# Patient Record
Sex: MALE | Race: BLACK OR AFRICAN AMERICAN | NOT HISPANIC OR LATINO | Employment: OTHER | URBAN - METROPOLITAN AREA
[De-identification: names, ages, dates, MRNs, and addresses within clinical notes are randomized per-mention and may not be internally consistent; named-entity substitution may affect disease eponyms.]

---

## 2017-01-20 ENCOUNTER — HOSPITAL ENCOUNTER (EMERGENCY)
Facility: HOSPITAL | Age: 77
Discharge: HOME | End: 2017-01-20
Attending: EMERGENCY MEDICINE
Payer: MEDICARE

## 2017-01-20 VITALS
BODY MASS INDEX: 26.66 KG/M2 | WEIGHT: 160 LBS | TEMPERATURE: 98 F | RESPIRATION RATE: 18 BRPM | SYSTOLIC BLOOD PRESSURE: 121 MMHG | HEIGHT: 65 IN | DIASTOLIC BLOOD PRESSURE: 83 MMHG | OXYGEN SATURATION: 97 % | HEART RATE: 92 BPM

## 2017-01-20 DIAGNOSIS — J44.1 COPD EXACERBATION: Primary | ICD-10-CM

## 2017-01-20 LAB
ALBUMIN SERPL BCP-MCNC: 3.7 G/DL
ALP SERPL-CCNC: 80 U/L
ALT SERPL W/O P-5'-P-CCNC: 41 U/L
ANION GAP SERPL CALC-SCNC: 8 MMOL/L
AST SERPL-CCNC: 41 U/L
BASOPHILS # BLD AUTO: 0 K/UL
BASOPHILS NFR BLD: 0 %
BILIRUB SERPL-MCNC: 0.3 MG/DL
BNP SERPL-MCNC: 14 PG/ML
BUN SERPL-MCNC: 34 MG/DL
CALCIUM SERPL-MCNC: 9 MG/DL
CHLORIDE SERPL-SCNC: 109 MMOL/L
CO2 SERPL-SCNC: 26 MMOL/L
CREAT SERPL-MCNC: 1.9 MG/DL
DIFFERENTIAL METHOD: ABNORMAL
EOSINOPHIL # BLD AUTO: 0 K/UL
EOSINOPHIL NFR BLD: 0 %
ERYTHROCYTE [DISTWIDTH] IN BLOOD BY AUTOMATED COUNT: 13.3 %
EST. GFR  (AFRICAN AMERICAN): 38 ML/MIN/1.73 M^2
EST. GFR  (NON AFRICAN AMERICAN): 33 ML/MIN/1.73 M^2
GLUCOSE SERPL-MCNC: 136 MG/DL
HCT VFR BLD AUTO: 37.5 %
HGB BLD-MCNC: 11.4 G/DL
INR PPP: 1
LYMPHOCYTES # BLD AUTO: 1.6 K/UL
LYMPHOCYTES NFR BLD: 29.3 %
MCH RBC QN AUTO: 24.4 PG
MCHC RBC AUTO-ENTMCNC: 30.4 %
MCV RBC AUTO: 80 FL
MONOCYTES # BLD AUTO: 0.5 K/UL
MONOCYTES NFR BLD: 8.4 %
NEUTROPHILS # BLD AUTO: 3.3 K/UL
NEUTROPHILS NFR BLD: 62.3 %
PLATELET # BLD AUTO: 160 K/UL
PMV BLD AUTO: 11.9 FL
POTASSIUM SERPL-SCNC: 4 MMOL/L
PROT SERPL-MCNC: 7.9 G/DL
PROTHROMBIN TIME: 10.4 SEC
RBC # BLD AUTO: 4.68 M/UL
SODIUM SERPL-SCNC: 143 MMOL/L
TROPONIN I SERPL DL<=0.01 NG/ML-MCNC: <0.006 NG/ML
WBC # BLD AUTO: 5.33 K/UL

## 2017-01-20 PROCEDURE — 85025 COMPLETE CBC W/AUTO DIFF WBC: CPT

## 2017-01-20 PROCEDURE — 84484 ASSAY OF TROPONIN QUANT: CPT

## 2017-01-20 PROCEDURE — 96374 THER/PROPH/DIAG INJ IV PUSH: CPT

## 2017-01-20 PROCEDURE — 99284 EMERGENCY DEPT VISIT MOD MDM: CPT | Mod: 25

## 2017-01-20 PROCEDURE — 94761 N-INVAS EAR/PLS OXIMETRY MLT: CPT

## 2017-01-20 PROCEDURE — 63600175 PHARM REV CODE 636 W HCPCS: Performed by: EMERGENCY MEDICINE

## 2017-01-20 PROCEDURE — 80053 COMPREHEN METABOLIC PANEL: CPT

## 2017-01-20 PROCEDURE — 25000242 PHARM REV CODE 250 ALT 637 W/ HCPCS: Performed by: EMERGENCY MEDICINE

## 2017-01-20 PROCEDURE — 85610 PROTHROMBIN TIME: CPT

## 2017-01-20 PROCEDURE — 83880 ASSAY OF NATRIURETIC PEPTIDE: CPT

## 2017-01-20 PROCEDURE — 94640 AIRWAY INHALATION TREATMENT: CPT

## 2017-01-20 RX ORDER — DOXYCYCLINE 100 MG/1
100 CAPSULE ORAL 2 TIMES DAILY
Qty: 20 CAPSULE | Refills: 0 | Status: SHIPPED | OUTPATIENT
Start: 2017-01-20 | End: 2017-01-30

## 2017-01-20 RX ORDER — ALBUTEROL SULFATE 0.83 MG/ML
SOLUTION RESPIRATORY (INHALATION)
Qty: 1 BOX | Refills: 11 | Status: SHIPPED | OUTPATIENT
Start: 2017-01-20 | End: 2019-05-06 | Stop reason: SDUPTHER

## 2017-01-20 RX ORDER — METHYLPREDNISOLONE SOD SUCC 125 MG
125 VIAL (EA) INJECTION
Status: COMPLETED | OUTPATIENT
Start: 2017-01-20 | End: 2017-01-20

## 2017-01-20 RX ORDER — IPRATROPIUM BROMIDE AND ALBUTEROL SULFATE 2.5; .5 MG/3ML; MG/3ML
3 SOLUTION RESPIRATORY (INHALATION)
Status: DISPENSED | OUTPATIENT
Start: 2017-01-20 | End: 2017-01-20

## 2017-01-20 RX ORDER — PREDNISONE 20 MG/1
40 TABLET ORAL DAILY
Qty: 10 TABLET | Refills: 0 | Status: SHIPPED | OUTPATIENT
Start: 2017-01-20 | End: 2017-01-25

## 2017-01-20 RX ADMIN — METHYLPREDNISOLONE SODIUM SUCCINATE 125 MG: 125 INJECTION, POWDER, FOR SOLUTION INTRAMUSCULAR; INTRAVENOUS at 01:01

## 2017-01-20 RX ADMIN — IPRATROPIUM BROMIDE AND ALBUTEROL SULFATE 3 ML: .5; 3 SOLUTION RESPIRATORY (INHALATION) at 12:01

## 2017-01-20 NOTE — ED AVS SNAPSHOT
[facility]  [place] [address]               Micah Laurent Jr.   2017 12:26 PM   ED    Description:  Male : 1940   Department:  [facility]           Your Care was Coordinated By:     Provider Role From To    Rakesh Hamm MD Attending Provider 17 1228 --      Reason for Visit     Shortness of Breath           Diagnoses this Visit        Comments    COPD exacerbation    -  Primary       ED Disposition     None           To Do List           Follow-up Information     Follow up with Chelsy Torrez MD. Schedule an appointment as soon as possible for a visit in 1 week.    Specialty:  Endocrinology    Why:  As needed    Contact information:    [address] [facility] [address]  [facility] [id number] [phone]         These Medications        Disp Refills Start End    albuterol (PROVENTIL) 2.5 mg /3 mL (0.083 %) nebulizer solution 1 Box 11 2017     Take 3mLs (2.5mg total) by nebulization TID x 4 days.  Then 3 mLs (2.5mg total) by nebulization every 6 (six) hours as needed.    Pharmacy: [facility]/pharmacy #[address] [place] [address] [facility] Ph #: [phone]       doxycycline (VIBRAMYCIN) 100 MG Cap 20 capsule 0 2017    Take 1 capsule (100 mg total) by mouth 2 (two) times daily. - Oral    Pharmacy: [facility]/pharmacy #[address] [place] [address] [place] [id number] [facility] Ph #: [phone]       predniSONE (DELTASONE) 20 MG tablet 10 tablet 0 2017    Take 2 tablets (40 mg total) by mouth once daily. - Oral    Pharmacy: [facility]/pharmacy #[address] [place], [place] [address] [facility] Ph #: [phone]         [facility]     [facility] -  Assistance  Registered nurses in the [facility] provide clinical advisement, health education, appointment booking, and other advisory services.  Call for this free service at [phone].             Medications            Message regarding Medications     Verify the changes and/or additions to your medication regime listed below are the same as discussed with your clinician today.  If any of these changes or additions are incorrect, please notify your healthcare provider.        START taking these NEW medications        Refills    doxycycline (VIBRAMYCIN) 100 MG Cap 0    Sig: Take 1 capsule (100 mg total) by mouth 2 (two) times daily.    Class: Print    Route: Oral    predniSONE (DELTASONE) 20 MG tablet 0    Sig: Take 2 tablets (40 mg total) by mouth once daily.    Class: Print    Route: Oral      These medications were administered today        Dose Freq    albuterol-ipratropium 2.5mg-0.5mg/3mL nebulizer solution 3 mL 3 mL Every 5 min    Sig: Take 3 mLs by nebulization every 5 (five) minutes.    Class: Normal    Route: Nebulization    methylPREDNISolone sodium succinate injection 125 mg 125 mg ED 1 Time    Sig: Inject 125 mg into the vein ED 1 Time.    Class: Normal    Route: Intravenous           Verify that the below list of medications is an accurate representation of the medications you are currently taking.  If none reported, the list may be blank. If incorrect, please contact your healthcare provider. Carry this list with you in case of emergency.           Current Medications     aspirin (ECOTRIN) 81 MG EC tablet Take 81 mg by mouth once daily.    fluticasone-salmeterol 250-50 mcg/dose (ADVAIR) 250-50 mcg/dose diskus inhaler Inhale 1 puff into the lungs 2 (two) times daily.    hydrALAZINE (APRESOLINE) 50 MG tablet Take 50 mg by mouth 3 (three) times daily.    hydrochlorothiazide (HYDRODIURIL) 25 MG tablet Take 1 tablet (25 mg total) by mouth once daily.    lisinopril (PRINIVIL,ZESTRIL) 40 MG tablet Take 1 tablet (40 mg total) by mouth once daily.    metoprolol succinate (TOPROL-XL) 25 MG 24 hr tablet Take 25 mg by mouth 2 (two) times daily.    albuterol (PROVENTIL) 2.5 mg /3 mL (0.083 %) nebulizer solution Take 3mLs (2.5mg  "total) by nebulization TID x 4 days.  Then 3 mLs (2.5mg total) by nebulization every 6 (six) hours as needed.    doxycycline (VIBRAMYCIN) 100 MG Cap Take 1 capsule (100 mg total) by mouth 2 (two) times daily.    predniSONE (DELTASONE) 20 MG tablet Take 2 tablets (40 mg total) by mouth once daily.           Clinical Reference Information           Your Vitals Were     BP Pulse Temp Resp Height Weight    120/76 (BP Location: Left arm, Patient Position: Sitting, BP Method: Automatic) 84 98.2 °F (36.8 °C) (Oral) 18 5' 5" (1.651 m) 72.6 kg (160 lb)    SpO2 BMI             100% 26.63 kg/m2         Allergies as of 1/20/2017     No Known Allergies      Immunizations Administered on Date of Encounter - 1/20/2017     None      ED Micro, Lab, POCT     Start Ordered       Status Ordering Provider    01/20/17 1232 01/20/17 1231  CBC auto differential  STAT      Final result     01/20/17 1232 01/20/17 1231  Comprehensive metabolic panel  STAT      Final result     01/20/17 1232 01/20/17 1231  Troponin I  STAT      Final result     01/20/17 1232 01/20/17 1231  Brain natriuretic peptide  STAT      Final result     01/20/17 1232 01/20/17 1231  Protime-INR  Once      Final result       ED Imaging Orders     Start Ordered       Status Ordering Provider    01/20/17 1232 01/20/17 1231  X-Ray Chest AP Portable  1 time imaging      Final result         Discharge Instructions         COPD Flare    You have had a flare-up of your COPD.  COPD, or chronic obstructive pulmonary disease, is a common lung disease. It causes your airways to become irritated and narrower. This makes it harder for you to breathe. Emphysema and chronic bronchitis are both types of COPD. This is a chronic condition, which means you always have it. Sometimes it gets worse. When this happens, it is called a flare-up.  Symptoms of COPD  People with COPD may have symptoms most of the time. In a flare-up, your symptoms get worse. These symptoms may mean you are having a " flare-up:  · Shortness of breath, shallow or rapid breathing, or wheezing that gets worse  · Lung infection  · Cough that gets worse  · More mucus, thicker mucus or mucus of a different color  · Tiredness, decreased energy, or trouble doing your usual activities  · Fever  · Chest tightness  · Your symptoms dont get better even when you use your usual medicines, inhalers, and nebulizer  · Trouble talking  · You feel confused  Causes of flare-ups  Unfortunately, a flare-up can happen even though you did everything right, and you followed your doctors instructions. Some causes of flare-ups are:  · Smoking or secondhand smoke  · Colds, the flu, or respiratory infections  · Air pollution  · Sudden change in the weather  · Dust, irritating chemicals, or strong fumes  · Not taking your medicines as prescribed  Home care  Here are some things you can do at home to treat a flare-up:  · Try not to panic. This makes it harder to breathe, and keeps you from doing the right things.  · Dont smoke or be around others who are smoking.  · Try to drink more fluids than usual during a flare-up, unless your doctor has told you not to because of heart and kidney problems. More fluids can help loosen the mucus.  · Use your inhalers and nebulizer, if you have one, as you have been told to.  · If you were given antibiotics, take them until they are used up or your doctor tells you to stop. Its important to finish the antibiotics, even though you feel better. This will make sure the infection has cleared.  · If you were given prednisone or another steroid, finish it even if you feel better.  Preventing a flare-up  Even though flare-ups happen, the best way to treat one is to prevent it before it starts. Here are some pointers:  · Dont smoke or be around others who are smoking.  · Take your medicines as you have been told.  · Talk with your doctor about getting a flu shot every year. Also find out if you need a pneumonia shot.  · If  there is a weather advisory warning to stay indoors, try to stay inside when possible.  · Try to eat healthy and get plenty of sleep.  · Try to avoid things that usually set you off, like dust, chemical fumes, hairsprays, or strong perfumes.  Follow-up care  Follow up with your healthcare provider.  If a culture was done, you will be told if your treatment needs to be changed. You can call as directed for the results.  If X-rays were done, and a radiologist had not seen them while you were there, they will be reviewed. You will be told if there is a change in the reading, especially if it affects your treatment.  Call 911  Call 911 if any of these occur:  · You have trouble breathing  · You feel confused or its difficult to wake you up  · You faint or lose consciousness  · You have a rapid heart rate  · You have new pain in your chest, arm, shoulder, neck or upper back  When to seek medical advice  Call your healthcare provider right away if any of these occur:  · Wheezing or shortness of breath gets worse  · You need to use your inhalers more often than usual without relief  · Fever of 100.4°F (38ºC) or higher, or as directed  · Coughing up lots of dark-colored or bloody sputum (mucus)  · Chest pain with each breath  · You do not start to get better within 24 hours  · Swelling or your ankles gets worse  · Dizziness or weakness     © 6552-6910 [employer]. [address] [place] [address]. All rights reserved. This information is not intended as a substitute for professional medical care. Always follow your healthcare professional's instructions.          [facility] Sign-Up     Activating your [facility] account is as easy as 1-2-3!     1) Visit [email or web address], select Sign Up Now, enter this activation code and your date of birth, then select Next.  [email or web address]  Expires: 3/6/2017  2:01 PM      2) Create a username and password to use when you visit [facility] in the future and select a  security question in case you lose your password and select Next.    3) Enter your e-mail address and click Sign Up!    Additional Information  If you have questions, please e-mail [email or web address] or call [phone] to talk to our [facility] staff. Remember, [facility] is NOT to be used for urgent needs. For medical emergencies, dial 911.         Smoking Cessation     If you would like to quit smoking:   You may be eligible for free services if you are a [place] resident and started smoking cigarettes before September 1, 1988.  Call the Smoking Cessation Trust (SCT) toll free at (770) 465-0408 or (622) 785-6856.   Call 1-477-QUIT-NOW if you do not meet the above criteria.             [facility] complies with applicable Federal civil rights laws and does not discriminate on the basis of race, color, national origin, age, disability, or sex.        Language Assistance Services     ATTENTION: Language assistance services are available, free of charge. Please call [phone].      ATENCIÓN: Si habla español, tiene a rock disposición servicios gratuitos de asistencia lingüística. Llame al [phone].     CHÚ Ý: N?u b?n nói Ti?ng Vi?t, có các d?ch v? h? tr? ngôn ng? mi?n phí dành cho b?n. G?i s? [phone].

## 2017-01-20 NOTE — ED PROVIDER NOTES
Encounter Date: 1/20/2017    SCRIBE #1 NOTE: I, Nayely Dutton, am scribing for, and in the presence of,  Rakesh Hamm MD. I have scribed the following portions of the note - Other sections scribed: HPI, ROS.       History     Chief Complaint   Patient presents with    Shortness of Breath     with cough x1 week, getting worse     Review of patient's allergies indicates:  No Known Allergies  HPI Comments: CC: Shortness of Breath    HPI: 75 y/o male with a PMHx of asthma, HTN, COPD, and atrial fibrillation presents to the ED c/o progressively worsening shortness of breath with an associated non productive cough for 7 days. Patient reports he has felt like this before, but it has never been this bad. No prior treatment. No alleviating or exacerbating factors. Patient denies leg swelling, rhinorrhea, and other associated symptoms.           The history is provided by the patient.     Past Medical History   Diagnosis Date    Asthma     Atrial fibrillation     COPD (chronic obstructive pulmonary disease)     Hypertension      Past Medical History Pertinent Negatives   Diagnosis Date Noted    Cancer 8/15/2014    CHF (congestive heart failure) 8/15/2014    Encounter for blood transfusion 8/15/2014    Stroke 8/15/2014     History reviewed. No pertinent past surgical history.  History reviewed. No pertinent family history.  Social History   Substance Use Topics    Smoking status: Former Smoker     Packs/day: 1.50     Years: 20.00    Smokeless tobacco: Never Used    Alcohol use No     Review of Systems   Constitutional: Negative for fever.   HENT: Negative for rhinorrhea.    Eyes: Negative for pain.   Respiratory: Positive for cough and shortness of breath.    Cardiovascular: Negative for leg swelling.   Gastrointestinal: Negative for diarrhea, nausea and vomiting.   Genitourinary: Negative for dysuria.   Musculoskeletal: Negative for back pain.   Skin: Negative for rash.   Neurological: Negative for dizziness  and headaches.       Physical Exam   Initial Vitals   BP Pulse Resp Temp SpO2   01/20/17 1225 01/20/17 1225 01/20/17 1225 01/20/17 1225 01/20/17 1225   128/82 106 22 98.2 °F (36.8 °C) 96 %     Physical Exam    Nursing note and vitals reviewed.  Constitutional: He appears well-developed and well-nourished.   HENT:   Head: Normocephalic and atraumatic.   Eyes: EOM are normal. Pupils are equal, round, and reactive to light.   Cardiovascular: Normal rate, regular rhythm, normal heart sounds and intact distal pulses.   Pulmonary/Chest: No respiratory distress. He has wheezes. He has rhonchi. He has no rales. He exhibits no tenderness.   Abdominal: Soft. Bowel sounds are normal. He exhibits no distension. There is no tenderness.   Musculoskeletal: Normal range of motion. He exhibits no edema.   Neurological: He is alert and oriented to person, place, and time.   Skin: Skin is warm and dry.         ED Course   Procedures  Labs Reviewed   CBC W/ AUTO DIFFERENTIAL - Abnormal; Notable for the following:        Result Value    Hemoglobin 11.4 (*)     Hematocrit 37.5 (*)     MCV 80 (*)     MCH 24.4 (*)     MCHC 30.4 (*)     All other components within normal limits   COMPREHENSIVE METABOLIC PANEL - Abnormal; Notable for the following:     Glucose 136 (*)     BUN, Bld 34 (*)     Creatinine 1.9 (*)     AST 41 (*)     eGFR if  38 (*)     eGFR if non  33 (*)     All other components within normal limits   TROPONIN I   B-TYPE NATRIURETIC PEPTIDE   PROTIME-INR            MEDICAL DECISION MAKING    This is an emergent evaluation of the patient's symptoms.  Differential diagnoses includes: Pneumonia, bronchitis, COPD exacerbation, congestive heart failure with pulmonary edema. Room air pulse oximetry interpreted by me as normal. A decision was made to obtain the patient's old medical records.  If they were available, these records were reviewed.  Significant findings include prior evaluation for COPD.   Chest x-ray does not show any evidence to suggest acute intrathoracic abnormalities such as pneumonia or decompensated heart failure.  No leukocytosis or evidence of anemia.  Metabolic panel significant for chronic renal insufficiency.  Negative cardiac markers.  Symptoms improved after treatment.  Suspect acute exacerbation of chronic lung disease.  Discharge with doxycycline, steroid burst, and Albuterol treatments for COPD exacerbation.               Scribe Attestation:   Scribe #1: I performed the above scribed service and the documentation accurately describes the services I performed. I attest to the accuracy of the note.    Attending Attestation:           Physician Attestation for Scribe:  Physician Attestation Statement for Scribe #1: I, Rakesh Hamm MD, reviewed documentation, as scribed by Nayely Dutton in my presence, and it is both accurate and complete.                 ED Course     Clinical Impression:   The encounter diagnosis was COPD exacerbation.          Rakesh Hamm MD  01/20/17 1713

## 2017-01-20 NOTE — DISCHARGE INSTRUCTIONS
COPD Flare    You have had a flare-up of your COPD.  COPD, or chronic obstructive pulmonary disease, is a common lung disease. It causes your airways to become irritated and narrower. This makes it harder for you to breathe. Emphysema and chronic bronchitis are both types of COPD. This is a chronic condition, which means you always have it. Sometimes it gets worse. When this happens, it is called a flare-up.  Symptoms of COPD  People with COPD may have symptoms most of the time. In a flare-up, your symptoms get worse. These symptoms may mean you are having a flare-up:  · Shortness of breath, shallow or rapid breathing, or wheezing that gets worse  · Lung infection  · Cough that gets worse  · More mucus, thicker mucus or mucus of a different color  · Tiredness, decreased energy, or trouble doing your usual activities  · Fever  · Chest tightness  · Your symptoms dont get better even when you use your usual medicines, inhalers, and nebulizer  · Trouble talking  · You feel confused  Causes of flare-ups  Unfortunately, a flare-up can happen even though you did everything right, and you followed your doctors instructions. Some causes of flare-ups are:  · Smoking or secondhand smoke  · Colds, the flu, or respiratory infections  · Air pollution  · Sudden change in the weather  · Dust, irritating chemicals, or strong fumes  · Not taking your medicines as prescribed  Home care  Here are some things you can do at home to treat a flare-up:  · Try not to panic. This makes it harder to breathe, and keeps you from doing the right things.  · Dont smoke or be around others who are smoking.  · Try to drink more fluids than usual during a flare-up, unless your doctor has told you not to because of heart and kidney problems. More fluids can help loosen the mucus.  · Use your inhalers and nebulizer, if you have one, as you have been told to.  · If you were given antibiotics, take them until they are used up or your doctor tells you  to stop. Its important to finish the antibiotics, even though you feel better. This will make sure the infection has cleared.  · If you were given prednisone or another steroid, finish it even if you feel better.  Preventing a flare-up  Even though flare-ups happen, the best way to treat one is to prevent it before it starts. Here are some pointers:  · Dont smoke or be around others who are smoking.  · Take your medicines as you have been told.  · Talk with your doctor about getting a flu shot every year. Also find out if you need a pneumonia shot.  · If there is a weather advisory warning to stay indoors, try to stay inside when possible.  · Try to eat healthy and get plenty of sleep.  · Try to avoid things that usually set you off, like dust, chemical fumes, hairsprays, or strong perfumes.  Follow-up care  Follow up with your healthcare provider.  If a culture was done, you will be told if your treatment needs to be changed. You can call as directed for the results.  If X-rays were done, and a radiologist had not seen them while you were there, they will be reviewed. You will be told if there is a change in the reading, especially if it affects your treatment.  Call 911  Call 911 if any of these occur:  · You have trouble breathing  · You feel confused or its difficult to wake you up  · You faint or lose consciousness  · You have a rapid heart rate  · You have new pain in your chest, arm, shoulder, neck or upper back  When to seek medical advice  Call your healthcare provider right away if any of these occur:  · Wheezing or shortness of breath gets worse  · You need to use your inhalers more often than usual without relief  · Fever of 100.4°F (38ºC) or higher, or as directed  · Coughing up lots of dark-colored or bloody sputum (mucus)  · Chest pain with each breath  · You do not start to get better within 24 hours  · Swelling or your ankles gets worse  · Dizziness or weakness     © 2576-8969 [employer]. [address] [place] [address], [place] [address]. All rights reserved. This information is not intended as a substitute for professional medical care. Always follow your healthcare professional's instructions.

## 2017-01-20 NOTE — ED TRIAGE NOTES
"Cough and pain in ribs x 1 week.  SOB constant x 2 days getting worse.  "Breathing tx not helping" per pt.  Denies chest pains, n/v/d, fever.  Cough is occasionally productive yellow in color.  "

## 2017-05-16 DIAGNOSIS — N18.9 CKD (CHRONIC KIDNEY DISEASE): Primary | ICD-10-CM

## 2017-05-25 ENCOUNTER — HOSPITAL ENCOUNTER (OUTPATIENT)
Dept: RADIOLOGY | Facility: HOSPITAL | Age: 77
Discharge: HOME | End: 2017-05-25
Attending: INTERNAL MEDICINE
Payer: MEDICARE

## 2017-05-25 DIAGNOSIS — N18.9 CKD (CHRONIC KIDNEY DISEASE): ICD-10-CM

## 2017-05-25 PROCEDURE — 76700 US EXAM ABDOM COMPLETE: CPT | Mod: TC

## 2017-05-25 PROCEDURE — 76700 US EXAM ABDOM COMPLETE: CPT | Mod: 26,,, | Performed by: RADIOLOGY

## 2017-08-19 ENCOUNTER — HOSPITAL ENCOUNTER (EMERGENCY)
Facility: HOSPITAL | Age: 77
Discharge: HOME | End: 2017-08-19
Attending: EMERGENCY MEDICINE
Payer: MEDICARE

## 2017-08-19 VITALS
DIASTOLIC BLOOD PRESSURE: 81 MMHG | WEIGHT: 160 LBS | RESPIRATION RATE: 20 BRPM | HEIGHT: 66 IN | SYSTOLIC BLOOD PRESSURE: 142 MMHG | OXYGEN SATURATION: 96 % | HEART RATE: 62 BPM | TEMPERATURE: 99 F | BODY MASS INDEX: 25.71 KG/M2

## 2017-08-19 DIAGNOSIS — R07.89 CHEST PAIN, NON-CARDIAC: ICD-10-CM

## 2017-08-19 DIAGNOSIS — R06.02 SHORTNESS OF BREATH: Primary | ICD-10-CM

## 2017-08-19 DIAGNOSIS — R07.9 CHEST PAIN: ICD-10-CM

## 2017-08-19 LAB
ALBUMIN SERPL BCP-MCNC: 3.8 G/DL
ALP SERPL-CCNC: 85 U/L
ALT SERPL W/O P-5'-P-CCNC: 23 U/L
ANION GAP SERPL CALC-SCNC: 7 MMOL/L
APTT BLDCRRT: 30.4 SEC
AST SERPL-CCNC: 36 U/L
BASOPHILS # BLD AUTO: 0 K/UL
BASOPHILS NFR BLD: 0 %
BILIRUB SERPL-MCNC: 0.4 MG/DL
BNP SERPL-MCNC: 84 PG/ML
BUN SERPL-MCNC: 23 MG/DL
CALCIUM SERPL-MCNC: 9.3 MG/DL
CHLORIDE SERPL-SCNC: 106 MMOL/L
CO2 SERPL-SCNC: 28 MMOL/L
CREAT SERPL-MCNC: 1.7 MG/DL
DIFFERENTIAL METHOD: ABNORMAL
EOSINOPHIL # BLD AUTO: 0 K/UL
EOSINOPHIL NFR BLD: 0 %
ERYTHROCYTE [DISTWIDTH] IN BLOOD BY AUTOMATED COUNT: 13.6 %
EST. GFR  (AFRICAN AMERICAN): 44 ML/MIN/1.73 M^2
EST. GFR  (NON AFRICAN AMERICAN): 38 ML/MIN/1.73 M^2
GLUCOSE SERPL-MCNC: 97 MG/DL
HCT VFR BLD AUTO: 39.1 %
HGB BLD-MCNC: 12.1 G/DL
INR PPP: 1
LYMPHOCYTES # BLD AUTO: 1.4 K/UL
LYMPHOCYTES NFR BLD: 30.5 %
MAGNESIUM SERPL-MCNC: 1.9 MG/DL
MCH RBC QN AUTO: 24.2 PG
MCHC RBC AUTO-ENTMCNC: 30.9 G/DL
MCV RBC AUTO: 78 FL
MONOCYTES # BLD AUTO: 0.5 K/UL
MONOCYTES NFR BLD: 10.5 %
NEUTROPHILS # BLD AUTO: 2.7 K/UL
NEUTROPHILS NFR BLD: 58.8 %
PLATELET # BLD AUTO: 165 K/UL
PMV BLD AUTO: 12.4 FL
POCT GLUCOSE: 96 MG/DL (ref 70–110)
POTASSIUM SERPL-SCNC: 4.4 MMOL/L
PROT SERPL-MCNC: 8.3 G/DL
PROTHROMBIN TIME: 10.1 SEC
RBC # BLD AUTO: 4.99 M/UL
SODIUM SERPL-SCNC: 141 MMOL/L
TROPONIN I SERPL DL<=0.01 NG/ML-MCNC: 0.01 NG/ML
WBC # BLD AUTO: 4.55 K/UL

## 2017-08-19 PROCEDURE — 99284 EMERGENCY DEPT VISIT MOD MDM: CPT | Mod: 25

## 2017-08-19 PROCEDURE — 85025 COMPLETE CBC W/AUTO DIFF WBC: CPT

## 2017-08-19 PROCEDURE — 84484 ASSAY OF TROPONIN QUANT: CPT

## 2017-08-19 PROCEDURE — 93005 ELECTROCARDIOGRAM TRACING: CPT

## 2017-08-19 PROCEDURE — 83735 ASSAY OF MAGNESIUM: CPT

## 2017-08-19 PROCEDURE — 85730 THROMBOPLASTIN TIME PARTIAL: CPT

## 2017-08-19 PROCEDURE — 25000003 PHARM REV CODE 250: Performed by: EMERGENCY MEDICINE

## 2017-08-19 PROCEDURE — 85610 PROTHROMBIN TIME: CPT

## 2017-08-19 PROCEDURE — 94644 CONT INHLJ TX 1ST HOUR: CPT

## 2017-08-19 PROCEDURE — 94761 N-INVAS EAR/PLS OXIMETRY MLT: CPT

## 2017-08-19 PROCEDURE — 93010 ELECTROCARDIOGRAM REPORT: CPT | Mod: ,,, | Performed by: INTERNAL MEDICINE

## 2017-08-19 PROCEDURE — 83880 ASSAY OF NATRIURETIC PEPTIDE: CPT

## 2017-08-19 PROCEDURE — 27000221 HC OXYGEN, UP TO 24 HOURS

## 2017-08-19 PROCEDURE — 25000242 PHARM REV CODE 250 ALT 637 W/ HCPCS: Performed by: EMERGENCY MEDICINE

## 2017-08-19 PROCEDURE — 82962 GLUCOSE BLOOD TEST: CPT

## 2017-08-19 PROCEDURE — 80053 COMPREHEN METABOLIC PANEL: CPT

## 2017-08-19 RX ORDER — ALBUTEROL SULFATE 2.5 MG/.5ML
5 SOLUTION RESPIRATORY (INHALATION)
Status: COMPLETED | OUTPATIENT
Start: 2017-08-19 | End: 2017-08-19

## 2017-08-19 RX ORDER — ACETAMINOPHEN 500 MG
1000 TABLET ORAL
Status: COMPLETED | OUTPATIENT
Start: 2017-08-19 | End: 2017-08-19

## 2017-08-19 RX ORDER — LISINOPRIL 40 MG/1
40 TABLET ORAL DAILY
Status: ON HOLD | COMMUNITY
End: 2018-05-18 | Stop reason: HOSPADM

## 2017-08-19 RX ORDER — PREDNISONE 20 MG/1
40 TABLET ORAL DAILY
Qty: 6 TABLET | Refills: 0 | Status: SHIPPED | OUTPATIENT
Start: 2017-08-19 | End: 2017-08-22

## 2017-08-19 RX ORDER — IPRATROPIUM BROMIDE AND ALBUTEROL SULFATE 2.5; .5 MG/3ML; MG/3ML
3 SOLUTION RESPIRATORY (INHALATION)
Status: COMPLETED | OUTPATIENT
Start: 2017-08-19 | End: 2017-08-19

## 2017-08-19 RX ORDER — HYDROCHLOROTHIAZIDE 25 MG/1
25 TABLET ORAL DAILY
COMMUNITY
End: 2018-05-18

## 2017-08-19 RX ADMIN — ACETAMINOPHEN 1000 MG: 500 TABLET ORAL at 11:08

## 2017-08-19 RX ADMIN — ALBUTEROL SULFATE 5 MG: 2.5 SOLUTION RESPIRATORY (INHALATION) at 09:08

## 2017-08-19 RX ADMIN — IPRATROPIUM BROMIDE AND ALBUTEROL SULFATE 3 ML: .5; 3 SOLUTION RESPIRATORY (INHALATION) at 09:08

## 2017-08-19 NOTE — ED TRIAGE NOTES
Pt states having chest pains that woke pt up at 0300 this morning.  States that pain is to the middle part of chest with pain to the back of neck.  Pt states having COPD and not on oxygen at home.  Pt states having shortness of breath since pain began this morning.    Pt rates chest pain as 5.

## 2017-08-19 NOTE — ED PROVIDER NOTES
Encounter Date: 8/19/2017    SCRIBE #1 NOTE: I, Yury Lucille, am scribing for, and in the presence of, Wili Hardin MD. Other sections scribed: HPI, ROS.       History     Chief Complaint   Patient presents with    Shortness of Breath     started this morning, with left sided chest pain and headache    Chest Pain     CC: Chest Pain, Shortness of Breath  HPI: This 76 y.o. male with COPD, asthma, HTN and Hx of A-fib, tobacco use presents to the ED c/o shortness of breath that woke him from his sleep at 0300 this morning. Pt reports sharp pain to L parasternal border that lasted for a few seconds when he woke; he reports having 3-4 episodes of this pain since. Pt also reports palpitations, neck pain, occipital HA, R medial thigh pain when he woke; all of these Sx have since resolved. He has had a non-productive cough for the past few days. He reports that his SOB feels similar to his COPD, but he denies any prior Hx of this morning's chest pain; his shortness of breath is worse with exercise. He denies fever, chills, abdominal pain, N/V, arm pain, back pain.        The history is provided by the patient.     Review of patient's allergies indicates:  No Known Allergies  Past Medical History:   Diagnosis Date    Asthma     Atrial fibrillation     COPD (chronic obstructive pulmonary disease)     Hypertension      History reviewed. No pertinent surgical history.  History reviewed. No pertinent family history.  Social History   Substance Use Topics    Smoking status: Former Smoker     Packs/day: 1.50     Years: 20.00    Smokeless tobacco: Never Used    Alcohol use No     Review of Systems   Constitutional: Negative for chills and fever.   HENT: Negative for congestion, rhinorrhea and sore throat.    Eyes: Negative for pain and visual disturbance.   Respiratory: Positive for cough (nonproductive) and shortness of breath.    Cardiovascular: Positive for chest pain (L parasternal, resolved) and palpitations.    Gastrointestinal: Negative for abdominal pain, nausea and vomiting.   Genitourinary: Negative for dysuria and flank pain.   Musculoskeletal: Positive for myalgias (R medial thigh, resolved) and neck pain (resolved).        (-) arm pain   Skin: Negative for rash and wound.   Neurological: Positive for headaches (occipital, resolved). Negative for syncope.       Physical Exam     Initial Vitals [08/19/17 0912]   BP Pulse Resp Temp SpO2   128/74 70 20 98.7 °F (37.1 °C) 97 %      MAP       92         Physical Exam  The patient was examined specifically for the following:   General:No significant distress, Good color, Warm and dry. Head and neck:Scalp atraumatic, Neck supple. Neurological:Appropriate conversation, Gross motor deficits. Eyes:Conjugate gaze, Clear corneas. ENT: No epistaxis. Cardiac: Regular rate and rhythm, Grossly normal heart tones. Pulmonary: Wheezing, Rales. Gastrointestinal: Abdominal tenderness, Abdominal distention. Musculoskeletal: Extremity deformity, Apparent pain with range of motion of the joints. Skin: Rash.   The findings on examination were normal.  The lungs are clear and free wheezing rales rubs and rhonchi.  Heart tones are normal.  The patient is a regular rate and rhythm.  There is no tachycardia.  There is no evidence of respiratory distress.  The abdomen is nontender.  The legs are nontender.  ED Course   Procedures  Labs Reviewed   COMPREHENSIVE METABOLIC PANEL - Abnormal; Notable for the following:        Result Value    Creatinine 1.7 (*)     Anion Gap 7 (*)     eGFR if  44 (*)     eGFR if non  38 (*)     All other components within normal limits   CBC W/ AUTO DIFFERENTIAL - Abnormal; Notable for the following:     Hemoglobin 12.1 (*)     Hematocrit 39.1 (*)     MCV 78 (*)     MCH 24.2 (*)     MCHC 30.9 (*)     All other components within normal limits   APTT   PROTIME-INR   TROPONIN I   B-TYPE NATRIURETIC PEPTIDE   MAGNESIUM   POCT GLUCOSE      EKG Readings: (Independently Interpreted)   This patient is in a normal sinus rhythm with a heart rate of 69.  The LA QRS and QT intervals are normal.  There are no significant ST segment or T-wave changes.  There is no evidence of acute myocardial infarction or malignant arrhythmia.  The patient does have left ventricular hypertrophy.       X-Rays:   Independently Interpreted Readings:   Other Readings:  Chest x-ray fails to reveal pneumothorax pneumonia pleural effusion.    Medical decision making: Given the above this patient presents complaining of shortness of breath.  He does have a history of COPD.  The patient complains of little sharp sticking chest pains, well localized left lower parasternal border occurring in episodes lasting seconds, 4 times since onset at 3 AM this morning.  The patient's troponin is negative.  I doubt myocardial infarction.  This does not sound like cardiac pain.  The patient was treated with Atrovent and albuterol the emergency room and shortness of breath resolved.  Pulse ox on room air with walking was 97%.  Pulse ox at rest without oxygen was 99%.  I could find no significant evidence of significant respiratory disease.  I presume this patient had some mild bronchospasm.  I see no evidence of congestive heart failure pneumothorax pneumonia pleural effusion.  I consider pulmonary embolus but the patient is not tachycardic and again is now asymptomatic after treatment for bronchospastic disease.  I will discharge and outpatient evaluation and treatment.              Scribe Attestation:   Scribe #1: I performed the above scribed service and the documentation accurately describes the services I performed. I attest to the accuracy of the note.    Attending Attestation:           Physician Attestation for Scribe:  Physician Attestation Statement for Scribe #1: I, Rakesh Montes MD, reviewed documentation, as scribed by Yury Adkins in my presence, and it is both accurate and  complete.                 ED Course     Clinical Impression:   The primary encounter diagnosis was Shortness of breath. Diagnoses of Chest pain and Chest pain, non-cardiac were also pertinent to this visit.                           Wili Hardin MD  08/20/17 1952

## 2017-08-19 NOTE — DISCHARGE INSTRUCTIONS
Please continue your usual medicines.  Please return immediately if you get worse or if new problems develop.  Please make an appointment to see your primary care doctor this week.  Rest.

## 2017-10-17 NOTE — ED NOTES
After Visit Summary   10/17/2017    Jose Angel Cha    MRN: [id number]           Patient Information     Date Of Birth          1955        Visit Information        Provider Department      10/17/2017 8:15 AM ER ALLERGY SHOTS [facility]        Today's Diagnoses     Toxic effect of venom of bees, unintentional, subsequent encounter    -  1       Follow-ups after your visit        Your next 10 appointments already scheduled     Nov 14, 2017  8:15 AM CST   Nurse Only with ER ALLERGY SHOTS   [facility] ([facility]    [address]  [place] [address] [phone]            Dec 12, 2017  8:15 AM CST   Nurse Only with ER ALLERGY SHOTS   [facility] ([facility])    [address]  [place] [id number] [phone]              Who to contact     If you have questions or need follow up information about today's clinic visit or your schedule please contact [facility] directly at [phone].  Normal or non-critical lab and imaging results will be communicated to you by mPurahart, letter or phone within 4 business days after the clinic has received the results. If you do not hear from us within 7 days, please contact the clinic through mPurahart or phone. If you have a critical or abnormal lab result, we will notify you by phone as soon as possible.  Submit refill requests through [facility] or call your pharmacy and they will forward the refill request to us. Please allow 3 business days for your refill to be completed.          Additional Information About Your Visit        MyChart Information     [facility] gives you secure access to your electronic health record. If you see a primary care provider, you can also send messages to your care team and make appointments. If you have questions, please call your primary care clinic.  If you do not have a primary care provider, please  Pt ambulated down hallway with portable pulse ox in place.  Oxygen sat 97% with activity.  No shortness of breath upon activity or at rest.   call [phone] and they will assist you.        Care EveryWhere ID     This is your Care EveryWhere ID. This could be used by other organizations to access your [facility] medical records  [id number]         Blood Pressure from Last 3 Encounters:   10/06/17 126/86   09/25/17 138/90   09/08/17 (!) 138/103    Weight from Last 3 Encounters:   10/06/17 211 lb (95.7 kg)   09/25/17 211 lb (95.7 kg)   09/08/17 205 lb (93 kg)              We Performed the Following     Allergy Shot: Two or more injections        Primary Care Provider Office Phone # Fax #    Shari Paredes [phone]       [address] [place] [id number]        Equal Access to Services     ANJEL TAFOYA : Hadii aad ku hadasho Soreji, waaxda luqadaha, qaybta kaalmada adeegyada, nico guillen . So [facility] [phone].    ATENCIÓN: Si habla español, tiene a armas disposición servicios gratuitos de asistencia lingüística. Llame al [phone].    We comply with applicable federal civil rights laws and [place] laws. We do not discriminate on the basis of race, color, national origin, age, disability, sex, sexual orientation, or gender identity.            Thank you!     Thank you for choosing [facility]  for your care. Our goal is always to provide you with excellent care. Hearing back from our patients is one way we can continue to improve our services. Please take a few minutes to complete the written survey that you may receive in the mail after your visit with us. Thank you!             Your Updated Medication List - Protect others around you: Learn how to safely use, store and throw away your medicines at www.disposemymeds.org.          This list is accurate as of: 10/17/17  9:00 AM.  Always use your most recent med list.                   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, accidental or unintentional, subsequent encounter       * ALLERGEN IMMUNOTHERAPY PRESCRIPTION     13 mL    Name of Mix: Mix #2  Wasp Wasp Venom 100 mcg/mL HS 13 ml Diluent: HSA qs to 13ml    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, accidental or unintentional, subsequent encounter       amLODIPine 2.5 MG tablet    NORVASC    90 tablet    Take 1 tablet (2.5 mg) by mouth daily    Essential hypertension       aspirin 81 MG tablet     0    ONE DAILY    Other and unspecified hyperlipidemia, Family history of ischemic heart disease       EPINEPHrine 0.3 MG/0.3ML injection 2-pack    EPIPEN 2-MIGUELINA    2 each    Inject 0.3 mLs (0.3 mg) into the muscle once as needed for anaphylaxis    Allergy to bee sting       MULTIVITAL PO      Take 1 tablet by mouth daily Reported on 3/22/2017        omeprazole 20 MG CR capsule    priLOSEC    90 capsule    TAKE ONE CAPSULE BY MOUTH EVERY DAY (TAKE 30 TO 60 MINUTES BEFORE A MEAL)    Gastroesophageal reflux disease without esophagitis       polyethylene glycol powder    MIRALAX    510 g    Take 17 g (1 capful) by mouth daily    Chronic constipation       STATIN NOT PRESCRIBED (INTENTIONAL)      by Other route continuous prn Reported on 4/6/2017    Mitral valve disorders(424.0), Hyperlipidemia LDL goal <100       temazepam 15 MG capsule    RESTORIL    30 capsule    Take 1 capsule (15 mg) by mouth nightly as needed for sleep    Anxiety       * Notice:  This list has 4 medication(s) that are the same as other medications prescribed for you. Read the directions carefully, and ask your doctor or other care provider to review them with you.

## 2018-01-23 PROCEDURE — 93010 ELECTROCARDIOGRAM REPORT: CPT | Mod: ,,, | Performed by: INTERNAL MEDICINE

## 2018-01-23 PROCEDURE — 99284 EMERGENCY DEPT VISIT MOD MDM: CPT | Mod: 25

## 2018-01-23 PROCEDURE — 93005 ELECTROCARDIOGRAM TRACING: CPT

## 2018-01-24 ENCOUNTER — HOSPITAL ENCOUNTER (EMERGENCY)
Facility: HOSPITAL | Age: 78
Discharge: HOME | End: 2018-01-24
Attending: EMERGENCY MEDICINE
Payer: MEDICARE

## 2018-01-24 VITALS
RESPIRATION RATE: 18 BRPM | TEMPERATURE: 98 F | SYSTOLIC BLOOD PRESSURE: 165 MMHG | OXYGEN SATURATION: 96 % | WEIGHT: 160 LBS | HEIGHT: 67 IN | HEART RATE: 88 BPM | BODY MASS INDEX: 25.11 KG/M2 | DIASTOLIC BLOOD PRESSURE: 116 MMHG

## 2018-01-24 DIAGNOSIS — R07.9 CHEST PAIN: Primary | ICD-10-CM

## 2018-01-24 DIAGNOSIS — J44.1 CHRONIC OBSTRUCTIVE PULMONARY DISEASE WITH ACUTE EXACERBATION: ICD-10-CM

## 2018-01-24 LAB
ALBUMIN SERPL BCP-MCNC: 4.1 G/DL
ALP SERPL-CCNC: 92 U/L
ALT SERPL W/O P-5'-P-CCNC: 34 U/L
ANION GAP SERPL CALC-SCNC: 11 MMOL/L
AST SERPL-CCNC: 35 U/L
BASOPHILS # BLD AUTO: 0 K/UL
BASOPHILS NFR BLD: 0 %
BILIRUB SERPL-MCNC: 0.5 MG/DL
BUN SERPL-MCNC: 24 MG/DL
CALCIUM SERPL-MCNC: 9.8 MG/DL
CHLORIDE SERPL-SCNC: 105 MMOL/L
CO2 SERPL-SCNC: 28 MMOL/L
CREAT SERPL-MCNC: 1.6 MG/DL
DIFFERENTIAL METHOD: ABNORMAL
EOSINOPHIL # BLD AUTO: 0 K/UL
EOSINOPHIL NFR BLD: 0 %
ERYTHROCYTE [DISTWIDTH] IN BLOOD BY AUTOMATED COUNT: 13.8 %
EST. GFR  (AFRICAN AMERICAN): 47 ML/MIN/1.73 M^2
EST. GFR  (NON AFRICAN AMERICAN): 41 ML/MIN/1.73 M^2
GLUCOSE SERPL-MCNC: 84 MG/DL
HCT VFR BLD AUTO: 36.5 %
HGB BLD-MCNC: 11.3 G/DL
LYMPHOCYTES # BLD AUTO: 1.7 K/UL
LYMPHOCYTES NFR BLD: 32.8 %
MCH RBC QN AUTO: 24.4 PG
MCHC RBC AUTO-ENTMCNC: 31 G/DL
MCV RBC AUTO: 79 FL
MONOCYTES # BLD AUTO: 0.6 K/UL
MONOCYTES NFR BLD: 11.7 %
NEUTROPHILS # BLD AUTO: 2.9 K/UL
NEUTROPHILS NFR BLD: 55.3 %
PLATELET # BLD AUTO: 160 K/UL
PMV BLD AUTO: 11.5 FL
POTASSIUM SERPL-SCNC: 4.5 MMOL/L
PROT SERPL-MCNC: 8.6 G/DL
RBC # BLD AUTO: 4.64 M/UL
SODIUM SERPL-SCNC: 144 MMOL/L
TROPONIN I SERPL DL<=0.01 NG/ML-MCNC: <0.006 NG/ML
WBC # BLD AUTO: 5.21 K/UL

## 2018-01-24 PROCEDURE — 94761 N-INVAS EAR/PLS OXIMETRY MLT: CPT

## 2018-01-24 PROCEDURE — 80053 COMPREHEN METABOLIC PANEL: CPT

## 2018-01-24 PROCEDURE — 84484 ASSAY OF TROPONIN QUANT: CPT

## 2018-01-24 PROCEDURE — 94640 AIRWAY INHALATION TREATMENT: CPT

## 2018-01-24 PROCEDURE — 25000242 PHARM REV CODE 250 ALT 637 W/ HCPCS: Performed by: EMERGENCY MEDICINE

## 2018-01-24 PROCEDURE — 63600175 PHARM REV CODE 636 W HCPCS: Performed by: EMERGENCY MEDICINE

## 2018-01-24 PROCEDURE — 85025 COMPLETE CBC W/AUTO DIFF WBC: CPT

## 2018-01-24 PROCEDURE — 25000003 PHARM REV CODE 250: Performed by: EMERGENCY MEDICINE

## 2018-01-24 RX ORDER — PREDNISONE 20 MG/1
40 TABLET ORAL DAILY
Qty: 8 TABLET | Refills: 0 | Status: SHIPPED | OUTPATIENT
Start: 2018-01-24 | End: 2018-01-28

## 2018-01-24 RX ORDER — ACETAMINOPHEN 325 MG/1
650 TABLET ORAL
Status: COMPLETED | OUTPATIENT
Start: 2018-01-24 | End: 2018-01-24

## 2018-01-24 RX ORDER — ALBUTEROL SULFATE 2.5 MG/.5ML
5 SOLUTION RESPIRATORY (INHALATION)
Status: COMPLETED | OUTPATIENT
Start: 2018-01-24 | End: 2018-01-24

## 2018-01-24 RX ORDER — PREDNISONE 20 MG/1
40 TABLET ORAL
Status: COMPLETED | OUTPATIENT
Start: 2018-01-24 | End: 2018-01-24

## 2018-01-24 RX ORDER — IBUPROFEN 400 MG/1
400 TABLET ORAL
Status: COMPLETED | OUTPATIENT
Start: 2018-01-24 | End: 2018-01-24

## 2018-01-24 RX ADMIN — PREDNISONE 40 MG: 20 TABLET ORAL at 04:01

## 2018-01-24 RX ADMIN — IBUPROFEN 400 MG: 400 TABLET, FILM COATED ORAL at 02:01

## 2018-01-24 RX ADMIN — ACETAMINOPHEN 650 MG: 325 TABLET ORAL at 04:01

## 2018-01-24 RX ADMIN — ALBUTEROL SULFATE 5 MG: 2.5 SOLUTION RESPIRATORY (INHALATION) at 02:01

## 2018-01-24 NOTE — ED PROVIDER NOTES
"Encounter Date: 1/23/2018    SCRIBE #1 NOTE: I, Dina Aguillon, am scribing for, and in the presence of,  Wili Ann MD. I have scribed the following portions of the note - Other sections scribed: HPI/ROS .       History     Chief Complaint   Patient presents with    Chest Pain     SOB, chest heaviness (intermittent) midsternal, dizziness that started around 6:30pm today;      CC: Chest Pain    HPI: 77 y.o. M with a PMHx of asthma, HTN, COPD, CKD, and a-fib presents to the ED c/o a tight, moderate midsternal CP with associated intermittent SOB beginning about 3 hours PTA while the pt was watching TV. Pt states, "It felt like something was pushing up my chest making it tight and hard to breathe good." Pt reports of a slight dizziness and HA that started prior to the CP. Pt also reports of a cough for the past few weeks. Pt states, "Sometimes when I cough, I cough up a chunk of glob." Pt notices that his body has been sore in the morning, primarily around his back and ribs. Myalgias is alleviated when the pt starts his days and begins to move around. Pt denies fever, chills, diaphoresis, N/V/D, abdominal pain, and rash.       The history is provided by the patient.     Review of patient's allergies indicates:  No Known Allergies  Past Medical History:   Diagnosis Date    Asthma     Atrial fibrillation     COPD (chronic obstructive pulmonary disease)     Hypertension      No past surgical history on file.  No family history on file.  Social History   Substance Use Topics    Smoking status: Former Smoker     Packs/day: 1.50     Years: 20.00    Smokeless tobacco: Never Used    Alcohol use No     Review of Systems   Constitutional: Negative for chills, diaphoresis and fever.   HENT: Negative for congestion and sore throat.    Eyes: Negative for pain and visual disturbance.   Respiratory: Positive for cough and shortness of breath.    Cardiovascular: Positive for chest pain.   Gastrointestinal: Negative for " abdominal pain, diarrhea, nausea and vomiting.   Genitourinary: Negative for dysuria.   Musculoskeletal: Positive for myalgias (back and rib ). Negative for neck pain and neck stiffness.   Skin: Negative for rash.   Neurological: Positive for dizziness and headaches.       Physical Exam     Initial Vitals [01/23/18 2134]   BP Pulse Resp Temp SpO2   (!) 177/95 68 16 98.1 °F (36.7 °C) 99 %      MAP       122.33         Physical Exam    Constitutional: He appears well-developed and well-nourished. He is not diaphoretic. No distress.   HENT:   Head: Normocephalic and atraumatic.   Nose: Nose normal.   Mouth/Throat: Oropharynx is clear and moist. No oropharyngeal exudate.   Eyes: Conjunctivae and EOM are normal. Pupils are equal, round, and reactive to light. No scleral icterus.   Neck: Normal range of motion. Neck supple. No thyromegaly present. No tracheal deviation present.   Cardiovascular: Normal rate, regular rhythm and normal heart sounds. Exam reveals no gallop and no friction rub.    No murmur heard.  Pulmonary/Chest: Breath sounds normal. No respiratory distress. He has no wheezes. He has no rhonchi. He has no rales.   Abdominal: Soft. Bowel sounds are normal. He exhibits no distension and no mass. There is no tenderness. There is no rebound and no guarding.   Musculoskeletal: Normal range of motion. He exhibits no edema or tenderness.   Lymphadenopathy:     He has no cervical adenopathy.   Neurological: He is alert and oriented to person, place, and time. He has normal strength. No cranial nerve deficit or sensory deficit.   Skin: Skin is warm and dry. No rash noted. No erythema. No pallor.   Psychiatric: He has a normal mood and affect. His behavior is normal. Thought content normal.         ED Course   Procedures  Labs Reviewed   CBC W/ AUTO DIFFERENTIAL - Abnormal; Notable for the following:        Result Value    Hemoglobin 11.3 (*)     Hematocrit 36.5 (*)     MCV 79 (*)     MCH 24.4 (*)     MCHC 31.0 (*)      All other components within normal limits   COMPREHENSIVE METABOLIC PANEL - Abnormal; Notable for the following:     BUN, Bld 24 (*)     Creatinine 1.6 (*)     Total Protein 8.6 (*)     eGFR if  47 (*)     eGFR if non  41 (*)     All other components within normal limits   TROPONIN I             Medical Decision Making:   Initial Assessment:   77-year-old male with a history of hypertension and COPD presents for evaluation of feeling of chest tightness with shortness of breath that began approximately 6 to 7.5 hours before my evaluation.  Also reports a slight dizziness and headache that began earlier in the day.  Patient denies nausea, vomiting, exertional chest pain, radiation, diaphoresis. Physical examination reveals decreased air entry bilaterally with very slight expiratory wheeze.  There is no other abnormality on physical exam.  Differential Diagnosis:   Patient's chest pain sounds noncardiac in origin.  Will obtain screening EKG and troponin, which will effectively rule out myocardial infarction given that it has been over 6 hours since onset of symptoms.  Will also obtain chest x-ray and perform breathing treatment to evaluate for pulmonary disease.  Independently Interpreted Test(s):   I have ordered and independently interpreted X-rays - see summary below.       <> Summary of X-Ray Reading(s): Chest x-ray: No acute abnormality  I have ordered and independently interpreted EKG Reading(s) - see summary below       <> Summary of EKG Reading(s):  NSR, nl axis, nl intervals, no definite acute ischemic changes  ED Management:  Patient's workup has been unremarkable.  After nebulized albuterol the patient reports complete resolution of shortness of breath, chest tightness.  On exam reveals persistence of slight wheeze, but notably increased air movement throughout.  Will treat further with oral prednisone and discharge with steroid burst.    Patient counseled regarding test  results, recommendations for supportive care, and need for follow-up.  Return precautions given.              Scribe Attestation:   Scribe #1: I performed the above scribed service and the documentation accurately describes the services I performed. I attest to the accuracy of the note.    Attending Attestation:           Physician Attestation for Scribe:  Physician Attestation Statement for Scribe #1: I, Wili Ann MD, reviewed documentation, as scribed by Dina Aguillon in my presence, and it is both accurate and complete.                 ED Course      Clinical Impression:   The primary encounter diagnosis was Chest pain. A diagnosis of Chronic obstructive pulmonary disease with acute exacerbation was also pertinent to this visit.                           Wili Ann III, MD  01/24/18 1695

## 2018-01-24 NOTE — DISCHARGE INSTRUCTIONS
Continue using your inhalers at home and take the prednisone I have prescribed for the next 4 days.  This will help improve your breathing and your chest tightness.      Return to the emergency department if you develop worsening pain, worsening difficulty breathing, severe lightheadedness, fainting, or for any new or worsening medical concerns.

## 2018-01-24 NOTE — ED NOTES
"Past Medical History:   Diagnosis Date    Asthma     Atrial fibrillation     COPD (chronic obstructive pulmonary disease)     Hypertension      Pt presented to ed with co chest pain that came on around 2000 on the left sternal chest, non radiating  States " like something pushing". Pt  Denies NVFD    no sweating.  Reports it came on while he was sitting watching TV. History of asthma states that the pain gets better when he walk also complained of sob.  "

## 2018-01-24 NOTE — ED NOTES
Past Medical History:   Diagnosis Date    Asthma     Atrial fibrillation     COPD (chronic obstructive pulmonary disease)     Hypertension

## 2018-01-28 ENCOUNTER — HOSPITAL ENCOUNTER (EMERGENCY)
Facility: HOSPITAL | Age: 78
Discharge: HOME | End: 2018-01-28
Attending: EMERGENCY MEDICINE
Payer: MEDICARE

## 2018-01-28 VITALS
SYSTOLIC BLOOD PRESSURE: 144 MMHG | RESPIRATION RATE: 18 BRPM | WEIGHT: 160 LBS | OXYGEN SATURATION: 94 % | DIASTOLIC BLOOD PRESSURE: 77 MMHG | HEIGHT: 67 IN | BODY MASS INDEX: 25.11 KG/M2 | HEART RATE: 86 BPM | TEMPERATURE: 100 F

## 2018-01-28 DIAGNOSIS — R05.9 COUGH: ICD-10-CM

## 2018-01-28 DIAGNOSIS — R19.7 DIARRHEA, UNSPECIFIED TYPE: ICD-10-CM

## 2018-01-28 DIAGNOSIS — R82.71 BACTERIURIA: ICD-10-CM

## 2018-01-28 DIAGNOSIS — R51.9 NONINTRACTABLE HEADACHE, UNSPECIFIED CHRONICITY PATTERN, UNSPECIFIED HEADACHE TYPE: ICD-10-CM

## 2018-01-28 DIAGNOSIS — J06.9 UPPER RESPIRATORY TRACT INFECTION, UNSPECIFIED TYPE: Primary | ICD-10-CM

## 2018-01-28 DIAGNOSIS — R11.10 VOMITING, INTRACTABILITY OF VOMITING NOT SPECIFIED, PRESENCE OF NAUSEA NOT SPECIFIED, UNSPECIFIED VOMITING TYPE: ICD-10-CM

## 2018-01-28 LAB
ALBUMIN SERPL BCP-MCNC: 3.7 G/DL
ALP SERPL-CCNC: 82 U/L
ALT SERPL W/O P-5'-P-CCNC: 25 U/L
ANION GAP SERPL CALC-SCNC: 9 MMOL/L
AST SERPL-CCNC: 28 U/L
BACTERIA #/AREA URNS HPF: ABNORMAL /HPF
BASOPHILS # BLD AUTO: 0 K/UL
BASOPHILS NFR BLD: 0 %
BILIRUB SERPL-MCNC: 0.6 MG/DL
BILIRUB UR QL STRIP: NEGATIVE
BUN SERPL-MCNC: 21 MG/DL
CALCIUM SERPL-MCNC: 9.1 MG/DL
CHLORIDE SERPL-SCNC: 101 MMOL/L
CLARITY UR: CLEAR
CO2 SERPL-SCNC: 27 MMOL/L
COLOR UR: YELLOW
CREAT SERPL-MCNC: 1.7 MG/DL
DIFFERENTIAL METHOD: ABNORMAL
EOSINOPHIL # BLD AUTO: 0 K/UL
EOSINOPHIL NFR BLD: 0 %
ERYTHROCYTE [DISTWIDTH] IN BLOOD BY AUTOMATED COUNT: 13.5 %
EST. GFR  (AFRICAN AMERICAN): 44 ML/MIN/1.73 M^2
EST. GFR  (NON AFRICAN AMERICAN): 38 ML/MIN/1.73 M^2
FLUAV AG SPEC QL IA: NEGATIVE
FLUBV AG SPEC QL IA: NEGATIVE
GLUCOSE SERPL-MCNC: 98 MG/DL
GLUCOSE UR QL STRIP: NEGATIVE
HCT VFR BLD AUTO: 35.1 %
HGB BLD-MCNC: 11.2 G/DL
HGB UR QL STRIP: ABNORMAL
KETONES UR QL STRIP: ABNORMAL
LEUKOCYTE ESTERASE UR QL STRIP: NEGATIVE
LIPASE SERPL-CCNC: 47 U/L
LYMPHOCYTES # BLD AUTO: 0.7 K/UL
LYMPHOCYTES NFR BLD: 8.9 %
MCH RBC QN AUTO: 24.7 PG
MCHC RBC AUTO-ENTMCNC: 31.9 G/DL
MCV RBC AUTO: 78 FL
MICROSCOPIC COMMENT: ABNORMAL
MONOCYTES # BLD AUTO: 0.8 K/UL
MONOCYTES NFR BLD: 9.7 %
NEUTROPHILS # BLD AUTO: 6.3 K/UL
NEUTROPHILS NFR BLD: 81.1 %
NITRITE UR QL STRIP: NEGATIVE
PH UR STRIP: 5 [PH] (ref 5–8)
PLATELET # BLD AUTO: 138 K/UL
PMV BLD AUTO: 11.6 FL
POTASSIUM SERPL-SCNC: 4.2 MMOL/L
PROT SERPL-MCNC: 7.7 G/DL
PROT UR QL STRIP: NEGATIVE
RBC # BLD AUTO: 4.53 M/UL
RBC #/AREA URNS HPF: 2 /HPF (ref 0–4)
SODIUM SERPL-SCNC: 137 MMOL/L
SP GR UR STRIP: 1.02 (ref 1–1.03)
SPECIMEN SOURCE: NORMAL
URN SPEC COLLECT METH UR: ABNORMAL
UROBILINOGEN UR STRIP-ACNC: NEGATIVE EU/DL
WBC # BLD AUTO: 7.73 K/UL
WBC #/AREA URNS HPF: 2 /HPF (ref 0–5)

## 2018-01-28 PROCEDURE — 80053 COMPREHEN METABOLIC PANEL: CPT

## 2018-01-28 PROCEDURE — 87400 INFLUENZA A/B EACH AG IA: CPT | Mod: 59

## 2018-01-28 PROCEDURE — 87086 URINE CULTURE/COLONY COUNT: CPT

## 2018-01-28 PROCEDURE — 81000 URINALYSIS NONAUTO W/SCOPE: CPT

## 2018-01-28 PROCEDURE — 96375 TX/PRO/DX INJ NEW DRUG ADDON: CPT

## 2018-01-28 PROCEDURE — 83690 ASSAY OF LIPASE: CPT

## 2018-01-28 PROCEDURE — 96361 HYDRATE IV INFUSION ADD-ON: CPT

## 2018-01-28 PROCEDURE — 96374 THER/PROPH/DIAG INJ IV PUSH: CPT

## 2018-01-28 PROCEDURE — 85025 COMPLETE CBC W/AUTO DIFF WBC: CPT

## 2018-01-28 PROCEDURE — 99284 EMERGENCY DEPT VISIT MOD MDM: CPT | Mod: 25

## 2018-01-28 PROCEDURE — 63600175 PHARM REV CODE 636 W HCPCS: Performed by: EMERGENCY MEDICINE

## 2018-01-28 PROCEDURE — 25000003 PHARM REV CODE 250: Performed by: EMERGENCY MEDICINE

## 2018-01-28 RX ORDER — OSELTAMIVIR PHOSPHATE 75 MG/1
75 CAPSULE ORAL 2 TIMES DAILY
Qty: 10 CAPSULE | Refills: 0 | Status: SHIPPED | OUTPATIENT
Start: 2018-01-28 | End: 2018-02-02

## 2018-01-28 RX ORDER — ONDANSETRON 2 MG/ML
4 INJECTION INTRAMUSCULAR; INTRAVENOUS
Status: COMPLETED | OUTPATIENT
Start: 2018-01-28 | End: 2018-01-28

## 2018-01-28 RX ORDER — ONDANSETRON 4 MG/1
4 TABLET, ORALLY DISINTEGRATING ORAL EVERY 6 HOURS PRN
Qty: 12 TABLET | Refills: 0 | Status: SHIPPED | OUTPATIENT
Start: 2018-01-28 | End: 2018-08-22

## 2018-01-28 RX ORDER — CEPHALEXIN 500 MG/1
500 CAPSULE ORAL EVERY 8 HOURS
Qty: 21 CAPSULE | Refills: 0 | Status: SHIPPED | OUTPATIENT
Start: 2018-01-28 | End: 2018-02-04

## 2018-01-28 RX ORDER — OSELTAMIVIR PHOSPHATE 75 MG/1
75 CAPSULE ORAL
Status: COMPLETED | OUTPATIENT
Start: 2018-01-28 | End: 2018-01-28

## 2018-01-28 RX ORDER — ACETAMINOPHEN 500 MG
1000 TABLET ORAL
Status: COMPLETED | OUTPATIENT
Start: 2018-01-28 | End: 2018-01-28

## 2018-01-28 RX ORDER — CEFTRIAXONE 1 G/1
1 INJECTION, POWDER, FOR SOLUTION INTRAMUSCULAR; INTRAVENOUS
Status: COMPLETED | OUTPATIENT
Start: 2018-01-28 | End: 2018-01-28

## 2018-01-28 RX ORDER — IBUPROFEN 400 MG/1
400 TABLET ORAL
Status: COMPLETED | OUTPATIENT
Start: 2018-01-28 | End: 2018-01-28

## 2018-01-28 RX ORDER — ACETAMINOPHEN AND CODEINE PHOSPHATE 300; 30 MG/1; MG/1
1 TABLET ORAL EVERY 6 HOURS PRN
Qty: 20 TABLET | Refills: 0 | Status: SHIPPED | OUTPATIENT
Start: 2018-01-28 | End: 2018-02-07

## 2018-01-28 RX ADMIN — CEFTRIAXONE SODIUM 1 G: 1 INJECTION, POWDER, FOR SOLUTION INTRAMUSCULAR; INTRAVENOUS at 10:01

## 2018-01-28 RX ADMIN — ACETAMINOPHEN 1000 MG: 500 TABLET ORAL at 08:01

## 2018-01-28 RX ADMIN — ONDANSETRON 4 MG: 2 INJECTION INTRAMUSCULAR; INTRAVENOUS at 09:01

## 2018-01-28 RX ADMIN — OSELTAMIVIR PHOSPHATE 75 MG: 75 CAPSULE ORAL at 10:01

## 2018-01-28 RX ADMIN — IBUPROFEN 400 MG: 400 TABLET, FILM COATED ORAL at 08:01

## 2018-01-28 RX ADMIN — SODIUM CHLORIDE 1000 ML: 0.9 INJECTION, SOLUTION INTRAVENOUS at 08:01

## 2018-01-28 NOTE — ED PROVIDER NOTES
Encounter Date: 1/28/2018    SCRIBE #1 NOTE: I, Ana Jansen, am scribing for, and in the presence of,  Tejinder Scott MD. I have scribed the following portions of the note - Other sections scribed: HPI and ROS.       History     Chief Complaint   Patient presents with    Headache     headache, abdominal pain, cough, diarrhea, and emesis; hx of HTN; denies chest pain    Abdominal Pain     CC: Headache    HPI: This 77 y.o male who has Asthma, Atrial fibrillation, COPD, and HTN presents to the ED for an evaluation of gradual onset, constant generalized headache, which he currently rates as a 10/10, that began last night.  Patient also reports of abdominal pain, nausea, cough, and diarrhea that began at 0300.  Patient reports he was recently evaluated at this facility 1 week ago for a headache, which he reports resolved upon being discharged.  Patient denies fever, chills, emesis, chest pain, back pain, shortness of breath, sore throat, rhinorrhea, sinus pressure, photophobia, or any other associated symptoms.  No prior tx.  No alleviating factors.      The history is provided by the patient. No  was used.     Review of patient's allergies indicates:  No Known Allergies  Past Medical History:   Diagnosis Date    Asthma     Atrial fibrillation     COPD (chronic obstructive pulmonary disease)     Hypertension      History reviewed. No pertinent surgical history.  History reviewed. No pertinent family history.  Social History   Substance Use Topics    Smoking status: Former Smoker     Packs/day: 1.50     Years: 20.00    Smokeless tobacco: Never Used    Alcohol use No     Review of Systems   Constitutional: Negative for chills and fever.   HENT: Negative for congestion, ear pain, rhinorrhea, sinus pressure, sore throat and trouble swallowing.    Eyes: Negative for pain.   Respiratory: Positive for cough. Negative for shortness of breath.    Cardiovascular: Negative for chest pain.    Gastrointestinal: Positive for abdominal pain, diarrhea and nausea. Negative for vomiting.   Genitourinary: Negative for dysuria.   Musculoskeletal: Negative for back pain.   Skin: Negative for rash.   Neurological: Positive for headaches. Negative for dizziness, syncope, speech difficulty, weakness, light-headedness and numbness.   Psychiatric/Behavioral: Negative for confusion.       Physical Exam     Initial Vitals [01/28/18 0737]   BP Pulse Resp Temp SpO2   (!) 188/95 109 20 (!) 100.8 °F (38.2 °C) (!) 93 %      MAP       126         Physical Exam    Nursing note and vitals reviewed.  Constitutional: He appears well-developed and well-nourished. No distress.   HENT:   Head: Atraumatic.   Nasal congestion and mild o/p erythema. No exudate. Normal tms. No sinus ttp.    Eyes: Conjunctivae and EOM are normal. Pupils are equal, round, and reactive to light.   No photophobia   Neck: Normal range of motion. Neck supple. No JVD present.   No neck stiffness, no meningeal signs. HA not worsened by flexing neck.    Cardiovascular: Normal rate, regular rhythm, normal heart sounds and intact distal pulses. Exam reveals no gallop and no friction rub.    No murmur heard.  Pulmonary/Chest: Breath sounds normal. No respiratory distress. He has no wheezes. He has no rhonchi. He has no rales. He exhibits no tenderness.   Abdominal: Soft. Bowel sounds are normal. He exhibits no distension and no mass. There is no tenderness. There is no rebound and no guarding.   No abdominal ttp.    Musculoskeletal: Normal range of motion. He exhibits no edema.   Lymphadenopathy:     He has no cervical adenopathy.   Neurological: He is alert and oriented to person, place, and time. He has normal strength and normal reflexes. He displays normal reflexes. No cranial nerve deficit or sensory deficit.   Skin: Skin is warm and dry. Capillary refill takes less than 2 seconds. No rash noted.   Psychiatric: He has a normal mood and affect. Thought  content normal.         ED Course   Procedures  Labs Reviewed   CBC W/ AUTO DIFFERENTIAL - Abnormal; Notable for the following:        Result Value    RBC 4.53 (*)     Hemoglobin 11.2 (*)     Hematocrit 35.1 (*)     MCV 78 (*)     MCH 24.7 (*)     MCHC 31.9 (*)     Platelets 138 (*)     Lymph # 0.7 (*)     Gran% 81.1 (*)     Lymph% 8.9 (*)     All other components within normal limits   COMPREHENSIVE METABOLIC PANEL - Abnormal; Notable for the following:     Creatinine 1.7 (*)     eGFR if  44 (*)     eGFR if non  38 (*)     All other components within normal limits    Narrative:     Recoll. [id number] by DWAYNE at 01/28/2018 09:05, reason: Specimen   hemolyzed.   01/28/2018  09:04 Called Dayna.   URINALYSIS - Abnormal; Notable for the following:     Ketones, UA Trace (*)     Occult Blood UA 1+ (*)     All other components within normal limits   URINALYSIS MICROSCOPIC - Abnormal; Notable for the following:     Bacteria, UA Moderate (*)     All other components within normal limits   CULTURE, URINE   LIPASE    Narrative:     Recoll. [id number] by DWAYNE at 01/28/2018 09:05, reason: Specimen   hemolyzed.   01/28/2018  09:04 Called Dayna.   INFLUENZA A AND B ANTIGEN        Patient states HA completely resolved with fever reduction. No head trauma or meningeal signs. Normal neurologic exam. HA lacks concerning features. Gradual onset and intermittent. With HA resolved at this time I do not feel further workup for HA is warranted at this time. Work up shows Bacteria in UA. No urinary symptoms but due to fever will cover with keflex. Patient likely has the flu. Will still cover with tamiflu due to age and symptoms mostly starting yesterday and current flu epidemic. Patient tolerating po. States he feels improved and is comfortable going home.                 Scribe Attestation:   Scribe #1: I performed the above scribed service and the documentation accurately describes the services I performed. I  attest to the accuracy of the note.    Attending Attestation:           Physician Attestation for Scribe:  Physician Attestation Statement for Scribe #1: I, Tejinder Scott MD, reviewed documentation, as scribed by Ana Jansen in my presence, and it is both accurate and complete.                 ED Course      Clinical Impression:   The primary encounter diagnosis was Upper respiratory tract infection, unspecified type. Diagnoses of Cough, Nonintractable headache, unspecified chronicity pattern, unspecified headache type, Vomiting, intractability of vomiting not specified, presence of nausea not specified, unspecified vomiting type, Diarrhea, unspecified type, and Bacteriuria were also pertinent to this visit.                           Tejinder Scott MD  01/28/18 1053

## 2018-01-28 NOTE — ED TRIAGE NOTES
Pt reports n/v, abdominal pain, and diarrhea since last night; pt states the stomach pain feels like cramping

## 2018-01-30 LAB — BACTERIA UR CULT: NORMAL

## 2018-05-18 ENCOUNTER — HOSPITAL ENCOUNTER (OUTPATIENT)
Facility: HOSPITAL | Age: 78
Discharge: HOME | End: 2018-05-18
Attending: EMERGENCY MEDICINE | Admitting: HOSPITALIST
Payer: MEDICARE

## 2018-05-18 VITALS
DIASTOLIC BLOOD PRESSURE: 84 MMHG | SYSTOLIC BLOOD PRESSURE: 165 MMHG | TEMPERATURE: 98 F | BODY MASS INDEX: 25.51 KG/M2 | HEART RATE: 55 BPM | WEIGHT: 158.75 LBS | OXYGEN SATURATION: 95 % | RESPIRATION RATE: 18 BRPM | HEIGHT: 66 IN

## 2018-05-18 DIAGNOSIS — R13.10 DYSPHAGIA: ICD-10-CM

## 2018-05-18 DIAGNOSIS — R06.02 SHORTNESS OF BREATH: ICD-10-CM

## 2018-05-18 DIAGNOSIS — T78.3XXA ANGIOEDEMA, INITIAL ENCOUNTER: Primary | ICD-10-CM

## 2018-05-18 DIAGNOSIS — I10 HTN (HYPERTENSION), MALIGNANT: ICD-10-CM

## 2018-05-18 LAB — DEPRECATED S PYO AG THROAT QL EIA: NEGATIVE

## 2018-05-18 PROCEDURE — 94761 N-INVAS EAR/PLS OXIMETRY MLT: CPT

## 2018-05-18 PROCEDURE — 96375 TX/PRO/DX INJ NEW DRUG ADDON: CPT

## 2018-05-18 PROCEDURE — 96374 THER/PROPH/DIAG INJ IV PUSH: CPT

## 2018-05-18 PROCEDURE — 25000003 PHARM REV CODE 250: Performed by: PHYSICIAN ASSISTANT

## 2018-05-18 PROCEDURE — 87081 CULTURE SCREEN ONLY: CPT

## 2018-05-18 PROCEDURE — 94640 AIRWAY INHALATION TREATMENT: CPT

## 2018-05-18 PROCEDURE — 25000242 PHARM REV CODE 250 ALT 637 W/ HCPCS: Performed by: PHYSICIAN ASSISTANT

## 2018-05-18 PROCEDURE — 25000003 PHARM REV CODE 250: Performed by: NURSE PRACTITIONER

## 2018-05-18 PROCEDURE — 63600175 PHARM REV CODE 636 W HCPCS: Performed by: PHYSICIAN ASSISTANT

## 2018-05-18 PROCEDURE — S0028 INJECTION, FAMOTIDINE, 20 MG: HCPCS | Performed by: PHYSICIAN ASSISTANT

## 2018-05-18 PROCEDURE — 99284 EMERGENCY DEPT VISIT MOD MDM: CPT | Mod: 25

## 2018-05-18 PROCEDURE — G0378 HOSPITAL OBSERVATION PER HR: HCPCS

## 2018-05-18 PROCEDURE — 87880 STREP A ASSAY W/OPTIC: CPT

## 2018-05-18 RX ORDER — METHYLPREDNISOLONE SOD SUCC 125 MG
125 VIAL (EA) INJECTION
Status: DISCONTINUED | OUTPATIENT
Start: 2018-05-18 | End: 2018-05-18

## 2018-05-18 RX ORDER — DIPHENHYDRAMINE HYDROCHLORIDE 50 MG/ML
12.5 INJECTION INTRAMUSCULAR; INTRAVENOUS
Status: COMPLETED | OUTPATIENT
Start: 2018-05-18 | End: 2018-05-18

## 2018-05-18 RX ORDER — HYDRALAZINE HYDROCHLORIDE 25 MG/1
50 TABLET, FILM COATED ORAL 3 TIMES DAILY
Status: DISCONTINUED | OUTPATIENT
Start: 2018-05-18 | End: 2018-05-18

## 2018-05-18 RX ORDER — ACETAMINOPHEN 325 MG/1
650 TABLET ORAL EVERY 8 HOURS PRN
Status: DISCONTINUED | OUTPATIENT
Start: 2018-05-18 | End: 2018-05-18 | Stop reason: HOSPADM

## 2018-05-18 RX ORDER — AMLODIPINE BESYLATE 5 MG/1
10 TABLET ORAL DAILY
Status: DISCONTINUED | OUTPATIENT
Start: 2018-05-18 | End: 2018-05-18 | Stop reason: HOSPADM

## 2018-05-18 RX ORDER — ALBUTEROL SULFATE 2.5 MG/.5ML
2.5 SOLUTION RESPIRATORY (INHALATION) EVERY 4 HOURS PRN
Status: DISCONTINUED | OUTPATIENT
Start: 2018-05-18 | End: 2018-05-18 | Stop reason: HOSPADM

## 2018-05-18 RX ORDER — METOPROLOL SUCCINATE 25 MG/1
25 TABLET, EXTENDED RELEASE ORAL 2 TIMES DAILY
Status: DISCONTINUED | OUTPATIENT
Start: 2018-05-18 | End: 2018-05-18 | Stop reason: HOSPADM

## 2018-05-18 RX ORDER — AMLODIPINE BESYLATE 10 MG/1
10 TABLET ORAL DAILY
Qty: 30 TABLET | Refills: 11 | Status: SHIPPED | OUTPATIENT
Start: 2018-05-18 | End: 2018-05-18 | Stop reason: HOSPADM

## 2018-05-18 RX ORDER — FLUTICASONE FUROATE AND VILANTEROL 100; 25 UG/1; UG/1
1 POWDER RESPIRATORY (INHALATION) DAILY
Status: DISCONTINUED | OUTPATIENT
Start: 2018-05-18 | End: 2018-05-18 | Stop reason: HOSPADM

## 2018-05-18 RX ORDER — METHYLPREDNISOLONE SOD SUCC 125 MG
125 VIAL (EA) INJECTION
Status: COMPLETED | OUTPATIENT
Start: 2018-05-18 | End: 2018-05-18

## 2018-05-18 RX ORDER — AMLODIPINE BESYLATE 10 MG/1
10 TABLET ORAL DAILY
Qty: 30 TABLET | Refills: 8 | Status: SHIPPED | OUTPATIENT
Start: 2018-05-18 | End: 2020-04-28 | Stop reason: CLARIF

## 2018-05-18 RX ORDER — ASPIRIN 81 MG/1
81 TABLET ORAL DAILY
Status: DISCONTINUED | OUTPATIENT
Start: 2018-05-18 | End: 2018-05-18 | Stop reason: HOSPADM

## 2018-05-18 RX ORDER — FAMOTIDINE 10 MG/ML
20 INJECTION INTRAVENOUS
Status: COMPLETED | OUTPATIENT
Start: 2018-05-18 | End: 2018-05-18

## 2018-05-18 RX ADMIN — FLUTICASONE FUROATE AND VILANTEROL TRIFENATATE 1 PUFF: 100; 25 POWDER RESPIRATORY (INHALATION) at 01:05

## 2018-05-18 RX ADMIN — DIPHENHYDRAMINE HYDROCHLORIDE 12.5 MG: 50 INJECTION, SOLUTION INTRAMUSCULAR; INTRAVENOUS at 08:05

## 2018-05-18 RX ADMIN — FAMOTIDINE 20 MG: 10 INJECTION INTRAVENOUS at 08:05

## 2018-05-18 RX ADMIN — AMLODIPINE BESYLATE 10 MG: 5 TABLET ORAL at 04:05

## 2018-05-18 RX ADMIN — METHYLPREDNISOLONE SODIUM SUCCINATE 125 MG: 125 INJECTION, POWDER, FOR SOLUTION INTRAMUSCULAR; INTRAVENOUS at 08:05

## 2018-05-18 RX ADMIN — ASPIRIN 81 MG: 81 TABLET, COATED ORAL at 03:05

## 2018-05-18 RX ADMIN — HYDRALAZINE HYDROCHLORIDE 50 MG: 25 TABLET ORAL at 03:05

## 2018-05-18 NOTE — PROGRESS NOTES
Patient is awaiting ride Patient is discharged. Telemetry monitor removed. IV taken out. Tip intact.

## 2018-05-18 NOTE — H&P
"[facility] Medicine  History & Physical    Patient Name: Micah Laurent Jr.  MRN: [id number]  Admission Date: 5/18/2018  Attending Physician: Jacquelyn Johnson MD   Primary Care Provider: Chelsy Torrez MD         Patient information was obtained from patient and ER records.     Subjective:     Principal Problem:Angio-edema    Chief Complaint:   Chief Complaint   Patient presents with    Throat Issue     "I feel like I got lump of coal in my throat. When I swallow it goes away and then comes back. It looks like a big lump of gel. It won't come up." symptoms started this morning; denies trouble breathing; did not eat anything this morning        HPI: Micah Laurent Jr. is a 77 y.o. AAM with PMHx HTN. Patient presents for evaluation of an acute episode of throat swelling discovered in early morning just prior to admit. Patient seen and evaluated in ED administered IV steroids and H2 blocker - Patients symptoms resolved by the time of this interview. He has no problems swallowing and there is no further evidence of angioedema. Lisinopril discontinued precautionary and patient started on amlodipine to replace for tight BP control. He has not further complaints. One dose of amlodipine will be administered prior to discharge.    Past Medical History:   Diagnosis Date    Asthma     Atrial fibrillation     COPD (chronic obstructive pulmonary disease)     Hypertension        History reviewed. No pertinent surgical history.    Review of patient's allergies indicates:  No Known Allergies    No current facility-administered medications on file prior to encounter.      Current Outpatient Prescriptions on File Prior to Encounter   Medication Sig    albuterol (PROVENTIL) 2.5 mg /3 mL (0.083 %) nebulizer solution Take 3mLs (2.5mg total) by nebulization TID x 4 days.  Then 3 mLs (2.5mg total) by nebulization every 6 (six) hours as needed.    aspirin (ECOTRIN) 81 MG EC tablet Take 81 mg by mouth " once daily.    fluticasone-salmeterol 250-50 mcg/dose (ADVAIR) 250-50 mcg/dose diskus inhaler Inhale 1 puff into the lungs 2 (two) times daily.    metoprolol succinate (TOPROL-XL) 25 MG 24 hr tablet Take 25 mg by mouth 2 (two) times daily.    [DISCONTINUED] hydrALAZINE (APRESOLINE) 50 MG tablet Take 50 mg by mouth 3 (three) times daily.    [DISCONTINUED] lisinopril (PRINIVIL,ZESTRIL) 40 MG tablet Take 40 mg by mouth once daily.    ondansetron (ZOFRAN-ODT) 4 MG TbDL Take 1 tablet (4 mg total) by mouth every 6 (six) hours as needed (nausea).    [DISCONTINUED] hydrochlorothiazide (HYDRODIURIL) 25 MG tablet Take 25 mg by mouth once daily.     Family History     None        Social History Main Topics    Smoking status: Former Smoker     Packs/day: 1.50     Years: 20.00     Quit date: 1990    Smokeless tobacco: Former User    Alcohol use No    Drug use: No    Sexual activity: No     Review of Systems   Constitutional: Negative for appetite change, chills, diaphoresis and fever.   HENT: Positive for facial swelling. Negative for congestion, hearing loss, sore throat, tinnitus and trouble swallowing.    Eyes: Negative for photophobia, discharge, itching and visual disturbance.   Respiratory: Negative for apnea, cough, wheezing and stridor.    Cardiovascular: Negative for chest pain, palpitations and leg swelling.   Gastrointestinal: Negative for abdominal distention, abdominal pain, blood in stool, constipation, diarrhea and nausea.   Endocrine: Negative for polydipsia, polyphagia and polyuria.   Genitourinary: Negative for difficulty urinating, dysuria, flank pain and frequency.   Musculoskeletal: Negative for arthralgias, joint swelling and neck stiffness.   Skin: Negative for color change, rash and wound.   Neurological: Negative for dizziness, tremors, seizures, light-headedness, numbness and headaches.   Hematological: Negative for adenopathy.   Psychiatric/Behavioral: Negative for hallucinations and  self-injury.     Objective:     Vital Signs (Most Recent):  Temp: 98.3 °F (36.8 °C) (05/18/18 1323)  Pulse: (!) 55 (05/18/18 1616)  Resp: 18 (05/18/18 1616)  BP: (!) 165/84 (05/18/18 1616)  SpO2: 95 % (05/18/18 1358) Vital Signs (24h Range):  Temp:  [97.2 °F (36.2 °C)-98.5 °F (36.9 °C)] 98.3 °F (36.8 °C)  Pulse:  [50-61] 55  Resp:  [18-20] 18  SpO2:  [95 %-99 %] 95 %  BP: (165-191)/() 165/84     Weight: 72 kg (158 lb 11.7 oz)  Body mass index is 25.62 kg/m².    Physical Exam   Constitutional: He is oriented to person, place, and time. He appears well-developed and well-nourished. He is cooperative.   HENT:   Head: Normocephalic and atraumatic.   Eyes: Conjunctivae, EOM and lids are normal. Pupils are equal, round, and reactive to light.   Neck: Normal range of motion and full passive range of motion without pain. Neck supple. No JVD present. No edema present. No thyroid mass present.   Cardiovascular: Normal rate, S1 normal, S2 normal and intact distal pulses.    No murmur heard.  Pulmonary/Chest: Effort normal.   Abdominal: Soft. Bowel sounds are normal. He exhibits no distension and no abdominal bruit. There is no splenomegaly or hepatomegaly. There is no tenderness. There is no CVA tenderness.   Musculoskeletal: Normal range of motion.   Lymphadenopathy:     He has no cervical adenopathy.     He has no axillary adenopathy.   Neurological: He is alert and oriented to person, place, and time. He has normal reflexes. He displays no tremor. He displays no seizure activity.   Skin: Skin is warm, dry and intact.   Psychiatric: He has a normal mood and affect. His speech is normal. Thought content normal. Cognition and memory are normal.         CRANIAL NERVES     CN III, IV, VI   Pupils are equal, round, and reactive to light.  Extraocular motions are normal.        Significant Labs:  Significant Imaging:   Imaging Results          X-Ray Neck Soft Tissue (Final result)  Result time 05/18/18 09:01:42    Final  result by Joselo Li MD (05/18/18 09:01:42)                 Impression:      As above.      Electronically signed by: Joselo Li MD  Date:    05/18/2018  Time:    09:01             Narrative:    EXAMINATION:  XR NECK SOFT TISSUE    CLINICAL HISTORY:  Dysphagia, unspecified    TECHNIQUE:  AP and lateral soft tissue views the neck were performed.    COMPARISON:  AP chest radiograph 01/28/2018    FINDINGS:  The visualized portions of the aerodigestive tract are patent.  The prevertebral soft tissue stripe is within normal limits.  Visualized portions of the lung apices are clear.    Cervical spine shows degenerative changes but no significant malalignment.                                  Assessment/Plan:     * Angio-edema    Thought to be related to lisinopril - resolved by the time the patient was seen in observation - he is anxious for discharge. Denies sore throat, URI, chill, fever, trouble swallowing, lip or tongue swelling. He has not taken lisinopril for at least 24 hours now.          HTN (hypertension), malignant    Poorly controlled - discontinued lisinopril and added amlodipine 10 mg to his regimen of metoprolol succinate        Asthma with exacerbation    No new complaints, lungs clear, airway open          VTE Risk Mitigation         Ordered     IP VTE HIGH RISK PATIENT  Once      05/18/18 1323     Place BEATRIZ hose  Until discontinued      05/18/18 1323     Place sequential compression device  Until discontinued      05/18/18 1323             BRYON Rand, FNP-C  Hospitalist - Department of Hospital Medicine  [address] [address]  Office [phone]; Pager [phone]

## 2018-05-18 NOTE — ED NOTES
Report received from charge nurse; pt hooked up to bp cuff and pulse ox as well as cardiac monitor; pt in NAD at this time; call light in reach

## 2018-05-18 NOTE — HPI
Micah Mehran Plascencia is a 77 y.o. AAM with PMHx HTN. Patient presents for evaluation of an acute episode of throat swelling discovered in early morning just prior to admit. Patient seen and evaluated in ED administered IV steroids and H2 blocker - Patients symptoms resolved by the time of this interview. He has no problems swallowing and there is no further evidence of angioedema. Lisinopril discontinued precautionary and patient started on amlodipine to replace for tight BP control. He has not further complaints. One dose of amlodipine will be administered prior to discharge.

## 2018-05-18 NOTE — ASSESSMENT & PLAN NOTE
Thought to be related to lisinopril - resolved by the time the patient was seen in observation - he is anxious for discharge. Denies sore throat, URI, chill, fever, trouble swallowing, lip or tongue swelling. He has not taken lisinopril for at least 24 hours now.

## 2018-05-18 NOTE — DISCHARGE SUMMARY
[facility] Medicine  Discharge Summary      Patient Name: Micah Laurent Jr.  MRN: [id number]  Admission Date: 5/18/2018  Hospital Length of Stay: 0 days  Discharge Date and Time:  05/18/2018 5:00 PM  Attending Physician: Jacquelyn Johnson MD   Discharging Provider: CAPRICE Alejo  Primary Care Provider: Chelsy Torrez MD      HPI:   Micah Laurent Jr. is a 77 y.o. AAM with PMHx HTN. Patient presents for evaluation of an acute episode of throat swelling discovered in early morning just prior to admit. Patient seen and evaluated in ED administered IV steroids and H2 blocker - Patients symptoms resolved by the time of this interview. He has no problems swallowing and there is no further evidence of angioedema. Lisinopril discontinued precautionary and patient started on amlodipine to replace for tight BP control. He has not further complaints. One dose of amlodipine will be administered prior to discharge.    * No surgery found *      Hospital Course:   Patients symptoms resolved by the time he arrived to the observation unit for interview. He has no problems swallowing and there is no further evidence of angioedema. Patient denies there was ever tongue or throat swelling. States he coughed up a mucus plugged that alarmed him and his wife insisted he present to the ED. Lisinopril discontinued precautionary and patient started on amlodipine to replace for tight BP control. He has not further complaints. One dose of amlodipine will be administered prior to discharge. He is at least 24+ hours past his last dose of lisinopril. He denies sore throat, URI, chill, fever, trouble swallowing, lip or tongue swelling. Anxious for discharge. Discharge to home with instructions to return to ED if symptoms return. No steroids ordered at discharge 2/2 to angioedema resolved + bradykinin response usually not amenable to steroids.     Consults:   Consults         Status Ordering Provider      Inpatient consult to Anesthesiology  Once     Provider:  (Not yet assigned)    Acknowledged DIGNA SUNG          No new Assessment & Plan notes have been filed under this hospital service since the last note was generated.  Service: Hospital Medicine    Final Active Diagnoses:    Diagnosis Date Noted POA    PRINCIPAL PROBLEM:  Angio-edema [T78.3XXA] 05/18/2018 Yes    HTN (hypertension), malignant [I10] 07/24/2013 Yes    Asthma with exacerbation [J45.901] 07/23/2013 Yes      Problems Resolved During this Admission:    Diagnosis Date Noted Date Resolved POA       Discharged Condition: stable    Disposition: Home or Self Care    Follow Up:  Follow-up Information     Chelsy Torrez MD.    Specialty:  Endocrinology  Contact information:  [address] [place] [address] [id number] [phone]                 Patient Instructions:     Diet Cardiac     Activity as tolerated         Significant Diagnostic Studies: Labs:   CMP No results for input(s): NA, K, CL, CO2, GLU, BUN, CREATININE, CALCIUM, PROT, ALBUMIN, BILITOT, ALKPHOS, AST, ALT, ANIONGAP, ESTGFRAFRICA, EGFRNONAA in the last 48 hours., CBC No results for input(s): WBC, HGB, HCT, PLT in the last 48 hours., INR   Lab Results   Component Value Date    INR 1.0 08/19/2017    INR 1.0 01/20/2017    INR 1.0 08/15/2014   , Lipid Panel   Lab Results   Component Value Date    CHOL 206 (H) 02/12/2016    HDL 51 02/12/2016    LDLCALC 142.6 02/12/2016    TRIG 62 02/12/2016    CHOLHDL 24.8 02/12/2016   , Troponin No results for input(s): TROPONINI in the last 168 hours. and A1C: No results for input(s): HGBA1C in the last 4320 hours.    Pending Diagnostic Studies:     None         Medications:  Reconciled Home Medications:      Medication List      START taking these medications    amLODIPine 10 MG tablet  Commonly known as:  NORVASC  Take 1 tablet (10 mg total) by mouth once daily.        CONTINUE taking these medications    albuterol 2.5 mg /3 mL (0.083 %) nebulizer  solution  Commonly known as:  PROVENTIL  Take 3mLs (2.5mg total) by nebulization TID x 4 days.  Then 3 mLs (2.5mg total) by nebulization every 6 (six) hours as needed.     aspirin 81 MG EC tablet  Commonly known as:  ECOTRIN  Take 81 mg by mouth once daily.     fluticasone-salmeterol 250-50 mcg/dose 250-50 mcg/dose diskus inhaler  Commonly known as:  ADVAIR  Inhale 1 puff into the lungs 2 (two) times daily.     metoprolol succinate 25 MG 24 hr tablet  Commonly known as:  TOPROL-XL  Take 25 mg by mouth 2 (two) times daily.     ondansetron 4 MG Tbdl  Commonly known as:  ZOFRAN-ODT  Take 1 tablet (4 mg total) by mouth every 6 (six) hours as needed (nausea).        STOP taking these medications    hydrALAZINE 50 MG tablet  Commonly known as:  APRESOLINE     lisinopril 40 MG tablet  Commonly known as:  PRINIVIL,ZESTRIL            Indwelling Lines/Drains at time of discharge:   Lines/Drains/Airways          No matching active lines, drains, or airways          Time spent on the discharge of patient: 45 minutes  Patient was seen and examined on the date of discharge and determined to be suitable for discharge.       BRYON Rand, FNP-C  Hospitalist - Department of Hospital Medicine  [address] [place] [address]  Office [phone]; Pager [phone]

## 2018-05-18 NOTE — SUBJECTIVE & OBJECTIVE
Past Medical History:   Diagnosis Date    Asthma     Atrial fibrillation     COPD (chronic obstructive pulmonary disease)     Hypertension        History reviewed. No pertinent surgical history.    Review of patient's allergies indicates:  No Known Allergies    No current facility-administered medications on file prior to encounter.      Current Outpatient Prescriptions on File Prior to Encounter   Medication Sig    albuterol (PROVENTIL) 2.5 mg /3 mL (0.083 %) nebulizer solution Take 3mLs (2.5mg total) by nebulization TID x 4 days.  Then 3 mLs (2.5mg total) by nebulization every 6 (six) hours as needed.    aspirin (ECOTRIN) 81 MG EC tablet Take 81 mg by mouth once daily.    fluticasone-salmeterol 250-50 mcg/dose (ADVAIR) 250-50 mcg/dose diskus inhaler Inhale 1 puff into the lungs 2 (two) times daily.    metoprolol succinate (TOPROL-XL) 25 MG 24 hr tablet Take 25 mg by mouth 2 (two) times daily.    [DISCONTINUED] hydrALAZINE (APRESOLINE) 50 MG tablet Take 50 mg by mouth 3 (three) times daily.    [DISCONTINUED] lisinopril (PRINIVIL,ZESTRIL) 40 MG tablet Take 40 mg by mouth once daily.    ondansetron (ZOFRAN-ODT) 4 MG TbDL Take 1 tablet (4 mg total) by mouth every 6 (six) hours as needed (nausea).    [DISCONTINUED] hydrochlorothiazide (HYDRODIURIL) 25 MG tablet Take 25 mg by mouth once daily.     Family History     None        Social History Main Topics    Smoking status: Former Smoker     Packs/day: 1.50     Years: 20.00     Quit date: 1990    Smokeless tobacco: Former User    Alcohol use No    Drug use: No    Sexual activity: No     Review of Systems   Constitutional: Negative for appetite change, chills, diaphoresis and fever.   HENT: Positive for facial swelling. Negative for congestion, hearing loss, sore throat, tinnitus and trouble swallowing.    Eyes: Negative for photophobia, discharge, itching and visual disturbance.   Respiratory: Negative for apnea, cough, wheezing and stridor.     Cardiovascular: Negative for chest pain, palpitations and leg swelling.   Gastrointestinal: Negative for abdominal distention, abdominal pain, blood in stool, constipation, diarrhea and nausea.   Endocrine: Negative for polydipsia, polyphagia and polyuria.   Genitourinary: Negative for difficulty urinating, dysuria, flank pain and frequency.   Musculoskeletal: Negative for arthralgias, joint swelling and neck stiffness.   Skin: Negative for color change, rash and wound.   Neurological: Negative for dizziness, tremors, seizures, light-headedness, numbness and headaches.   Hematological: Negative for adenopathy.   Psychiatric/Behavioral: Negative for hallucinations and self-injury.     Objective:     Vital Signs (Most Recent):  Temp: 98.3 °F (36.8 °C) (05/18/18 1323)  Pulse: (!) 55 (05/18/18 1616)  Resp: 18 (05/18/18 1616)  BP: (!) 165/84 (05/18/18 1616)  SpO2: 95 % (05/18/18 1358) Vital Signs (24h Range):  Temp:  [97.2 °F (36.2 °C)-98.5 °F (36.9 °C)] 98.3 °F (36.8 °C)  Pulse:  [50-61] 55  Resp:  [18-20] 18  SpO2:  [95 %-99 %] 95 %  BP: (165-191)/() 165/84     Weight: 72 kg (158 lb 11.7 oz)  Body mass index is 25.62 kg/m².    Physical Exam   Constitutional: He is oriented to person, place, and time. He appears well-developed and well-nourished. He is cooperative.   HENT:   Head: Normocephalic and atraumatic.   Eyes: Conjunctivae, EOM and lids are normal. Pupils are equal, round, and reactive to light.   Neck: Normal range of motion and full passive range of motion without pain. Neck supple. No JVD present. No edema present. No thyroid mass present.   Cardiovascular: Normal rate, S1 normal, S2 normal and intact distal pulses.    No murmur heard.  Pulmonary/Chest: Effort normal.   Abdominal: Soft. Bowel sounds are normal. He exhibits no distension and no abdominal bruit. There is no splenomegaly or hepatomegaly. There is no tenderness. There is no CVA tenderness.   Musculoskeletal: Normal range of motion.    Lymphadenopathy:     He has no cervical adenopathy.     He has no axillary adenopathy.   Neurological: He is alert and oriented to person, place, and time. He has normal reflexes. He displays no tremor. He displays no seizure activity.   Skin: Skin is warm, dry and intact.   Psychiatric: He has a normal mood and affect. His speech is normal. Thought content normal. Cognition and memory are normal.         CRANIAL NERVES     CN III, IV, VI   Pupils are equal, round, and reactive to light.  Extraocular motions are normal.        Significant Labs:  Significant Imaging:   Imaging Results          X-Ray Neck Soft Tissue (Final result)  Result time 05/18/18 09:01:42    Final result by Joselo Li MD (05/18/18 09:01:42)                 Impression:      As above.      Electronically signed by: Joselo Li MD  Date:    05/18/2018  Time:    09:01             Narrative:    EXAMINATION:  XR NECK SOFT TISSUE    CLINICAL HISTORY:  Dysphagia, unspecified    TECHNIQUE:  AP and lateral soft tissue views the neck were performed.    COMPARISON:  AP chest radiograph 01/28/2018    FINDINGS:  The visualized portions of the aerodigestive tract are patent.  The prevertebral soft tissue stripe is within normal limits.  Visualized portions of the lung apices are clear.    Cervical spine shows degenerative changes but no significant malalignment.

## 2018-05-18 NOTE — HOSPITAL COURSE
Patients symptoms resolved by the time he arrived to the observation unit for interview. He has no problems swallowing and there is no further evidence of angioedema. Patient denies there was ever tongue or throat swelling. States he coughed up a mucus plugged that alarmed him and his wife insisted he present to the ED. Lisinopril discontinued precautionary and patient started on amlodipine to replace for tight BP control. He has not further complaints. One dose of amlodipine will be administered prior to discharge. He is at least 24+ hours past his last dose of lisinopril. He denies sore throat, URI, chill, fever, trouble swallowing, lip or tongue swelling. Anxious for discharge. Discharge to home with instructions to return to ED if symptoms return. No steroids ordered at discharge 2/2 to angioedema resolved + bradykinin response usually not amenable to steroids.

## 2018-05-18 NOTE — ASSESSMENT & PLAN NOTE
Poorly controlled - discontinued lisinopril and added amlodipine 10 mg to his regimen of metoprolol succinate

## 2018-05-18 NOTE — PLAN OF CARE
Discharge planning assessment completed.  Patient from home with spouse and independent.  Don prefers morning appointments and preferred pharmacy is [facility] on [facility] in [place].  If patient needs help, his wife and daughter are available to help.       05/18/18 1215   Discharge Assessment   Assessment Type Discharge Planning Assessment   Confirmed/corrected address and phone number on facesheet? Yes   Assessment information obtained from? Patient   Prior to hospitilization cognitive status: Alert/Oriented   Prior to hospitalization functional status: Independent   Current cognitive status: Alert/Oriented   Current Functional Status: Independent   Facility Arrived From: home   Lives With spouse   Able to Return to Prior Arrangements yes   Is patient able to care for self after discharge? Yes   Who are your caregiver(s) and their phone number(s)? wife: Susanne Laurent: [phone]   Patient's perception of discharge disposition home or selfcare   Readmission Within The Last 30 Days no previous admission in last 30 days   Patient currently being followed by outpatient case management? No   Patient currently receives any other outside agency services? No   Equipment Currently Used at Home none   Do you have any problems affording any of your prescribed medications? No   Is the patient taking medications as prescribed? yes   Does the patient have transportation home? Yes   Transportation Available car   Dialysis Name and Scheduled days n/a   Does the patient receive services at the [facility]? No  (Aspirin)   Discharge Plan A Home   Discharge Plan B Home   Patient/Family In Agreement With Plan yes   Dedra Read LMSw, YASMIN-BORIS, CCM  5/18/2018

## 2018-05-18 NOTE — ED TRIAGE NOTES
Pt states something is in his throat. He woke up this morning and stated he was clearing his throat and it wouldn't come out. The pt states he is talking funny and when he lays back he has SOB.

## 2018-05-18 NOTE — ED PROVIDER NOTES
"Encounter Date: 5/18/2018       History     Chief Complaint   Patient presents with    Throat Issue     "I feel like I got lump of coal in my throat. When I swallow it goes away and then comes back. It looks like a big lump of gel. It won't come up." symptoms started this morning; denies trouble breathing; did not eat anything this morning       This is a 77-year-old male with hypertension, asthma, history of AFib who presents with throat swelling this morning.  He explains he woke up with the feeling of the back his throat swollen, but denies any pain.  He has no difficulty breathing except for whenever he lays supine, he has difficulty breathing through his nose.  He is able to swallow, but has mild difficulty, and is also experiencing change in his voice.  He denies injury, recent URI symptoms, or cough.  He takes lisinopril for blood pressure.          Review of patient's allergies indicates:  No Known Allergies  Past Medical History:   Diagnosis Date    Asthma     Atrial fibrillation     COPD (chronic obstructive pulmonary disease)     Hypertension      History reviewed. No pertinent surgical history.  History reviewed. No pertinent family history.  Social History   Substance Use Topics    Smoking status: Former Smoker     Packs/day: 1.50     Years: 20.00     Quit date: 1990    Smokeless tobacco: Former User    Alcohol use No     Review of Systems   Constitutional: Negative for fever.   HENT: Positive for trouble swallowing and voice change. Negative for congestion, drooling, rhinorrhea, sinus pain and sore throat.    Respiratory: Positive for shortness of breath (  When supine).    Cardiovascular: Negative for chest pain.   Gastrointestinal: Negative for nausea.   Genitourinary: Negative for dysuria.   Musculoskeletal: Negative for back pain.   Skin: Negative for rash.   Neurological: Negative for weakness.   Hematological: Does not bruise/bleed easily.       Physical Exam     Initial Vitals [05/18/18 " 0758]   BP Pulse Resp Temp SpO2   (!) 172/81 60 20 98.5 °F (36.9 °C) 98 %      MAP       111.33         Physical Exam    Vitals reviewed.  Constitutional: He appears well-developed and well-nourished. He is not diaphoretic. No distress.   HENT:   Head: Normocephalic and atraumatic.   Right Ear: External ear normal.   Left Ear: External ear normal.   Nose: Nose normal.   Mouth/Throat: No trismus in the jaw. Uvula swelling present. No dental abscesses. Posterior oropharyngeal edema present. No oropharyngeal exudate, posterior oropharyngeal erythema or tonsillar abscesses.    Significant uvula and soft palate edema with change in phonation   Eyes: Conjunctivae are normal. No scleral icterus.   Neck: Normal range of motion. Neck supple. No thyroid mass present. No stridor present. No tracheal tenderness present.   Cardiovascular: Normal rate, regular rhythm, normal heart sounds and intact distal pulses.   Pulmonary/Chest: Breath sounds normal. No respiratory distress. He has no wheezes. He has no rhonchi. He has no rales. He exhibits no tenderness.   Abdominal: Soft. He exhibits no distension and no mass. There is no tenderness. There is no rebound and no guarding.   Musculoskeletal: Normal range of motion.   Neurological: He is alert and oriented to person, place, and time.   Skin: Skin is warm and dry. No rash noted. No erythema.         ED Course   Procedures  Labs Reviewed   THROAT SCREEN, RAPID   CULTURE, STREP A,  THROAT             Medical Decision Making:   Initial Assessment:     77-year-old male with uvula edema, difficulty swallowing, and change in phonation since this morning. Reports shortness of breath when lying supine.  Presents in no distress, controlling secretions. He denies pain. Taking ace inhibitor  Significant uvula and soft palate swelling.  No stridor or increased work of breathing.  ED Management:   Rapid strep is negative. He was treated in the ED with Solu-Medrol, Pepcid, Benadryl.  He  reports mild improvement and does not have shortness of breath when lying supine anymore, but his edema does not appear to be improved.   We will admit for observation of airway in this patient with possible angioedema secondary to ACE-inhibitor.  Will hold lisinopril.  Airway patent.  No indication for intubation at this time.                      Clinical Impression:   The primary encounter diagnosis was Angioedema, initial encounter. Diagnoses of Dysphagia, HTN (hypertension), malignant, and Shortness of breath were also pertinent to this visit.                           MARCO ANTONIO Pugh-C  05/18/18 5395

## 2018-05-18 NOTE — NURSING
Report received from ROSA M Ramirez.  Patient is AAOx4. Patient is resting comfortably. Patient has no denies signs of SOB, difficulty breathing. Patient IV is clean dry intact no sign of redness.

## 2018-05-20 LAB — BACTERIA THROAT CULT: NORMAL

## 2018-08-22 ENCOUNTER — HOSPITAL ENCOUNTER (EMERGENCY)
Facility: HOSPITAL | Age: 78
Discharge: HOME | End: 2018-08-22
Attending: EMERGENCY MEDICINE
Payer: MEDICARE

## 2018-08-22 VITALS
HEIGHT: 66 IN | OXYGEN SATURATION: 96 % | HEART RATE: 53 BPM | BODY MASS INDEX: 25.71 KG/M2 | WEIGHT: 160 LBS | RESPIRATION RATE: 18 BRPM | TEMPERATURE: 99 F | SYSTOLIC BLOOD PRESSURE: 132 MMHG | DIASTOLIC BLOOD PRESSURE: 74 MMHG

## 2018-08-22 DIAGNOSIS — R52 PAIN: ICD-10-CM

## 2018-08-22 DIAGNOSIS — R10.9 ABDOMINAL DISCOMFORT: ICD-10-CM

## 2018-08-22 PROCEDURE — 99283 EMERGENCY DEPT VISIT LOW MDM: CPT | Mod: 25

## 2018-08-22 RX ORDER — HYDROCHLOROTHIAZIDE 25 MG/1
25 TABLET ORAL DAILY
COMMUNITY
End: 2019-12-14

## 2018-08-22 NOTE — ED PROVIDER NOTES
"Encounter Date: 8/22/2018  SORT: This is a 77 y.o. male who presents for emergent consideration of rib pain. Patient reports left sided rib pain/LUQ pain with "crawling" sensation x 2-3 days. Patient reports that he feels as though the LUQ has been swelling. Initial exam: no evidence of respiratory distress, no abdominal TTP. Patient will be moved to a room when one is available. Orders placed.  VOLODYMYR Burgos PA-C     SCRIBE #1 NOTE: IMonika am scribing for, and in the presence of,  Bela Dobbs PA-C. I have scribed the following portions of the note - Other sections scribed: HPI, ROS.       History     Chief Complaint   Patient presents with    Rib Pain     pt here c/o pressure to left bottom of rib. states "it feels like something is pushing out and crawling under there". no rash noted. pt denies any chest pain; denies SOB.      CC: Abdominal "Discomfort"    78 y/o male with asthma, a fib, COPD, and HTN presents to the ED c/o x3 day hx of LUQ "discomfort." The patient states "it feels like something is moving in there and coming out." The patient denies any pain. He states the "discomfort" is intermittent and lasts ~3-4 min upon onset. He states "it's aggravating." The patient is compliant with his HTN medications. The patient denies any recent fall, trauma, or injury. The patient denies dizziness, lightheadedness, SOB, chest pain, N/V/D, or constipation. No other symptoms reported. No attempted treatment reported. No other symptoms reported.      The history is provided by the patient. No  was used.     Review of patient's allergies indicates:   Allergen Reactions    Lisinopril Edema     Past Medical History:   Diagnosis Date    Asthma     Atrial fibrillation     COPD (chronic obstructive pulmonary disease)     Hypertension      History reviewed. No pertinent surgical history.  History reviewed. No pertinent family history.  Social History     Tobacco Use    " "Smoking status: Former Smoker     Packs/day: 1.50     Years: 20.00     Pack years: 30.00     Last attempt to quit:      Years since quittin.6    Smokeless tobacco: Former User   Substance Use Topics    Alcohol use: No    Drug use: No     Review of Systems   Constitutional: Negative for chills and fever.   HENT: Negative for congestion, ear pain, rhinorrhea and sore throat.    Eyes: Negative for redness.   Respiratory: Negative for cough and shortness of breath.    Cardiovascular: Negative for chest pain.   Gastrointestinal: Positive for abdominal pain (LUQ "discomfort"). Negative for constipation, diarrhea, nausea and vomiting.   Genitourinary: Negative for decreased urine volume, difficulty urinating, dysuria, frequency, hematuria and urgency.   Musculoskeletal: Negative for back pain and neck pain.   Skin: Negative for rash.   Neurological: Negative for dizziness, light-headedness and headaches.       Physical Exam     Initial Vitals [18 1034]   BP Pulse Resp Temp SpO2   116/76 65 18 98.6 °F (37 °C) 98 %      MAP       --         Physical Exam    Nursing note and vitals reviewed.  Constitutional: He appears well-developed and well-nourished. No distress.   HENT:   Head: Normocephalic.   Right Ear: External ear normal.   Left Ear: External ear normal.   Nose: Nose normal.   Eyes: Conjunctivae are normal.   Cardiovascular: Normal rate and regular rhythm. Exam reveals no gallop and no friction rub.    No murmur heard.  Pulmonary/Chest: Breath sounds normal. No respiratory distress. He has no wheezes. He has no rhonchi. He has no rales.   Abdominal: Soft. Bowel sounds are normal. He exhibits no distension. There is no tenderness. There is no rigidity, no rebound, no guarding, no CVA tenderness, no tenderness at McBurney's point and negative Acevedo's sign. No hernia.   Musculoskeletal: Normal range of motion.   Neurological: He is alert. No sensory deficit.   Skin: Skin is warm and dry. No erythema. "   Psychiatric: He has a normal mood and affect.         ED Course   Procedures  Labs Reviewed - No data to display       Imaging Results          X-Ray Abdomen Flat And Erect (Final result)  Result time 08/22/18 12:20:44    Final result by Olu Callaway MD (08/22/18 12:20:44)                 Impression:      1. Moderate stool throughout the colon, no findings to suggest high-grade obstruction.      Electronically signed by: Olu Callaway MD  Date:    08/22/2018  Time:    12:20             Narrative:    EXAMINATION:  XR ABDOMEN FLAT AND ERECT    CLINICAL HISTORY:  Unspecified abdominal pain    TECHNIQUE:  Flat and erect AP views of the abdomen were performed.    COMPARISON:  08/15/2014    One upright, 2 supine.    No significant air-fluid levels on upright view.  Air and stool is seen within the large bowel, and projected over the rectum noting moderate stool throughout the colon.  No focally dilated small bowel loops.  There are no calcifications to convincingly suggest nephrolithiasis or cholelithiasis.  No large volume free air or pneumatosis.  Degenerative changes are noted of the osseous structures.  The lower lung zones are grossly clear noting basilar atelectasis.                               X-Ray Chest PA And Lateral (Final result)  Result time 08/22/18 11:21:06    Final result by Giovanny Marcus Jr., MD (08/22/18 11:21:06)                 Impression:      No significant detrimental change.      Electronically signed by: Giovanny Marcus MD  Date:    08/22/2018  Time:    11:21             Narrative:    EXAMINATION:  XR CHEST PA AND LATERAL    CLINICAL HISTORY:  Pain, unspecified    TECHNIQUE:  PA and lateral views of the chest were performed.    COMPARISON:  January 28, 2018.    FINDINGS:  Heart size pulmonary vessels are normal.  Tortuosity of the aorta.  No confluent consolidation.  Bones are osteopenic.  No pleural fluid.                                 Medical Decision Making:   Initial Assessment:    77-year-old male presenting for evaluation of 3 day history of left upper quadrant discomfort.  Patient denies associated pain, fever, nausea, vomiting, diarrhea, constipation, urinary symptoms.  He denies chest pain, shortness of breath, dizziness, lightheadedness.  Exam findings detailed above.  No abdominal tenderness to palpation. No hernia or findings to explain patient's complaint. Initial history of triage stated that the patient was experiencing left rib pain.  However, patient denies rib pain at this time.  X-ray abdomen is negative for findings to explain patient's abdominal discomfort.  Is remarkable for constipation.  Patient states he has constipation medications at home that he will take.  Instructed patient on increasing water intake as well as fiber intake.  Review of previous imaging CT in 2014  reveals  2.6-cm fluid density focus medial segment left lobe of the liver extending exophytically to abut the gallbladder it this may correspond to opacity seen on plain film with slight distention of the gallbladder without evidence for calcified gallstones by CT criteria. Slight nodularity of the surface of the liver cannot exclude component of underlying cirrhosis. Pt states he followed up for this and was told to continue following up with primary care for this but he states he has not discussed monitoring this with his PCP in awhile. Will have pt follow up with primary care for further evaluation and management. Doubt incarcerated hernia bowel obstruction, acute surgical abdomen.  Left patient follow up with GI.  ER return precautions discussed worsening symptoms or as needed.  Discussed this patient Dr. Xiao who agrees with the assessment plan.            Scribe Attestation:   Scribe #1: I performed the above scribed service and the documentation accurately describes the services I performed. I attest to the accuracy of the note.    Attending Attestation:     Physician Attestation Statement for  NP/PA:   I discussed this assessment and plan of this patient with the NP/PA, but I did not personally examine the patient. The face to face encounter was performed by the NP/PA.        Physician Attestation for Scribe:  Physician Attestation Statement for Scribe #1: I, Bela Dobbs PA-C, reviewed documentation, as scribed by Monika Ruiz in my presence, and it is both accurate and complete.                    Clinical Impression:   Diagnoses of Pain and Abdominal discomfort were pertinent to this visit.                             Bela Steele PA-C  08/22/18 [id number]

## 2018-08-22 NOTE — ED TRIAGE NOTES
"Patient reports "pressure under the left ribcage". Patient states "it feels like something is moving in there". Pt reports pain has been present for 3 days. Patient states pain is an 8/10 and it comes mainly when he's at rest.   "

## 2018-08-22 NOTE — DISCHARGE INSTRUCTIONS
Bring copy of your image results to primary care.     Follow up with primary care in 2 days. Return to ER for worsening symptoms or as needed.

## 2018-10-26 ENCOUNTER — HOSPITAL ENCOUNTER (EMERGENCY)
Facility: HOSPITAL | Age: 78
Discharge: HOME | End: 2018-10-26
Attending: EMERGENCY MEDICINE
Payer: MEDICARE

## 2018-10-26 VITALS
RESPIRATION RATE: 16 BRPM | TEMPERATURE: 98 F | HEIGHT: 67 IN | SYSTOLIC BLOOD PRESSURE: 129 MMHG | BODY MASS INDEX: 25.11 KG/M2 | HEART RATE: 67 BPM | DIASTOLIC BLOOD PRESSURE: 84 MMHG | OXYGEN SATURATION: 95 % | WEIGHT: 160 LBS

## 2018-10-26 DIAGNOSIS — R10.9 FLANK PAIN: ICD-10-CM

## 2018-10-26 DIAGNOSIS — R05.9 COUGH: ICD-10-CM

## 2018-10-26 DIAGNOSIS — R10.9 BILATERAL FLANK PAIN: ICD-10-CM

## 2018-10-26 DIAGNOSIS — R93.5 ABNORMAL CT OF THE ABDOMEN: Primary | ICD-10-CM

## 2018-10-26 LAB
ALBUMIN SERPL BCP-MCNC: 4 G/DL
ALP SERPL-CCNC: 88 U/L
ALT SERPL W/O P-5'-P-CCNC: 38 U/L
ANION GAP SERPL CALC-SCNC: 12 MMOL/L
AST SERPL-CCNC: 47 U/L
BACTERIA #/AREA URNS HPF: ABNORMAL /HPF
BASOPHILS # BLD AUTO: 0 K/UL
BASOPHILS NFR BLD: 0 %
BILIRUB SERPL-MCNC: 0.6 MG/DL
BILIRUB UR QL STRIP: NEGATIVE
BUN SERPL-MCNC: 26 MG/DL
CALCIUM SERPL-MCNC: 9.3 MG/DL
CHLORIDE SERPL-SCNC: 106 MMOL/L
CLARITY UR: CLEAR
CO2 SERPL-SCNC: 23 MMOL/L
COLOR UR: YELLOW
CREAT SERPL-MCNC: 1.7 MG/DL
DIFFERENTIAL METHOD: ABNORMAL
EOSINOPHIL # BLD AUTO: 0 K/UL
EOSINOPHIL NFR BLD: 0 %
ERYTHROCYTE [DISTWIDTH] IN BLOOD BY AUTOMATED COUNT: 14.5 %
EST. GFR  (AFRICAN AMERICAN): 43 ML/MIN/1.73 M^2
EST. GFR  (NON AFRICAN AMERICAN): 38 ML/MIN/1.73 M^2
GLUCOSE SERPL-MCNC: 81 MG/DL
GLUCOSE UR QL STRIP: NEGATIVE
HCT VFR BLD AUTO: 37.4 %
HGB BLD-MCNC: 11.6 G/DL
HGB UR QL STRIP: NEGATIVE
KETONES UR QL STRIP: NEGATIVE
LEUKOCYTE ESTERASE UR QL STRIP: ABNORMAL
LYMPHOCYTES # BLD AUTO: 1.8 K/UL
LYMPHOCYTES NFR BLD: 29.7 %
MCH RBC QN AUTO: 24.5 PG
MCHC RBC AUTO-ENTMCNC: 31 G/DL
MCV RBC AUTO: 79 FL
MICROSCOPIC COMMENT: ABNORMAL
MONOCYTES # BLD AUTO: 0.6 K/UL
MONOCYTES NFR BLD: 10.6 %
NEUTROPHILS # BLD AUTO: 3.6 K/UL
NEUTROPHILS NFR BLD: 59.4 %
NITRITE UR QL STRIP: NEGATIVE
PH UR STRIP: 6 [PH] (ref 5–8)
PLATELET # BLD AUTO: 194 K/UL
PMV BLD AUTO: 11.2 FL
POTASSIUM SERPL-SCNC: 4.4 MMOL/L
PROT SERPL-MCNC: 8.7 G/DL
PROT UR QL STRIP: NEGATIVE
RBC # BLD AUTO: 4.73 M/UL
RBC #/AREA URNS HPF: 2 /HPF (ref 0–4)
SODIUM SERPL-SCNC: 141 MMOL/L
SP GR UR STRIP: 1.01 (ref 1–1.03)
TROPONIN I SERPL DL<=0.01 NG/ML-MCNC: <0.006 NG/ML
URN SPEC COLLECT METH UR: ABNORMAL
UROBILINOGEN UR STRIP-ACNC: NEGATIVE EU/DL
WBC # BLD AUTO: 6.02 K/UL
WBC #/AREA URNS HPF: 25 /HPF (ref 0–5)

## 2018-10-26 PROCEDURE — 84484 ASSAY OF TROPONIN QUANT: CPT

## 2018-10-26 PROCEDURE — 87086 URINE CULTURE/COLONY COUNT: CPT

## 2018-10-26 PROCEDURE — 87088 URINE BACTERIA CULTURE: CPT

## 2018-10-26 PROCEDURE — 81000 URINALYSIS NONAUTO W/SCOPE: CPT

## 2018-10-26 PROCEDURE — 85025 COMPLETE CBC W/AUTO DIFF WBC: CPT

## 2018-10-26 PROCEDURE — 25000003 PHARM REV CODE 250: Performed by: PHYSICIAN ASSISTANT

## 2018-10-26 PROCEDURE — 93005 ELECTROCARDIOGRAM TRACING: CPT

## 2018-10-26 PROCEDURE — 87077 CULTURE AEROBIC IDENTIFY: CPT

## 2018-10-26 PROCEDURE — 99283 EMERGENCY DEPT VISIT LOW MDM: CPT

## 2018-10-26 PROCEDURE — 87186 SC STD MICRODIL/AGAR DIL: CPT

## 2018-10-26 PROCEDURE — 93010 ELECTROCARDIOGRAM REPORT: CPT | Mod: ,,, | Performed by: INTERNAL MEDICINE

## 2018-10-26 PROCEDURE — 80053 COMPREHEN METABOLIC PANEL: CPT

## 2018-10-26 RX ORDER — ACETAMINOPHEN 325 MG/1
650 TABLET ORAL
Status: COMPLETED | OUTPATIENT
Start: 2018-10-26 | End: 2018-10-26

## 2018-10-26 RX ORDER — HYDRALAZINE HYDROCHLORIDE 50 MG/1
50 TABLET, FILM COATED ORAL DAILY
COMMUNITY
End: 2020-02-17 | Stop reason: SDUPTHER

## 2018-10-26 RX ORDER — CEPHALEXIN 500 MG/1
500 CAPSULE ORAL EVERY 12 HOURS
Qty: 14 CAPSULE | Refills: 0 | Status: SHIPPED | OUTPATIENT
Start: 2018-10-26 | End: 2018-11-02

## 2018-10-26 RX ORDER — DEXTROMETHORPHAN HYDROBROMIDE, GUAIFENESIN 5; 100 MG/5ML; MG/5ML
650 LIQUID ORAL EVERY 8 HOURS PRN
Qty: 30 TABLET | Refills: 0 | OUTPATIENT
Start: 2018-10-26 | End: 2019-01-28

## 2018-10-26 RX ADMIN — ACETAMINOPHEN 650 MG: 325 TABLET, FILM COATED ORAL at 02:10

## 2018-10-26 NOTE — ED PROVIDER NOTES
Encounter Date: 10/26/2018    SCRIBE #1 NOTE: I, Shaggy Rodriguez, am scribing for, and in the presence of,  MARCO ANTONIO Amaya. I have scribed the following portions of the note - Other sections scribed: HPI and ROS.       History     Chief Complaint   Patient presents with    Flank Pain     pt c/o flank pain on both sides since last night; denies trouble with urination; denies blood in urine; denies issues with bm     Chief Complaint: Rib Pain    HPI: This 78 y.o. Male with asthma, HTN, COPD and Afib presents to the ED c/o acute onset bilateral upper rib cage pain. Symptoms awakened patient from sleep last night. Pain is constant, severe and worse with deep breaths. Mild associated abdominal pain. Pain improved with Aleve but not resolved. No back pain, nausea, vomiting, dysuria, hematuria.       The history is provided by the patient. No  was used.     Review of patient's allergies indicates:   Allergen Reactions    Lisinopril Edema     Past Medical History:   Diagnosis Date    Asthma     Atrial fibrillation     COPD (chronic obstructive pulmonary disease)     Hypertension      History reviewed. No pertinent surgical history.  History reviewed. No pertinent family history.  Social History     Tobacco Use    Smoking status: Former Smoker     Packs/day: 1.50     Years: 20.00     Pack years: 30.00     Last attempt to quit: 1990     Years since quittin.8    Smokeless tobacco: Former User   Substance Use Topics    Alcohol use: No    Drug use: No     Review of Systems   Constitutional: Negative for chills and fever.   HENT: Negative for ear pain and sore throat.    Eyes: Negative for pain.   Respiratory: Negative for cough and shortness of breath.    Cardiovascular: Negative for chest pain.   Gastrointestinal: Positive for abdominal pain. Negative for diarrhea, nausea and vomiting.   Genitourinary: Negative for dysuria and hematuria.   Musculoskeletal: Negative for myalgias (arm or leg  pain).        Rib cage pain   Skin: Negative for rash.   Neurological: Negative for headaches.       Physical Exam     Initial Vitals [10/26/18 1253]   BP Pulse Resp Temp SpO2   (!) 112/59 60 20 98.3 °F (36.8 °C) 96 %      MAP       --         Physical Exam    Vitals reviewed.  Constitutional: He appears well-developed and well-nourished. He is not diaphoretic. No distress.   HENT:   Head: Normocephalic and atraumatic.   Right Ear: External ear normal.   Left Ear: External ear normal.   Nose: Nose normal.   Eyes: Conjunctivae are normal. No scleral icterus.   Neck: Normal range of motion. Neck supple.   Cardiovascular: Normal rate, regular rhythm, normal heart sounds and intact distal pulses.   Pulmonary/Chest: Breath sounds normal. No respiratory distress. He has no wheezes. He has no rhonchi. He has no rales. He exhibits no tenderness.   Abdominal: Soft. Bowel sounds are normal. He exhibits no distension and no mass. There is no tenderness. There is no rebound, no guarding, no CVA tenderness, no tenderness at McBurney's point and negative Acevedo's sign.   Musculoskeletal: Normal range of motion.   Neurological: He is alert and oriented to person, place, and time.   Skin: Skin is warm and dry. No rash noted. No erythema.         ED Course   Procedures  Labs Reviewed   CBC W/ AUTO DIFFERENTIAL - Abnormal; Notable for the following components:       Result Value    Hemoglobin 11.6 (*)     Hematocrit 37.4 (*)     MCV 79 (*)     MCH 24.5 (*)     MCHC 31.0 (*)     All other components within normal limits   COMPREHENSIVE METABOLIC PANEL - Abnormal; Notable for the following components:    BUN, Bld 26 (*)     Creatinine 1.7 (*)     Total Protein 8.7 (*)     AST 47 (*)     eGFR if  43 (*)     eGFR if non  38 (*)     All other components within normal limits    Narrative:     Recoll. [id number] by NICKO at 10/26/2018 14:20, reason: Specimen   hemolyzed; Tube has been refrigerated; CALLED TO  LAURA LARES    10/26/2018  14:20   URINALYSIS, REFLEX TO URINE CULTURE - Abnormal; Notable for the following components:    Leukocytes, UA 2+ (*)     All other components within normal limits    Narrative:     Preferred Collection Type->Urine, Clean Catch   URINALYSIS MICROSCOPIC - Abnormal; Notable for the following components:    WBC, UA 25 (*)     Bacteria, UA Many (*)     All other components within normal limits    Narrative:     Preferred Collection Type->Urine, Clean Catch   CULTURE, URINE   TROPONIN I     EKG Readings: (Independently Interpreted)   Normal sinus rhythm with no ST elevation or depression.  LVH.  Normal axis and intervals.  Good R-wave progression.       Imaging Results          CT Renal Stone Study ABD Pelvis WO (Final result)     Abnormal  Result time 10/26/18 13:55:04    Final result by Lj Figueroa MD (10/26/18 13:55:04)                 Impression:      1. No acute process or CT findings identified to explain patient's symptoms of flank pain.  Specifically, no radiodense nephrolithiasis or ureteral calculus.  2. Right lower lobe few small clusters of tree-in-bud opacities which can be seen with a nonspecific bronchopneumonia or early aspiration.  Follow-up as warranted.  3. More conspicuous 10 mm fat attenuation focus at the posterior aspect of the pancreatic body.  This is nonspecific and could represent sequela of pancreatic atrophy with a more prominent fatty cleft versus possible lipoma, with liposarcoma not excluded on the basis of this examination.  Further evaluation with MRI MRCP of the abdomen to include pancreatic mass protocol may be beneficial if pertinent to the management of this patient.  4. Stable exophytic hepatic cyst.  5. Diverticulosis coli without diverticulitis.  6. Prostatomegaly.  7. Few additional incidental findings as above.  This report was flagged in Epic as abnormal.      Electronically signed by: Lj Figueroa MD  Date:    10/26/2018  Time:    13:55              Narrative:    EXAMINATION:  CT RENAL STONE STUDY ABD PELVIS WO    CLINICAL HISTORY:  Flank pain, stone disease suspected;Hematuria;    TECHNIQUE:  Low dose axial images, sagittal and coronal reformations were obtained from the lung bases to the pubic symphysis.  Contrast was not administered.    COMPARISON:  Chest radiograph same day, abdominal series 08/22/2018, abdominal ultrasound 05/25/2017 and CT abdomen and pelvis 08/16/2014    FINDINGS:  Included lung bases show minimal scattered linear opacities consistent with platelike scarring versus atelectasis as well as a few small clusters of tree-in-bud opacities within the right lower lobe.  No large consolidation or pleural fluid.  The heart is normal in size without significant pericardial fluid noting coronary arterial and aortic annular calcifications.    Liver is somewhat small in size with subtle nodularity of the ventral surface similar to prior.    There is a stable 2.6 cm fluid density mass arising exophytically from the right hepatic lobe with slight mass effect upon the gallbladder similar to prior.  Noncontrast appearance of the gallbladder, spleen, stomach, and duodenum are within normal limits.  Grossly similar nonspecific nodular thickening of the bilateral adrenal glands.  No biliary ductal dilatation or additional hepatic lesion seen.  Pancreas is normal in size containing a 10 mm rounded fat attenuation focus along the posterior aspect of the mid body, more conspicuous on today's exam.    Bilateral kidneys are stable in size, shape and location, overall mildly atrophic.  No radiodense calculus seen within the collecting systems on either side or urinary bladder.  No hydronephrosis or significant perinephric stranding.  Few scattered subcentimeter low-attenuation cortical foci at each kidney which are too small to characterize.  Bilateral ureters are within normal limits.  Urinary bladder is within normal limits.  Prostate is enlarged  measuring up to 5.3 cm with mild mass effect upon the urinary bladder base, grossly similar to prior.  Pelvic phleboliths noted.    No ascites, free air or lymphadenopathy.  Minimal scattered atherosclerosis.  The abdominal aorta is atherosclerotic and minimally ectatic but not aneurysmal.    Appendix and terminal ileum are within normal limits.  There are numerous scattered colonic diverticula without convincing evidence for acute diverticulitis.  No evidence of bowel obstruction or perforation.  No pneumatosis or portal venous gas.  Small fat containing umbilical hernia.    Included osseous structures appear grossly similar to prior without acute or destructive process seen.                               X-Ray Chest PA And Lateral (Final result)  Result time 10/26/18 13:25:56    Final result by Yury Schwarz MD (10/26/18 13:25:56)                 Impression:      No acute abnormality.      Electronically signed by: Yury Schwarz MD  Date:    10/26/2018  Time:    13:25             Narrative:    EXAMINATION:  XR CHEST PA AND LATERAL    CLINICAL HISTORY:  Cough    TECHNIQUE:  PA and lateral views of the chest were performed.    COMPARISON:  Chest radiograph from 08/22/2018    FINDINGS:  The lungs are clear, with normal appearance of pulmonary vasculature and no pleural effusion or pneumothorax.    The cardiac silhouette is normal in size. The hilar and mediastinal contours are unremarkable.    Bones are intact.                              X-Rays:   Independently Interpreted Readings:   Chest X-Ray: Normal heart size.  No infiltrates.  No acute abnormalities.   Abdomen: No obstructive uropathy, obvious lower lobe pneumonia identified.  Incidental findings include 1 cm focus on pancreas, hepatic cyst, prostatomegaly, diverticulosis without diverticulitis.     Medical Decision Making:   ED Management:  78-year-old male with hypertension, COPD, history AFib with pain primarily with movement and deep breath to  the lateral aspect of bilateral upper abdomen/lower chest wall.  He has no tenderness here.  He is well-appearing in no distress.  Vital signs are reassuring.  EKG and chest x-ray without evidence of acute cardiopulmonary process.  Troponin negative. Heart score 3.  Low suspicion for ACS or PE.  No leukocytosis.  Creatinine is baseline.  CT shows no evidence of pneumonia in the lower lobes, no urolithiasis, or other obvious pathology to explain patient's pain.  Incidental findings discussed with patient who was urged to follow up with PCP.  UA with evidence for infection.  Patient without urinary symptoms or CVA tenderness. Low suspicion for pyelonephritis.  Will treat for infection.  Again patient is well-appearing, tolerating p.o., afebrile.  I doubt urosepsis, and believe patient is safe for discharge. Tylenol for pain.  Return precautions given.  He verbalized understanding and agreed with plan.  Case discussed with Dr. Jaime.    Additional MDM:   PERC Rule:   Age is greater than or equal to 50 = 1.0  Heart Rate is greater than or equal to 100 = 0.0  SaO2 on room air < 95% = 0.0  Unilateral leg swelling = 0.0  Hemoptysis = 0.0  Recent surgery or trauma = 0.0  Prior PE or DVT =  0.0  Hormone use = 0.00  PERC Score = 1    Well's Criteria Score:  -Clinical symptoms of DVT (leg swelling, pain with palpation) = 0.0  -Other diagnosis less likely than pulmonary embolism =            0.0  -Heart Rate >100 =   0.0  -Immobilization (= or > than 3 days) or surgery in the previous 4 weeks = 0.0  -Previous DVT/PE = 0.0  -Hemoptysis =          0.0  -Malignancy =           0.0  Well's Probability Score =    0      Heart Score:    History:          Slightly suspicious.  ECG:             Normal  Age:               >65 years  Risk factors: 1-2 risk factors  Troponin:       Less than or equal to normal limit  Final Score: 3             Scribe Attestation:   Scribe #1: I performed the above scribed service and the documentation  accurately describes the services I performed. I attest to the accuracy of the note.    Attending Attestation:           Physician Attestation for Scribe:  Physician Attestation Statement for Scribe #1: I, MARCO ANTONIO Amaya, reviewed documentation, as scribed by Shaggy Rodriguez in my presence, and it is both accurate and complete.                    Clinical Impression:   The primary encounter diagnosis was Abnormal CT of the abdomen. Diagnoses of Cough, Flank pain, and Bilateral flank pain were also pertinent to this visit.                             Chris Abel PA-C  10/26/18 1724       Chris Abel PA-C  10/26/18 1734

## 2018-10-28 LAB — BACTERIA UR CULT: NORMAL

## 2019-01-28 ENCOUNTER — HOSPITAL ENCOUNTER (EMERGENCY)
Facility: HOSPITAL | Age: 79
Discharge: HOME | End: 2019-01-28
Attending: EMERGENCY MEDICINE
Payer: MEDICARE

## 2019-01-28 VITALS
BODY MASS INDEX: 25.11 KG/M2 | DIASTOLIC BLOOD PRESSURE: 74 MMHG | TEMPERATURE: 98 F | OXYGEN SATURATION: 7 % | HEART RATE: 66 BPM | HEIGHT: 67 IN | RESPIRATION RATE: 20 BRPM | SYSTOLIC BLOOD PRESSURE: 138 MMHG | WEIGHT: 160 LBS

## 2019-01-28 DIAGNOSIS — M54.32 SCIATICA, LEFT SIDE: Primary | ICD-10-CM

## 2019-01-28 DIAGNOSIS — H61.22 IMPACTED CERUMEN OF LEFT EAR: ICD-10-CM

## 2019-01-28 PROCEDURE — 25000003 PHARM REV CODE 250: Performed by: PHYSICIAN ASSISTANT

## 2019-01-28 PROCEDURE — 69209 REMOVE IMPACTED EAR WAX UNI: CPT | Mod: LT

## 2019-01-28 PROCEDURE — 99284 EMERGENCY DEPT VISIT MOD MDM: CPT | Mod: 25

## 2019-01-28 RX ORDER — ACETAMINOPHEN 500 MG
500 TABLET ORAL EVERY 4 HOURS PRN
Qty: 20 TABLET | Refills: 0 | Status: SHIPPED | OUTPATIENT
Start: 2019-01-28 | End: 2019-02-02

## 2019-01-28 RX ORDER — LIDOCAINE 50 MG/G
1 PATCH TOPICAL DAILY
Qty: 15 PATCH | Refills: 0 | Status: SHIPPED | OUTPATIENT
Start: 2019-01-28 | End: 2019-02-12

## 2019-01-28 RX ORDER — ACETAMINOPHEN 500 MG
500 TABLET ORAL
Status: COMPLETED | OUTPATIENT
Start: 2019-01-28 | End: 2019-01-28

## 2019-01-28 RX ORDER — LIDOCAINE 50 MG/G
1 PATCH TOPICAL
Status: DISCONTINUED | OUTPATIENT
Start: 2019-01-28 | End: 2019-01-28 | Stop reason: HOSPADM

## 2019-01-28 RX ADMIN — ACETAMINOPHEN 500 MG: 500 TABLET, FILM COATED ORAL at 02:01

## 2019-01-28 RX ADMIN — LIDOCAINE 1 PATCH: 50 PATCH TOPICAL at 02:01

## 2019-01-28 NOTE — ED TRIAGE NOTES
Pt presents to ED c/o burning sensation with pain to his upper left thigh.  Rates it between a 5-10.  The pain fluctuates.  Denies taking any meds for it

## 2019-01-28 NOTE — DISCHARGE INSTRUCTIONS
Take Tylenol and apply Lidoderm as prescribed.   Follow up with primary care and orthopedics in 2 days. Return to ER for worsening symptoms or as needed.

## 2019-01-28 NOTE — ED PROVIDER NOTES
"Encounter Date: 2019    SCRIBE #1 NOTE: I, Celine Benavides, am scribing for, and in the presence of,  Bela Steele PA-C. I have scribed the following portions of the note - Other sections scribed: HPI, ROS .       History     Chief Complaint   Patient presents with    Leg Pain     "This leg is like it's numb and all of a sudden it started burning through there." Pt reports pain to L thigh "I don't know if I pulled a muscle or what."      CC: Leg Pain      78 y.o. Male with a past medical history of Asthma, Atrial fibrillation, COPD, and Hypertension presents to ED c/o acute onset, constant burning 7/10 left lateral thigh pain and tingling for x4 days that worsened today. Pt states the pain will become more severe for 4-5 minutes but is constant. Pt denies hx of similar symptoms. He has not visited his PCP for symptoms. Pt attempted tx with Aleve and experienced some relief. Denies back pain, hip pain, fall, fever, chills, N/V/D, rash, pain with movement, bowel or bladder incontinence, saddle anesthesia, testicular pain, leg swelling, and hx of blood clots. Pt additionally c/o decreased hearing from L ear. Denies otalgia, otorrhea. No other associated symptoms.           The history is provided by the patient. No  was used.     Review of patient's allergies indicates:   Allergen Reactions    Lisinopril Edema     Past Medical History:   Diagnosis Date    Asthma     Atrial fibrillation     COPD (chronic obstructive pulmonary disease)     Hypertension      No past surgical history on file.  No family history on file.  Social History     Tobacco Use    Smoking status: Former Smoker     Packs/day: 1.50     Years: 20.00     Pack years: 30.00     Last attempt to quit:      Years since quittin.0    Smokeless tobacco: Former User   Substance Use Topics    Alcohol use: No    Drug use: No     Review of Systems   Constitutional: Negative for appetite change, chills and " fever.   HENT: Positive for hearing loss. Negative for ear discharge, ear pain, rhinorrhea and sore throat.    Eyes: Negative for pain and visual disturbance.   Respiratory: Negative for cough and shortness of breath.    Cardiovascular: Negative for chest pain and leg swelling.   Gastrointestinal: Negative for abdominal pain, nausea and vomiting.   Genitourinary: Negative for dysuria and testicular pain.   Musculoskeletal: Negative for gait problem.        (+) left lateral thigh pain   Skin: Negative for rash.   Neurological: Positive for numbness (left lateral thigh). Negative for syncope and weakness.       Physical Exam     Initial Vitals [01/28/19 1324]   BP Pulse Resp Temp SpO2   129/72 67 18 98.4 °F (36.9 °C) 95 %      MAP       --         Physical Exam    Nursing note and vitals reviewed.  Constitutional: He appears well-developed and well-nourished. No distress.   HENT:   Head: Normocephalic.   Right Ear: Hearing, tympanic membrane, external ear and ear canal normal.   Left Ear: External ear normal. Decreased hearing is noted.   Nose: Nose normal.   Mouth/Throat: Oropharynx is clear and moist. No oropharyngeal exudate.   Large amount of thick white earwax adhered to TM    Eyes: Conjunctivae are normal.   Neck: Neck supple.   Cardiovascular: Normal rate and regular rhythm. Exam reveals no gallop and no friction rub.    No murmur heard.  Pulses:       Dorsalis pedis pulses are 2+ on the right side, and 2+ on the left side.   Pulmonary/Chest: Breath sounds normal. No respiratory distress. He has no wheezes. He has no rhonchi. He has no rales.   Abdominal: Soft. Bowel sounds are normal. He exhibits no distension. There is no tenderness. There is no rebound and no guarding.   Musculoskeletal:   No midline or paraspinal tenderness to palpation. Normal strength in bilateral lower extremities. No tenderness to palpation of the left lower extremity.  No swelling. No medial thigh, popliteal or calf tenderness to  palpation   Lymphadenopathy:     He has no cervical adenopathy.   Neurological: He is alert.   Skin: Skin is warm and dry. No rash noted.   Psychiatric: He has a normal mood and affect.         ED Course   Ear Wax Removal  Date/Time: 1/29/2019 12:19 AM  Performed by: Bela Steele PA-C  Authorized by: Krishna Michel MD   Location details: left ear  Procedure type: irrigation Cerumen Removal Results: Cerumen partially removed.  Patient tolerance: Patient tolerated the procedure well with no immediate complications        Labs Reviewed - No data to display       Imaging Results    None          Medical Decision Making:   Initial Assessment:   77 y/o male presenting for evaluation of 4 day history of left lateral thigh tingling that is constant and not worse with movement.  Denies trauma, falls, fever, chills, nausea, vomiting, back pain, bowel or bladder incontinence, saddle anesthesia.  Exam above.  Considered but doubt fracture, dislocation, cauda equina, epidural abscess, DVT.  Think this is likely sciatica.  Patient given Tylenol and Lidoderm patch applied.  Patient states that he is being followed by his primary care for decreased kidney function.  Instructed him to refrain from use of NSAIDs.  Return to the ER for worsening symptoms.  Follow up with Orthopedics for further evaluation management.  Addition complaining of decreased hearing from left ear.  Cerumen irrigation attempted and not completely successful.  Will have him follow up with his primary care or ENT            Scribe Attestation:   Scribe #1: I performed the above scribed service and the documentation accurately describes the services I performed. I attest to the accuracy of the note.    Attending Attestation:           Physician Attestation for Scribe:  Physician Attestation Statement for Scribe #1: I, Bela Steele PA-C, reviewed documentation, as scribed by Celine Benavides in my presence, and it is both accurate and  complete.                    Clinical Impression:   The primary encounter diagnosis was Sciatica, left side. A diagnosis of Impacted cerumen of left ear was also pertinent to this visit.                         Bela Steele PA-C  01/29/19 0020

## 2019-05-05 ENCOUNTER — HOSPITAL ENCOUNTER (EMERGENCY)
Facility: HOSPITAL | Age: 79
Discharge: HOME | End: 2019-05-06
Attending: EMERGENCY MEDICINE
Payer: MEDICARE

## 2019-05-05 DIAGNOSIS — R00.2 PALPITATIONS: ICD-10-CM

## 2019-05-05 DIAGNOSIS — R06.02 SOB (SHORTNESS OF BREATH): Primary | ICD-10-CM

## 2019-05-05 DIAGNOSIS — J44.1 COPD EXACERBATION: ICD-10-CM

## 2019-05-05 LAB
ALBUMIN SERPL BCP-MCNC: 3.9 G/DL (ref 3.5–5.2)
ALP SERPL-CCNC: 94 U/L (ref 55–135)
ALT SERPL W/O P-5'-P-CCNC: 19 U/L (ref 10–44)
ANION GAP SERPL CALC-SCNC: 10 MMOL/L (ref 8–16)
AST SERPL-CCNC: 31 U/L (ref 10–40)
BASOPHILS # BLD AUTO: 0 K/UL (ref 0–0.2)
BASOPHILS NFR BLD: 0 % (ref 0–1.9)
BILIRUB SERPL-MCNC: 0.3 MG/DL (ref 0.1–1)
BNP SERPL-MCNC: 36 PG/ML (ref 0–99)
BUN SERPL-MCNC: 16 MG/DL (ref 8–23)
CALCIUM SERPL-MCNC: 9.3 MG/DL (ref 8.7–10.5)
CHLORIDE SERPL-SCNC: 108 MMOL/L (ref 95–110)
CO2 SERPL-SCNC: 24 MMOL/L (ref 23–29)
CREAT SERPL-MCNC: 1.2 MG/DL (ref 0.5–1.4)
DIFFERENTIAL METHOD: ABNORMAL
EOSINOPHIL # BLD AUTO: 0 K/UL (ref 0–0.5)
EOSINOPHIL NFR BLD: 0 % (ref 0–8)
ERYTHROCYTE [DISTWIDTH] IN BLOOD BY AUTOMATED COUNT: 14.6 % (ref 11.5–14.5)
EST. GFR  (AFRICAN AMERICAN): >60 ML/MIN/1.73 M^2
EST. GFR  (NON AFRICAN AMERICAN): 57 ML/MIN/1.73 M^2
GLUCOSE SERPL-MCNC: 100 MG/DL (ref 70–110)
GLUCOSE SERPL-MCNC: 91 MG/DL (ref 70–110)
HCT VFR BLD AUTO: 37.2 % (ref 40–54)
HGB BLD-MCNC: 11.6 G/DL (ref 14–18)
INR PPP: 1 (ref 0.8–1.2)
LYMPHOCYTES # BLD AUTO: 1.8 K/UL (ref 1–4.8)
LYMPHOCYTES NFR BLD: 35.2 % (ref 18–48)
MAGNESIUM SERPL-MCNC: 1.8 MG/DL (ref 1.6–2.6)
MCH RBC QN AUTO: 25.3 PG (ref 27–31)
MCHC RBC AUTO-ENTMCNC: 31.2 G/DL (ref 32–36)
MCV RBC AUTO: 81 FL (ref 82–98)
MONOCYTES # BLD AUTO: 0.4 K/UL (ref 0.3–1)
MONOCYTES NFR BLD: 8.3 % (ref 4–15)
NEUTROPHILS # BLD AUTO: 2.9 K/UL (ref 1.8–7.7)
NEUTROPHILS NFR BLD: 56.5 % (ref 38–73)
PLATELET # BLD AUTO: 151 K/UL (ref 150–350)
PMV BLD AUTO: 11.5 FL (ref 9.2–12.9)
POCT GLUCOSE: 100 MG/DL (ref 70–110)
POTASSIUM SERPL-SCNC: 3.7 MMOL/L (ref 3.5–5.1)
PROT SERPL-MCNC: 8.6 G/DL (ref 6–8.4)
PROTHROMBIN TIME: 10 SEC (ref 9–12.5)
RBC # BLD AUTO: 4.59 M/UL (ref 4.6–6.2)
SODIUM SERPL-SCNC: 142 MMOL/L (ref 136–145)
TROPONIN I SERPL DL<=0.01 NG/ML-MCNC: <0.006 NG/ML (ref 0–0.03)
WBC # BLD AUTO: 5.17 K/UL (ref 3.9–12.7)

## 2019-05-05 PROCEDURE — 93010 ELECTROCARDIOGRAM REPORT: CPT | Mod: ,,, | Performed by: INTERNAL MEDICINE

## 2019-05-05 PROCEDURE — 85610 PROTHROMBIN TIME: CPT

## 2019-05-05 PROCEDURE — 93005 ELECTROCARDIOGRAM TRACING: CPT

## 2019-05-05 PROCEDURE — 85025 COMPLETE CBC W/AUTO DIFF WBC: CPT

## 2019-05-05 PROCEDURE — 94640 AIRWAY INHALATION TREATMENT: CPT

## 2019-05-05 PROCEDURE — 84484 ASSAY OF TROPONIN QUANT: CPT

## 2019-05-05 PROCEDURE — 83880 ASSAY OF NATRIURETIC PEPTIDE: CPT

## 2019-05-05 PROCEDURE — 82962 GLUCOSE BLOOD TEST: CPT

## 2019-05-05 PROCEDURE — 80053 COMPREHEN METABOLIC PANEL: CPT

## 2019-05-05 PROCEDURE — 83735 ASSAY OF MAGNESIUM: CPT

## 2019-05-05 PROCEDURE — 99285 EMERGENCY DEPT VISIT HI MDM: CPT | Mod: 25

## 2019-05-05 PROCEDURE — 93010 EKG 12-LEAD: ICD-10-PCS | Mod: ,,, | Performed by: INTERNAL MEDICINE

## 2019-05-05 PROCEDURE — 25000242 PHARM REV CODE 250 ALT 637 W/ HCPCS: Performed by: EMERGENCY MEDICINE

## 2019-05-05 RX ORDER — IPRATROPIUM BROMIDE AND ALBUTEROL SULFATE 2.5; .5 MG/3ML; MG/3ML
3 SOLUTION RESPIRATORY (INHALATION)
Status: COMPLETED | OUTPATIENT
Start: 2019-05-05 | End: 2019-05-05

## 2019-05-05 RX ADMIN — IPRATROPIUM BROMIDE AND ALBUTEROL SULFATE 3 ML: .5; 3 SOLUTION RESPIRATORY (INHALATION) at 10:05

## 2019-05-06 VITALS
HEIGHT: 67 IN | RESPIRATION RATE: 20 BRPM | OXYGEN SATURATION: 95 % | HEART RATE: 74 BPM | WEIGHT: 160 LBS | TEMPERATURE: 99 F | BODY MASS INDEX: 25.11 KG/M2 | DIASTOLIC BLOOD PRESSURE: 74 MMHG | SYSTOLIC BLOOD PRESSURE: 127 MMHG

## 2019-05-06 RX ORDER — PREDNISONE 10 MG/1
TABLET ORAL
Qty: 36 TABLET | Refills: 0 | OUTPATIENT
Start: 2019-05-06 | End: 2019-07-22

## 2019-05-06 RX ORDER — ALBUTEROL SULFATE 0.83 MG/ML
SOLUTION RESPIRATORY (INHALATION)
Qty: 1 BOX | Refills: 2 | Status: SHIPPED | OUTPATIENT
Start: 2019-05-06 | End: 2020-03-02 | Stop reason: SDUPTHER

## 2019-05-06 NOTE — ED TRIAGE NOTES
Pt cc Shortness of Breath Pt reports hx asthma; started yesterday evening, stopped, then SOB again this morning; also reports palpitations that makes breathing difficult

## 2019-05-06 NOTE — ED TRIAGE NOTES
Pt cc Shortness of Breath Pt reports hx asthma; started yesterday evening, stopped, then SOB again this morning; also reports palpitations that makes breathing difficult.

## 2019-05-25 NOTE — ED PROVIDER NOTES
Encounter Date: 2019       History     Chief Complaint   Patient presents with    Shortness of Breath     repprts hx asthma; started yesterday evening, stopped, then SOB again this morning; also reports palpitations that makes breathing difficult;     78-year-old female with history of asthma, atrial fibrillation, COPD, hypertension presents complaining of shortness of breath last night and then again this morning also reports palpitations.  No chest pain. No pain with breathing.  No orthopnea.  No fever.  No productive cough.  States this feels similar to his asthma/COPD.        Review of patient's allergies indicates:   Allergen Reactions    Lisinopril Edema     Past Medical History:   Diagnosis Date    Asthma     Atrial fibrillation     COPD (chronic obstructive pulmonary disease)     Hypertension      History reviewed. No pertinent surgical history.  History reviewed. No pertinent family history.  Social History     Tobacco Use    Smoking status: Former Smoker     Packs/day: 1.50     Years: 20.00     Pack years: 30.00     Last attempt to quit:      Years since quittin.4    Smokeless tobacco: Former User   Substance Use Topics    Alcohol use: No    Drug use: No     Review of Systems   Constitutional: Negative for fever.   HENT: Negative for sore throat.    Respiratory: Positive for shortness of breath and wheezing.    Cardiovascular: Negative for chest pain and leg swelling.   Gastrointestinal: Negative for nausea.   Genitourinary: Negative for dysuria.   Musculoskeletal: Negative for back pain.   Skin: Negative for rash.   Neurological: Negative for weakness.   Hematological: Does not bruise/bleed easily.       Physical Exam     Initial Vitals [19]   BP Pulse Resp Temp SpO2   133/70 74 (!) 28 98.2 °F (36.8 °C) 96 %      MAP       --         Physical Exam    Nursing note and vitals reviewed.  Constitutional: He appears well-developed and well-nourished.   HENT:   Head:  Atraumatic.   Eyes: EOM are normal. Pupils are equal, round, and reactive to light.   Neck: Normal range of motion. Neck supple. No JVD present.   Cardiovascular: Normal rate, regular rhythm, normal heart sounds and intact distal pulses. Exam reveals no gallop and no friction rub.    No murmur heard.  Pulmonary/Chest: He has wheezes.   Abdominal: Soft. Bowel sounds are normal.   Musculoskeletal: Normal range of motion.   Lymphadenopathy:     He has no cervical adenopathy.   Neurological: He is alert and oriented to person, place, and time. He has normal strength.   Skin: Skin is warm and dry.   Psychiatric: He has a normal mood and affect. Thought content normal.         ED Course   Procedures  Labs Reviewed   CBC W/ AUTO DIFFERENTIAL - Abnormal; Notable for the following components:       Result Value    RBC 4.59 (*)     Hemoglobin 11.6 (*)     Hematocrit 37.2 (*)     Mean Corpuscular Volume 81 (*)     Mean Corpuscular Hemoglobin 25.3 (*)     Mean Corpuscular Hemoglobin Conc 31.2 (*)     RDW 14.6 (*)     All other components within normal limits   COMPREHENSIVE METABOLIC PANEL - Abnormal; Notable for the following components:    Total Protein 8.6 (*)     eGFR if non  57 (*)     All other components within normal limits   PROTIME-INR   TROPONIN I   B-TYPE NATRIURETIC PEPTIDE   MAGNESIUM   POCT GLUCOSE     EKG Readings: (Independently Interpreted)   Initial Reading: No STEMI. Rhythm: Sinus Arrhythmia. Heart Rate: 72. Ectopy: No Ectopy. Conduction: Normal.   LVH with refill abnormality.     ECG Results          EKG 12-lead (Final result)  Result time 05/08/19 21:16:21    Final result by Interface, Lab In [facility] (05/08/19 21:16:21)                 Narrative:    Test Reason : R00.2,    Vent. Rate : 072 BPM     Atrial Rate : 072 BPM     P-R Int : 144 ms          QRS Dur : 084 ms      QT Int : 392 ms       P-R-T Axes : 066 060 007 degrees     QTc Int : 429 ms    Sinus rhythm with marked sinus  arrythmia  LVH with repolarization abnormality  Abnormal ECG  When compared with ECG of 26-OCT-2018 14:26,  Significant changes have occurred  Confirmed by Rocael ALMANZA, Ba RUFF ([id number]) on 5/8/2019 9:16:07 PM    Referred By: BINH   SELF           Confirmed By:Ba Cote MD                             EKG 12-LEAD (Final result)  Result time 05/10/19 14:30:49    Final result by Unknown User (05/10/19 14:30:49)                                Imaging Results          X-Ray Chest AP Portable (Final result)  Result time 05/05/19 22:25:31    Final result by Ruperto Banuelos MD (05/05/19 22:25:31)                 Impression:      Atelectasis of the right middle and lower lobes.  Additional evaluation, as clinically warranted.      Electronically signed by: Ruperto Banuelos MD  Date:    05/05/2019  Time:    22:25             Narrative:    EXAMINATION:  XR CHEST AP PORTABLE    CLINICAL HISTORY:  SOB;    TECHNIQUE:  Single frontal view of the chest was performed.    COMPARISON:  10/26/2018.    FINDINGS:  The trachea is unremarkable.  The cardiomediastinal silhouette is within normal limits.  The hilar structures are unremarkable.  There is elevation of the right hemidiaphragm.  There is no evidence of free air beneath the hemidiaphragms.  There is no evidence of a pneumothorax.  There is no evidence of pneumomediastinum.  There is atelectasis involving the right middle and lower lobes.  No airspace opacity is present.  There are degenerative changes in the osseous structures.                              X-Rays:   Independently Interpreted Readings:   Chest X-Ray: Normal heart size. Atelectasis of right middle and lower lobe.       patient giving respiratory treatments.  Marked improvement.  No wheezing or shortness of breath. No chest pain. No fever or leukocytosis.  Chest x-ray shows some atelectasis.  Patient denies any change in sputum or cough.  No antibiotics.  Will treat with prednisone and albuterol.  Stable for discharge  patient feels comfortable going home and feels at his baseline.  Oxygen saturations are 95%.  No tachypnea.  No tachycardia.                    Clinical Impression:       ICD-10-CM ICD-9-CM   1. SOB (shortness of breath) R06.02 786.05   2. Palpitations R00.2 785.1   3. COPD exacerbation J44.1 491.21                                Tejinder Scott MD  05/24/19 5414

## 2019-07-22 ENCOUNTER — HOSPITAL ENCOUNTER (EMERGENCY)
Facility: HOSPITAL | Age: 79
Discharge: HOME | End: 2019-07-22
Attending: EMERGENCY MEDICINE
Payer: MEDICARE

## 2019-07-22 VITALS
HEIGHT: 66 IN | TEMPERATURE: 99 F | DIASTOLIC BLOOD PRESSURE: 86 MMHG | HEART RATE: 90 BPM | WEIGHT: 160 LBS | BODY MASS INDEX: 25.71 KG/M2 | OXYGEN SATURATION: 99 % | SYSTOLIC BLOOD PRESSURE: 136 MMHG | RESPIRATION RATE: 18 BRPM

## 2019-07-22 DIAGNOSIS — J44.1 COPD EXACERBATION: ICD-10-CM

## 2019-07-22 DIAGNOSIS — R06.02 SHORTNESS OF BREATH: Primary | ICD-10-CM

## 2019-07-22 LAB
ALBUMIN SERPL BCP-MCNC: 3.9 G/DL (ref 3.5–5.2)
ALP SERPL-CCNC: 95 U/L (ref 55–135)
ALT SERPL W/O P-5'-P-CCNC: 23 U/L (ref 10–44)
ANION GAP SERPL CALC-SCNC: 10 MMOL/L (ref 8–16)
AST SERPL-CCNC: 26 U/L (ref 10–40)
BASOPHILS # BLD AUTO: 0 K/UL (ref 0–0.2)
BASOPHILS NFR BLD: 0 % (ref 0–1.9)
BILIRUB SERPL-MCNC: 0.4 MG/DL (ref 0.1–1)
BNP SERPL-MCNC: 31 PG/ML (ref 0–99)
BUN SERPL-MCNC: 14 MG/DL (ref 8–23)
CALCIUM SERPL-MCNC: 9.3 MG/DL (ref 8.7–10.5)
CHLORIDE SERPL-SCNC: 106 MMOL/L (ref 95–110)
CO2 SERPL-SCNC: 26 MMOL/L (ref 23–29)
CREAT SERPL-MCNC: 1.2 MG/DL (ref 0.5–1.4)
DIFFERENTIAL METHOD: ABNORMAL
EOSINOPHIL # BLD AUTO: 0 K/UL (ref 0–0.5)
EOSINOPHIL NFR BLD: 0 % (ref 0–8)
ERYTHROCYTE [DISTWIDTH] IN BLOOD BY AUTOMATED COUNT: 14.9 % (ref 11.5–14.5)
EST. GFR  (AFRICAN AMERICAN): >60 ML/MIN/1.73 M^2
EST. GFR  (NON AFRICAN AMERICAN): 57 ML/MIN/1.73 M^2
GLUCOSE SERPL-MCNC: 100 MG/DL (ref 70–110)
HCT VFR BLD AUTO: 38.3 % (ref 40–54)
HGB BLD-MCNC: 11.5 G/DL (ref 14–18)
LYMPHOCYTES # BLD AUTO: 1.4 K/UL (ref 1–4.8)
LYMPHOCYTES NFR BLD: 29.7 % (ref 18–48)
MCH RBC QN AUTO: 24.7 PG (ref 27–31)
MCHC RBC AUTO-ENTMCNC: 30 G/DL (ref 32–36)
MCV RBC AUTO: 82 FL (ref 82–98)
MONOCYTES # BLD AUTO: 0.5 K/UL (ref 0.3–1)
MONOCYTES NFR BLD: 10.5 % (ref 4–15)
NEUTROPHILS # BLD AUTO: 2.9 K/UL (ref 1.8–7.7)
NEUTROPHILS NFR BLD: 60 % (ref 38–73)
PLATELET # BLD AUTO: 191 K/UL (ref 150–350)
PMV BLD AUTO: 10.2 FL (ref 9.2–12.9)
POTASSIUM SERPL-SCNC: 4 MMOL/L (ref 3.5–5.1)
PROT SERPL-MCNC: 8.4 G/DL (ref 6–8.4)
RBC # BLD AUTO: 4.66 M/UL (ref 4.6–6.2)
SODIUM SERPL-SCNC: 142 MMOL/L (ref 136–145)
TROPONIN I SERPL DL<=0.01 NG/ML-MCNC: <0.006 NG/ML (ref 0–0.03)
WBC # BLD AUTO: 4.78 K/UL (ref 3.9–12.7)

## 2019-07-22 PROCEDURE — 63600175 PHARM REV CODE 636 W HCPCS: Performed by: EMERGENCY MEDICINE

## 2019-07-22 PROCEDURE — 93010 EKG 12-LEAD: ICD-10-PCS | Mod: ,,, | Performed by: INTERNAL MEDICINE

## 2019-07-22 PROCEDURE — 85025 COMPLETE CBC W/AUTO DIFF WBC: CPT

## 2019-07-22 PROCEDURE — 99285 EMERGENCY DEPT VISIT HI MDM: CPT | Mod: 25

## 2019-07-22 PROCEDURE — 83880 ASSAY OF NATRIURETIC PEPTIDE: CPT

## 2019-07-22 PROCEDURE — 80053 COMPREHEN METABOLIC PANEL: CPT

## 2019-07-22 PROCEDURE — 96374 THER/PROPH/DIAG INJ IV PUSH: CPT

## 2019-07-22 PROCEDURE — 25000242 PHARM REV CODE 250 ALT 637 W/ HCPCS: Performed by: EMERGENCY MEDICINE

## 2019-07-22 PROCEDURE — 93010 ELECTROCARDIOGRAM REPORT: CPT | Mod: ,,, | Performed by: INTERNAL MEDICINE

## 2019-07-22 PROCEDURE — 84484 ASSAY OF TROPONIN QUANT: CPT

## 2019-07-22 PROCEDURE — 93005 ELECTROCARDIOGRAM TRACING: CPT

## 2019-07-22 PROCEDURE — 94640 AIRWAY INHALATION TREATMENT: CPT

## 2019-07-22 PROCEDURE — 25000003 PHARM REV CODE 250: Performed by: EMERGENCY MEDICINE

## 2019-07-22 RX ORDER — METHYLPREDNISOLONE SOD SUCC 125 MG
125 VIAL (EA) INJECTION
Status: COMPLETED | OUTPATIENT
Start: 2019-07-22 | End: 2019-07-22

## 2019-07-22 RX ORDER — IPRATROPIUM BROMIDE AND ALBUTEROL SULFATE 2.5; .5 MG/3ML; MG/3ML
3 SOLUTION RESPIRATORY (INHALATION)
Status: COMPLETED | OUTPATIENT
Start: 2019-07-22 | End: 2019-07-22

## 2019-07-22 RX ORDER — PREDNISONE 50 MG/1
50 TABLET ORAL DAILY
Qty: 5 TABLET | Refills: 0 | Status: SHIPPED | OUTPATIENT
Start: 2019-07-22 | End: 2019-07-27

## 2019-07-22 RX ORDER — ASPIRIN 325 MG
325 TABLET ORAL
Status: COMPLETED | OUTPATIENT
Start: 2019-07-22 | End: 2019-07-22

## 2019-07-22 RX ADMIN — IPRATROPIUM BROMIDE AND ALBUTEROL SULFATE 3 ML: .5; 3 SOLUTION RESPIRATORY (INHALATION) at 02:07

## 2019-07-22 RX ADMIN — ASPIRIN 325 MG ORAL TABLET 325 MG: 325 PILL ORAL at 02:07

## 2019-07-22 RX ADMIN — METHYLPREDNISOLONE SODIUM SUCCINATE 125 MG: 125 INJECTION, POWDER, FOR SOLUTION INTRAMUSCULAR; INTRAVENOUS at 03:07

## 2019-07-22 NOTE — ED TRIAGE NOTES
Pt reports he awakened from his sleep with difficulty breathing. Used his home nebulizer & inhaler without relief. C/o SOB after minimal activity. BLE's noted with +3 pitting edema. Hx of COPD. Room air O2 sats 94% in triage. Denies CP, vomiting

## 2019-07-22 NOTE — ED PROVIDER NOTES
Encounter Date: 2019    SCRIBE #1 NOTE: I, Vanessa Watson, am scribing for, and in the presence of,  Anthony Winters MD. I have scribed the following portions of the note - Other sections scribed: HPI, ROS.       History     Chief Complaint   Patient presents with    Shortness of Breath     Pt reports he awakened from his sleep with difficulty breathing. Used his home nebulizer & inhaler without relief. C/o SOB after minimal activity.  BLE's noted with +3 pitting edema. Hx of COPD. Room air O2 sats 94% in triage. Denies CP, vomiting.      CC: Shortness of Breath    HPI: This 79 y/o male with a medical history of Asthma, Atrial fibrillation, COPD, and HTN presents to the ED for emergent evaluation of shortness of breath upon waking up in the middle of the night. Patient reports he used his nembulizer and inhaler at-home without relief. Patient denies history of MI or CHF. Patient also denies fever, chills, chest pain, congestion, rhinorrhea, leg swelling or N/V/D. No exacerbating factors reported.      The history is provided by the patient. No  was used.     Review of patient's allergies indicates:   Allergen Reactions    Lisinopril Edema     Past Medical History:   Diagnosis Date    Asthma     Atrial fibrillation     COPD (chronic obstructive pulmonary disease)     Hypertension      No past surgical history on file.  No family history on file.  Social History     Tobacco Use    Smoking status: Former Smoker     Packs/day: 1.50     Years: 20.00     Pack years: 30.00     Last attempt to quit: 1990     Years since quittin.5    Smokeless tobacco: Former User   Substance Use Topics    Alcohol use: No    Drug use: No     Review of Systems   Constitutional: Negative for chills and fever.   HENT: Negative for congestion, ear discharge, ear pain, nosebleeds, rhinorrhea and sore throat.    Respiratory: Positive for shortness of breath. Negative for cough and chest tightness.     Cardiovascular: Negative for chest pain.   Gastrointestinal: Negative for abdominal pain, diarrhea, nausea and vomiting.   Genitourinary: Negative for dysuria, flank pain, hematuria and urgency.   Musculoskeletal: Negative for back pain, myalgias and neck pain.   Skin: Negative for rash.   Neurological: Negative for dizziness, weakness, light-headedness, numbness and headaches.   Psychiatric/Behavioral: Negative for agitation.   All other systems reviewed and are negative.      Physical Exam     Initial Vitals [07/22/19 0140]   BP Pulse Resp Temp SpO2   119/68 90 (!) 26 99 °F (37.2 °C) 95 %      MAP       --         Physical Exam    Nursing note and vitals reviewed.  Constitutional: He appears well-developed and well-nourished. He is not diaphoretic. No distress.   HENT:   Head: Normocephalic and atraumatic.   Right Ear: External ear normal.   Left Ear: External ear normal.   Mouth/Throat: Oropharynx is clear and moist.   Eyes: Right eye exhibits no discharge. Left eye exhibits no discharge. No scleral icterus.   Neck: No tracheal deviation present. No JVD present.   Cardiovascular: Normal rate, regular rhythm, normal heart sounds and intact distal pulses. Exam reveals no gallop and no friction rub.    No murmur heard.  Pulmonary/Chest: No stridor. No respiratory distress. He has wheezes. He has no rhonchi. He has no rales.   Abdominal: Soft. He exhibits no distension. There is no tenderness. There is no guarding.   Musculoskeletal: Normal range of motion. He exhibits no edema or tenderness.   Neurological: He is alert and oriented to person, place, and time. He has normal strength.   CURT with NGND's   Skin: Skin is warm and dry. No rash noted. No erythema.   Psychiatric: He has a normal mood and affect. His behavior is normal. Judgment and thought content normal.         ED Course   Procedures  Labs Reviewed   CBC W/ AUTO DIFFERENTIAL - Abnormal; Notable for the following components:       Result Value     Hemoglobin 11.5 (*)     Hematocrit 38.3 (*)     Mean Corpuscular Hemoglobin 24.7 (*)     Mean Corpuscular Hemoglobin Conc 30.0 (*)     RDW 14.9 (*)     All other components within normal limits   COMPREHENSIVE METABOLIC PANEL - Abnormal; Notable for the following components:    eGFR if non  57 (*)     All other components within normal limits   TROPONIN I   B-TYPE NATRIURETIC PEPTIDE        ECG Results          EKG 12-lead (In process)  Result time 07/22/19 08:22:35    In process by Interface, Lab In [facility] (07/22/19 08:22:35)                 Narrative:    Test Reason : R07.9,    Vent. Rate : 082 BPM     Atrial Rate : 082 BPM     P-R Int : 146 ms          QRS Dur : 080 ms      QT Int : 364 ms       P-R-T Axes : 082 051 035 degrees     QTc Int : 425 ms    Normal sinus rhythm  Moderate voltage criteria for LVH, may be normal variant  Borderline Abnormal ECG  When compared with ECG of 05-MAY-2019 22:31,  Significant changes have occurred    Referred By: [email or web address]   SELF           Confirmed By:                   In process by Interface, Lab In [facility] (07/22/19 06:20:48)                 Narrative:    Test Reason : R07.9,    Vent. Rate : 082 BPM     Atrial Rate : 082 BPM     P-R Int : 146 ms          QRS Dur : 080 ms      QT Int : 364 ms       P-R-T Axes : 082 051 035 degrees     QTc Int : 425 ms    Normal sinus rhythm  Moderate voltage criteria for LVH, may be normal variant  Borderline Abnormal ECG  When compared with ECG of 05-MAY-2019 22:31,  Significant changes have occurred    Referred By: AAAREFERR   SELF           Confirmed By:                   In process by Interface, Lab In [facility] (07/22/19 06:07:05)                 Narrative:    Test Reason : R07.9,    Vent. Rate : 082 BPM     Atrial Rate : 082 BPM     P-R Int : 146 ms          QRS Dur : 080 ms      QT Int : 364 ms       P-R-T Axes : 082 051 035 degrees     QTc Int : 425 ms    Normal sinus rhythm  Moderate voltage criteria for LVH, may be  normal variant  Borderline Abnormal ECG  When compared with ECG of 05-MAY-2019 22:31,  Significant changes have occurred    Referred By: AAAREFERR   SELF           Confirmed By:                             Imaging Results          X-Ray Chest AP Portable (Final result)  Result time 07/22/19 02:47:38    Final result by Esdras Mantilla MD (07/22/19 02:47:38)                 Impression:      No radiographic evidence of acute intrathoracic process.      Electronically signed by: Esdras Mantilla MD  Date:    07/22/2019  Time:    02:47             Narrative:    EXAMINATION:  XR CHEST AP PORTABLE    CLINICAL HISTORY:  Chest Pain;    TECHNIQUE:  Single frontal view of the chest was performed.    COMPARISON:  Chest radiograph 05/05/2019, 10/26/2018    FINDINGS:  Cardiac monitoring leads overlie the chest.  Cardiomediastinal silhouette appears within normal limits.  The lungs are symmetrically expanded without evidence of confluent airspace consolidation.  No evidence of large pleural effusion or pneumothorax.  Osseous structures demonstrate degenerative changes.                              Pt arrived A/O x 4, afebrile, non-toxic in appearance, in no acute respiratory distress with VSS.  EKG revealed no acute findings concerning for ischemia or any pathologic heart block.  Labs showed no acute findings.  CXR revealed no acute pathology.  Sx's improved with nebs.  Pt discharged and counseled on the need to return to the nearest emergency room if they experience any other concerning symptoms.  Pt counseled to F/U outpatient with a PCP over the next two to three days.    Anthony Winters MD                          Scribe Attestation:   Scribe #1: I performed the above scribed service and the documentation accurately describes the services I performed. I attest to the accuracy of the note.    Attending Attestation:           Physician Attestation for Scribe:  Physician Attestation Statement for Scribe #1: Anthony STRICKLAND  MD Vianey, reviewed documentation, as scribed by Vanessa Watson in my presence, and it is both accurate and complete.                    Clinical Impression:       ICD-10-CM ICD-9-CM   1. Shortness of breath R06.02 786.05   2. COPD exacerbation J44.1 491.21                                Anthony Winters MD  07/23/19 0147

## 2019-08-28 ENCOUNTER — HOSPITAL ENCOUNTER (EMERGENCY)
Facility: HOSPITAL | Age: 79
Discharge: HOME | End: 2019-08-28
Attending: EMERGENCY MEDICINE
Payer: MEDICARE

## 2019-08-28 VITALS
TEMPERATURE: 98 F | SYSTOLIC BLOOD PRESSURE: 140 MMHG | OXYGEN SATURATION: 100 % | DIASTOLIC BLOOD PRESSURE: 79 MMHG | RESPIRATION RATE: 17 BRPM | BODY MASS INDEX: 26.99 KG/M2 | HEART RATE: 98 BPM | HEIGHT: 65 IN | WEIGHT: 162 LBS

## 2019-08-28 DIAGNOSIS — J44.1 COPD WITH ACUTE EXACERBATION: ICD-10-CM

## 2019-08-28 DIAGNOSIS — J44.9 COPD (CHRONIC OBSTRUCTIVE PULMONARY DISEASE): ICD-10-CM

## 2019-08-28 LAB
ALBUMIN SERPL BCP-MCNC: 3.9 G/DL (ref 3.5–5.2)
ALP SERPL-CCNC: 98 U/L (ref 55–135)
ALT SERPL W/O P-5'-P-CCNC: 15 U/L (ref 10–44)
ANION GAP SERPL CALC-SCNC: 6 MMOL/L (ref 8–16)
AST SERPL-CCNC: 22 U/L (ref 10–40)
BASOPHILS # BLD AUTO: 0 K/UL (ref 0–0.2)
BASOPHILS NFR BLD: 0 % (ref 0–1.9)
BILIRUB SERPL-MCNC: 0.3 MG/DL (ref 0.1–1)
BNP SERPL-MCNC: 20 PG/ML (ref 0–99)
BUN SERPL-MCNC: 16 MG/DL (ref 8–23)
CALCIUM SERPL-MCNC: 9.2 MG/DL (ref 8.7–10.5)
CHLORIDE SERPL-SCNC: 108 MMOL/L (ref 95–110)
CO2 SERPL-SCNC: 31 MMOL/L (ref 23–29)
CREAT SERPL-MCNC: 1.3 MG/DL (ref 0.5–1.4)
DIFFERENTIAL METHOD: ABNORMAL
EOSINOPHIL # BLD AUTO: 0 K/UL (ref 0–0.5)
EOSINOPHIL NFR BLD: 0 % (ref 0–8)
ERYTHROCYTE [DISTWIDTH] IN BLOOD BY AUTOMATED COUNT: 15 % (ref 11.5–14.5)
EST. GFR  (AFRICAN AMERICAN): 60 ML/MIN/1.73 M^2
EST. GFR  (NON AFRICAN AMERICAN): 52 ML/MIN/1.73 M^2
GLUCOSE SERPL-MCNC: 99 MG/DL (ref 70–110)
HCT VFR BLD AUTO: 37.9 % (ref 40–54)
HGB BLD-MCNC: 11.2 G/DL (ref 14–18)
LYMPHOCYTES # BLD AUTO: 1.6 K/UL (ref 1–4.8)
LYMPHOCYTES NFR BLD: 35.8 % (ref 18–48)
MAGNESIUM SERPL-MCNC: 1.8 MG/DL (ref 1.6–2.6)
MCH RBC QN AUTO: 24.6 PG (ref 27–31)
MCHC RBC AUTO-ENTMCNC: 29.6 G/DL (ref 32–36)
MCV RBC AUTO: 83 FL (ref 82–98)
MONOCYTES # BLD AUTO: 0.4 K/UL (ref 0.3–1)
MONOCYTES NFR BLD: 9.8 % (ref 4–15)
NEUTROPHILS # BLD AUTO: 2.4 K/UL (ref 1.8–7.7)
NEUTROPHILS NFR BLD: 54.6 % (ref 38–73)
PLATELET # BLD AUTO: 211 K/UL (ref 150–350)
PMV BLD AUTO: 11 FL (ref 9.2–12.9)
POTASSIUM SERPL-SCNC: 3.8 MMOL/L (ref 3.5–5.1)
PROT SERPL-MCNC: 8.2 G/DL (ref 6–8.4)
RBC # BLD AUTO: 4.55 M/UL (ref 4.6–6.2)
SODIUM SERPL-SCNC: 145 MMOL/L (ref 136–145)
TROPONIN I SERPL DL<=0.01 NG/ML-MCNC: <0.006 NG/ML (ref 0–0.03)
WBC # BLD AUTO: 4.39 K/UL (ref 3.9–12.7)

## 2019-08-28 PROCEDURE — 85025 COMPLETE CBC W/AUTO DIFF WBC: CPT

## 2019-08-28 PROCEDURE — 63600175 PHARM REV CODE 636 W HCPCS: Performed by: EMERGENCY MEDICINE

## 2019-08-28 PROCEDURE — 80053 COMPREHEN METABOLIC PANEL: CPT

## 2019-08-28 PROCEDURE — 83880 ASSAY OF NATRIURETIC PEPTIDE: CPT

## 2019-08-28 PROCEDURE — 93010 ELECTROCARDIOGRAM REPORT: CPT | Mod: ,,, | Performed by: INTERNAL MEDICINE

## 2019-08-28 PROCEDURE — 84484 ASSAY OF TROPONIN QUANT: CPT

## 2019-08-28 PROCEDURE — 96366 THER/PROPH/DIAG IV INF ADDON: CPT

## 2019-08-28 PROCEDURE — 99285 EMERGENCY DEPT VISIT HI MDM: CPT | Mod: 25

## 2019-08-28 PROCEDURE — 83735 ASSAY OF MAGNESIUM: CPT

## 2019-08-28 PROCEDURE — 93005 ELECTROCARDIOGRAM TRACING: CPT

## 2019-08-28 PROCEDURE — 96365 THER/PROPH/DIAG IV INF INIT: CPT

## 2019-08-28 PROCEDURE — 25000242 PHARM REV CODE 250 ALT 637 W/ HCPCS: Performed by: EMERGENCY MEDICINE

## 2019-08-28 PROCEDURE — 94640 AIRWAY INHALATION TREATMENT: CPT

## 2019-08-28 PROCEDURE — 93010 EKG 12-LEAD: ICD-10-PCS | Mod: ,,, | Performed by: INTERNAL MEDICINE

## 2019-08-28 PROCEDURE — 96375 TX/PRO/DX INJ NEW DRUG ADDON: CPT

## 2019-08-28 RX ORDER — METHYLPREDNISOLONE SOD SUCC 125 MG
125 VIAL (EA) INJECTION
Status: COMPLETED | OUTPATIENT
Start: 2019-08-28 | End: 2019-08-28

## 2019-08-28 RX ORDER — PREDNISONE 20 MG/1
40 TABLET ORAL DAILY
Qty: 10 TABLET | Refills: 0 | Status: SHIPPED | OUTPATIENT
Start: 2019-08-28 | End: 2019-09-02

## 2019-08-28 RX ORDER — MAGNESIUM SULFATE 1 G/100ML
1 INJECTION INTRAVENOUS
Status: COMPLETED | OUTPATIENT
Start: 2019-08-28 | End: 2019-08-28

## 2019-08-28 RX ORDER — IPRATROPIUM BROMIDE AND ALBUTEROL SULFATE 2.5; .5 MG/3ML; MG/3ML
3 SOLUTION RESPIRATORY (INHALATION)
Status: COMPLETED | OUTPATIENT
Start: 2019-08-28 | End: 2019-08-28

## 2019-08-28 RX ADMIN — IPRATROPIUM BROMIDE AND ALBUTEROL SULFATE 3 ML: .5; 3 SOLUTION RESPIRATORY (INHALATION) at 08:08

## 2019-08-28 RX ADMIN — METHYLPREDNISOLONE SODIUM SUCCINATE 125 MG: 125 INJECTION, POWDER, FOR SOLUTION INTRAMUSCULAR; INTRAVENOUS at 07:08

## 2019-08-28 RX ADMIN — MAGNESIUM SULFATE 1 G: 1 INJECTION INTRAVENOUS at 07:08

## 2019-08-29 NOTE — ED PROVIDER NOTES
Encounter Date: 2019    SCRIBE #1 NOTE: I, Marjan June, am scribing for, and in the presence of,  Olimpia Alex MD. I have scribed the following portions of the note - Other sections scribed: HPI, ROS, and PE.       History     Chief Complaint   Patient presents with    Shortness of Breath     Pt here with c/o SOB that started 2 days ago. Pt reports hx of asthma and COPD. Pt reports using inhalers and breathing treatments at home with no relief.     This is a 78 y.o. Male, who is a former smoker with a PMHx of asthma, COPD, bronchitis, and HTN, who presents to the Emergency Department with a cc of acute SOB x2 days. He states that he cannot walk more than 30 feet before having to rest. Pt reports that he is also experiencing associated and lower abdominal pain (intermittent for the last 3 days).The patient reports that he uses an inhaler as well as a nebulizer for treatment, noting that he has been using the medications more frequently than normal. He denies any fever, chills, emesis, appetite change or nausea and he has had no sick contact. There are no other associated symptoms.    The history is provided by the patient. No  was used.     Review of patient's allergies indicates:   Allergen Reactions    Lisinopril      Past Medical History:   Diagnosis Date    Asthma     Atrial fibrillation     COPD (chronic obstructive pulmonary disease)     Hypertension      History reviewed. No pertinent surgical history.  History reviewed. No pertinent family history.  Social History     Tobacco Use    Smoking status: Former Smoker     Packs/day: 1.50     Years: 20.00     Pack years: 30.00     Last attempt to quit: 1990     Years since quittin.6    Smokeless tobacco: Former User   Substance Use Topics    Alcohol use: No    Drug use: No     Review of Systems   Constitutional: Negative for appetite change, chills, diaphoresis and fever.   HENT: Negative for ear pain and sore throat.     Eyes: Negative for pain.   Respiratory: Positive for cough and shortness of breath.    Cardiovascular: Negative for chest pain.   Gastrointestinal: Positive for abdominal pain (resolved presently). Negative for diarrhea, nausea and vomiting.   Genitourinary: Negative for dysuria.   Musculoskeletal: Negative for arthralgias, joint swelling and myalgias.   Skin: Negative for rash.   Neurological: Negative for headaches.       Physical Exam     Initial Vitals [08/28/19 1914]   BP Pulse Resp Temp SpO2   (!) 144/82 97 18 98.4 °F (36.9 °C) (!) 93 %      MAP       --         Physical Exam    Nursing note and vitals reviewed.  Constitutional: He appears well-developed and well-nourished. He is not diaphoretic. No distress.   HENT:   Head: Normocephalic and atraumatic.   Mouth/Throat: Oropharynx is clear and moist.   Eyes: Conjunctivae and EOM are normal. Pupils are equal, round, and reactive to light.   Neck: Normal range of motion. Neck supple.   Cardiovascular: Normal rate, regular rhythm and normal heart sounds.   No murmur heard.  Pulmonary/Chest: No respiratory distress. He has wheezes (diffuse).   Very poor air movement. Decreased lung sounds.    Abdominal: Soft. Bowel sounds are normal. He exhibits no distension and no mass. There is no tenderness. There is no rebound and no guarding.   Musculoskeletal: Normal range of motion. He exhibits no edema or tenderness.        Cervical back: Normal.        Thoracic back: Normal.        Lumbar back: Normal.   Neurological: He is alert and oriented to person, place, and time.   Skin: Skin is warm and dry.   Psychiatric: He has a normal mood and affect. Thought content normal.         ED Course   Procedures  Labs Reviewed   CBC W/ AUTO DIFFERENTIAL - Abnormal; Notable for the following components:       Result Value    RBC 4.55 (*)     Hemoglobin 11.2 (*)     Hematocrit 37.9 (*)     Mean Corpuscular Hemoglobin 24.6 (*)     Mean Corpuscular Hemoglobin Conc 29.6 (*)     RDW  15.0 (*)     All other components within normal limits   COMPREHENSIVE METABOLIC PANEL - Abnormal; Notable for the following components:    CO2 31 (*)     Anion Gap 6 (*)     eGFR if non  52 (*)     All other components within normal limits   MAGNESIUM   B-TYPE NATRIURETIC PEPTIDE   TROPONIN I     EKG Readings: (Independently Interpreted)   Initial Reading: No STEMI. Previous EKG: Compared with most recent EKG Previous EKG Date: 07/22/2019. Heart Rate: 94. Ectopy: No Ectopy.       Imaging Results          X-Ray Chest PA And Lateral (Final result)  Result time 08/28/19 20:11:54    Final result by Emily Melendez MD (08/28/19 20:11:54)                 Impression:      No failure or pneumonia.      Electronically signed by: Emily Melendez  Date:    08/28/2019  Time:    20:11             Narrative:    EXAMINATION:  XR CHEST PA AND LATERAL    CLINICAL HISTORY:  Chronic obstructive pulmonary disease with (acute) exacerbation    TECHNIQUE:  PA and lateral views of the chest were performed.    COMPARISON:  07/22/2019 chest    FINDINGS:  Single frontal and single lateral view at 19:58.    Visualized spine appears intact.  Hemidiaphragms are flattened suggesting hyperinflation.  There is old healed right proximal humeral fracture.  Trachea appears normal.  The hilar shadows and heart are normal.  No pneumothorax or pleural effusion or interstitial edema consolidation or nodule.  No abdominal free air.                                 Medical Decision Making:   Initial Assessment:   This is an emergent evaluation of a 78-year-old man who presented to the emergency department today secondary to shortness of breath.  Differential Diagnosis:   COPD exacerbation, CHF, pneumonia, amongst others.  Clinical Tests:   Lab Tests: Ordered and Reviewed  Radiological Study: Ordered and Reviewed  Medical Tests: Reviewed and Ordered  ED Management:  On physical examination, patient was in no acute distress. Heart sounds were  within normal limits.  Lungs revealed diffuse wheezing with decreased air movement.  There was no lower extremity edema. Abdomen was soft, nontender, nondistended with good bowel sounds throughout.  There was no rebound or guarding.  There is no tenderness in the right upper or the right lower quadrants.  Patient was treated with Solu-Medrol and 3 duo nebs as well as magnesium in the emergency department.  His breathing improved significantly as did his wheezing.  He had much better air movement after this.  He remained without abdominal pain throughout his stay in the emergency department.  Labs and imaging were obtained and were unremarkable. Patient was discharged home with a burst of prednisone.  He advised he has plenty of medication for his nebulizer as well as albuterol.  He was discharged home in stable condition.    Olimpia Alex MD  11:59 PM  8/28/2019                        Clinical Impression:       ICD-10-CM ICD-9-CM   1. COPD (chronic obstructive pulmonary disease) J44.9 496   2. COPD with acute exacerbation J44.1 491.21             Scribe attestation: I, Olimpia Alex personally performed the services described in this documentation. All medical record entries made by the scribe were at my direction and in my presence.  I have reviewed the chart and agree that the record reflects my personal performance and is accurate and complete.                        Olimpia Alex MD  08/28/19 2105

## 2019-08-29 NOTE — ED TRIAGE NOTES
Pt states he has COPD and bronchitis.  States he can not walk 30 feet without having to stop and rest.

## 2019-09-17 ENCOUNTER — HOSPITAL ENCOUNTER (INPATIENT)
Facility: HOSPITAL | Age: 79
LOS: 2 days | Discharge: HOME | DRG: 192 | End: 2019-09-19
Attending: EMERGENCY MEDICINE | Admitting: HOSPITALIST
Payer: MEDICARE

## 2019-09-17 DIAGNOSIS — J44.1 COPD EXACERBATION: ICD-10-CM

## 2019-09-17 DIAGNOSIS — R07.9 CHEST PAIN: ICD-10-CM

## 2019-09-17 DIAGNOSIS — J44.1 COPD WITH ACUTE EXACERBATION: Primary | ICD-10-CM

## 2019-09-17 DIAGNOSIS — I10 ESSENTIAL HYPERTENSION: ICD-10-CM

## 2019-09-17 DIAGNOSIS — R06.02 SHORTNESS OF BREATH: ICD-10-CM

## 2019-09-17 DIAGNOSIS — D63.8 ANEMIA OF CHRONIC DISEASE: ICD-10-CM

## 2019-09-17 DIAGNOSIS — I48.91 ATRIAL FIBRILLATION, UNSPECIFIED TYPE: ICD-10-CM

## 2019-09-17 LAB
BASOPHILS # BLD AUTO: 0.01 K/UL (ref 0–0.2)
BASOPHILS NFR BLD: 0.2 % (ref 0–1.9)
BNP SERPL-MCNC: 23 PG/ML (ref 0–99)
DACRYOCYTES BLD QL SMEAR: ABNORMAL
DIFFERENTIAL METHOD: ABNORMAL
EOSINOPHIL # BLD AUTO: 0 K/UL (ref 0–0.5)
EOSINOPHIL NFR BLD: 0 % (ref 0–8)
ERYTHROCYTE [DISTWIDTH] IN BLOOD BY AUTOMATED COUNT: 15.2 % (ref 11.5–14.5)
GIANT PLATELETS BLD QL SMEAR: PRESENT
HCT VFR BLD AUTO: 37.6 % (ref 40–54)
HGB BLD-MCNC: 11.6 G/DL (ref 14–18)
LYMPHOCYTES # BLD AUTO: 1.5 K/UL (ref 1–4.8)
LYMPHOCYTES NFR BLD: 27.9 % (ref 18–48)
MCH RBC QN AUTO: 25.2 PG (ref 27–31)
MCHC RBC AUTO-ENTMCNC: 30.9 G/DL (ref 32–36)
MCV RBC AUTO: 82 FL (ref 82–98)
MONOCYTES # BLD AUTO: 0.5 K/UL (ref 0.3–1)
MONOCYTES NFR BLD: 8.2 % (ref 4–15)
NEUTROPHILS # BLD AUTO: 3.5 K/UL (ref 1.8–7.7)
NEUTROPHILS NFR BLD: 64.6 % (ref 38–73)
PLATELET # BLD AUTO: 222 K/UL (ref 150–350)
PLATELET BLD QL SMEAR: ABNORMAL
PMV BLD AUTO: ABNORMAL FL (ref 9.2–12.9)
POLYCHROMASIA BLD QL SMEAR: ABNORMAL
RBC # BLD AUTO: 4.6 M/UL (ref 4.6–6.2)
SCHISTOCYTES BLD QL SMEAR: ABNORMAL
TARGETS BLD QL SMEAR: ABNORMAL
WBC # BLD AUTO: 5.49 K/UL (ref 3.9–12.7)

## 2019-09-17 PROCEDURE — 99285 EMERGENCY DEPT VISIT HI MDM: CPT | Mod: 25

## 2019-09-17 PROCEDURE — 84484 ASSAY OF TROPONIN QUANT: CPT

## 2019-09-17 PROCEDURE — 63600175 PHARM REV CODE 636 W HCPCS: Performed by: EMERGENCY MEDICINE

## 2019-09-17 PROCEDURE — 94640 AIRWAY INHALATION TREATMENT: CPT | Mod: ER

## 2019-09-17 PROCEDURE — 83880 ASSAY OF NATRIURETIC PEPTIDE: CPT

## 2019-09-17 PROCEDURE — 25000003 PHARM REV CODE 250: Performed by: EMERGENCY MEDICINE

## 2019-09-17 PROCEDURE — 12000002 HC ACUTE/MED SURGE SEMI-PRIVATE ROOM

## 2019-09-17 PROCEDURE — 93005 ELECTROCARDIOGRAM TRACING: CPT

## 2019-09-17 PROCEDURE — 96374 THER/PROPH/DIAG INJ IV PUSH: CPT

## 2019-09-17 PROCEDURE — 25000242 PHARM REV CODE 250 ALT 637 W/ HCPCS: Performed by: EMERGENCY MEDICINE

## 2019-09-17 PROCEDURE — 85025 COMPLETE CBC W/AUTO DIFF WBC: CPT

## 2019-09-17 PROCEDURE — 80053 COMPREHEN METABOLIC PANEL: CPT

## 2019-09-17 PROCEDURE — 94761 N-INVAS EAR/PLS OXIMETRY MLT: CPT | Mod: ER

## 2019-09-17 PROCEDURE — 93010 EKG 12-LEAD: ICD-10-PCS | Mod: ,,, | Performed by: INTERNAL MEDICINE

## 2019-09-17 PROCEDURE — 93010 ELECTROCARDIOGRAM REPORT: CPT | Mod: ,,, | Performed by: INTERNAL MEDICINE

## 2019-09-17 RX ORDER — ASPIRIN 325 MG
325 TABLET ORAL
Status: COMPLETED | OUTPATIENT
Start: 2019-09-17 | End: 2019-09-17

## 2019-09-17 RX ORDER — IPRATROPIUM BROMIDE AND ALBUTEROL SULFATE 2.5; .5 MG/3ML; MG/3ML
3 SOLUTION RESPIRATORY (INHALATION)
Status: COMPLETED | OUTPATIENT
Start: 2019-09-17 | End: 2019-09-17

## 2019-09-17 RX ORDER — METHYLPREDNISOLONE SOD SUCC 125 MG
125 VIAL (EA) INJECTION
Status: COMPLETED | OUTPATIENT
Start: 2019-09-17 | End: 2019-09-17

## 2019-09-17 RX ADMIN — IPRATROPIUM BROMIDE AND ALBUTEROL SULFATE 3 ML: .5; 3 SOLUTION RESPIRATORY (INHALATION) at 10:09

## 2019-09-17 RX ADMIN — METHYLPREDNISOLONE SODIUM SUCCINATE 125 MG: 125 INJECTION, POWDER, FOR SOLUTION INTRAMUSCULAR; INTRAVENOUS at 10:09

## 2019-09-17 RX ADMIN — ASPIRIN 325 MG ORAL TABLET 325 MG: 325 PILL ORAL at 10:09

## 2019-09-18 PROBLEM — J44.1 COPD EXACERBATION: Status: ACTIVE | Noted: 2019-09-18

## 2019-09-18 PROBLEM — D63.8 ANEMIA OF CHRONIC DISEASE: Status: ACTIVE | Noted: 2019-09-18

## 2019-09-18 LAB
ALBUMIN SERPL BCP-MCNC: 3.3 G/DL (ref 3.5–5.2)
ALLENS TEST: ABNORMAL
ALP SERPL-CCNC: 82 U/L (ref 55–135)
ALT SERPL W/O P-5'-P-CCNC: 16 U/L (ref 10–44)
ANION GAP SERPL CALC-SCNC: 7 MMOL/L (ref 8–16)
AST SERPL-CCNC: 18 U/L (ref 10–40)
BILIRUB SERPL-MCNC: 0.5 MG/DL (ref 0.1–1)
BUN SERPL-MCNC: 16 MG/DL (ref 8–23)
CALCIUM SERPL-MCNC: 9 MG/DL (ref 8.7–10.5)
CHLORIDE SERPL-SCNC: 104 MMOL/L (ref 95–110)
CO2 SERPL-SCNC: 30 MMOL/L (ref 23–29)
CREAT SERPL-MCNC: 1.3 MG/DL (ref 0.5–1.4)
EST. GFR  (AFRICAN AMERICAN): 60 ML/MIN/1.73 M^2
EST. GFR  (NON AFRICAN AMERICAN): 52 ML/MIN/1.73 M^2
GLUCOSE SERPL-MCNC: 111 MG/DL (ref 70–110)
HCO3 UR-SCNC: 29.2 MMOL/L (ref 24–28)
PCO2 BLDA: 45.6 MMHG (ref 35–45)
PH SMN: 7.41 [PH] (ref 7.35–7.45)
PO2 BLDA: 53 MMHG (ref 80–100)
POC BE: 4 MMOL/L
POC SATURATED O2: 87 % (ref 95–100)
POC TCO2: 31 MMOL/L (ref 23–27)
POTASSIUM SERPL-SCNC: 4.1 MMOL/L (ref 3.5–5.1)
PROT SERPL-MCNC: 7.4 G/DL (ref 6–8.4)
SAMPLE: ABNORMAL
SITE: ABNORMAL
SODIUM SERPL-SCNC: 141 MMOL/L (ref 136–145)
TROPONIN I SERPL DL<=0.01 NG/ML-MCNC: <0.006 NG/ML (ref 0–0.03)

## 2019-09-18 PROCEDURE — 36415 COLL VENOUS BLD VENIPUNCTURE: CPT

## 2019-09-18 PROCEDURE — 25000003 PHARM REV CODE 250: Performed by: EMERGENCY MEDICINE

## 2019-09-18 PROCEDURE — 63600175 PHARM REV CODE 636 W HCPCS: Performed by: INTERNAL MEDICINE

## 2019-09-18 PROCEDURE — 82803 BLOOD GASES ANY COMBINATION: CPT

## 2019-09-18 PROCEDURE — 84484 ASSAY OF TROPONIN QUANT: CPT | Mod: 91

## 2019-09-18 PROCEDURE — 11000001 HC ACUTE MED/SURG PRIVATE ROOM

## 2019-09-18 PROCEDURE — 36600 WITHDRAWAL OF ARTERIAL BLOOD: CPT

## 2019-09-18 PROCEDURE — 63600175 PHARM REV CODE 636 W HCPCS: Performed by: EMERGENCY MEDICINE

## 2019-09-18 PROCEDURE — 25000242 PHARM REV CODE 250 ALT 637 W/ HCPCS: Performed by: EMERGENCY MEDICINE

## 2019-09-18 PROCEDURE — 94640 AIRWAY INHALATION TREATMENT: CPT

## 2019-09-18 PROCEDURE — 25000003 PHARM REV CODE 250: Performed by: HOSPITALIST

## 2019-09-18 PROCEDURE — 94761 N-INVAS EAR/PLS OXIMETRY MLT: CPT

## 2019-09-18 PROCEDURE — 27000221 HC OXYGEN, UP TO 24 HOURS

## 2019-09-18 PROCEDURE — 99900035 HC TECH TIME PER 15 MIN (STAT)

## 2019-09-18 RX ORDER — METHYLPREDNISOLONE SOD SUCC 125 MG
125 VIAL (EA) INJECTION EVERY 8 HOURS
Status: DISCONTINUED | OUTPATIENT
Start: 2019-09-18 | End: 2019-09-18

## 2019-09-18 RX ORDER — PREDNISONE 20 MG/1
60 TABLET ORAL DAILY
Status: DISCONTINUED | OUTPATIENT
Start: 2019-09-18 | End: 2019-09-19 | Stop reason: HOSPADM

## 2019-09-18 RX ORDER — ENOXAPARIN SODIUM 100 MG/ML
40 INJECTION SUBCUTANEOUS EVERY 24 HOURS
Status: DISCONTINUED | OUTPATIENT
Start: 2019-09-18 | End: 2019-09-19 | Stop reason: HOSPADM

## 2019-09-18 RX ORDER — AMLODIPINE BESYLATE 5 MG/1
10 TABLET ORAL DAILY
Status: DISCONTINUED | OUTPATIENT
Start: 2019-09-18 | End: 2019-09-19 | Stop reason: HOSPADM

## 2019-09-18 RX ORDER — SODIUM CHLORIDE 0.9 % (FLUSH) 0.9 %
10 SYRINGE (ML) INJECTION
Status: DISCONTINUED | OUTPATIENT
Start: 2019-09-18 | End: 2019-09-19 | Stop reason: HOSPADM

## 2019-09-18 RX ORDER — HYDROCHLOROTHIAZIDE 25 MG/1
25 TABLET ORAL DAILY
Status: DISCONTINUED | OUTPATIENT
Start: 2019-09-18 | End: 2019-09-19 | Stop reason: HOSPADM

## 2019-09-18 RX ORDER — METOPROLOL SUCCINATE 25 MG/1
25 TABLET, EXTENDED RELEASE ORAL 2 TIMES DAILY
Status: DISCONTINUED | OUTPATIENT
Start: 2019-09-18 | End: 2019-09-19 | Stop reason: HOSPADM

## 2019-09-18 RX ORDER — IPRATROPIUM BROMIDE AND ALBUTEROL SULFATE 2.5; .5 MG/3ML; MG/3ML
3 SOLUTION RESPIRATORY (INHALATION) EVERY 4 HOURS
Status: DISCONTINUED | OUTPATIENT
Start: 2019-09-18 | End: 2019-09-19 | Stop reason: HOSPADM

## 2019-09-18 RX ORDER — ACETAMINOPHEN 325 MG/1
650 TABLET ORAL EVERY 6 HOURS PRN
Status: DISCONTINUED | OUTPATIENT
Start: 2019-09-18 | End: 2019-09-19 | Stop reason: HOSPADM

## 2019-09-18 RX ORDER — HYDRALAZINE HYDROCHLORIDE 25 MG/1
50 TABLET, FILM COATED ORAL 3 TIMES DAILY
Status: DISCONTINUED | OUTPATIENT
Start: 2019-09-18 | End: 2019-09-19 | Stop reason: HOSPADM

## 2019-09-18 RX ADMIN — AMLODIPINE BESYLATE 10 MG: 5 TABLET ORAL at 08:09

## 2019-09-18 RX ADMIN — IPRATROPIUM BROMIDE AND ALBUTEROL SULFATE 3 ML: .5; 3 SOLUTION RESPIRATORY (INHALATION) at 07:09

## 2019-09-18 RX ADMIN — IPRATROPIUM BROMIDE AND ALBUTEROL SULFATE 3 ML: .5; 3 SOLUTION RESPIRATORY (INHALATION) at 08:09

## 2019-09-18 RX ADMIN — METOPROLOL SUCCINATE 25 MG: 25 TABLET, EXTENDED RELEASE ORAL at 08:09

## 2019-09-18 RX ADMIN — IPRATROPIUM BROMIDE AND ALBUTEROL SULFATE 3 ML: .5; 3 SOLUTION RESPIRATORY (INHALATION) at 11:09

## 2019-09-18 RX ADMIN — HYDROCHLOROTHIAZIDE 25 MG: 25 TABLET ORAL at 08:09

## 2019-09-18 RX ADMIN — METHYLPREDNISOLONE SODIUM SUCCINATE 125 MG: 125 INJECTION, POWDER, FOR SOLUTION INTRAMUSCULAR; INTRAVENOUS at 05:09

## 2019-09-18 RX ADMIN — HYDRALAZINE HYDROCHLORIDE 50 MG: 25 TABLET ORAL at 11:09

## 2019-09-18 RX ADMIN — ENOXAPARIN SODIUM 40 MG: 100 INJECTION SUBCUTANEOUS at 05:09

## 2019-09-18 RX ADMIN — ACETAMINOPHEN 650 MG: 325 TABLET, FILM COATED ORAL at 08:09

## 2019-09-18 RX ADMIN — IPRATROPIUM BROMIDE AND ALBUTEROL SULFATE 3 ML: .5; 3 SOLUTION RESPIRATORY (INHALATION) at 05:09

## 2019-09-18 RX ADMIN — IPRATROPIUM BROMIDE AND ALBUTEROL SULFATE 3 ML: .5; 3 SOLUTION RESPIRATORY (INHALATION) at 03:09

## 2019-09-18 RX ADMIN — HYDRALAZINE HYDROCHLORIDE 50 MG: 25 TABLET ORAL at 05:09

## 2019-09-18 RX ADMIN — PREDNISONE 60 MG: 20 TABLET ORAL at 11:09

## 2019-09-18 NOTE — PLAN OF CARE
Problem: Adult Inpatient Plan of Care  Goal: Plan of Care Review  Outcome: Ongoing (interventions implemented as appropriate)     09/18/19 0597   Plan of Care Review   Progress improving     Patient is alert and oriented x 4.  Patient remained free from fall throughout shift.  Patient is on neb treatments and steroids.  Patient on oxygen via NC at 2 (L).  No acute distress noted.

## 2019-09-18 NOTE — H&P
[facility] Medicine  History & Physical    Patient Name: Micah Laurent Jr.  MRN: [id number]  Admission Date: 9/17/2019  Attending Physician: Mert Feliciano MD   Primary Care Provider: Chelsy Torrez MD         Patient information was obtained from patient and ER records.     Subjective:     Principal Problem:COPD exacerbation    Chief Complaint:   Chief Complaint   Patient presents with    Shortness of Breath     pt reports SOB & cough ongoing for 2 days; pt reports hx of COPD; pt denies wearing home oxygen        HPI: Mr. Laurent is a 79 yo M with a known history of COPD and is not on home 02, presents with a CC of progressive SOB since Sunday. He attempted to use his nebulizer without success.  He presents to the ED on 9/17 and is admitted for COPD exacerbation.  He states that this feels like his previous COPD and was wheezing on admission. No cough or fever. Feels much better.     Past Medical History:   Diagnosis Date    Asthma     Atrial fibrillation     COPD (chronic obstructive pulmonary disease)     Hypertension        History reviewed. No pertinent surgical history.    Review of patient's allergies indicates:   Allergen Reactions    Lisinopril        No current facility-administered medications on file prior to encounter.      Current Outpatient Medications on File Prior to Encounter   Medication Sig    albuterol (PROVENTIL) 2.5 mg /3 mL (0.083 %) nebulizer solution Take 3mLs (2.5mg total) by nebulization TID x 4 days.  Then 3 mLs (2.5mg total) by nebulization every 6 (six) hours as needed.    amLODIPine (NORVASC) 10 MG tablet Take 1 tablet (10 mg total) by mouth once daily.    aspirin (ECOTRIN) 81 MG EC tablet Take 81 mg by mouth once daily.    fluticasone-salmeterol 250-50 mcg/dose (ADVAIR) 250-50 mcg/dose diskus inhaler Inhale 1 puff into the lungs 2 (two) times daily.    hydrALAZINE (APRESOLINE) 50 MG tablet Take 50 mg by mouth 3 (three) times daily.     hydroCHLOROthiazide (HYDRODIURIL) 25 MG tablet Take 25 mg by mouth once daily.    ipratropium-albuterol (COMBIVENT)  mcg/actuation inhaler Inhale 1 puff into the lungs every 6 (six) hours as needed for Wheezing. Rescue    metoprolol succinate (TOPROL-XL) 25 MG 24 hr tablet Take 25 mg by mouth 2 (two) times daily.     Family History     None        Tobacco Use    Smoking status: Former Smoker     Packs/day: 1.50     Years: 20.00     Pack years: 30.00     Last attempt to quit:      Years since quittin.7    Smokeless tobacco: Former User   Substance and Sexual Activity    Alcohol use: No    Drug use: No    Sexual activity: Never     Review of Systems   Constitutional: Negative for activity change, appetite change, chills, diaphoresis, fatigue and fever.   HENT: Negative for congestion.    Eyes: Negative for discharge.   Respiratory: Positive for shortness of breath and wheezing. Negative for cough, choking, chest tightness and stridor.    Cardiovascular: Negative for chest pain and leg swelling.   Gastrointestinal: Negative for abdominal distention, anal bleeding, blood in stool, constipation, nausea and vomiting.   Genitourinary: Negative for difficulty urinating.   Musculoskeletal: Negative for arthralgias.   Neurological: Negative for dizziness.   Psychiatric/Behavioral: Negative for agitation.     Objective:     Vital Signs (Most Recent):  Temp: 98.3 °F (36.8 °C) (19 0348)  Pulse: 94 (19 0759)  Resp: 20 (19 0759)  BP: (!) 161/91 (19 0348)  SpO2: (!) 89 % (19 0759) Vital Signs (24h Range):  Temp:  [98.3 °F (36.8 °C)-99 °F (37.2 °C)] 98.3 °F (36.8 °C)  Pulse:  [] 94  Resp:  [17-25] 20  SpO2:  [89 %-100 %] 89 %  BP: (147-161)/(75-91) 161/91     Weight: 72.1 kg (158 lb 15.2 oz)  Body mass index is 25.66 kg/m².    Physical Exam   Constitutional: He is oriented to person, place, and time. He appears well-developed and well-nourished.   HENT:   Head: Normocephalic  and atraumatic.   Cardiovascular: Normal rate and regular rhythm.   Pulmonary/Chest: Breath sounds normal. No stridor. No respiratory distress. He has no wheezes. He has no rales.   Abdominal: Soft. Bowel sounds are normal. He exhibits no distension and no mass. There is no tenderness. There is no rebound and no guarding.   Neurological: He is alert and oriented to person, place, and time.   Skin: Skin is warm and dry.   Psychiatric: He has a normal mood and affect. His behavior is normal.   Nursing note and vitals reviewed.          Significant Labs:   CBC:   Recent Labs   Lab 09/17/19 2229   WBC 5.49   HGB 11.6*   HCT 37.6*        CMP:   Recent Labs   Lab 09/17/19  2335      K 4.1      CO2 30*   *   BUN 16   CREATININE 1.3   CALCIUM 9.0   PROT 7.4   ALBUMIN 3.3*   BILITOT 0.5   ALKPHOS 82   AST 18   ALT 16   ANIONGAP 7*   EGFRNONAA 52*       Significant Imaging:  CXR- no acute process       Assessment/Plan:     * COPD exacerbation  Clinically improved. No wheezing currently. Patient states he feels like he could go home. No home 02.  Will change to po prednisone today. Continue breathing treatments and likely home on 9/19.       Anemia of chronic disease  No acute issues.       Essential hypertension  No acute issues. Resuming home meds       Atrial fibrillation  Not on a NOAC         VTE Risk Mitigation (From admission, onward)        Ordered     enoxaparin injection 40 mg  Daily      09/18/19 0855     IP VTE HIGH RISK PATIENT  Once      09/18/19 0343     Place BEATRIZ hose  Until discontinued      09/18/19 0343     Place sequential compression device  Until discontinued      09/18/19 0343        Change to po prednisone. Home likely on 9/19.          Mert Hazel MD  Department of Hospital Medicine   [facility]

## 2019-09-18 NOTE — HOSPITAL COURSE
Mr. Laurent presented with progressive SOB which was unrelieved with home nebulizer treatments.  He was admitted for COPD exacerbation with hypoxia.  Chest x-ray was negative for any infiltrate and he was without fever or elevation of white cell count.  He was treated with IV steroids, O2, and nebulizer treatment.  Patient will improve faster than expected and was able to be weaned to p.o. Prednisone.  He was no longer hypoxic and was stable on room air.  Patient was discharged with short prednisone taper and he is to continue his home nebulizer treatments as needed.  Close follow-up arranged with his primary care physician.

## 2019-09-18 NOTE — PLAN OF CARE
09/18/19 1542   Discharge Assessment   Assessment Type Discharge Planning Assessment   Confirmed/corrected address and phone number on facesheet? Yes   Assessment information obtained from? Patient   Communicated expected length of stay with patient/caregiver no   Prior to hospitilization cognitive status: Alert/Oriented;No Deficits   Prior to hospitalization functional status: Independent   Current cognitive status: Alert/Oriented;No Deficits   Current Functional Status: Independent   Facility Arrived From:   (Home)   Lives With spouse   Able to Return to Prior Arrangements yes   Is patient able to care for self after discharge? Yes   Who are your caregiver(s) and their phone number(s)?   (Susanne (spouse) [phone])   Patient's perception of discharge disposition home or selfcare   Readmission Within the Last 30 Days no previous admission in last 30 days   Patient currently being followed by outpatient case management? No   Patient currently receives any other outside agency services? No   Equipment Currently Used at Home none   Do you have any problems affording any of your prescribed medications? No   Is the patient taking medications as prescribed? yes   Does the patient have transportation home? Yes   Transportation Anticipated car, drives self;family or friend will provide   Dialysis Name and Scheduled days   (N/A)   Does the patient receive services at the [facility]? No   Discharge Plan A Home with family  (PCP F/U)   Discharge Plan B Home with family   DME Needed Upon Discharge    (TBD)   Patient/Family in Agreement with Plan yes         [id number] - [place], [place]  [address] [place] [id number]  Phone: [phone] Fax: [phone]

## 2019-09-18 NOTE — SUBJECTIVE & OBJECTIVE
Past Medical History:   Diagnosis Date    Asthma     Atrial fibrillation     COPD (chronic obstructive pulmonary disease)     Hypertension        History reviewed. No pertinent surgical history.    Review of patient's allergies indicates:   Allergen Reactions    Lisinopril        No current facility-administered medications on file prior to encounter.      Current Outpatient Medications on File Prior to Encounter   Medication Sig    albuterol (PROVENTIL) 2.5 mg /3 mL (0.083 %) nebulizer solution Take 3mLs (2.5mg total) by nebulization TID x 4 days.  Then 3 mLs (2.5mg total) by nebulization every 6 (six) hours as needed.    amLODIPine (NORVASC) 10 MG tablet Take 1 tablet (10 mg total) by mouth once daily.    aspirin (ECOTRIN) 81 MG EC tablet Take 81 mg by mouth once daily.    fluticasone-salmeterol 250-50 mcg/dose (ADVAIR) 250-50 mcg/dose diskus inhaler Inhale 1 puff into the lungs 2 (two) times daily.    hydrALAZINE (APRESOLINE) 50 MG tablet Take 50 mg by mouth 3 (three) times daily.    hydroCHLOROthiazide (HYDRODIURIL) 25 MG tablet Take 25 mg by mouth once daily.    ipratropium-albuterol (COMBIVENT)  mcg/actuation inhaler Inhale 1 puff into the lungs every 6 (six) hours as needed for Wheezing. Rescue    metoprolol succinate (TOPROL-XL) 25 MG 24 hr tablet Take 25 mg by mouth 2 (two) times daily.     Family History     None        Tobacco Use    Smoking status: Former Smoker     Packs/day: 1.50     Years: 20.00     Pack years: 30.00     Last attempt to quit:      Years since quittin.7    Smokeless tobacco: Former User   Substance and Sexual Activity    Alcohol use: No    Drug use: No    Sexual activity: Never     Review of Systems   Constitutional: Negative for activity change, appetite change, chills, diaphoresis, fatigue and fever.   HENT: Negative for congestion.    Eyes: Negative for discharge.   Respiratory: Positive for shortness of breath and wheezing. Negative for cough,  choking, chest tightness and stridor.    Cardiovascular: Negative for chest pain and leg swelling.   Gastrointestinal: Negative for abdominal distention, anal bleeding, blood in stool, constipation, nausea and vomiting.   Genitourinary: Negative for difficulty urinating.   Musculoskeletal: Negative for arthralgias.   Neurological: Negative for dizziness.   Psychiatric/Behavioral: Negative for agitation.     Objective:     Vital Signs (Most Recent):  Temp: 98.3 °F (36.8 °C) (09/18/19 0348)  Pulse: 94 (09/18/19 0759)  Resp: 20 (09/18/19 0759)  BP: (!) 161/91 (09/18/19 0348)  SpO2: (!) 89 % (09/18/19 0759) Vital Signs (24h Range):  Temp:  [98.3 °F (36.8 °C)-99 °F (37.2 °C)] 98.3 °F (36.8 °C)  Pulse:  [] 94  Resp:  [17-25] 20  SpO2:  [89 %-100 %] 89 %  BP: (147-161)/(75-91) 161/91     Weight: 72.1 kg (158 lb 15.2 oz)  Body mass index is 25.66 kg/m².    Physical Exam   Constitutional: He is oriented to person, place, and time. He appears well-developed and well-nourished.   HENT:   Head: Normocephalic and atraumatic.   Cardiovascular: Normal rate and regular rhythm.   Pulmonary/Chest: Breath sounds normal. No stridor. No respiratory distress. He has no wheezes. He has no rales.   Abdominal: Soft. Bowel sounds are normal. He exhibits no distension and no mass. There is no tenderness. There is no rebound and no guarding.   Neurological: He is alert and oriented to person, place, and time.   Skin: Skin is warm and dry.   Psychiatric: He has a normal mood and affect. His behavior is normal.   Nursing note and vitals reviewed.          Significant Labs:   CBC:   Recent Labs   Lab 09/17/19 2229   WBC 5.49   HGB 11.6*   HCT 37.6*        CMP:   Recent Labs   Lab 09/17/19  2335      K 4.1      CO2 30*   *   BUN 16   CREATININE 1.3   CALCIUM 9.0   PROT 7.4   ALBUMIN 3.3*   BILITOT 0.5   ALKPHOS 82   AST 18   ALT 16   ANIONGAP 7*   EGFRNONAA 52*       Significant Imaging:

## 2019-09-18 NOTE — ED PROVIDER NOTES
Encounter Date: 2019    SCRIBE #1 NOTE: ILj, am scribing for, and in the presence of, Anthony Winters MD. Other sections scribed: HPI, ROS, PE.       History     Chief Complaint   Patient presents with    Shortness of Breath     pt reports SOB & cough ongoing for 2 days; pt reports hx of COPD; pt denies wearing home oxygen     This is a 78 y.o. Male with a PMHx of COPD, Asthma, Afib, and HTN who presents to the ED complaining of SOB, cough, and wheezing ongoing for 2 days. Pain rated 3/10. He denies any back pain, leg pain, HA, fever, or any other worsening or alleviating factors. He does not use breathing treatments or home oxygen. This pt is allergic to Lisinopril. Former smoker. Denies any drug or alcohol use.    The history is provided by the patient and medical records.     Review of patient's allergies indicates:   Allergen Reactions    Lisinopril      Past Medical History:   Diagnosis Date    Asthma     Atrial fibrillation     COPD (chronic obstructive pulmonary disease)     Hypertension      History reviewed. No pertinent surgical history.  History reviewed. No pertinent family history.  Social History     Tobacco Use    Smoking status: Former Smoker     Packs/day: 1.50     Years: 20.00     Pack years: 30.00     Last attempt to quit:      Years since quittin.7    Smokeless tobacco: Former User   Substance Use Topics    Alcohol use: No    Drug use: No     Review of Systems   Constitutional: Negative for chills and fever.   HENT: Negative for congestion, dental problem, sinus pain and sore throat.    Eyes: Negative.    Respiratory: Positive for cough, shortness of breath and wheezing.    Cardiovascular: Negative for chest pain.   Gastrointestinal: Negative for abdominal pain, blood in stool, diarrhea, nausea and vomiting.        NO melena or rectal bleeding   Genitourinary: Negative for decreased urine volume, discharge, dysuria, flank pain, frequency and testicular pain.    Musculoskeletal: Negative for arthralgias, back pain and joint swelling.   Skin: Negative for rash and wound.   Neurological: Negative for dizziness, syncope, speech difficulty, weakness, numbness and headaches.        No numbness   Psychiatric/Behavioral: Negative for confusion and hallucinations.   All other systems reviewed and are negative.      Physical Exam     Initial Vitals [09/17/19 2150]   BP Pulse Resp Temp SpO2   (!) 156/79 97 20 99 °F (37.2 °C) (!) 90 %      MAP       --         Physical Exam    Nursing note and vitals reviewed.  Constitutional: He appears well-developed and well-nourished. He is not diaphoretic. No distress.   HENT:   Head: Normocephalic and atraumatic.   Eyes: Conjunctivae and EOM are normal. Pupils are equal, round, and reactive to light. Right eye exhibits no discharge. Left eye exhibits no discharge.   Neck: Neck supple. No tracheal deviation present.   Cardiovascular: Normal rate, regular rhythm and normal heart sounds.   No murmur heard.  Pulmonary/Chest: No stridor. No respiratory distress. He has wheezes (generalized). He has no rhonchi. He has no rales. He exhibits no tenderness.   Abdominal: Soft. He exhibits no distension and no mass. There is no tenderness. There is no rebound and no guarding.   Musculoskeletal: Normal range of motion. He exhibits no edema or tenderness.   Neurological: He is alert and oriented to person, place, and time. He has normal strength.   Skin: Skin is warm and dry. No rash noted.   Psychiatric: He has a normal mood and affect. His behavior is normal. Judgment and thought content normal.         ED Course   Procedures  Labs Reviewed   CBC W/ AUTO DIFFERENTIAL - Abnormal; Notable for the following components:       Result Value    Hemoglobin 11.6 (*)     Hematocrit 37.6 (*)     Mean Corpuscular Hemoglobin 25.2 (*)     Mean Corpuscular Hemoglobin Conc 30.9 (*)     RDW 15.2 (*)     All other components within normal limits   COMPREHENSIVE METABOLIC  PANEL - Abnormal; Notable for the following components:    CO2 30 (*)     Glucose 111 (*)     Albumin 3.3 (*)     Anion Gap 7 (*)     eGFR if non  52 (*)     All other components within normal limits    Narrative:     Recoll. [id number] by [facility] at 09/17/2019 23:18, reason: Specimen   hemolyzed   ISTAT PROCEDURE - Abnormal; Notable for the following components:    POC PCO2 45.6 (*)     POC PO2 53 (*)     POC HCO3 29.2 (*)     POC SATURATED O2 87 (*)     POC TCO2 31 (*)     All other components within normal limits   TROPONIN I    Narrative:     Recoll. [id number] by [facility] at 09/17/2019 23:18, reason: Specimen   hemolyzed   B-TYPE NATRIURETIC PEPTIDE        ECG Results          EKG 12-lead (Final result)  Result time 09/19/19 17:23:12    Final result by Interface, Lab In [facility] (09/19/19 17:23:12)                 Narrative:    Test Reason : R07.9,    Vent. Rate : 093 BPM     Atrial Rate : 093 BPM     P-R Int : 148 ms          QRS Dur : 074 ms      QT Int : 354 ms       P-R-T Axes : 074 062 019 degrees     QTc Int : 440 ms    Normal sinus rhythm  Minimal voltage criteria for LVH, may be normal variant  Nonspecific ST abnormality  Abnormal ECG  When compared with ECG of 28-AUG-2019 19:38,  No significant change was found    Confirmed by Giuseppe Nicholson MD ([id number]) on 9/19/2019 5:23:04 PM    Referred By: [email or web address]   SELF           Confirmed By:Giuseppe Nicholson MD                             EKG 12-LEAD (Final result)  Result time 09/19/19 14:15:26    Final result by Unknown User (09/19/19 14:15:26)                                Imaging Results          X-Ray Chest AP Portable (Final result)  Result time 09/17/19 23:06:50    Final result by Ruperto Banuelos MD (09/17/19 23:06:50)                 Impression:      No acute process.      Electronically signed by: Ruperto Banuelos MD  Date:    09/17/2019  Time:    23:06             Narrative:    EXAMINATION:  XR CHEST AP PORTABLE    CLINICAL HISTORY:  Chest  Pain;    TECHNIQUE:  Single frontal view of the chest was performed.    COMPARISON:  08/28/2019.    FINDINGS:  Monitoring EKG leads are present.  The trachea is unremarkable.  The cardiomediastinal silhouette is within normal limits.  The hilar structures are unremarkable.  The hemidiaphragms are within normal limits.  There is no evidence of free air beneath the hemidiaphragms.  There are no pleural effusions.  There is no evidence of a pneumothorax.  There is no evidence of pneumomediastinum.  No airspace opacity is present.  There are degenerative changes in the osseous structures.                                 Medical Decision Making:   History:   Old Medical Records: I decided to obtain old medical records.            Scribe Attestation:   Scribe #1: I performed the above scribed service and the documentation accurately describes the services I performed. I attest to the accuracy of the note.       Pt presented with SOB and symptoms similar to previous COPD exacerbations.  Wheezing and work of breathing improved with duoneb treatments but SaO2 dropped as low as 87% on room air at rest.  Pt does not use oxygen at home and given he is now requiring supplemental oxygen he was admitted for further evaluation and treatment.    Anthony Winters MD              Clinical Impression:       ICD-10-CM ICD-9-CM   1. COPD with acute exacerbation J44.1 491.21   2. Chest pain R07.9 786.50   3. COPD exacerbation J44.1 491.21   4. Atrial fibrillation, unspecified type I48.91 427.31   5. Essential hypertension I10 401.9   6. Anemia of chronic disease D63.8 285.29   7. Shortness of breath R06.02 786.05        I, Anthony Winters, personally performed the services described in this documentation. All medical record entries made by the scribe were at my direction and in my presence.  I have reviewed the chart and agree that the record reflects my personal performance and is accurate and complete.                      Anthony  LINCOLN Winters MD  09/20/19 1250

## 2019-09-18 NOTE — ASSESSMENT & PLAN NOTE
Clinically improved. No wheezing currently. Patient states he feels like he could go home. No home 02.  Will change to po prednisone today. Continue breathing treatments and likely home on 9/19.

## 2019-09-18 NOTE — PROGRESS NOTES
Nursing Notes  03:36 Patient arrived on unit and 2 (L) given via N[place].  Patient vitals taken and uploaded to system.  Patient continues to be SOB with ambulation.  No acute distress noted.  Will continue to monitor.  Patient is alert and oriented   x 4.      05:30 Patient medications given.  Patient resting in bed in low and locked position.      07:08 Report given to morning nurse.  Patient resting in bed in low and locked position.        Huddle Comments

## 2019-09-18 NOTE — HPI
Mr. Laurent is a 77 yo M with a known history of COPD and is not on home 02, presents with a CC of progressive SOB since Sunday. He attempted to use his nebulizer without success.  He presents to the ED on 9/17 and is admitted for COPD exacerbation.  He states that this feels like his previous COPD and was wheezing on admission. No cough or fever. Feels much better.

## 2019-09-18 NOTE — PROGRESS NOTES
Reported to .Results for MELISSA PEARCE JR. (MRN [id number]) as of 9/18/2019 01:20   Ref. Range 9/18/2019 01:12   POC PH Latest Ref Range: 7.35 - 7.45  7.413   POC PCO2 Latest Ref Range: 35 - 45 mmHg 45.6 (H)   POC PO2 Latest Ref Range: 80 - 100 mmHg 53 (LL)   POC BE Latest Ref Range: -2 to 2 mmol/L 4   POC HCO3 Latest Ref Range: 24 - 28 mmol/L 29.2 (H)   POC SATURATED O2 Latest Ref Range: 95 - 100 % 87 (L)   POC TCO2 Latest Ref Range: 23 - 27 mmol/L 31 (H)   Sample Unknown ARTERIAL   Allens Test Unknown N/A   Site Unknown RR

## 2019-09-19 VITALS
HEIGHT: 66 IN | BODY MASS INDEX: 25.54 KG/M2 | WEIGHT: 158.94 LBS | SYSTOLIC BLOOD PRESSURE: 153 MMHG | RESPIRATION RATE: 18 BRPM | TEMPERATURE: 98 F | HEART RATE: 90 BPM | DIASTOLIC BLOOD PRESSURE: 80 MMHG | OXYGEN SATURATION: 94 %

## 2019-09-19 PROCEDURE — 25000003 PHARM REV CODE 250: Performed by: EMERGENCY MEDICINE

## 2019-09-19 PROCEDURE — 94761 N-INVAS EAR/PLS OXIMETRY MLT: CPT

## 2019-09-19 PROCEDURE — 63600175 PHARM REV CODE 636 W HCPCS: Performed by: INTERNAL MEDICINE

## 2019-09-19 PROCEDURE — 25000242 PHARM REV CODE 250 ALT 637 W/ HCPCS: Performed by: EMERGENCY MEDICINE

## 2019-09-19 PROCEDURE — 27000221 HC OXYGEN, UP TO 24 HOURS

## 2019-09-19 PROCEDURE — 94640 AIRWAY INHALATION TREATMENT: CPT

## 2019-09-19 RX ORDER — PREDNISONE 20 MG/1
40 TABLET ORAL DAILY
Qty: 10 TABLET | Refills: 0 | Status: SHIPPED | OUTPATIENT
Start: 2019-09-19 | End: 2019-09-24

## 2019-09-19 RX ADMIN — IPRATROPIUM BROMIDE AND ALBUTEROL SULFATE 3 ML: .5; 3 SOLUTION RESPIRATORY (INHALATION) at 03:09

## 2019-09-19 RX ADMIN — PREDNISONE 60 MG: 20 TABLET ORAL at 08:09

## 2019-09-19 RX ADMIN — AMLODIPINE BESYLATE 10 MG: 5 TABLET ORAL at 08:09

## 2019-09-19 RX ADMIN — HYDROCHLOROTHIAZIDE 25 MG: 25 TABLET ORAL at 08:09

## 2019-09-19 RX ADMIN — METOPROLOL SUCCINATE 25 MG: 25 TABLET, EXTENDED RELEASE ORAL at 08:09

## 2019-09-19 RX ADMIN — IPRATROPIUM BROMIDE AND ALBUTEROL SULFATE 3 ML: .5; 3 SOLUTION RESPIRATORY (INHALATION) at 12:09

## 2019-09-19 RX ADMIN — IPRATROPIUM BROMIDE AND ALBUTEROL SULFATE 3 ML: .5; 3 SOLUTION RESPIRATORY (INHALATION) at 07:09

## 2019-09-19 RX ADMIN — HYDRALAZINE HYDROCHLORIDE 50 MG: 25 TABLET ORAL at 08:09

## 2019-09-19 RX ADMIN — HYDRALAZINE HYDROCHLORIDE 50 MG: 25 TABLET ORAL at 02:09

## 2019-09-19 NOTE — NURSING
Upon rounding pt lying supine, asleep, snoring, right arm in sling with right hand resting on his body, no complaints at this time.

## 2019-09-19 NOTE — PHYSICIAN QUERY
PT Name: Micah Laurent Jr.  MR #: [id number]    Physician Query Form - Atrial Fibrillation Specificity     CDS/: Nevaeh Pozo               Contact information:[email or web address]     This form is a permanent document in the medical record.     Query Date: September 19, 2019    By submitting this query, we are merely seeking further clarification of documentation. Please utilize your independent clinical judgment when addressing the question(s) below.    The medical record contains the following:   Indicators     Supporting Clinical Findings Location in Medical Record   x Atrial Fibrillation Atrial fibrillation  Not on a NOAC    H&P 9/18   x EKG results Vent. Rate : 093 BPM     Atrial Rate : 093 BPM     P-R Int : 148 ms          QRS Dur : 074 ms      QT Int : 354 ms       P-R-T Axes : 074 062 019 degrees     QTc Int : 440 ms    Normal sinus rhythm  Minimal voltage criteria for LVH, may be normal variant  Nonspecific ST abnormality  Abnormal ECG  When compared with ECG of 28-AUG-2019 19:38,  Significant changes have occurred   EKG 9/17   x Medication metoprolol 25 mg by mouth 2 (two) times daily.     H&P 9/18 and MAR    Treatment      Other         Provider, please further specify the Atrial Fibrillation diagnosis.    [  ] Chronic   [x  ] Paroxysmal   [  ] Permanent   [  ] Persistent   [  ] Other (please specify):   [  ] Clinically Undetermined       Please document in your progress notes daily for the duration of treatment until resolved, and include in your discharge summary.

## 2019-09-19 NOTE — PLAN OF CARE
09/19/19 1432   Post-Acute Status   Post-Acute Authorization Placement  (Home)   Post-Acute Placement Status Set-up Complete   Discharge Delays None known at this time

## 2019-09-19 NOTE — PROGRESS NOTES
1413 TN contacted PCP's office, Dr. Torrez, at [phone]; unable to schedule an appt; was transferred to [employer] voicemail; left message to contact pt with a hospital f/u appt in 1 week.

## 2019-09-19 NOTE — PLAN OF CARE
09/19/19 1433   Final Note   Assessment Type Final Discharge Note   Anticipated Discharge Disposition Home   What phone number can be called within the next 1-3 days to see how you are doing after discharge?   (Listed in chart)   Hospital Follow Up  Appt(s) scheduled? No  (Clinic Nurse will contact patient with a 1 week appt.)   Discharge plans and expectations educations in teach back method with documentation complete? Yes   Right Care Referral Info   Post Acute Recommendation No Care     TN informed floor nurseQuan, care management is complete and can proceed with discharge teaching.

## 2019-09-19 NOTE — PLAN OF CARE
Problem: Adjustment to Illness COPD (Chronic Obstructive Pulmonary Disease)  Goal: Optimal Chronic Illness Coping  Outcome: Ongoing (interventions implemented as appropriate)  Intervention: Support and Optimize Psychosocial Response     09/19/19 0511   Promote Anxiety Reduction   Family/Support System Care self-care encouraged   Supportive Measures active listening utilized;self-care encouraged;verbalization of feelings encouraged         Problem: Functional Ability Impaired COPD (Chronic Obstructive Pulmonary Disease)  Goal: Optimal Level of Functional [place]  Outcome: Ongoing (interventions implemented as appropriate)  Intervention: Optimize Functional Ability     09/18/19 1930 09/19/19 0511   Monitor/Manage Chemotherapy Gastrointestinal Effects   Environmental Support  --  calm environment promoted;distractions minimized;environmental consistency promoted;rest periods encouraged   Identify and Manage Contributors to Fall Injury Risk   Self-Care Promotion independence encouraged;BADL personal objects within reach  --    Promote Airway Secretion Clearance   Activity Management ambulated to bathroom - L4  --          Problem: Respiratory Compromise COPD (Chronic Obstructive Pulmonary Disease)  Goal: Effective Oxygenation and Ventilation  Outcome: Ongoing (interventions implemented as appropriate)  Intervention: Promote Airway Secretion Clearance     09/18/19 1930   Promote Airway Secretion Clearance   Cough And Deep Breathing done independently per patient   Promote Airway Secretion Clearance   Activity Management ambulated to bathroom - L4

## 2019-09-20 NOTE — DISCHARGE SUMMARY
[facility] Hospital Medicine  Discharge Summary      Patient Name: Micah Laurent Jr.  MRN: [id number]  Admission Date: 9/17/2019  Hospital Length of Stay: 1 days  Discharge Date and Time: 9/19/2019  3:03 PM  Attending Physician: Cielo Krishna MD  Discharging Provider: Cielo Krishna MD  Primary Care Provider: Chelsy Torrez MD      HPI:   Mr. Laurent is a 79 yo M with a known history of COPD and is not on home 02, presents with a CC of progressive SOB since Sunday. He attempted to use his nebulizer without success.  He presents to the ED on 9/17 and is admitted for COPD exacerbation.  He states that this feels like his previous COPD and was wheezing on admission. No cough or fever. Feels much better.     * No surgery found *      Hospital Course:   Mr. Laurent presented with progressive SOB which was unrelieved with home nebulizer treatments.  He was admitted for COPD exacerbation with hypoxia.  Chest x-ray was negative for any infiltrate and he was without fever or elevation of white cell count.  He was treated with IV steroids, O2, and nebulizer treatment.  Patient improved faster than expected and was able to be weaned to p.o. Prednisone.  He was no longer hypoxic and was stable on room air.  Patient was discharged with short prednisone taper and he is to continue his home nebulizer treatments as needed.  Close follow-up arranged with his primary care physician.     Consults: None    Service: Hospital Medicine    Final Active Diagnoses:    Diagnosis Date Noted POA    PRINCIPAL PROBLEM:  COPD exacerbation [J44.1] 09/18/2019 Yes    Anemia of chronic disease [D63.8] 09/18/2019 Yes    Essential hypertension [I10]  Yes    Atrial fibrillation [I48.91]  Yes      Problems Resolved During this Admission:       Discharged Condition: good    Disposition: Home or Self Care    Follow Up:  Follow-up Information     Chelsy Torrez MD In 1 week.    Specialty:  Endocrinology  Why:  Outpatient Services PCP  Follow-Up Clinic Nurse will contact patient with an appointment in 1 week.  Contact information:  [address] [id number] [phone]                 Patient Instructions:      Diet Cardiac     Activity as tolerated       Significant Diagnostic Studies:     Pending Diagnostic Studies:     None         Medications:  Reconciled Home Medications:      Medication List      START taking these medications    predniSONE 20 MG tablet  Commonly known as:  DELTASONE  Take 2 tablets (40 mg total) by mouth once daily. for 5 days        CONTINUE taking these medications    albuterol 2.5 mg /3 mL (0.083 %) nebulizer solution  Commonly known as:  PROVENTIL  Take 3mLs (2.5mg total) by nebulization TID x 4 days.  Then 3 mLs (2.5mg total) by nebulization every 6 (six) hours as needed.     amLODIPine 10 MG tablet  Commonly known as:  NORVASC  Take 1 tablet (10 mg total) by mouth once daily.     aspirin 81 MG EC tablet  Commonly known as:  ECOTRIN  Take 81 mg by mouth once daily.     fluticasone-salmeterol 250-50 mcg/dose 250-50 mcg/dose diskus inhaler  Commonly known as:  ADVAIR  Inhale 1 puff into the lungs 2 (two) times daily.     hydrALAZINE 50 MG tablet  Commonly known as:  APRESOLINE  Take 50 mg by mouth 3 (three) times daily.     hydroCHLOROthiazide 25 MG tablet  Commonly known as:  HYDRODIURIL  Take 25 mg by mouth once daily.     ipratropium-albuterol  mcg/actuation inhaler  Commonly known as:  COMBIVENT  Inhale 1 puff into the lungs every 6 (six) hours as needed for Wheezing. Rescue     metoprolol succinate 25 MG 24 hr tablet  Commonly known as:  TOPROL-XL  Take 25 mg by mouth 2 (two) times daily.            Indwelling Lines/Drains at time of discharge:   Lines/Drains/Airways          None          Time spent on the discharge of patient: 35 minutes  Patient was seen and examined on the date of discharge and determined to be suitable for discharge.         Cielo Krishna MD  Department of [facility]  Medicine  [facility]

## 2019-09-23 ENCOUNTER — NURSE TRIAGE (OUTPATIENT)
Dept: ADMINISTRATIVE | Facility: CLINIC | Age: 79
End: 2019-09-23

## 2019-09-23 NOTE — TELEPHONE ENCOUNTER
"Pt reporting L shoulder pain in the back by shoulder blade and Tylenol eases the pain; First said pain was a 6.5-7/10; now saying it does not hurt right now and only hurting when taking "a big deep breath and when coughing. Disposition: See pcp today in ofc. Pt will call now; If must leave msg, w/c back in 30 min if no return call; Verb. Understanding. Pt given OOC# if warrants a call back.  "

## 2019-09-23 NOTE — TELEPHONE ENCOUNTER
Reason for Disposition   Numbness (i.e., loss of sensation) in hand or fingers    Additional Information   Negative: Shock suspected (e.g., cold/pale/clammy skin, too weak to stand, low BP, rapid pulse)   Negative: Similar pain previously and it was from 'heart attack'   Negative: Similar pain previously and it was from 'angina' and not relieved by nitroglycerin   Negative: Sounds like a life-threatening emergency to the triager   Negative: Chest pain   Negative: Followed an injury to shoulder   Negative: Difficulty breathing or unusual sweating (e.g., sweating without exertion)   Negative: Pain lasting > 5 minutes and pain also present in chest (Exception: pain is clearly made worse by movement)   Negative: Age > 40 and no obvious cause and pain even when not moving the arm (Exception: pain is clearly made worse by moving arm or bending neck)   Negative: Red area or streak and fever   Negative: Swollen joint and fever   Negative: Entire arm is swollen   Negative: Patient sounds very sick or weak to the triager   Negative: SEVERE pain (e.g., excruciating, unable to do any normal activities)   Negative: Shoulder pains with exertion (e.g., walking) and pain goes away on resting and not present now   Negative: Painful rash with multiple small blisters grouped together (i.e., dermatomal distribution or 'band' or 'stripe')   Negative: Looks like a boil, infected sore, deep ulcer or other infected rash (spreading redness, pus)   Negative: Localized rash is very painful (no fever)   Negative: Weakness (i.e., loss of strength) in hand or fingers (Exception: not truly weak; hand feels weak because of pain)    Protocols used: SHOULDER PAIN-A-OH

## 2019-10-02 ENCOUNTER — HOSPITAL ENCOUNTER (EMERGENCY)
Facility: HOSPITAL | Age: 79
Discharge: HOME | End: 2019-10-02
Attending: EMERGENCY MEDICINE
Payer: MEDICARE

## 2019-10-02 VITALS
BODY MASS INDEX: 25.71 KG/M2 | TEMPERATURE: 98 F | RESPIRATION RATE: 20 BRPM | DIASTOLIC BLOOD PRESSURE: 63 MMHG | OXYGEN SATURATION: 95 % | HEART RATE: 69 BPM | SYSTOLIC BLOOD PRESSURE: 125 MMHG | HEIGHT: 66 IN | WEIGHT: 160 LBS

## 2019-10-02 DIAGNOSIS — R06.02 SHORTNESS OF BREATH: ICD-10-CM

## 2019-10-02 DIAGNOSIS — J44.1 ACUTE EXACERBATION OF CHRONIC OBSTRUCTIVE PULMONARY DISEASE (COPD): Primary | ICD-10-CM

## 2019-10-02 LAB
ALBUMIN SERPL BCP-MCNC: 3.2 G/DL (ref 3.5–5.2)
ALP SERPL-CCNC: 72 U/L (ref 55–135)
ALT SERPL W/O P-5'-P-CCNC: 16 U/L (ref 10–44)
ANION GAP SERPL CALC-SCNC: 7 MMOL/L (ref 8–16)
AST SERPL-CCNC: 18 U/L (ref 10–40)
BASOPHILS # BLD AUTO: 0 K/UL (ref 0–0.2)
BASOPHILS NFR BLD: 0 % (ref 0–1.9)
BILIRUB SERPL-MCNC: 0.3 MG/DL (ref 0.1–1)
BNP SERPL-MCNC: 64 PG/ML (ref 0–99)
BUN SERPL-MCNC: 18 MG/DL (ref 8–23)
CALCIUM SERPL-MCNC: 9 MG/DL (ref 8.7–10.5)
CHLORIDE SERPL-SCNC: 106 MMOL/L (ref 95–110)
CO2 SERPL-SCNC: 28 MMOL/L (ref 23–29)
CREAT SERPL-MCNC: 1.3 MG/DL (ref 0.5–1.4)
DIFFERENTIAL METHOD: ABNORMAL
EOSINOPHIL # BLD AUTO: 0 K/UL (ref 0–0.5)
EOSINOPHIL NFR BLD: 0 % (ref 0–8)
ERYTHROCYTE [DISTWIDTH] IN BLOOD BY AUTOMATED COUNT: 15 % (ref 11.5–14.5)
EST. GFR  (AFRICAN AMERICAN): 60 ML/MIN/1.73 M^2
EST. GFR  (NON AFRICAN AMERICAN): 52 ML/MIN/1.73 M^2
GLUCOSE SERPL-MCNC: 147 MG/DL (ref 70–110)
HCT VFR BLD AUTO: 38.9 % (ref 40–54)
HGB BLD-MCNC: 11.8 G/DL (ref 14–18)
INR PPP: 1 (ref 0.8–1.2)
LYMPHOCYTES # BLD AUTO: 0.9 K/UL (ref 1–4.8)
LYMPHOCYTES NFR BLD: 14.1 % (ref 18–48)
MCH RBC QN AUTO: 25 PG (ref 27–31)
MCHC RBC AUTO-ENTMCNC: 30.3 G/DL (ref 32–36)
MCV RBC AUTO: 82 FL (ref 82–98)
MONOCYTES # BLD AUTO: 0.7 K/UL (ref 0.3–1)
MONOCYTES NFR BLD: 11.5 % (ref 4–15)
NEUTROPHILS # BLD AUTO: 4.7 K/UL (ref 1.8–7.7)
NEUTROPHILS NFR BLD: 74.6 % (ref 38–73)
PLATELET # BLD AUTO: 242 K/UL (ref 150–350)
PMV BLD AUTO: 11.1 FL (ref 9.2–12.9)
POTASSIUM SERPL-SCNC: 3.9 MMOL/L (ref 3.5–5.1)
PROT SERPL-MCNC: 7.8 G/DL (ref 6–8.4)
PROTHROMBIN TIME: 10.2 SEC (ref 9–12.5)
RBC # BLD AUTO: 4.72 M/UL (ref 4.6–6.2)
SODIUM SERPL-SCNC: 141 MMOL/L (ref 136–145)
TROPONIN I SERPL DL<=0.01 NG/ML-MCNC: 0.01 NG/ML (ref 0–0.03)
WBC # BLD AUTO: 6.26 K/UL (ref 3.9–12.7)

## 2019-10-02 PROCEDURE — 80053 COMPREHEN METABOLIC PANEL: CPT

## 2019-10-02 PROCEDURE — 85610 PROTHROMBIN TIME: CPT

## 2019-10-02 PROCEDURE — 84484 ASSAY OF TROPONIN QUANT: CPT

## 2019-10-02 PROCEDURE — 93010 EKG 12-LEAD: ICD-10-PCS | Mod: ,,, | Performed by: INTERNAL MEDICINE

## 2019-10-02 PROCEDURE — 85025 COMPLETE CBC W/AUTO DIFF WBC: CPT

## 2019-10-02 PROCEDURE — 93005 ELECTROCARDIOGRAM TRACING: CPT

## 2019-10-02 PROCEDURE — 94640 AIRWAY INHALATION TREATMENT: CPT

## 2019-10-02 PROCEDURE — 93010 ELECTROCARDIOGRAM REPORT: CPT | Mod: ,,, | Performed by: INTERNAL MEDICINE

## 2019-10-02 PROCEDURE — 25000242 PHARM REV CODE 250 ALT 637 W/ HCPCS: Performed by: EMERGENCY MEDICINE

## 2019-10-02 PROCEDURE — 83880 ASSAY OF NATRIURETIC PEPTIDE: CPT

## 2019-10-02 PROCEDURE — 99284 EMERGENCY DEPT VISIT MOD MDM: CPT | Mod: 25

## 2019-10-02 PROCEDURE — 94761 N-INVAS EAR/PLS OXIMETRY MLT: CPT

## 2019-10-02 PROCEDURE — 25000003 PHARM REV CODE 250: Performed by: EMERGENCY MEDICINE

## 2019-10-02 RX ORDER — IPRATROPIUM BROMIDE AND ALBUTEROL SULFATE 2.5; .5 MG/3ML; MG/3ML
3 SOLUTION RESPIRATORY (INHALATION)
Status: COMPLETED | OUTPATIENT
Start: 2019-10-02 | End: 2019-10-02

## 2019-10-02 RX ORDER — ACETAMINOPHEN 500 MG
1000 TABLET ORAL
Status: COMPLETED | OUTPATIENT
Start: 2019-10-02 | End: 2019-10-02

## 2019-10-02 RX ORDER — ALBUTEROL SULFATE 2.5 MG/.5ML
2.5 SOLUTION RESPIRATORY (INHALATION)
Status: COMPLETED | OUTPATIENT
Start: 2019-10-02 | End: 2019-10-02

## 2019-10-02 RX ORDER — PREDNISONE 20 MG/1
40 TABLET ORAL DAILY
Qty: 10 TABLET | Refills: 0 | Status: SHIPPED | OUTPATIENT
Start: 2019-10-02 | End: 2019-10-07

## 2019-10-02 RX ADMIN — ACETAMINOPHEN 1000 MG: 500 TABLET ORAL at 12:10

## 2019-10-02 RX ADMIN — IPRATROPIUM BROMIDE AND ALBUTEROL SULFATE 3 ML: .5; 3 SOLUTION RESPIRATORY (INHALATION) at 12:10

## 2019-10-02 RX ADMIN — ALBUTEROL SULFATE 2.5 MG: 2.5 SOLUTION RESPIRATORY (INHALATION) at 12:10

## 2019-10-02 NOTE — DISCHARGE INSTRUCTIONS
Prednisone as directed.  Please use your nebulizer treatments at home, every 4 hr.  Please return if you get worse or if new problems develop.  Please follow-up with her primary care doctor this week.

## 2019-10-02 NOTE — ED TRIAGE NOTES
Pt arrives to er via personal vehicle with wife from home stating Shortness of Breath and pain under bilateral ribs when breath in. Pt with hx of COPDand reports SOB since Saturday. Worsening today.  Pt no distress  Denies chest pain

## 2019-10-02 NOTE — ED PROVIDER NOTES
Encounter Date: 10/2/2019    SCRIBE #1 NOTE: I, Ana Jansen, am scribing for, and in the presence of,  Wili Hardin MD. I have scribed the following portions of the note - Other sections scribed: HPI, ROS.       History     Chief Complaint   Patient presents with    Shortness of Breath     Pt with hx of COPD reports SOB since Saturday. Worsening today. RR labored at present. Sats 96% on RA. RR 26     This 78 y.o male presents to the ED for an evaluation for shortness of breath for the past 4 days.  Patient also reports of bilateral lower rib pain and chronic cough.  He reports his rib pain is worse with deep breathing and coughing.  He reports he has been attempting treatment with his Albuterol nebulizer, but reports it as inadequate.  Patient also reports attempting Tylenol for his rib pain.  Patient reports previously taking Incruse, but reports he discontinued it 1 week ago secondary to it causing palpitations.  He denies fever, chills, nausea, emesis, diarrhea, abdominal pain, headache, or any other associated symptoms.  No alleviating factors.    PMHx: Asthma, COPD, HTN, Afib    The history is provided by the patient.     Review of patient's allergies indicates:   Allergen Reactions    Lisinopril      Past Medical History:   Diagnosis Date    Asthma     Atrial fibrillation     COPD (chronic obstructive pulmonary disease)     Hypertension      History reviewed. No pertinent surgical history.  History reviewed. No pertinent family history.  Social History     Tobacco Use    Smoking status: Former Smoker     Packs/day: 1.50     Years: 20.00     Pack years: 30.00     Last attempt to quit:      Years since quittin.7    Smokeless tobacco: Former User   Substance Use Topics    Alcohol use: No    Drug use: No     Review of Systems   Constitutional: Negative for chills and fever.   HENT: Negative for congestion, ear pain, rhinorrhea, sinus pressure and sore throat.    Eyes: Negative for pain and  visual disturbance.   Respiratory: Positive for cough and shortness of breath.    Cardiovascular: Negative for chest pain.   Gastrointestinal: Negative for abdominal pain, diarrhea, nausea and vomiting.   Genitourinary: Negative for dysuria.   Musculoskeletal: Negative for back pain and neck pain.        (+) rib pain   Skin: Negative for rash.   Neurological: Negative for headaches.       Physical Exam     Initial Vitals [10/02/19 1050]   BP Pulse Resp Temp SpO2   125/71 89 (!) 26 98.3 °F (36.8 °C) 96 %      MAP       --         Physical Exam  The patient was examined specifically for the following:   General:No significant distress, Good color, Warm and dry. Head and neck:Scalp atraumatic, Neck supple. Neurological:Appropriate conversation, Gross motor deficits. Eyes:Conjugate gaze, Clear corneas. ENT: No epistaxis. Cardiac: Regular rate and rhythm, Grossly normal heart tones. Pulmonary: Wheezing, Rales. Gastrointestinal: Abdominal tenderness, Abdominal distention. Musculoskeletal: Extremity deformity, Apparent pain with range of motion of the joints. Skin: Rash.   The findings on examination were normal except for the following:  Patient has mild bronchial breath sounds bilaterally.  There are occasional scattered wheezes.  There is no clinical evidence of respiratory distress.  The lungs are otherwise unremarkable. The abdomen is soft.  The patient is afebrile.  There is no tachycardia.  ED Course   Procedures  Labs Reviewed   CBC W/ AUTO DIFFERENTIAL - Abnormal; Notable for the following components:       Result Value    Hemoglobin 11.8 (*)     Hematocrit 38.9 (*)     Mean Corpuscular Hemoglobin 25.0 (*)     Mean Corpuscular Hemoglobin Conc 30.3 (*)     RDW 15.0 (*)     Lymph # 0.9 (*)     Gran% 74.6 (*)     Lymph% 14.1 (*)     All other components within normal limits   COMPREHENSIVE METABOLIC PANEL - Abnormal; Notable for the following components:    Glucose 147 (*)     Albumin 3.2 (*)     Anion Gap 7 (*)      eGFR if non  52 (*)     All other components within normal limits   TROPONIN I   B-TYPE NATRIURETIC PEPTIDE   PROTIME-INR     EKG Readings: (Independently Interpreted)   This patient is in a normal sinus rhythm with a heart rate of 90.  The p.r. QRS and QT intervals are normal.  There is no definite evidence of acute myocardial infarction or malignant arrhythmia.       Imaging Results          X-Ray Chest AP Portable (Final result)  Result time 10/02/19 12:35:43    Final result by Giovanny Marcus Jr., MD (10/02/19 12:35:43)                 Impression:      Some vague increased opacity right lung base medially may be some pleural fluid or perhaps epicardial fat.      Electronically signed by: Giovanny Marcus MD  Date:    10/02/2019  Time:    12:35             Narrative:    EXAMINATION:  XR CHEST AP PORTABLE    CLINICAL HISTORY:  Shotness of Breath;    TECHNIQUE:  Single frontal view of the chest was performed.    COMPARISON:  September 17, 2019.    FINDINGS:  Monitoring leads are in place.  Heart size pulmonary vessels are normal.  The lungs fairly well aerated.  No confluent consolidation.  Few increased markings at the right lung base medially.  This may be some pleural fluid or epicardial fat.  Degenerative changes right shoulder.                              Medical decision making:  Given the above, this patient presents to the emergency room with vague shortness of breath. He developed some palpitations after using his Breo Ellipta.  He was told to stop it.  He is unhappy that he does not have something else for his COPD.  He has minimal physical findings.  He has oxygen saturations of 96%.  There is no tachycardia.  He has no chest pain. I will discharge him on prednisone and have him follow-up with his primary care doctor.  I specifically doubt pulmonary embolus congestive heart failure myocardial infarction.                Scribe Attestation:   Scribe #1: I performed the above scribed service  and the documentation accurately describes the services I performed. I attest to the accuracy of the note.               Clinical Impression:       ICD-10-CM ICD-9-CM   1. Acute exacerbation of chronic obstructive pulmonary disease (COPD) J44.1 491.21   2. Shortness of breath R06.02 786.05       I personally performed the services described in this documentation.  All medical record  entries made by the scribe are at my direction and in my presence.  Signed, Dr. Lashawn Hardin MD  10/02/19 1300       Wili Hardin MD  10/02/19 1301

## 2019-12-01 ENCOUNTER — HOSPITAL ENCOUNTER (EMERGENCY)
Facility: HOSPITAL | Age: 79
Discharge: HOME | End: 2019-12-02
Attending: EMERGENCY MEDICINE
Payer: MEDICARE

## 2019-12-01 DIAGNOSIS — R06.02 SOB (SHORTNESS OF BREATH): Primary | ICD-10-CM

## 2019-12-01 DIAGNOSIS — J44.1 COPD EXACERBATION: ICD-10-CM

## 2019-12-01 DIAGNOSIS — R07.9 CHEST PAIN: ICD-10-CM

## 2019-12-01 LAB
ALBUMIN SERPL BCP-MCNC: 4 G/DL (ref 3.5–5.2)
ALLENS TEST: ABNORMAL
ALP SERPL-CCNC: 98 U/L (ref 55–135)
ALT SERPL W/O P-5'-P-CCNC: 20 U/L (ref 10–44)
ANION GAP SERPL CALC-SCNC: 8 MMOL/L (ref 8–16)
AST SERPL-CCNC: 24 U/L (ref 10–40)
BASOPHILS # BLD AUTO: 0 K/UL (ref 0–0.2)
BASOPHILS NFR BLD: 0 % (ref 0–1.9)
BILIRUB SERPL-MCNC: 0.4 MG/DL (ref 0.1–1)
BNP SERPL-MCNC: 49 PG/ML (ref 0–99)
BUN SERPL-MCNC: 18 MG/DL (ref 8–23)
CALCIUM SERPL-MCNC: 9.2 MG/DL (ref 8.7–10.5)
CHLORIDE SERPL-SCNC: 107 MMOL/L (ref 95–110)
CO2 SERPL-SCNC: 28 MMOL/L (ref 23–29)
CREAT SERPL-MCNC: 1.3 MG/DL (ref 0.5–1.4)
DELSYS: ABNORMAL
DIFFERENTIAL METHOD: ABNORMAL
EOSINOPHIL # BLD AUTO: 0 K/UL (ref 0–0.5)
EOSINOPHIL NFR BLD: 0 % (ref 0–8)
ERYTHROCYTE [DISTWIDTH] IN BLOOD BY AUTOMATED COUNT: 15.3 % (ref 11.5–14.5)
EST. GFR  (AFRICAN AMERICAN): 60 ML/MIN/1.73 M^2
EST. GFR  (NON AFRICAN AMERICAN): 52 ML/MIN/1.73 M^2
FIO2: 21
GLUCOSE SERPL-MCNC: 88 MG/DL (ref 70–110)
HCO3 UR-SCNC: 33.5 MMOL/L (ref 24–28)
HCT VFR BLD AUTO: 39.5 % (ref 40–54)
HGB BLD-MCNC: 11.9 G/DL (ref 14–18)
IMM GRANULOCYTES # BLD AUTO: 0 K/UL (ref 0–0.04)
IMM GRANULOCYTES NFR BLD AUTO: 0 % (ref 0–0.5)
LYMPHOCYTES # BLD AUTO: 1.4 K/UL (ref 1–4.8)
LYMPHOCYTES NFR BLD: 44.7 % (ref 18–48)
MAGNESIUM SERPL-MCNC: 1.9 MG/DL (ref 1.6–2.6)
MCH RBC QN AUTO: 25.2 PG (ref 27–31)
MCHC RBC AUTO-ENTMCNC: 30.1 G/DL (ref 32–36)
MCV RBC AUTO: 84 FL (ref 82–98)
MODE: ABNORMAL
MONOCYTES # BLD AUTO: 0.5 K/UL (ref 0.3–1)
MONOCYTES NFR BLD: 17.2 % (ref 4–15)
NEUTROPHILS # BLD AUTO: 1.2 K/UL (ref 1.8–7.7)
NEUTROPHILS NFR BLD: 38.1 % (ref 38–73)
NRBC BLD-RTO: 0 /100 WBC
PCO2 BLDA: 59.6 MMHG (ref 35–45)
PH SMN: 7.36 [PH] (ref 7.35–7.45)
PLATELET # BLD AUTO: 178 K/UL (ref 150–350)
PMV BLD AUTO: 10.6 FL (ref 9.2–12.9)
PO2 BLDA: 35 MMHG (ref 40–60)
POC BE: 6 MMOL/L
POC SATURATED O2: 63 % (ref 95–100)
POC TCO2: 35 MMOL/L (ref 24–29)
POTASSIUM SERPL-SCNC: 3.8 MMOL/L (ref 3.5–5.1)
PROT SERPL-MCNC: 8.2 G/DL (ref 6–8.4)
RBC # BLD AUTO: 4.72 M/UL (ref 4.6–6.2)
SAMPLE: ABNORMAL
SITE: ABNORMAL
SODIUM SERPL-SCNC: 143 MMOL/L (ref 136–145)
TROPONIN I SERPL DL<=0.01 NG/ML-MCNC: 0.01 NG/ML (ref 0–0.03)
WBC # BLD AUTO: 3.02 K/UL (ref 3.9–12.7)

## 2019-12-01 PROCEDURE — 99285 EMERGENCY DEPT VISIT HI MDM: CPT | Mod: 25

## 2019-12-01 PROCEDURE — 93005 ELECTROCARDIOGRAM TRACING: CPT

## 2019-12-01 PROCEDURE — 80053 COMPREHEN METABOLIC PANEL: CPT

## 2019-12-01 PROCEDURE — 93010 EKG 12-LEAD: ICD-10-PCS | Mod: ,,, | Performed by: INTERNAL MEDICINE

## 2019-12-01 PROCEDURE — 99900035 HC TECH TIME PER 15 MIN (STAT)

## 2019-12-01 PROCEDURE — 94761 N-INVAS EAR/PLS OXIMETRY MLT: CPT

## 2019-12-01 PROCEDURE — 93010 ELECTROCARDIOGRAM REPORT: CPT | Mod: ,,, | Performed by: INTERNAL MEDICINE

## 2019-12-01 PROCEDURE — 85025 COMPLETE CBC W/AUTO DIFF WBC: CPT

## 2019-12-01 PROCEDURE — 83735 ASSAY OF MAGNESIUM: CPT

## 2019-12-01 PROCEDURE — 25000242 PHARM REV CODE 250 ALT 637 W/ HCPCS: Performed by: EMERGENCY MEDICINE

## 2019-12-01 PROCEDURE — 94640 AIRWAY INHALATION TREATMENT: CPT

## 2019-12-01 PROCEDURE — 96365 THER/PROPH/DIAG IV INF INIT: CPT

## 2019-12-01 PROCEDURE — 63600175 PHARM REV CODE 636 W HCPCS: Performed by: EMERGENCY MEDICINE

## 2019-12-01 PROCEDURE — 83880 ASSAY OF NATRIURETIC PEPTIDE: CPT

## 2019-12-01 PROCEDURE — 84484 ASSAY OF TROPONIN QUANT: CPT

## 2019-12-01 PROCEDURE — 82803 BLOOD GASES ANY COMBINATION: CPT

## 2019-12-01 RX ORDER — MAGNESIUM SULFATE 1 G/100ML
1 INJECTION INTRAVENOUS
Status: COMPLETED | OUTPATIENT
Start: 2019-12-01 | End: 2019-12-01

## 2019-12-01 RX ORDER — IPRATROPIUM BROMIDE AND ALBUTEROL SULFATE 2.5; .5 MG/3ML; MG/3ML
3 SOLUTION RESPIRATORY (INHALATION)
Status: COMPLETED | OUTPATIENT
Start: 2019-12-01 | End: 2019-12-01

## 2019-12-01 RX ORDER — PREDNISONE 20 MG/1
60 TABLET ORAL
Status: COMPLETED | OUTPATIENT
Start: 2019-12-01 | End: 2019-12-01

## 2019-12-01 RX ORDER — IPRATROPIUM BROMIDE AND ALBUTEROL SULFATE 2.5; .5 MG/3ML; MG/3ML
3 SOLUTION RESPIRATORY (INHALATION)
Status: COMPLETED | OUTPATIENT
Start: 2019-12-02 | End: 2019-12-02

## 2019-12-01 RX ADMIN — IPRATROPIUM BROMIDE AND ALBUTEROL SULFATE 3 ML: .5; 3 SOLUTION RESPIRATORY (INHALATION) at 09:12

## 2019-12-01 RX ADMIN — PREDNISONE 60 MG: 20 TABLET ORAL at 09:12

## 2019-12-01 RX ADMIN — MAGNESIUM SULFATE 1 G: 1 INJECTION INTRAVENOUS at 09:12

## 2019-12-02 VITALS
RESPIRATION RATE: 20 BRPM | HEIGHT: 66 IN | BODY MASS INDEX: 25.71 KG/M2 | SYSTOLIC BLOOD PRESSURE: 172 MMHG | DIASTOLIC BLOOD PRESSURE: 99 MMHG | OXYGEN SATURATION: 91 % | WEIGHT: 160 LBS | HEART RATE: 100 BPM | TEMPERATURE: 99 F

## 2019-12-02 PROCEDURE — 94640 AIRWAY INHALATION TREATMENT: CPT

## 2019-12-02 PROCEDURE — 25000242 PHARM REV CODE 250 ALT 637 W/ HCPCS: Performed by: EMERGENCY MEDICINE

## 2019-12-02 RX ORDER — PREDNISONE 50 MG/1
50 TABLET ORAL DAILY
Qty: 4 TABLET | Refills: 0 | Status: SHIPPED | OUTPATIENT
Start: 2019-12-02 | End: 2019-12-06

## 2019-12-02 RX ADMIN — IPRATROPIUM BROMIDE AND ALBUTEROL SULFATE 3 ML: .5; 3 SOLUTION RESPIRATORY (INHALATION) at 12:12

## 2019-12-02 NOTE — DISCHARGE INSTRUCTIONS
You were seen in the emergency department for difficulty breathing due to asthma. We gave you steroids and a breathing treatment and you felt better.  You've planned to go home and get another breathing treatment.  Please continue this every 4 hours while awake for the next day or so until you are completely back to normal.  We have given you a prescription for steroids.  Please take it daily for the next 4 days.  Please follow-up with your primary care provider.  Please return immediately for any new or worsening wheezing, difficulty breathing, dizziness, or you become concerned in any way.\

## 2019-12-02 NOTE — ED PROVIDER NOTES
Encounter Date: 2019    SCRIBE #1 NOTE: I, Tammy Gudino, am scribing for, and in the presence of,  Rogerio Marquez MD. I have scribed the following portions of the note - Other sections scribed: HPI, ROS, Physical Exam.       History     Chief Complaint   Patient presents with    Shortness of Breath     pt has hx of asthma and COPD and HTN; pt c/o SOB and dry cough that started on Saturday     79 year old male patient with a past medical history of Chronic Obstructive Pulmonary Disease and asthma reports to the ED complaining of shortness of breath. He also complains of a non-productive cough and pain on the left side of his neck associated with the cough. He denies any chest pain, fever, or swelling in his legs. The patient reports that he attempted treatment with breathing treatments at home every 4 hours with no alleviation in symptoms. He reports that he does not use a home oxygen machine. He reports that he has never been to the ICU or intubated for his shortness of breath, but reports that he has been admitted overnight due to his lung problems. He denies any recent sick contact. He denies a history of heart problems. He denies smoking, alcohol use, or drug use.     The history is provided by the patient.     Review of patient's allergies indicates:   Allergen Reactions    Lisinopril      Past Medical History:   Diagnosis Date    Asthma     Atrial fibrillation     COPD (chronic obstructive pulmonary disease)     Hypertension      History reviewed. No pertinent surgical history.  History reviewed. No pertinent family history.  Social History     Tobacco Use    Smoking status: Former Smoker     Packs/day: 1.50     Years: 20.00     Pack years: 30.00     Last attempt to quit:      Years since quittin.9    Smokeless tobacco: Former User   Substance Use Topics    Alcohol use: No    Drug use: No     Review of Systems   Constitutional: Negative for fever.   HENT: Negative for rhinorrhea.     Eyes: Negative for visual disturbance.   Respiratory: Positive for cough (Non-productive) and shortness of breath.    Cardiovascular: Negative for chest pain and leg swelling.   Gastrointestinal: Negative for nausea.   Endocrine: Negative for polyuria.   Genitourinary: Negative for dysuria.   Musculoskeletal: Positive for neck pain (Left-sided neck pain when coughing).   Skin: Negative for rash.   Allergic/Immunologic: Negative for environmental allergies.   Neurological: Negative for headaches.   Hematological: Negative for adenopathy.   Psychiatric/Behavioral: Negative for confusion.       Physical Exam     Initial Vitals [12/01/19 2033]   BP Pulse Resp Temp SpO2   (!) 188/96 85 20 98.4 °F (36.9 °C) 95 %      MAP       --         Physical Exam    Nursing note and vitals reviewed.  Constitutional: He appears well-developed and well-nourished. He is not diaphoretic. No distress.   HENT:   Head: Normocephalic and atraumatic.   Eyes: Conjunctivae and EOM are normal. Pupils are equal, round, and reactive to light. No scleral icterus.   Neck: Normal range of motion. Neck supple.   Cardiovascular: Normal rate, regular rhythm, normal heart sounds and intact distal pulses. Exam reveals no gallop and no friction rub.    No murmur heard.  Pulmonary/Chest: No respiratory distress. He has wheezes.   Tight bilateral wheezing with prolonged expiratory phase.  Mild supraclavicular retractions.   Abdominal: Soft. Bowel sounds are normal. He exhibits no distension. There is no tenderness. There is no rebound and no guarding.   Musculoskeletal: Normal range of motion. He exhibits no edema or tenderness.   Neurological: He is alert and oriented to person, place, and time. He has normal strength. No cranial nerve deficit.   Skin: Skin is warm and dry. No rash noted.   Psychiatric: He has a normal mood and affect. His behavior is normal.         ED Course   Procedures  Labs Reviewed   CBC W/ AUTO DIFFERENTIAL - Abnormal; Notable for  the following components:       Result Value    WBC 3.02 (*)     Hemoglobin 11.9 (*)     Hematocrit 39.5 (*)     Mean Corpuscular Hemoglobin 25.2 (*)     Mean Corpuscular Hemoglobin Conc 30.1 (*)     RDW 15.3 (*)     Gran # (ANC) 1.2 (*)     Mono% 17.2 (*)     All other components within normal limits   COMPREHENSIVE METABOLIC PANEL - Abnormal; Notable for the following components:    eGFR if non  52 (*)     All other components within normal limits   ISTAT PROCEDURE - Abnormal; Notable for the following components:    POC PCO2 59.6 (*)     POC PO2 35 (*)     POC HCO3 33.5 (*)     POC SATURATED O2 63 (*)     POC TCO2 35 (*)     All other components within normal limits   TROPONIN I   B-TYPE NATRIURETIC PEPTIDE   MAGNESIUM          Imaging Results          X-Ray Chest AP Portable (Final result)  Result time 12/01/19 21:26:03    Final result by Ruperto Banuelos MD (12/01/19 21:26:03)                 Impression:      No acute process.      Electronically signed by: Ruperto Banuelos MD  Date:    12/01/2019  Time:    21:26             Narrative:    EXAMINATION:  XR CHEST AP PORTABLE    CLINICAL HISTORY:  Chest Pain;    TECHNIQUE:  Single frontal view of the chest was performed.    COMPARISON:  10/02/2019.    FINDINGS:  Monitoring EKG leads are present.  The trachea is unremarkable.  The cardiomediastinal silhouette is within normal limits.  The hilar structures are unremarkable.  The hemidiaphragms are within normal limits.  There is no evidence of free air beneath the hemidiaphragms.  There are no pleural effusions.  There is no evidence of a pneumothorax.  There is no evidence of pneumomediastinum.  No airspace opacity is present.  The osseous structures are unremarkable.                                 Medical Decision Making:   Initial Assessment:   79-year-old male with history of COPD presenting with dyspnea.  On exam, patient has tight wheezing with a prolonged expiratory phase.  Speaking mostly in full  sentences though minimally shortened.  No definite distress. No significant tachypnea, no hypoxia.  Patient given prednisone, breathing treatment, magnesium.  VBG shows some CO2 retention.  After 1st breathing treatment, symptoms significantly improved though with some wheezing still.  Patient given 2nd breathing treatment with resolution of symptoms.  Patient states he feels that he is at his baseline, is breathing easily, talking full sentences and has no further complaints.  Denies chest pain. Patient has no risk factors for PE, no significant lower extremity edema, no hemoptysis and no history of DVT or PE.  Blood pressure shows mild hypertension, though no history of CHF and BNP normal. Patient denies chest pain and troponin negative. EKG shows normal sinus rhythm, rate 77, no significant ST segment elevation or depression concerning for acute ischemia. Offered admission.  The patient is desiring to go home.  He feels he at his baseline, he is breathing easily and well-appearing.  He has a history of COPD.  He is mildly hypoxic but in no distress and feeling fine.  Believe he is safe for discharge home.  Discussed need to continue taking prednisone, return for any new or worsening symptoms, as well as to take breathing treatments every 4 hr while awake at home.  Believe the patient is reliable and would return should he become short of breath. He lives with his family member who I also believe is reliable and he would return should he have any further concerns.            Scribe Attestation:   Scribe #1: I performed the above scribed service and the documentation accurately describes the services I performed. I attest to the accuracy of the note.                          Clinical Impression:       ICD-10-CM ICD-9-CM   1. SOB (shortness of breath) R06.02 786.05   2. Chest pain R07.9 786.50   3. COPD exacerbation J44.1 491.21         Disposition:   Disposition: Discharged  Condition: Stable    Scribe Attestation:  I Rogerio Marquez personally performed the services described in this documentation. All medical record entries made by the scribe were at my direction and in my presence. I have reviewed the chart and agree that the record reflects my personal performance and is accurate and complete.                 Rogerio Marquez MD  12/02/19 1934

## 2019-12-02 NOTE — ED NOTES
MD aware of pt's 91-92% oxygen saturation on room air. MD states he is okay with patient being discharged with this oxygen saturation as pt has hx of COPD and pt states he feels well and ready for discharge.

## 2019-12-02 NOTE — ED TRIAGE NOTES
"Pt presents to ED with c/o SOB that began Friday. Pt states "I have been feeling short of breath since Friday but it wasn't until today that it started messing with me doing things." no acute distress noted.   "

## 2019-12-14 ENCOUNTER — HOSPITAL ENCOUNTER (EMERGENCY)
Facility: HOSPITAL | Age: 79
Discharge: HOME | End: 2019-12-15
Attending: EMERGENCY MEDICINE
Payer: MEDICARE

## 2019-12-14 DIAGNOSIS — R06.02 SOB (SHORTNESS OF BREATH): ICD-10-CM

## 2019-12-14 DIAGNOSIS — J44.1 COPD EXACERBATION: Primary | ICD-10-CM

## 2019-12-14 LAB
ALBUMIN SERPL BCP-MCNC: 3.7 G/DL (ref 3.5–5.2)
ALP SERPL-CCNC: 95 U/L (ref 55–135)
ALT SERPL W/O P-5'-P-CCNC: 22 U/L (ref 10–44)
ANION GAP SERPL CALC-SCNC: 7 MMOL/L (ref 8–16)
AST SERPL-CCNC: 21 U/L (ref 10–40)
BASOPHILS # BLD AUTO: 0 K/UL (ref 0–0.2)
BASOPHILS NFR BLD: 0 % (ref 0–1.9)
BILIRUB SERPL-MCNC: 0.3 MG/DL (ref 0.1–1)
BNP SERPL-MCNC: 20 PG/ML (ref 0–99)
BUN SERPL-MCNC: 21 MG/DL (ref 8–23)
CALCIUM SERPL-MCNC: 8.9 MG/DL (ref 8.7–10.5)
CHLORIDE SERPL-SCNC: 107 MMOL/L (ref 95–110)
CO2 SERPL-SCNC: 28 MMOL/L (ref 23–29)
CREAT SERPL-MCNC: 1.3 MG/DL (ref 0.5–1.4)
DIFFERENTIAL METHOD: ABNORMAL
EOSINOPHIL # BLD AUTO: 0 K/UL (ref 0–0.5)
EOSINOPHIL NFR BLD: 0 % (ref 0–8)
ERYTHROCYTE [DISTWIDTH] IN BLOOD BY AUTOMATED COUNT: 16 % (ref 11.5–14.5)
EST. GFR  (AFRICAN AMERICAN): 60 ML/MIN/1.73 M^2
EST. GFR  (NON AFRICAN AMERICAN): 52 ML/MIN/1.73 M^2
GLUCOSE SERPL-MCNC: 184 MG/DL (ref 70–110)
HCT VFR BLD AUTO: 38.3 % (ref 40–54)
HGB BLD-MCNC: 11.6 G/DL (ref 14–18)
IMM GRANULOCYTES # BLD AUTO: 0.01 K/UL (ref 0–0.04)
IMM GRANULOCYTES NFR BLD AUTO: 0.2 % (ref 0–0.5)
LYMPHOCYTES # BLD AUTO: 1.3 K/UL (ref 1–4.8)
LYMPHOCYTES NFR BLD: 27.1 % (ref 18–48)
MAGNESIUM SERPL-MCNC: 1.9 MG/DL (ref 1.6–2.6)
MCH RBC QN AUTO: 25.7 PG (ref 27–31)
MCHC RBC AUTO-ENTMCNC: 30.3 G/DL (ref 32–36)
MCV RBC AUTO: 85 FL (ref 82–98)
MONOCYTES # BLD AUTO: 0.6 K/UL (ref 0.3–1)
MONOCYTES NFR BLD: 11.8 % (ref 4–15)
NEUTROPHILS # BLD AUTO: 2.9 K/UL (ref 1.8–7.7)
NEUTROPHILS NFR BLD: 60.9 % (ref 38–73)
NRBC BLD-RTO: 0 /100 WBC
PLATELET # BLD AUTO: 161 K/UL (ref 150–350)
PMV BLD AUTO: 10.9 FL (ref 9.2–12.9)
POTASSIUM SERPL-SCNC: 4 MMOL/L (ref 3.5–5.1)
PROT SERPL-MCNC: 7.6 G/DL (ref 6–8.4)
RBC # BLD AUTO: 4.51 M/UL (ref 4.6–6.2)
SODIUM SERPL-SCNC: 142 MMOL/L (ref 136–145)
TROPONIN I SERPL DL<=0.01 NG/ML-MCNC: <0.006 NG/ML (ref 0–0.03)
WBC # BLD AUTO: 4.76 K/UL (ref 3.9–12.7)

## 2019-12-14 PROCEDURE — 99285 EMERGENCY DEPT VISIT HI MDM: CPT | Mod: 25

## 2019-12-14 PROCEDURE — 84484 ASSAY OF TROPONIN QUANT: CPT

## 2019-12-14 PROCEDURE — 96374 THER/PROPH/DIAG INJ IV PUSH: CPT

## 2019-12-14 PROCEDURE — 83880 ASSAY OF NATRIURETIC PEPTIDE: CPT

## 2019-12-14 PROCEDURE — 63600175 PHARM REV CODE 636 W HCPCS: Performed by: EMERGENCY MEDICINE

## 2019-12-14 PROCEDURE — 83735 ASSAY OF MAGNESIUM: CPT

## 2019-12-14 PROCEDURE — 25000242 PHARM REV CODE 250 ALT 637 W/ HCPCS: Performed by: EMERGENCY MEDICINE

## 2019-12-14 PROCEDURE — 93005 ELECTROCARDIOGRAM TRACING: CPT

## 2019-12-14 PROCEDURE — 85025 COMPLETE CBC W/AUTO DIFF WBC: CPT

## 2019-12-14 PROCEDURE — 94640 AIRWAY INHALATION TREATMENT: CPT

## 2019-12-14 PROCEDURE — 93010 ELECTROCARDIOGRAM REPORT: CPT | Mod: ,,, | Performed by: INTERNAL MEDICINE

## 2019-12-14 PROCEDURE — 80053 COMPREHEN METABOLIC PANEL: CPT

## 2019-12-14 PROCEDURE — 93010 EKG 12-LEAD: ICD-10-PCS | Mod: ,,, | Performed by: INTERNAL MEDICINE

## 2019-12-14 RX ORDER — ALBUTEROL SULFATE 1.25 MG/3ML
2.5 SOLUTION RESPIRATORY (INHALATION) EVERY 6 HOURS PRN
Qty: 1 BOX | Refills: 0 | Status: SHIPPED | OUTPATIENT
Start: 2019-12-14 | End: 2019-12-24

## 2019-12-14 RX ORDER — AZITHROMYCIN 250 MG/1
TABLET, FILM COATED ORAL
Qty: 6 TABLET | Refills: 0 | Status: SHIPPED | OUTPATIENT
Start: 2019-12-14 | End: 2020-04-28 | Stop reason: CLARIF

## 2019-12-14 RX ORDER — IPRATROPIUM BROMIDE AND ALBUTEROL SULFATE 2.5; .5 MG/3ML; MG/3ML
3 SOLUTION RESPIRATORY (INHALATION)
Status: COMPLETED | OUTPATIENT
Start: 2019-12-14 | End: 2019-12-14

## 2019-12-14 RX ORDER — ALBUTEROL SULFATE 2.5 MG/.5ML
2.5 SOLUTION RESPIRATORY (INHALATION)
Status: COMPLETED | OUTPATIENT
Start: 2019-12-14 | End: 2019-12-14

## 2019-12-14 RX ORDER — METHYLPREDNISOLONE SOD SUCC 125 MG
125 VIAL (EA) INJECTION
Status: COMPLETED | OUTPATIENT
Start: 2019-12-14 | End: 2019-12-14

## 2019-12-14 RX ORDER — ALBUTEROL SULFATE 90 UG/1
1-2 AEROSOL, METERED RESPIRATORY (INHALATION) EVERY 6 HOURS PRN
Qty: 1 INHALER | Refills: 0 | Status: SHIPPED | OUTPATIENT
Start: 2019-12-14 | End: 2020-03-02 | Stop reason: SDUPTHER

## 2019-12-14 RX ORDER — METHYLPREDNISOLONE 4 MG/1
TABLET ORAL
Qty: 1 PACKAGE | Refills: 0 | Status: ON HOLD | OUTPATIENT
Start: 2019-12-14 | End: 2020-01-07 | Stop reason: HOSPADM

## 2019-12-14 RX ADMIN — ALBUTEROL SULFATE 2.5 MG: 2.5 SOLUTION RESPIRATORY (INHALATION) at 10:12

## 2019-12-14 RX ADMIN — METHYLPREDNISOLONE SODIUM SUCCINATE 125 MG: 125 INJECTION, POWDER, FOR SOLUTION INTRAMUSCULAR; INTRAVENOUS at 10:12

## 2019-12-14 RX ADMIN — IPRATROPIUM BROMIDE AND ALBUTEROL SULFATE 3 ML: .5; 3 SOLUTION RESPIRATORY (INHALATION) at 10:12

## 2019-12-15 VITALS
WEIGHT: 159 LBS | BODY MASS INDEX: 26.49 KG/M2 | DIASTOLIC BLOOD PRESSURE: 82 MMHG | HEIGHT: 65 IN | OXYGEN SATURATION: 94 % | RESPIRATION RATE: 20 BRPM | SYSTOLIC BLOOD PRESSURE: 154 MMHG | HEART RATE: 86 BPM | TEMPERATURE: 98 F

## 2019-12-15 NOTE — ED PROVIDER NOTES
Encounter Date: 2019    SCRIBE #1 NOTE: I, Lj Ruibo, am scribing for, and in the presence of,  Deon Lemon MD. I have scribed the following portions of the note - Other sections scribed: HPI, ROS, PE.       History     Chief Complaint   Patient presents with    Shortness of Breath     pt c/o SOB that started last night; hx of asthma and COPD; used inhalers at home with no relief      This is a 79 y.o. Male with a PMHx of Asthma and COPD who presents to the ED with c/o SOB with associated wheezing and productive cough that began last night. He took albuterol inhaler at home with no relief. Denies any CP, back pain, abd pain, fever, HA, N/V, diarrhea, leg swelling, or any other worsening or alleviating factors. The pt was admitted earlier this month for COPD exacerbation. He notes no recent changes to his medicine regimen.  He is not on home oxygen.  Denies peripheral edema.     The history is provided by the patient and medical records.     Review of patient's allergies indicates:   Allergen Reactions    Lisinopril      Past Medical History:   Diagnosis Date    Asthma     Atrial fibrillation     COPD (chronic obstructive pulmonary disease)     Hypertension      History reviewed. No pertinent surgical history.  History reviewed. No pertinent family history.  Social History     Tobacco Use    Smoking status: Former Smoker     Packs/day: 1.50     Years: 20.00     Pack years: 30.00     Last attempt to quit: 1990     Years since quittin.9    Smokeless tobacco: Former User   Substance Use Topics    Alcohol use: No    Drug use: No     Review of Systems   Constitutional: Negative for chills, diaphoresis and fever.   HENT: Negative for congestion and sore throat.    Eyes: Negative.  Negative for visual disturbance.   Respiratory: Positive for cough, shortness of breath and wheezing. Negative for chest tightness and stridor.    Cardiovascular: Negative for chest pain and palpitations.    Gastrointestinal: Negative for abdominal pain, diarrhea, nausea and vomiting.        NO melena or rectal bleeding   Genitourinary: Negative for dysuria, flank pain and frequency.   Musculoskeletal: Negative for back pain.   Skin: Negative for rash and wound.   Neurological: Negative for dizziness, syncope, speech difficulty, weakness, numbness and headaches.        No numbness   All other systems reviewed and are negative.      Physical Exam     Initial Vitals [12/14/19 2141]   BP Pulse Resp Temp SpO2   (!) 145/85 98 (!) 24 98.2 °F (36.8 °C) (!) 94 %      MAP       --         Physical Exam    Nursing note and vitals reviewed.  Constitutional: He appears well-developed and well-nourished. He is not diaphoretic. No distress.   HENT:   Head: Normocephalic and atraumatic.   Nose: Nose normal.   Mouth/Throat: Oropharynx is clear and moist.   Eyes: Conjunctivae and EOM are normal. Pupils are equal, round, and reactive to light. Right eye exhibits no discharge. Left eye exhibits no discharge. No scleral icterus.   Neck: Neck supple. No tracheal deviation present. No JVD present.   Cardiovascular: Normal rate, regular rhythm and normal heart sounds.   No murmur heard.  Pulmonary/Chest: No stridor. He has decreased breath sounds (bilaterally). He has wheezes (feint bilaterally). He has rhonchi (feint bilaterally). He has no rales. He exhibits no tenderness.   Poor air movement bilaterally. Decreased breath sounds bilaterally.   Abdominal: Soft. He exhibits no distension. There is no tenderness. There is no rebound and no guarding.   Musculoskeletal: Normal range of motion. He exhibits edema (trace levels at feet and ankles). He exhibits no tenderness.   Neurological: He is alert and oriented to person, place, and time. He has normal strength. No cranial nerve deficit. GCS score is 15. GCS eye subscore is 4. GCS verbal subscore is 5. GCS motor subscore is 6.   Skin: Skin is warm and dry. No rash noted.   Psychiatric: He has  a normal mood and affect. His behavior is normal. Judgment and thought content normal.         ED Course   Procedures  Labs Reviewed   CBC W/ AUTO DIFFERENTIAL - Abnormal; Notable for the following components:       Result Value    RBC 4.51 (*)     Hemoglobin 11.6 (*)     Hematocrit 38.3 (*)     Mean Corpuscular Hemoglobin 25.7 (*)     Mean Corpuscular Hemoglobin Conc 30.3 (*)     RDW 16.0 (*)     All other components within normal limits   COMPREHENSIVE METABOLIC PANEL - Abnormal; Notable for the following components:    Glucose 184 (*)     Anion Gap 7 (*)     eGFR if non  52 (*)     All other components within normal limits   TROPONIN I   B-TYPE NATRIURETIC PEPTIDE   MAGNESIUM     EKG Readings: (Independently Interpreted)   Initial Reading: No STEMI. Rhythm: Normal Sinus Rhythm. Heart Rate: 92. Ectopy: No Ectopy. Conduction: Normal. ST Segments: Non-Specific ST Segment Depression.   Left ventricular hypertrophy.  No significant change compared to 12/01/2019.       Imaging Results          X-Ray Chest AP Portable (Final result)  Result time 12/14/19 22:28:01    Final result by Barbra Hutchinson MD (12/14/19 22:28:01)                 Impression:      No acute intrathoracic abnormality detected.      Electronically signed by: Barbra Hutchinson  Date:    12/14/2019  Time:    22:28             Narrative:    EXAMINATION:  AP PORTABLE CHEST    CLINICAL HISTORY:  Chest Pain;    TECHNIQUE:  AP portable chest radiograph was submitted.    COMPARISON:  12/01/2019    FINDINGS:  AP portable chest radiograph demonstrates a cardiac silhouette within normal limits.  There is no focal consolidation, pneumothorax, or pleural effusion.  The bones are diffusely osteopenic.                                 Medical Decision Making:   History:   Old Medical Records: I decided to obtain old medical records.  Initial Assessment:   Patient presents with wheezing and shortness of breath that started yesterday.  Patient has  history of COPD.  exam consistent with COPD exacerbation.  Oxygen saturation 98% on room air.  Patient no acute distress. Will treat with nebulization and corticosteroids.  Will reassess.  Will check labs and chest x-ray.  Differential Diagnosis:   COPD exacerbation, pneumonia, CHF  Clinical Tests:   Lab Tests: Ordered and Reviewed  The following lab test(s) were unremarkable: CBC, CMP and BNP  Radiological Study: Ordered and Reviewed  Medical Tests: Ordered and Reviewed  ED Management:  2330:  Symptoms improved.  Repeat pulmonary exam clear.  Patient feeling better.  Oxygen saturation on percent room air.  Patient in no distress. No evidence ischemia on EKG.  Lab work is unremarkable. No evidence infiltrates on chest x-ray.  Will treat for mild COPD exacerbation as an outpatient.            Scribe Attestation:   Scribe #1: I performed the above scribed service and the documentation accurately describes the services I performed. I attest to the accuracy of the note.                          Clinical Impression:       ICD-10-CM ICD-9-CM   1. COPD exacerbation J44.1 491.21   2. SOB (shortness of breath) R06.02 786.05         Disposition:   Disposition: Discharged  Condition: Stable      I, Deon Lemon MD, personally performed the services described in this documentation. All medical record entries made by the scribe were at my direction and in my presence.  I have reviewed the chart and agree that the record reflects my personal performance and is accurate and complete                    Deon Lemon MD  12/14/19 5753

## 2019-12-15 NOTE — ED NOTES
Pt reports that he is feeling much better and can breath much easier after receiving medications and breathing treatment; pt respirations even and unlabored; pt denies any complaints; no distress noted; pt aware of discharge status

## 2019-12-15 NOTE — ED TRIAGE NOTES
Pt reports to ED via personal transportation with c/o SOB starting last night; pt reports SOB worse when lying down and with ambulation; pt has hx of asthma & COPD but denies home oxygen use; pt reports using his prescribed inhaler and neb treatment at home with no relief; audible wheezing heard and pt is tachypnepic; pt denies any pain; pt AAOx4; wife at bedside; will continue to monitor

## 2020-01-06 ENCOUNTER — HOSPITAL ENCOUNTER (OUTPATIENT)
Facility: HOSPITAL | Age: 80
LOS: 1 days | Discharge: HOME | End: 2020-01-07
Attending: EMERGENCY MEDICINE | Admitting: EMERGENCY MEDICINE
Payer: MEDICARE

## 2020-01-06 DIAGNOSIS — J44.1 COPD WITH ACUTE EXACERBATION: ICD-10-CM

## 2020-01-06 DIAGNOSIS — R09.02 HYPOXIA: Primary | ICD-10-CM

## 2020-01-06 DIAGNOSIS — R07.9 CHEST PAIN: ICD-10-CM

## 2020-01-06 DIAGNOSIS — J96.01 ACUTE RESPIRATORY FAILURE WITH HYPOXIA AND HYPERCAPNIA: ICD-10-CM

## 2020-01-06 DIAGNOSIS — J96.02 ACUTE RESPIRATORY FAILURE WITH HYPOXIA AND HYPERCAPNIA: ICD-10-CM

## 2020-01-06 PROCEDURE — 99285 EMERGENCY DEPT VISIT HI MDM: CPT | Mod: 25

## 2020-01-06 PROCEDURE — 96365 THER/PROPH/DIAG IV INF INIT: CPT

## 2020-01-07 VITALS
TEMPERATURE: 98 F | HEART RATE: 99 BPM | WEIGHT: 164.25 LBS | OXYGEN SATURATION: 95 % | HEIGHT: 65 IN | SYSTOLIC BLOOD PRESSURE: 166 MMHG | RESPIRATION RATE: 17 BRPM | BODY MASS INDEX: 27.36 KG/M2 | DIASTOLIC BLOOD PRESSURE: 85 MMHG

## 2020-01-07 PROBLEM — R06.89 HYPERCAPNIA: Status: ACTIVE | Noted: 2020-01-07

## 2020-01-07 PROBLEM — R09.02 HYPOXIA: Status: ACTIVE | Noted: 2020-01-07

## 2020-01-07 LAB
ALBUMIN SERPL BCP-MCNC: 3.7 G/DL (ref 3.5–5.2)
ALLENS TEST: ABNORMAL
ALP SERPL-CCNC: 82 U/L (ref 55–135)
ALT SERPL W/O P-5'-P-CCNC: 22 U/L (ref 10–44)
ANION GAP SERPL CALC-SCNC: 5 MMOL/L (ref 8–16)
ANION GAP SERPL CALC-SCNC: 8 MMOL/L (ref 8–16)
AST SERPL-CCNC: 29 U/L (ref 10–40)
BASOPHILS # BLD AUTO: 0 K/UL (ref 0–0.2)
BASOPHILS # BLD AUTO: 0 K/UL (ref 0–0.2)
BASOPHILS NFR BLD: 0 % (ref 0–1.9)
BASOPHILS NFR BLD: 0 % (ref 0–1.9)
BILIRUB SERPL-MCNC: 0.4 MG/DL (ref 0.1–1)
BNP SERPL-MCNC: 45 PG/ML (ref 0–99)
BUN SERPL-MCNC: 12 MG/DL (ref 8–23)
BUN SERPL-MCNC: 13 MG/DL (ref 8–23)
CALCIUM SERPL-MCNC: 9.2 MG/DL (ref 8.7–10.5)
CALCIUM SERPL-MCNC: 9.2 MG/DL (ref 8.7–10.5)
CHLORIDE SERPL-SCNC: 103 MMOL/L (ref 95–110)
CHLORIDE SERPL-SCNC: 105 MMOL/L (ref 95–110)
CO2 SERPL-SCNC: 31 MMOL/L (ref 23–29)
CO2 SERPL-SCNC: 33 MMOL/L (ref 23–29)
CREAT SERPL-MCNC: 1.2 MG/DL (ref 0.5–1.4)
CREAT SERPL-MCNC: 1.2 MG/DL (ref 0.5–1.4)
DELSYS: ABNORMAL
DIFFERENTIAL METHOD: ABNORMAL
DIFFERENTIAL METHOD: ABNORMAL
EOSINOPHIL # BLD AUTO: 0 K/UL (ref 0–0.5)
EOSINOPHIL # BLD AUTO: 0 K/UL (ref 0–0.5)
EOSINOPHIL NFR BLD: 0 % (ref 0–8)
EOSINOPHIL NFR BLD: 0 % (ref 0–8)
ERYTHROCYTE [DISTWIDTH] IN BLOOD BY AUTOMATED COUNT: 15.6 % (ref 11.5–14.5)
ERYTHROCYTE [DISTWIDTH] IN BLOOD BY AUTOMATED COUNT: 15.7 % (ref 11.5–14.5)
EST. GFR  (AFRICAN AMERICAN): >60 ML/MIN/1.73 M^2
EST. GFR  (AFRICAN AMERICAN): >60 ML/MIN/1.73 M^2
EST. GFR  (NON AFRICAN AMERICAN): 57 ML/MIN/1.73 M^2
EST. GFR  (NON AFRICAN AMERICAN): 57 ML/MIN/1.73 M^2
ESTIMATED AVG GLUCOSE: 120 MG/DL (ref 68–131)
GLUCOSE SERPL-MCNC: 102 MG/DL (ref 70–110)
GLUCOSE SERPL-MCNC: 107 MG/DL (ref 70–110)
HBA1C MFR BLD HPLC: 5.8 % (ref 4–5.6)
HCO3 UR-SCNC: 32.3 MMOL/L (ref 24–28)
HCT VFR BLD AUTO: 38.9 % (ref 40–54)
HCT VFR BLD AUTO: 39.1 % (ref 40–54)
HGB BLD-MCNC: 11.6 G/DL (ref 14–18)
HGB BLD-MCNC: 11.8 G/DL (ref 14–18)
IMM GRANULOCYTES # BLD AUTO: 0.01 K/UL (ref 0–0.04)
IMM GRANULOCYTES # BLD AUTO: 0.01 K/UL (ref 0–0.04)
IMM GRANULOCYTES NFR BLD AUTO: 0.2 % (ref 0–0.5)
IMM GRANULOCYTES NFR BLD AUTO: 0.3 % (ref 0–0.5)
LYMPHOCYTES # BLD AUTO: 0.8 K/UL (ref 1–4.8)
LYMPHOCYTES # BLD AUTO: 1.7 K/UL (ref 1–4.8)
LYMPHOCYTES NFR BLD: 19.8 % (ref 18–48)
LYMPHOCYTES NFR BLD: 43.2 % (ref 18–48)
MCH RBC QN AUTO: 26 PG (ref 27–31)
MCH RBC QN AUTO: 26.2 PG (ref 27–31)
MCHC RBC AUTO-ENTMCNC: 29.8 G/DL (ref 32–36)
MCHC RBC AUTO-ENTMCNC: 30.2 G/DL (ref 32–36)
MCV RBC AUTO: 87 FL (ref 82–98)
MCV RBC AUTO: 87 FL (ref 82–98)
MODE: ABNORMAL
MONOCYTES # BLD AUTO: 0.2 K/UL (ref 0.3–1)
MONOCYTES # BLD AUTO: 0.5 K/UL (ref 0.3–1)
MONOCYTES NFR BLD: 13.3 % (ref 4–15)
MONOCYTES NFR BLD: 4.4 % (ref 4–15)
NEUTROPHILS # BLD AUTO: 1.7 K/UL (ref 1.8–7.7)
NEUTROPHILS # BLD AUTO: 3.1 K/UL (ref 1.8–7.7)
NEUTROPHILS NFR BLD: 43.2 % (ref 38–73)
NEUTROPHILS NFR BLD: 75.6 % (ref 38–73)
NRBC BLD-RTO: 0 /100 WBC
NRBC BLD-RTO: 0 /100 WBC
PCO2 BLDA: 57 MMHG (ref 35–45)
PH SMN: 7.36 [PH] (ref 7.35–7.45)
PLATELET # BLD AUTO: 167 K/UL (ref 150–350)
PLATELET # BLD AUTO: 184 K/UL (ref 150–350)
PMV BLD AUTO: 11.9 FL (ref 9.2–12.9)
PMV BLD AUTO: 12.1 FL (ref 9.2–12.9)
PO2 BLDA: 51 MMHG (ref 40–60)
POC BE: 5 MMOL/L
POC SATURATED O2: 83 % (ref 95–100)
POC TCO2: 34 MMOL/L (ref 24–29)
POTASSIUM SERPL-SCNC: 4.4 MMOL/L (ref 3.5–5.1)
POTASSIUM SERPL-SCNC: 4.5 MMOL/L (ref 3.5–5.1)
PROT SERPL-MCNC: 7.8 G/DL (ref 6–8.4)
RBC # BLD AUTO: 4.46 M/UL (ref 4.6–6.2)
RBC # BLD AUTO: 4.5 M/UL (ref 4.6–6.2)
SAMPLE: ABNORMAL
SITE: ABNORMAL
SODIUM SERPL-SCNC: 142 MMOL/L (ref 136–145)
SODIUM SERPL-SCNC: 143 MMOL/L (ref 136–145)
SP02: 97
TROPONIN I SERPL DL<=0.01 NG/ML-MCNC: <0.006 NG/ML (ref 0–0.03)
WBC # BLD AUTO: 3.91 K/UL (ref 3.9–12.7)
WBC # BLD AUTO: 4.09 K/UL (ref 3.9–12.7)

## 2020-01-07 PROCEDURE — 25000242 PHARM REV CODE 250 ALT 637 W/ HCPCS: Performed by: EMERGENCY MEDICINE

## 2020-01-07 PROCEDURE — 25000242 PHARM REV CODE 250 ALT 637 W/ HCPCS: Performed by: HOSPITALIST

## 2020-01-07 PROCEDURE — 96375 TX/PRO/DX INJ NEW DRUG ADDON: CPT

## 2020-01-07 PROCEDURE — 94640 AIRWAY INHALATION TREATMENT: CPT

## 2020-01-07 PROCEDURE — 85025 COMPLETE CBC W/AUTO DIFF WBC: CPT | Mod: 91

## 2020-01-07 PROCEDURE — 80053 COMPREHEN METABOLIC PANEL: CPT

## 2020-01-07 PROCEDURE — 93005 ELECTROCARDIOGRAM TRACING: CPT

## 2020-01-07 PROCEDURE — 82803 BLOOD GASES ANY COMBINATION: CPT

## 2020-01-07 PROCEDURE — G0378 HOSPITAL OBSERVATION PER HR: HCPCS

## 2020-01-07 PROCEDURE — 94761 N-INVAS EAR/PLS OXIMETRY MLT: CPT

## 2020-01-07 PROCEDURE — 99900035 HC TECH TIME PER 15 MIN (STAT)

## 2020-01-07 PROCEDURE — 63600175 PHARM REV CODE 636 W HCPCS: Performed by: EMERGENCY MEDICINE

## 2020-01-07 PROCEDURE — 83880 ASSAY OF NATRIURETIC PEPTIDE: CPT

## 2020-01-07 PROCEDURE — 36415 COLL VENOUS BLD VENIPUNCTURE: CPT

## 2020-01-07 PROCEDURE — 80048 BASIC METABOLIC PNL TOTAL CA: CPT

## 2020-01-07 PROCEDURE — 93010 ELECTROCARDIOGRAM REPORT: CPT | Mod: ,,, | Performed by: INTERNAL MEDICINE

## 2020-01-07 PROCEDURE — 93010 EKG 12-LEAD: ICD-10-PCS | Mod: ,,, | Performed by: INTERNAL MEDICINE

## 2020-01-07 PROCEDURE — 84484 ASSAY OF TROPONIN QUANT: CPT

## 2020-01-07 PROCEDURE — 25000003 PHARM REV CODE 250: Performed by: HOSPITALIST

## 2020-01-07 PROCEDURE — 83036 HEMOGLOBIN GLYCOSYLATED A1C: CPT

## 2020-01-07 PROCEDURE — 63600175 PHARM REV CODE 636 W HCPCS: Performed by: HOSPITALIST

## 2020-01-07 RX ORDER — AMOXICILLIN 250 MG
1 CAPSULE ORAL 2 TIMES DAILY
Status: DISCONTINUED | OUTPATIENT
Start: 2020-01-07 | End: 2020-01-07 | Stop reason: HOSPADM

## 2020-01-07 RX ORDER — HYDRALAZINE HYDROCHLORIDE 20 MG/ML
10 INJECTION INTRAMUSCULAR; INTRAVENOUS EVERY 8 HOURS PRN
Status: DISCONTINUED | OUTPATIENT
Start: 2020-01-07 | End: 2020-01-07 | Stop reason: HOSPADM

## 2020-01-07 RX ORDER — METHYLPREDNISOLONE SOD SUCC 125 MG
125 VIAL (EA) INJECTION EVERY 8 HOURS
Status: DISCONTINUED | OUTPATIENT
Start: 2020-01-07 | End: 2020-01-07 | Stop reason: HOSPADM

## 2020-01-07 RX ORDER — PREDNISONE 20 MG/1
40 TABLET ORAL DAILY
Qty: 8 TABLET | Refills: 0 | Status: SHIPPED | OUTPATIENT
Start: 2020-01-08 | End: 2020-01-12

## 2020-01-07 RX ORDER — ACETAMINOPHEN 325 MG/1
650 TABLET ORAL EVERY 8 HOURS PRN
Status: DISCONTINUED | OUTPATIENT
Start: 2020-01-07 | End: 2020-01-07 | Stop reason: HOSPADM

## 2020-01-07 RX ORDER — GUAIFENESIN/DEXTROMETHORPHAN 100-10MG/5
10 SYRUP ORAL EVERY 6 HOURS
Status: DISCONTINUED | OUTPATIENT
Start: 2020-01-07 | End: 2020-01-07 | Stop reason: HOSPADM

## 2020-01-07 RX ORDER — OXYCODONE HYDROCHLORIDE 5 MG/1
5 TABLET ORAL EVERY 4 HOURS PRN
Status: DISCONTINUED | OUTPATIENT
Start: 2020-01-07 | End: 2020-01-07 | Stop reason: HOSPADM

## 2020-01-07 RX ORDER — MAGNESIUM SULFATE 1 G/100ML
1 INJECTION INTRAVENOUS
Status: COMPLETED | OUTPATIENT
Start: 2020-01-07 | End: 2020-01-07

## 2020-01-07 RX ORDER — AMLODIPINE BESYLATE 5 MG/1
10 TABLET ORAL DAILY
Status: DISCONTINUED | OUTPATIENT
Start: 2020-01-07 | End: 2020-01-07 | Stop reason: HOSPADM

## 2020-01-07 RX ORDER — FLUTICASONE FUROATE AND VILANTEROL 100; 25 UG/1; UG/1
1 POWDER RESPIRATORY (INHALATION) DAILY
Status: DISCONTINUED | OUTPATIENT
Start: 2020-01-07 | End: 2020-01-07 | Stop reason: HOSPADM

## 2020-01-07 RX ORDER — FAMOTIDINE 40 MG/5ML
20 POWDER, FOR SUSPENSION ORAL DAILY
Status: DISCONTINUED | OUTPATIENT
Start: 2020-01-07 | End: 2020-01-07 | Stop reason: HOSPADM

## 2020-01-07 RX ORDER — IPRATROPIUM BROMIDE AND ALBUTEROL SULFATE 2.5; .5 MG/3ML; MG/3ML
3 SOLUTION RESPIRATORY (INHALATION)
Status: COMPLETED | OUTPATIENT
Start: 2020-01-07 | End: 2020-01-07

## 2020-01-07 RX ORDER — IPRATROPIUM BROMIDE AND ALBUTEROL SULFATE 2.5; .5 MG/3ML; MG/3ML
3 SOLUTION RESPIRATORY (INHALATION) EVERY 4 HOURS
Status: DISCONTINUED | OUTPATIENT
Start: 2020-01-07 | End: 2020-01-07 | Stop reason: HOSPADM

## 2020-01-07 RX ORDER — ONDANSETRON 2 MG/ML
4 INJECTION INTRAMUSCULAR; INTRAVENOUS EVERY 8 HOURS PRN
Status: DISCONTINUED | OUTPATIENT
Start: 2020-01-07 | End: 2020-01-07

## 2020-01-07 RX ORDER — SODIUM CHLORIDE 0.9 % (FLUSH) 0.9 %
10 SYRINGE (ML) INJECTION
Status: DISCONTINUED | OUTPATIENT
Start: 2020-01-07 | End: 2020-01-07 | Stop reason: HOSPADM

## 2020-01-07 RX ORDER — FAMOTIDINE 20 MG/1
20 TABLET, FILM COATED ORAL 2 TIMES DAILY
Status: DISCONTINUED | OUTPATIENT
Start: 2020-01-07 | End: 2020-01-07 | Stop reason: DRUGHIGH

## 2020-01-07 RX ORDER — PREDNISONE 20 MG/1
60 TABLET ORAL
Status: COMPLETED | OUTPATIENT
Start: 2020-01-07 | End: 2020-01-07

## 2020-01-07 RX ORDER — BENZONATATE 100 MG/1
200 CAPSULE ORAL 3 TIMES DAILY
Status: DISCONTINUED | OUTPATIENT
Start: 2020-01-07 | End: 2020-01-07 | Stop reason: HOSPADM

## 2020-01-07 RX ORDER — METOPROLOL SUCCINATE 25 MG/1
25 TABLET, EXTENDED RELEASE ORAL 2 TIMES DAILY
Status: DISCONTINUED | OUTPATIENT
Start: 2020-01-07 | End: 2020-01-07 | Stop reason: HOSPADM

## 2020-01-07 RX ORDER — ONDANSETRON 2 MG/ML
8 INJECTION INTRAMUSCULAR; INTRAVENOUS EVERY 8 HOURS PRN
Status: DISCONTINUED | OUTPATIENT
Start: 2020-01-07 | End: 2020-01-07 | Stop reason: HOSPADM

## 2020-01-07 RX ORDER — ASPIRIN 81 MG/1
81 TABLET ORAL DAILY
Status: DISCONTINUED | OUTPATIENT
Start: 2020-01-07 | End: 2020-01-07 | Stop reason: HOSPADM

## 2020-01-07 RX ADMIN — IPRATROPIUM BROMIDE AND ALBUTEROL SULFATE 3 ML: .5; 3 SOLUTION RESPIRATORY (INHALATION) at 04:01

## 2020-01-07 RX ADMIN — IPRATROPIUM BROMIDE AND ALBUTEROL SULFATE 3 ML: .5; 3 SOLUTION RESPIRATORY (INHALATION) at 12:01

## 2020-01-07 RX ADMIN — HYDRALAZINE HYDROCHLORIDE 10 MG: 20 INJECTION INTRAMUSCULAR; INTRAVENOUS at 12:01

## 2020-01-07 RX ADMIN — FAMOTIDINE 20 MG: 40 POWDER, FOR SUSPENSION ORAL at 08:01

## 2020-01-07 RX ADMIN — ASPIRIN 81 MG: 81 TABLET, COATED ORAL at 08:01

## 2020-01-07 RX ADMIN — BENZONATATE 200 MG: 100 CAPSULE ORAL at 08:01

## 2020-01-07 RX ADMIN — SENNOSIDES AND DOCUSATE SODIUM 1 TABLET: 8.6; 5 TABLET ORAL at 08:01

## 2020-01-07 RX ADMIN — DOXYCYCLINE 100 MG: 100 INJECTION, POWDER, LYOPHILIZED, FOR SOLUTION INTRAVENOUS at 05:01

## 2020-01-07 RX ADMIN — FLUTICASONE FUROATE AND VILANTEROL TRIFENATATE 1 PUFF: 100; 25 POWDER RESPIRATORY (INHALATION) at 07:01

## 2020-01-07 RX ADMIN — HYDRALAZINE HYDROCHLORIDE 10 MG: 20 INJECTION INTRAMUSCULAR; INTRAVENOUS at 05:01

## 2020-01-07 RX ADMIN — IPRATROPIUM BROMIDE AND ALBUTEROL SULFATE 3 ML: .5; 3 SOLUTION RESPIRATORY (INHALATION) at 11:01

## 2020-01-07 RX ADMIN — AMLODIPINE BESYLATE 10 MG: 5 TABLET ORAL at 08:01

## 2020-01-07 RX ADMIN — GUAIFENESIN AND DEXTROMETHORPHAN 10 ML: 100; 10 SYRUP ORAL at 12:01

## 2020-01-07 RX ADMIN — METHYLPREDNISOLONE SODIUM SUCCINATE 125 MG: 125 INJECTION, POWDER, FOR SOLUTION INTRAMUSCULAR; INTRAVENOUS at 05:01

## 2020-01-07 RX ADMIN — PREDNISONE 60 MG: 20 TABLET ORAL at 12:01

## 2020-01-07 RX ADMIN — MAGNESIUM SULFATE 1 G: 1 INJECTION INTRAVENOUS at 12:01

## 2020-01-07 RX ADMIN — IPRATROPIUM BROMIDE AND ALBUTEROL SULFATE 3 ML: .5; 3 SOLUTION RESPIRATORY (INHALATION) at 07:01

## 2020-01-07 RX ADMIN — METOPROLOL SUCCINATE 25 MG: 25 TABLET, EXTENDED RELEASE ORAL at 08:01

## 2020-01-07 RX ADMIN — BENZONATATE 200 MG: 100 CAPSULE ORAL at 05:01

## 2020-01-07 RX ADMIN — GUAIFENESIN AND DEXTROMETHORPHAN 10 ML: 100; 10 SYRUP ORAL at 05:01

## 2020-01-07 NOTE — ED TRIAGE NOTES
Pt presents to ED with c/o shortness of breath that started yesterday. Pt with hx of COPD and asthma. Pt used inhalers and breathing treatments at home without relief in symptoms. Denies chest pain, nausea, vomiting. He reports a productive cough with dark green sputum. Denies all other symptoms. Pt tachypneic but in no distress, wife at bedside.

## 2020-01-07 NOTE — PROGRESS NOTES
[facility] WRITTEN HEALTHCARE AND DISCHARGE INFORMATION.  Follow-up Information     Chelsy Torrez MD On 1/21/2020.    Specialty:  Endocrinology  Why:  out patient services: 10:00aAM follow up from the hospital  Contact information:  [place] [address]  [phone]                   Help at Home           [phone]  After discharge for assistance [facility] On Call Nurse Care Line 24/7 Assistance.   Things You are responsible For To Manage Your Care At Home:  1.    Getting your prescriptions filled   2.    Taking your medications as directed, DO NOT MISS ANY DOSES!  3.    Going to your follow-up doctor appointment. This is important because it  allow the doctor to monitor your progress and determine if  any changes need to made to your treatment plan.   Thank you for choosing [facility] for your care. [facility] is happy to have the opportunity to serve you.    Sincerely,  Your [facility] Team,  So Cavazos RN, BSN, [facility]  [phone]  1/7/2020

## 2020-01-07 NOTE — NURSING
Pt discharged per MD order. IV dc'd, catheter tip intact. VS stable, afebrile, NAD, no nausea/vomiting. Pt educated on discharge instructions with pt verbally indicating understanding. Pt discharged to home. Off unit via wheelchair with escort.

## 2020-01-07 NOTE — PLAN OF CARE
01/07/20 1201   Post-Acute Status   Post-Acute Authorization Other  (home)   Discharge Delays None known at this time   .So Cavazos, RN, BSN, [facility]N [facility]  1/7/2020

## 2020-01-07 NOTE — HPI
Micah Laurent Jr. is a 79 y.o. male that (in part)  has a past medical history of Asthma, Atrial fibrillation, COPD (chronic obstructive pulmonary disease), and Hypertension.  has no past surgical history on file. Presents to [facility] complaining of shortness of breath as described below in detail.     Description of symptoms    Location:  Chest/lungs  Onset:  Subacute onset with progressive worsening  Character:  Moderate to severe  Frequency:  Daily   Duration:  Constant  Associated Symptoms:  Dyspnea with exertion.  Denies fever chills.  Radiation:  Radiation throughout chest  Exacerbating factors:  Worsen with ambulation and mild exertion  Relieving factors:  Some relief given with supplemental oxygen in the emergency department         In the emergency department routine laboratory studies, chest x-ray, EKG, and ABG was obtained.  There is evidence of hypoxia and hypercapnic respiratory failure consistent with another acute COPD exacerbation.  Patient has had 2 episodes last month with similar symptoms.  He reports being prescribed inhaler but this did not provide him relief at home.  He was given nebulizer treatments, supplemental oxygen, and steroids.    Hospital medicine has been asked to admit to inpatient for further evaluation and treatment for treatment of acute on chronic COPD exacerbation.

## 2020-01-07 NOTE — CARE UPDATE
I agree with my colleague's assessment and plan at time of admission. Patient was seen and examined. Is stable, not wheezing, in no distress, breath sounds throughout and feeling much better today. Based on our conversation, it seems to me patient is not using inhalers appropriately at home. Asked respiratory to provide teaching. Patient also asked to be discharged home today. Will continue current regimen and test O2 at room air later today. If stable, ok to dc home from my standpoint. Will discuss at huddle today.

## 2020-01-07 NOTE — PLAN OF CARE
"TN introduced herself and explained the 's role. TN utilized 2 patient identifiers: Name & .  TN placed Name and spectralink number on whiteboard.TN provided and reviewed with patient "[facility] My Health Packet". TN discussed what Help At Home means to the patient and the name of the person spouse/Susanne, who helps at home. TN discussed with patient the things the patient is responsible for to HELP manage patient's  healthcare at home. TN taught Symptoms and Problems with patient for COPD .Patient verbalized understanding & teachback:1.SOB, 2.INCREASE USE OF INHALER . Patient prefers morning or after noon doctor appointments.     20 1039   Discharge Assessment   Assessment Type Discharge Planning Assessment   Confirmed/corrected address and phone number on facesheet? Yes   Assessment information obtained from? Patient   Communicated expected length of stay with patient/caregiver yes   Prior to hospitilization cognitive status: Alert/Oriented   Prior to hospitalization functional status: Independent   Current cognitive status: Alert/Oriented   Current Functional Status: Independent   Lives With spouse   Able to Return to Prior Arrangements yes   Is patient able to care for self after discharge? Yes   Who are your caregiver(s) and their phone number(s)?   (spouse, Susanne Laurent [phone])   Patient's perception of discharge disposition admitted as an inpatient   Readmission Within the Last 30 Days no previous admission in last 30 days   Patient currently being followed by outpatient case management? No   Patient currently receives any other outside agency services? No   Equipment Currently Used at Home nebulizer   Do you have any problems affording any of your prescribed medications? No   Is the patient taking medications as prescribed? yes   Does the patient have transportation home? Yes   Transportation Anticipated car, drives self;family or friend will provide   Does the patient receive " services at the [facility]? No   Discharge Plan A Home with family   Discharge Plan B Home with family   DME Needed Upon Discharge  none   Patient/Family in Agreement with Plan yes   Does the patient have transportation to healthcare appointments? Yes     ..  [id number] - [place] - [address] [address]  Phone: [phone] Fax: [phone]

## 2020-01-07 NOTE — PROGRESS NOTES
Pharmacist Renal Dose Adjustment Note    Micah Laurent Jr. is a 79 y.o. male being treated with the medication famotidine    Patient Data:    Vital Signs (Most Recent):  Temp: 98.6 °F (37 °C) (01/06/20 2342)  Pulse: 75 (01/07/20 0252)  Resp: 19 (01/07/20 0252)  BP: (!) 186/87 (01/07/20 0231)  SpO2: 95 % (01/07/20 0252)   Vital Signs (72h Range):  Temp:  [98.6 °F (37 °C)]   Pulse:  [75-97]   Resp:  [19-29]   BP: (158-186)/()   SpO2:  [87 %-98 %]      Recent Labs   Lab 01/07/20  0013   CREATININE 1.2     Serum creatinine: 1.2 mg/dL 01/07/20 0013  Estimated creatinine clearance: 42.7 mL/min    Medication: famotidine 20 mg oral BID will be changed to famotidine 20 mg oral daily    Pharmacist's Name: Yeimi Bernard  Pharmacist's Extension: 1654

## 2020-01-07 NOTE — ED PROVIDER NOTES
Encounter Date: 2020    SCRIBE #1 NOTE: I, Ricco Jorge, am scribing for, and in the presence of,  Rogerio Marquez MD. I have scribed the following portions of the note - Other sections scribed: HPI, ROS, PE.       History     Chief Complaint   Patient presents with    Shortness of Breath     pt reports SOB & cough starting last night but worsening since; pt has COPD and was in ED twice last month for flair ups; pt reports using prescribed inhaler with no relief     CC: SOB    HPI:  This is a 79 y.o. male with a PMHx of COPD and HTN who presents to the Emergency Department with a cc of SOB worse with exertion beginning 1 day ago. Pt reports Sx are similar to his COPD exacerbation. Pt reports associated chest tightness. Pt denies leg swelling or CP. Pt reports that he was previously admitted for similar Sx for 1 night, but no intubation or BiPAP. Pt is allergic to Lisinopril.             Review of patient's allergies indicates:   Allergen Reactions    Lisinopril      Past Medical History:   Diagnosis Date    Asthma     Atrial fibrillation     COPD (chronic obstructive pulmonary disease)     Hypertension      History reviewed. No pertinent surgical history.  History reviewed. No pertinent family history.  Social History     Tobacco Use    Smoking status: Former Smoker     Packs/day: 1.50     Years: 20.00     Pack years: 30.00     Last attempt to quit:      Years since quittin.0    Smokeless tobacco: Former User   Substance Use Topics    Alcohol use: No    Drug use: No     Review of Systems   Constitutional: Negative for fever.   HENT: Negative for sore throat.    Respiratory: Positive for chest tightness and shortness of breath.    Cardiovascular: Negative for chest pain and leg swelling.   Gastrointestinal: Negative for nausea.   Genitourinary: Negative for dysuria.   Musculoskeletal: Negative for back pain.   Skin: Negative for rash.   Neurological: Negative for weakness.   Hematological:  Does not bruise/bleed easily.       Physical Exam     Initial Vitals [01/06/20 2342]   BP Pulse Resp Temp SpO2   (!) 175/93 88 20 98.6 °F (37 °C) 95 %      MAP       --         Physical Exam    Nursing note and vitals reviewed.  Constitutional: He appears well-developed and well-nourished. He is not diaphoretic. No distress.   HENT:   Head: Normocephalic and atraumatic.   Mouth/Throat: Oropharynx is clear and moist.   Eyes: Conjunctivae and EOM are normal. Pupils are equal, round, and reactive to light. No scleral icterus.   Neck: Normal range of motion. Neck supple. No JVD present.   Cardiovascular: Normal rate, regular rhythm, normal heart sounds and intact distal pulses. Exam reveals no gallop and no friction rub.    No murmur heard.  Pulmonary/Chest: Breath sounds normal. No stridor. Tachypnea noted. No respiratory distress. He has no wheezes. He has no rhonchi. He has no rales.   Tight bilateral wheezing   prolonged expiratory phase  Pt is speaking in short sentences   Supraclavicular retraction   Abdominal: Soft. Bowel sounds are normal. He exhibits no distension. There is no tenderness. There is no rebound and no guarding.   Musculoskeletal: Normal range of motion. He exhibits no edema or tenderness.   Neurological: He is alert and oriented to person, place, and time. He has normal strength. No cranial nerve deficit or sensory deficit. GCS score is 15. GCS eye subscore is 4. GCS verbal subscore is 5. GCS motor subscore is 6.   Skin: Skin is warm and dry. No rash noted.   Psychiatric: He has a normal mood and affect. His behavior is normal.         ED Course   Procedures  Labs Reviewed   CBC W/ AUTO DIFFERENTIAL - Abnormal; Notable for the following components:       Result Value    RBC 4.50 (*)     Hemoglobin 11.8 (*)     Hematocrit 39.1 (*)     Mean Corpuscular Hemoglobin 26.2 (*)     Mean Corpuscular Hemoglobin Conc 30.2 (*)     RDW 15.6 (*)     Gran # (ANC) 1.7 (*)     All other components within normal  limits   COMPREHENSIVE METABOLIC PANEL - Abnormal; Notable for the following components:    CO2 33 (*)     Anion Gap 5 (*)     eGFR if non  57 (*)     All other components within normal limits   ISTAT PROCEDURE - Abnormal; Notable for the following components:    POC PCO2 57.0 (*)     POC HCO3 32.3 (*)     POC SATURATED O2 83 (*)     POC TCO2 34 (*)     All other components within normal limits   TROPONIN I   B-TYPE NATRIURETIC PEPTIDE          Imaging Results          X-Ray Chest AP Portable (Final result)  Result time 01/07/20 01:07:25    Final result by Esdras Mantilla MD (01/07/20 01:07:25)                 Impression:      No radiographic evidence of acute intrathoracic process.      Electronically signed by: Esdras Mantilla MD  Date:    01/07/2020  Time:    01:07             Narrative:    EXAMINATION:  XR CHEST AP PORTABLE    CLINICAL HISTORY:  Chest Pain;    TECHNIQUE:  Single frontal view of the chest was performed.    COMPARISON:  12/14/2019    FINDINGS:  Cardiac monitoring leads overlie the chest.  The cardiomediastinal silhouette is stable in size.  The lungs appear slightly hyperexpanded with coarse interstitial markings bilaterally, similar to prior exam.  No confluent airspace consolidation or pleural effusion identified.  There is no evidence of pneumothorax.  Osseous structures demonstrate stable degenerative changes.                                 Medical Decision Making:   Initial Assessment:   79-year-old male presenting with shortness of breath, wheezing, chest tightness.  Similar to prior COPD exacerbations.  On exam, patient tachypneic, with tight wheezing decreased air movement and speaking in shortened sentences.  Patient noted to be somewhat hypoxic, high 80s on room air.  Retractions noted diffusely.  Exam otherwise unremarkable. Patient given steroids, nebs, magnesium.  Improved symptoms, speaking in full sentences breathing freely but still hypoxic.  Patient started on  nasal cannula with good improvement.  Still with some wheezing.  BNP negative, doubt CHF.  Chest x-ray negative for pneumonia.  Patient started on azithromycin empirically.  Will admit the patient given ongoing hypoxia and new oxygen requirement.  Patient will be admitted to Hospital Medicine.  Remainder of workup wise unremarkable.            Scribe Attestation:   Scribe #1: I performed the above scribed service and the documentation accurately describes the services I performed. I attest to the accuracy of the note.                   Scribe attestation: I, Rogerio Marquez, personally performed the services described in this documentation. All medical record entries made by the scribe were at my direction and in my presence.  I have reviewed the chart and agree that the record reflects my personal performance and is accurate and complete.          Clinical Impression:       ICD-10-CM ICD-9-CM   1. Hypoxia R09.02 799.02   2. Chest pain R07.9 786.50   3. COPD with acute exacerbation J44.1 491.21   4. Acute respiratory failure with hypoxia and hypercapnia J96.01 518.81    J96.02          Disposition:   Disposition: Admitted  Condition: Stable                     Roegrio Marquez MD  01/07/20 0332

## 2020-01-07 NOTE — PLAN OF CARE
TN informed Galina,Manager of [facility] that pt is cleared for [facility] from  [facility].     01/07/20 1158   Final Note   Assessment Type Final Discharge Note   Anticipated Discharge Disposition Home   What phone number can be called within the next 1-3 days to see how you are doing after discharge?   (SEE CHRT)   Hospital Follow Up  Appt(s) scheduled? Yes   Discharge plans and expectations educations in teach back method with documentation complete? Yes   Right Care Referral Info   Referral Type NO CARE   .So Cavazos, RN, BSN, STN [facility]  1/7/2020

## 2020-01-07 NOTE — H&P
[facility] Hospital Medicine  History & Physical    Patient Name: Micah Laurent Jr.  MRN: [id number]  Admission Date: 01/07/2020  Attending Physician: Giuseppe Quintanilla MD, MPH      PCP:     Chelsy Torrez MD    CC:     Chief Complaint   Patient presents with    Shortness of Breath     pt reports SOB & cough starting last night but worsening since; pt has COPD and was in ED twice last month for flair ups; pt reports using prescribed inhaler with no relief       HISTORY OF PRESENT ILLNESS:     Micah Laurnet Jr. is a 79 y.o. male that (in part)  has a past medical history of Asthma, Atrial fibrillation, COPD (chronic obstructive pulmonary disease), and Hypertension.  has no past surgical history on file. Presents to [facility] Emergency Department complaining of shortness of breath as described below in detail.     Description of symptoms    Location:  Chest/lungs  Onset:  Subacute onset with progressive worsening  Character:  Moderate to severe  Frequency:  Daily   Duration:  Constant  Associated Symptoms:  Dyspnea with exertion.  Denies fever chills.  Radiation:  Radiation throughout chest  Exacerbating factors:  Worsen with ambulation and mild exertion  Relieving factors:  Some relief given with supplemental oxygen in the emergency department         In the emergency department routine laboratory studies, chest x-ray, EKG, and ABG was obtained.  There is evidence of hypoxia and hypercapnic respiratory failure consistent with another acute COPD exacerbation.  Patient has had 2 episodes last month with similar symptoms.  He reports being prescribed inhaler but this did not provide him relief at home.  He was given nebulizer treatments, supplemental oxygen, and steroids.    Hospital medicine has been asked to admit to inpatient for further evaluation and treatment for treatment of acute on chronic COPD exacerbation.       REVIEW OF SYSTEMS:     -- Constitutional: No  fever or chills.  -- Eyes: No visual changes, diplopia, pain, tearing, blind spots, or discharge.   -- Ears, nose, mouth, throat, and face: No congestion, sore throat, epistaxis, d/c, bleeding gums, neck stiffness masses, or dental issues.  -- Respiratory:  As above in HPI.  -- Cardiovascular:  Dyspnea with exertion.  No chest pain, syncope, PND, edema, cyanosis, or palpitations.   -- Gastrointestinal: No vomiting, abdominal pain, hematemesis, melena, dyspepsia, or change in bowel habits.  -- Genitourinary: No hematuria, dysuria, frequency, urgency, nocturia, polyuria, stones, or incontinence.  -- Integument/breast: No rash, pruritis, pigmentation changes, dryness, or changes in hair  -- Hematologic/lymphatic: No easy bruising or lymphadenopathy.   -- Musculoskeletal:  Chronic arthralgias.  No acute arthralgias, acute myalgias, joint swelling, acute limitations of ROM, or acute muscular weakness.  -- Neurological: No seizures, headaches, incoordination, paraesthesias, ataxia, vertigo, or tremors.  -- Behavioral/Psych: No auditory or visual hallucinations, depression, or suicidal/homicidal ideations.  -- Endocrine: No heat or cold intolerance, polydipsia, or unintentional weight gain / loss.  -- Allergy/Immunologic: No recurrent infections or adverse reaction to food, insects, or difficulty breathing.      PAST MEDICAL / SURGICAL HISTORY:     Past Medical History:   Diagnosis Date    Asthma     Atrial fibrillation     COPD (chronic obstructive pulmonary disease)     Hypertension      History reviewed. No pertinent surgical history.      FAMILY HISTORY:     Family history cardiovascular disease      SOCIAL HISTORY:     Social History     Socioeconomic History    Marital status:      Spouse name: Not on file    Number of children: Not on file    Years of education: Not on file    Highest education level: Not on file   Occupational History    Not on file   Social Needs    Financial resource strain: Not  on file    Food insecurity:     Worry: Not on file     Inability: Not on file    Transportation needs:     Medical: Not on file     Non-medical: Not on file   Tobacco Use    Smoking status: Former Smoker     Packs/day: 1.50     Years: 20.00     Pack years: 30.00     Last attempt to quit:      Years since quittin.0    Smokeless tobacco: Former User   Substance and Sexual Activity    Alcohol use: No    Drug use: No    Sexual activity: Never   Lifestyle    Physical activity:     Days per week: Not on file     Minutes per session: Not on file    Stress: Not on file   Relationships    Social connections:     Talks on phone: Not on file     Gets together: Not on file     Attends Ca[employer] service: Not on file     Active member of club or organization: Not on file     Attends meetings of clubs or organizations: Not on file     Relationship status: Not on file   Other Topics Concern    Not on file   Social History Narrative    Not on file    In a    From  ALLERGIES:       Review of patient's allergies indicates:   Allergen Reactions    Lisinopril          HEALTH SCREENING:     Influenza vaccine not up-to-date for this season.  Prevnar 13 pneumonia vaccine = no evidence of previous vaccination found in the medical record      HOME MEDICATIONS:     Prior to Admission medications    Medication Sig Start Date End Date Taking? Authorizing Provider   albuterol (PROVENTIL) 2.5 mg /3 mL (0.083 %) nebulizer solution Take 3mLs (2.5mg total) by nebulization TID x 4 days.  Then 3 mLs (2.5mg total) by nebulization every 6 (six) hours as needed. 19  Yes Tejinder Scott MD   albuterol (PROVENTIL/VENTOLIN HFA) 90 mcg/actuation inhaler Inhale 1-2 puffs into the lungs every 6 (six) hours as needed for Wheezing. Rescue 19  Yes Deon Lemon MD   aspirin (ECOTRIN) 81 MG EC tablet Take 81 mg by mouth once daily.   Yes Historical Provider, MD   hydrALAZINE (APRESOLINE) 50 MG tablet Take 50 mg by mouth  once daily.    Yes Historical Provider, MD   metoprolol succinate (TOPROL-XL) 25 MG 24 hr tablet Take 25 mg by mouth 2 (two) times daily.   Yes Historical Provider, MD   amLODIPine (NORVASC) 10 MG tablet Take 1 tablet (10 mg total) by mouth once daily. 5/18/18 5/18/19  CAPRICE Rand   azithromycin (Z-TERRENCE) 250 MG tablet Take 2 tablets by mouth on day 1; Take 1 tablet by mouth on days 2-5 12/14/19   Deon Lemon MD   ipratropium-albuterol (COMBIVENT)  mcg/actuation inhaler Inhale 1 puff into the lungs every 6 (six) hours as needed for Wheezing. Rescue 5/6/19   Tejinder Scott MD   methylPREDNISolone (MEDROL DOSEPACK) 4 mg tablet use as directed 12/14/19   Deon Lemon MD          PHYSICAL EXAM:     Wt Readings from Last 1 Encounters:   01/06/20 2342 72.6 kg (160 lb)     Body mass index is 26.63 kg/m².  Vitals:    01/07/20 0231 01/07/20 0252 01/07/20 0429 01/07/20 0431   BP: (!) 186/87  (!) 171/90    BP Location:   Left arm    Patient Position:   Lying    Pulse: 78 75 83 80   Resp: 20 19 18 20   Temp:   98.4 °F (36.9 °C)    TempSrc:   Oral    SpO2: 95% 95% 96% 98%   Weight:       Height:              -- General appearance: well developed. appears stated age   -- Head: normocephalic, atraumatic   -- Eyes: conjunctivae clear. Extraocular muscles intact  -- Nose:  Supplemental oxygen in place.  Nares normal. Septum midline.   -- Mouth/Throat: lips, mucosa, and tongue normal. no throat erythema.   -- Neck: supple, symmetrical, trachea midline, no JVD and thyroid not grossly enlarged, appears symmetric  -- Lungs:  Decreased breath sounds to auscultation bilaterally with prolonged expiratory phase and poor air excursion consistent long-term smoker. normal respiratory effort. No use of accessory muscles.   -- Chest wall: no tenderness. equal bilateral chest rise   -- Heart: regular rate and regular rhythm. S1, S2 normal.  no click, rub or gallop   -- Abdomen: soft, non-tender, non-distended,  non-tympanic; bowel sounds normal; no masses  -- Extremities: no cyanosis, clubbing or edema.   -- Pulses: 2+ and symmetric   -- Skin:  Turgor normal. Color normal. Texture normal. No rashes or lesions.   -- Neurologic: Normal strength and tone. No focal numbness or weakness. CNII-XII intact. [place] coma scale: eyes open spontaneously-4, oriented & converses-5, obeys commands-6.      LABORATORY STUDIES:     Recent Results (from the past 36 hour(s))   CBC auto differential    Collection Time: 01/07/20 12:13 AM   Result Value Ref Range    WBC 3.91 3.90 - 12.70 K/uL    RBC 4.50 (L) 4.60 - 6.20 M/uL    Hemoglobin 11.8 (L) 14.0 - 18.0 g/dL    Hematocrit 39.1 (L) 40.0 - 54.0 %    Mean Corpuscular Volume 87 82 - 98 fL    Mean Corpuscular Hemoglobin 26.2 (L) 27.0 - 31.0 pg    Mean Corpuscular Hemoglobin Conc 30.2 (L) 32.0 - 36.0 g/dL    RDW 15.6 (H) 11.5 - 14.5 %    Platelets 184 150 - 350 K/uL    MPV 12.1 9.2 - 12.9 fL    Immature Granulocytes 0.3 0.0 - 0.5 %    Gran # (ANC) 1.7 (L) 1.8 - 7.7 K/uL    Immature Grans (Abs) 0.01 0.00 - 0.04 K/uL    Lymph # 1.7 1.0 - 4.8 K/uL    Mono # 0.5 0.3 - 1.0 K/uL    Eos # 0.0 0.0 - 0.5 K/uL    Baso # 0.00 0.00 - 0.20 K/uL    nRBC 0 0 /100 WBC    Gran% 43.2 38.0 - 73.0 %    Lymph% 43.2 18.0 - 48.0 %    Mono% 13.3 4.0 - 15.0 %    Eosinophil% 0.0 0.0 - 8.0 %    Basophil% 0.0 0.0 - 1.9 %    Differential Method Automated    Comprehensive metabolic panel    Collection Time: 01/07/20 12:13 AM   Result Value Ref Range    Sodium 143 136 - 145 mmol/L    Potassium 4.5 3.5 - 5.1 mmol/L    Chloride 105 95 - 110 mmol/L    CO2 33 (H) 23 - 29 mmol/L    Glucose 102 70 - 110 mg/dL    BUN, Bld 13 8 - 23 mg/dL    Creatinine 1.2 0.5 - 1.4 mg/dL    Calcium 9.2 8.7 - 10.5 mg/dL    Total Protein 7.8 6.0 - 8.4 g/dL    Albumin 3.7 3.5 - 5.2 g/dL    Total Bilirubin 0.4 0.1 - 1.0 mg/dL    Alkaline Phosphatase 82 55 - 135 U/L    AST 29 10 - 40 U/L    ALT 22 10 - 44 U/L    Anion Gap 5 (L) 8 - 16 mmol/L    eGFR if  African American >60 >60 mL/min/1.73 m^2    eGFR if non African American 57 (A) >60 mL/min/1.73 m^2   Troponin I #1    Collection Time: 01/07/20 12:13 AM   Result Value Ref Range    Troponin I <0.006 0.000 - 0.026 ng/mL   B-Type natriuretic peptide (BNP)    Collection Time: 01/07/20 12:13 AM   Result Value Ref Range    BNP 45 0 - 99 pg/mL   ISTAT PROCEDURE    Collection Time: 01/07/20 12:50 AM   Result Value Ref Range    POC PH 7.361 7.35 - 7.45    POC PCO2 57.0 (H) 35 - 45 mmHg    POC PO2 51 40 - 60 mmHg    POC HCO3 32.3 (H) 24 - 28 mmol/L    POC BE 5 -2 to 2 mmol/L    POC SATURATED O2 83 (L) 95 - 100 %    POC TCO2 34 (H) 24 - 29 mmol/L    Sample VENOUS     Site Other     Allens Test N/A     DelSys Room Air     Mode SPONT     Sp02 97        Lab Results   Component Value Date    INR 1.0 10/02/2019    INR 1.0 05/05/2019    INR 1.0 08/19/2017     Lab Results   Component Value Date    HGBA1C 5.0 02/12/2016     No results for input(s): POCTGLUCOSE in the last 72 hours.        IMAGING:     Imaging Results          X-Ray Chest AP Portable (Final result)  Result time 01/07/20 01:07:25    Final result by Esdras Mantilla MD (01/07/20 01:07:25)                 Impression:      No radiographic evidence of acute intrathoracic process.      Electronically signed by: Esdras Mantilla MD  Date:    01/07/2020  Time:    01:07             Narrative:    EXAMINATION:  XR CHEST AP PORTABLE    CLINICAL HISTORY:  Chest Pain;    TECHNIQUE:  Single frontal view of the chest was performed.    COMPARISON:  12/14/2019    FINDINGS:  Cardiac monitoring leads overlie the chest.  The cardiomediastinal silhouette is stable in size.  The lungs appear slightly hyperexpanded with coarse interstitial markings bilaterally, similar to prior exam.  No confluent airspace consolidation or pleural effusion identified.  There is no evidence of pneumothorax.  Osseous structures demonstrate stable degenerative changes.                                   CONSULTS:     IP CONSULT TO SOCIAL WORK/CASE MANAGEMENT       ASSESSMENT & PLAN:     Primary Diagnosis:  COPD with acute exacerbation    Active Hospital Problems    Diagnosis  POA    *COPD with acute exacerbation [J44.1]  Yes     Priority: 1 - High    Hypoxia [R09.02]  Yes     Priority: 2     Hypercapnia [R06.89]  Yes     Priority: 2     Anemia of chronic disease [D63.8]  Yes    Essential hypertension [I10]  Yes    Atrial fibrillation [I48.91]  Yes    Asthma [J45.909]  Yes      Resolved Hospital Problems   No resolved problems to display.     Acute COPD exacerbation with hypoxemia and hypercapnia  · As evidence by history, physical exam, and imaging  · .  No evidence of pneumonia on chest x-ray  · To recent recurrent episodes of COPD exacerbations  · Scheduled nebulizer treatments (albuterol/atrovent)  · Empiric doxycycline  · Initiate Solumedrol 125mg IV q8, then taper as clinical course improves; convert to PO taper as outpatient  · PPI or H2 blocker concomitantly with steroids  · Titrate O2 sats between 88 to 93%.  No supplemental 02 for 02 saturation greater than 93% due to V/Q mismatch  · Breo, or similar, while inpatient  · Add budesonide/formoterol or salmeterol/fluticasone propionate - at or before discharge - (Advair 500/50mg, Spiriva 18mcg, or Daliresp 500mcg)   · Mucolytics  · Consult pulmonology for further optimization  · Former smoker.  Encouraged continued tobacco cessation     Essential Hypertension  · Goal while inpatient is a systolic blood pressure less than 160mmHg  · BP in acceptable range at this time  · Continue current home regimen with hold parameters  · PRN antihypertensives available    Anemia of chronic disease  · The patient's H/H is stable and consistent with previous laboratory measurements.  · The patient exhibits no signs or symptoms of acute bleeding.  · There is no indication for transfusion.  · Will continue to monitor.    Chronic Atrial fibrillation  · Currently  rate controlled  · Maintain with beta-blocker with hold parameters  · Monitor on telemetry  · Maintain magnesium around 2.0  · Maintain potassium around 4.0        VTE Risk Mitigation (From admission, onward)         Ordered     IP VTE HIGH RISK PATIENT  Once      01/07/20 0338     Place BEATRIZ hose  Until discontinued      01/07/20 0338     Place sequential compression device  Until discontinued      01/07/20 0338                  Adult PRN medications available   DVT prophylaxis given       DISPOSITION:     Will admit to the Hospital Medicine service for further evaluation and treatment.    Chart reviewed and updated where applicable.    High Risk Conditions:  Patient has a condition that poses threat to life and bodily function: Severe Respiratory Distress      ===============================================================    Giuseppe Quintanilla MD, MPH  Department of Hospital Medicine   [facility]  [phone] pg  (7pm - 6am)          This note is dictated using Resultly voice recognition software.  There are word recognition mistakes that are occasionally missed on review.

## 2020-01-08 NOTE — HOSPITAL COURSE
Mr. Laurent presented with COPD exacerbation.  Was hypoxic at room air while in the emergency department.  He was initiated on nebulized treatment, supplemental O2 and steroids.  Patient quickly improved over the next 24 hr and was able to wean off supplemental O2 . Patient ambulated down the hallway and in room at room air and remained stable.  Requested discharge. I did notice the patient was not using his maintenance inhaler appropriately at home.  Respiratory therapy tot patient how to use it appropriately.  I believe this is the reason why patient experienced exacerbation.  Discharged on 4 days of prednisone to complete a total 5 days of steroids and to continue home bronchodilator therapy.  Follow-up with PCP.  Activity as tolerated.  Cardiac diet.

## 2020-01-08 NOTE — DISCHARGE SUMMARY
[facility] Hospital Medicine  Discharge Summary      Patient Name: Micah Laurent Jr.  MRN: [id number]  Admission Date: 1/6/2020  Hospital Length of Stay: 1 days  Discharge Date and Time:  01/07/2020 4:31 PM  Attending Physician: Ruthann att. providers found   Discharging Provider: Nayely Ellison MD  Primary Care Provider: Chelsy Torrez MD      HPI:     Micah Laurent Jr. is a 79 y.o. male that (in part)  has a past medical history of Asthma, Atrial fibrillation, COPD (chronic obstructive pulmonary disease), and Hypertension.  has no past surgical history on file. Presents to [facility] Emergency Department complaining of shortness of breath as described below in detail.     Description of symptoms    Location:  Chest/lungs  Onset:  Subacute onset with progressive worsening  Character:  Moderate to severe  Frequency:  Daily   Duration:  Constant  Associated Symptoms:  Dyspnea with exertion.  Denies fever chills.  Radiation:  Radiation throughout chest  Exacerbating factors:  Worsen with ambulation and mild exertion  Relieving factors:  Some relief given with supplemental oxygen in the emergency department         In the emergency department routine laboratory studies, chest x-ray, EKG, and ABG was obtained.  There is evidence of hypoxia and hypercapnic respiratory failure consistent with another acute COPD exacerbation.  Patient has had 2 episodes last month with similar symptoms.  He reports being prescribed inhaler but this did not provide him relief at home.  He was given nebulizer treatments, supplemental oxygen, and steroids.    Hospital medicine has been asked to admit to inpatient for further evaluation and treatment for treatment of acute on chronic COPD exacerbation.     * No surgery found *      Hospital Course:   Mr. Laurent presented with COPD exacerbation.  Was hypoxic at room air while in the emergency department.  He was initiated on nebulized treatment,  supplemental O2 and steroids.  Patient quickly improved over the next 24 hr and was able to wean off supplemental O2 . Patient ambulated down the hallway and in room at room air and remained stable.  Requested discharge. I did notice the patient was not using his maintenance inhaler appropriately at home.  Respiratory therapy tot patient how to use it appropriately.  I believe this is the reason why patient experienced exacerbation.  Discharged on 4 days of prednisone to complete a total 5 days of steroids and to continue home bronchodilator therapy.  Follow-up with PCP.  Activity as tolerated.  Cardiac diet.     Final Active Diagnoses:    Diagnosis Date Noted POA    PRINCIPAL PROBLEM:  COPD with acute exacerbation [J44.1] 08/28/2019 Yes    Hypoxia [R09.02] 01/07/2020 Yes    Hypercapnia [R06.89] 01/07/2020 Yes    Anemia of chronic disease [D63.8] 09/18/2019 Yes    Essential hypertension [I10]  Yes    Atrial fibrillation [I48.91]  Yes    Asthma [J45.909] 03/07/2014 Yes      Problems Resolved During this Admission:       Discharged Condition: good    Disposition: Home or Self Care    Follow Up:  Follow-up Information     Chelsy Torrez MD On 1/21/2020.    Specialty:  Endocrinology  Why:  out patient services: 10:00aAM follow up from the hospital  Contact information:  [address] [place] [address] [id number] [phone]                  Medications:  Reconciled Home Medications:      Medication List      START taking these medications    predniSONE 20 MG tablet  Commonly known as:  DELTASONE  Take 2 tablets (40 mg total) by mouth once daily. for 4 days        CONTINUE taking these medications    * albuterol 2.5 mg /3 mL (0.083 %) nebulizer solution  Commonly known as:  PROVENTIL  Take 3mLs (2.5mg total) by nebulization TID x 4 days.  Then 3 mLs (2.5mg total) by nebulization every 6 (six) hours as needed.     * albuterol 90 mcg/actuation inhaler  Commonly known as:  PROVENTIL/VENTOLIN HFA  Inhale 1-2 puffs  into the lungs every 6 (six) hours as needed for Wheezing. Rescue     amLODIPine 10 MG tablet  Commonly known as:  NORVASC  Take 1 tablet (10 mg total) by mouth once daily.     aspirin 81 MG EC tablet  Commonly known as:  ECOTRIN  Take 81 mg by mouth once daily.     azithromycin 250 MG tablet  Commonly known as:  Z-TERRENCE  Take 2 tablets by mouth on day 1; Take 1 tablet by mouth on days 2-5     hydrALAZINE 50 MG tablet  Commonly known as:  APRESOLINE  Take 50 mg by mouth once daily.     ipratropium-albuterol  mcg/actuation inhaler  Commonly known as:  COMBIVENT  Inhale 1 puff into the lungs every 6 (six) hours as needed for Wheezing. Rescue     metoprolol succinate 25 MG 24 hr tablet  Commonly known as:  TOPROL-XL  Take 25 mg by mouth 2 (two) times daily.         * This list has 2 medication(s) that are the same as other medications prescribed for you. Read the directions carefully, and ask your doctor or other care provider to review them with you.            STOP taking these medications    methylPREDNISolone 4 mg tablet  Commonly known as:  MEDROL DOSEPACK            Indwelling Lines/Drains at time of discharge:   Lines/Drains/Airways     None                 Time spent on the discharge of patient: > 35 minutes  Patient was seen and examined on the date of discharge and determined to be suitable for discharge.         Nayely Ellison MD  Department of Hospital Medicine  [facility]

## 2020-02-16 ENCOUNTER — HOSPITAL ENCOUNTER (EMERGENCY)
Facility: HOSPITAL | Age: 80
Discharge: HOME | End: 2020-02-17
Attending: EMERGENCY MEDICINE
Payer: MEDICARE

## 2020-02-16 DIAGNOSIS — J44.1 COPD EXACERBATION: Primary | ICD-10-CM

## 2020-02-16 DIAGNOSIS — R07.9 CHEST PAIN: ICD-10-CM

## 2020-02-16 DIAGNOSIS — R06.02 SOB (SHORTNESS OF BREATH): ICD-10-CM

## 2020-02-16 LAB
ALBUMIN SERPL BCP-MCNC: 3.6 G/DL (ref 3.5–5.2)
ALP SERPL-CCNC: 95 U/L (ref 55–135)
ALT SERPL W/O P-5'-P-CCNC: 31 U/L (ref 10–44)
ANION GAP SERPL CALC-SCNC: 11 MMOL/L (ref 8–16)
AST SERPL-CCNC: 36 U/L (ref 10–40)
BASOPHILS # BLD AUTO: 0 K/UL (ref 0–0.2)
BASOPHILS NFR BLD: 0 % (ref 0–1.9)
BILIRUB SERPL-MCNC: 0.3 MG/DL (ref 0.1–1)
BNP SERPL-MCNC: 80 PG/ML (ref 0–99)
BUN SERPL-MCNC: 15 MG/DL (ref 8–23)
CALCIUM SERPL-MCNC: 9.4 MG/DL (ref 8.7–10.5)
CHLORIDE SERPL-SCNC: 104 MMOL/L (ref 95–110)
CO2 SERPL-SCNC: 28 MMOL/L (ref 23–29)
CREAT SERPL-MCNC: 1.5 MG/DL (ref 0.5–1.4)
DIFFERENTIAL METHOD: ABNORMAL
EOSINOPHIL # BLD AUTO: 0 K/UL (ref 0–0.5)
EOSINOPHIL NFR BLD: 0 % (ref 0–8)
ERYTHROCYTE [DISTWIDTH] IN BLOOD BY AUTOMATED COUNT: 14.4 % (ref 11.5–14.5)
EST. GFR  (AFRICAN AMERICAN): 50 ML/MIN/1.73 M^2
EST. GFR  (NON AFRICAN AMERICAN): 43 ML/MIN/1.73 M^2
GLUCOSE SERPL-MCNC: 97 MG/DL (ref 70–110)
HCT VFR BLD AUTO: 37.4 % (ref 40–54)
HGB BLD-MCNC: 11.5 G/DL (ref 14–18)
IMM GRANULOCYTES # BLD AUTO: 0.01 K/UL (ref 0–0.04)
IMM GRANULOCYTES NFR BLD AUTO: 0.2 % (ref 0–0.5)
LYMPHOCYTES # BLD AUTO: 1.4 K/UL (ref 1–4.8)
LYMPHOCYTES NFR BLD: 29.8 % (ref 18–48)
MCH RBC QN AUTO: 26.3 PG (ref 27–31)
MCHC RBC AUTO-ENTMCNC: 30.7 G/DL (ref 32–36)
MCV RBC AUTO: 86 FL (ref 82–98)
MONOCYTES # BLD AUTO: 0.6 K/UL (ref 0.3–1)
MONOCYTES NFR BLD: 13.4 % (ref 4–15)
NEUTROPHILS # BLD AUTO: 2.7 K/UL (ref 1.8–7.7)
NEUTROPHILS NFR BLD: 56.6 % (ref 38–73)
NRBC BLD-RTO: 0 /100 WBC
PLATELET # BLD AUTO: 180 K/UL (ref 150–350)
PMV BLD AUTO: 12.5 FL (ref 9.2–12.9)
POTASSIUM SERPL-SCNC: 4 MMOL/L (ref 3.5–5.1)
PROT SERPL-MCNC: 8.2 G/DL (ref 6–8.4)
RBC # BLD AUTO: 4.37 M/UL (ref 4.6–6.2)
SODIUM SERPL-SCNC: 143 MMOL/L (ref 136–145)
TROPONIN I SERPL DL<=0.01 NG/ML-MCNC: <0.006 NG/ML (ref 0–0.03)
WBC # BLD AUTO: 4.76 K/UL (ref 3.9–12.7)

## 2020-02-16 PROCEDURE — 93010 ELECTROCARDIOGRAM REPORT: CPT | Mod: ,,, | Performed by: INTERNAL MEDICINE

## 2020-02-16 PROCEDURE — 99285 EMERGENCY DEPT VISIT HI MDM: CPT | Mod: 25

## 2020-02-16 PROCEDURE — 85025 COMPLETE CBC W/AUTO DIFF WBC: CPT

## 2020-02-16 PROCEDURE — 94761 N-INVAS EAR/PLS OXIMETRY MLT: CPT

## 2020-02-16 PROCEDURE — 25000242 PHARM REV CODE 250 ALT 637 W/ HCPCS: Performed by: EMERGENCY MEDICINE

## 2020-02-16 PROCEDURE — 83880 ASSAY OF NATRIURETIC PEPTIDE: CPT

## 2020-02-16 PROCEDURE — 96375 TX/PRO/DX INJ NEW DRUG ADDON: CPT

## 2020-02-16 PROCEDURE — 80053 COMPREHEN METABOLIC PANEL: CPT

## 2020-02-16 PROCEDURE — 93005 ELECTROCARDIOGRAM TRACING: CPT

## 2020-02-16 PROCEDURE — 84484 ASSAY OF TROPONIN QUANT: CPT

## 2020-02-16 PROCEDURE — 93010 EKG 12-LEAD: ICD-10-PCS | Mod: ,,, | Performed by: INTERNAL MEDICINE

## 2020-02-16 PROCEDURE — 96374 THER/PROPH/DIAG INJ IV PUSH: CPT

## 2020-02-16 PROCEDURE — 94640 AIRWAY INHALATION TREATMENT: CPT

## 2020-02-16 PROCEDURE — 25000003 PHARM REV CODE 250: Performed by: EMERGENCY MEDICINE

## 2020-02-16 PROCEDURE — 63600175 PHARM REV CODE 636 W HCPCS: Performed by: EMERGENCY MEDICINE

## 2020-02-16 RX ORDER — METHYLPREDNISOLONE SOD SUCC 125 MG
125 VIAL (EA) INJECTION
Status: COMPLETED | OUTPATIENT
Start: 2020-02-16 | End: 2020-02-16

## 2020-02-16 RX ORDER — IPRATROPIUM BROMIDE AND ALBUTEROL SULFATE 2.5; .5 MG/3ML; MG/3ML
3 SOLUTION RESPIRATORY (INHALATION)
Status: COMPLETED | OUTPATIENT
Start: 2020-02-16 | End: 2020-02-16

## 2020-02-16 RX ORDER — ASPIRIN 325 MG
325 TABLET ORAL
Status: COMPLETED | OUTPATIENT
Start: 2020-02-16 | End: 2020-02-16

## 2020-02-16 RX ADMIN — IPRATROPIUM BROMIDE AND ALBUTEROL SULFATE 3 ML: .5; 3 SOLUTION RESPIRATORY (INHALATION) at 10:02

## 2020-02-16 RX ADMIN — ASPIRIN 325 MG ORAL TABLET 325 MG: 325 PILL ORAL at 10:02

## 2020-02-16 RX ADMIN — METHYLPREDNISOLONE SODIUM SUCCINATE 125 MG: 125 INJECTION, POWDER, FOR SOLUTION INTRAMUSCULAR; INTRAVENOUS at 10:02

## 2020-02-17 VITALS
DIASTOLIC BLOOD PRESSURE: 95 MMHG | HEART RATE: 94 BPM | OXYGEN SATURATION: 98 % | WEIGHT: 160 LBS | SYSTOLIC BLOOD PRESSURE: 170 MMHG | HEIGHT: 65 IN | RESPIRATION RATE: 22 BRPM | BODY MASS INDEX: 26.66 KG/M2 | TEMPERATURE: 98 F

## 2020-02-17 PROCEDURE — 63600175 PHARM REV CODE 636 W HCPCS: Performed by: EMERGENCY MEDICINE

## 2020-02-17 RX ORDER — HYDRALAZINE HYDROCHLORIDE 50 MG/1
50 TABLET, FILM COATED ORAL EVERY 12 HOURS
Qty: 60 TABLET | Refills: 11 | Status: ON HOLD | OUTPATIENT
Start: 2020-02-17 | End: 2020-11-12 | Stop reason: SDUPTHER

## 2020-02-17 RX ORDER — PREDNISONE 50 MG/1
50 TABLET ORAL DAILY
Qty: 5 TABLET | Refills: 0 | Status: SHIPPED | OUTPATIENT
Start: 2020-02-17 | End: 2020-02-22

## 2020-02-17 RX ORDER — HYDRALAZINE HYDROCHLORIDE 20 MG/ML
20 INJECTION INTRAMUSCULAR; INTRAVENOUS
Status: COMPLETED | OUTPATIENT
Start: 2020-02-17 | End: 2020-02-17

## 2020-02-17 RX ADMIN — HYDRALAZINE HYDROCHLORIDE 20 MG: 20 INJECTION INTRAMUSCULAR; INTRAVENOUS at 12:02

## 2020-02-17 NOTE — ED PROVIDER NOTES
Encounter Date: 2020    SCRIBE #1 NOTE: I, Patricia Moon, am scribing for, and in the presence of,  Anthony Winters. I have scribed the entire note.       History     Chief Complaint   Patient presents with    Shortness of Breath     Patient reports SOB since yesterday night. patient reports taking nebulizer treatments and Combivent 30 mins prior to arrival with no relief. Patient reports midsternal chest pain rates pain 5/10 described as tightness.      CC: Shortness of breath    HPI: This is an 80 y/o male with a PMHx of Asthma, Hypertension, COPD, and AFib, presenting to the ED with a cc of shortness of breath that began last night. Patient also experiences wheezing, and a cough that radiates pain to his head. Pt denies having a fever or chest pain. He also denies any pacemakers, stents, or defibrillators. Pt is not on Oxygen at home. Patient reports using a breathing Tx, with no relief. Patient states being compliant with his night dose of Hypertension medication 5 hours ago.    The history is provided by the patient.     Review of patient's allergies indicates:   Allergen Reactions    Lisinopril      Past Medical History:   Diagnosis Date    Asthma     Atrial fibrillation     COPD (chronic obstructive pulmonary disease)     Hypertension      History reviewed. No pertinent surgical history.  History reviewed. No pertinent family history.  Social History     Tobacco Use    Smoking status: Former Smoker     Packs/day: 1.50     Years: 20.00     Pack years: 30.00     Last attempt to quit:      Years since quittin.1    Smokeless tobacco: Former User   Substance Use Topics    Alcohol use: No    Drug use: No     Review of Systems   Constitutional: Negative for chills and fever.   HENT: Negative for congestion, postnasal drip, rhinorrhea, sinus pressure and sore throat.    Eyes: Negative for pain and redness.   Respiratory: Positive for cough, shortness of breath and wheezing.     Cardiovascular: Negative for chest pain.   Gastrointestinal: Negative for abdominal distention, abdominal pain, blood in stool, constipation, diarrhea, nausea and vomiting.   Endocrine: Negative for cold intolerance and heat intolerance.   Genitourinary: Negative for dysuria, flank pain, hematuria and urgency.   Musculoskeletal: Negative for arthralgias, back pain and myalgias.        +pain in back of head secondary to cough.   Skin: Negative for rash and wound.   Allergic/Immunologic: Negative for immunocompromised state.   Neurological: Negative for weakness, light-headedness and headaches.   Hematological: Does not bruise/bleed easily.   Psychiatric/Behavioral: Negative for agitation, behavioral problems, confusion and hallucinations. The patient is not nervous/anxious.        Physical Exam     Initial Vitals [02/16/20 2202]   BP Pulse Resp Temp SpO2   (!) 193/110 100 (!) 24 98.3 °F (36.8 °C) (!) 88 %      MAP       --         Physical Exam    Nursing note and vitals reviewed.  Constitutional: He appears well-developed and well-nourished. He is not diaphoretic. No distress.   HENT:   Head: Normocephalic and atraumatic.   Right Ear: External ear normal.   Left Ear: External ear normal.   Mouth/Throat: Oropharynx is clear and moist.   Eyes: Conjunctivae and EOM are normal. Pupils are equal, round, and reactive to light. Right eye exhibits no discharge. Left eye exhibits no discharge. No scleral icterus.   Neck: Normal range of motion. No tracheal deviation present. No JVD present.   Cardiovascular: Normal rate, regular rhythm, normal heart sounds and intact distal pulses. Exam reveals no gallop and no friction rub.    No murmur heard.  Pulmonary/Chest: No stridor. No respiratory distress. He has wheezes (minimal). He has no rhonchi. He has no rales. He exhibits no tenderness.   Abdominal: Soft. He exhibits no distension. There is no tenderness. There is no rebound and no guarding.   Musculoskeletal: Normal range  of motion. He exhibits no edema or tenderness.   Neurological: He is alert and oriented to person, place, and time. He has normal strength. GCS score is 15. GCS eye subscore is 4. GCS verbal subscore is 5. GCS motor subscore is 6.   CURT with NGND's   Skin: Skin is warm and dry. Capillary refill takes less than 2 seconds. No rash noted. No erythema.   Psychiatric: He has a normal mood and affect. His behavior is normal. Judgment and thought content normal.         ED Course   Procedures  Labs Reviewed   CBC W/ AUTO DIFFERENTIAL - Abnormal; Notable for the following components:       Result Value    RBC 4.37 (*)     Hemoglobin 11.5 (*)     Hematocrit 37.4 (*)     Mean Corpuscular Hemoglobin 26.3 (*)     Mean Corpuscular Hemoglobin Conc 30.7 (*)     All other components within normal limits   COMPREHENSIVE METABOLIC PANEL - Abnormal; Notable for the following components:    Creatinine 1.5 (*)     eGFR if  50 (*)     eGFR if non  43 (*)     All other components within normal limits   TROPONIN I   B-TYPE NATRIURETIC PEPTIDE   TROPONIN I     EKG Readings: (Independently Interpreted)   Initial Reading: No STEMI. Rhythm: Normal Sinus Rhythm. Heart Rate: 94. Ectopy: No Ectopy. Conduction: Normal. ST Segments: Normal ST Segments. T Waves: Normal. Axis: Normal. Clinical Impression: Normal Sinus Rhythm       Imaging Results          X-Ray Chest AP Portable (Final result)  Result time 02/16/20 23:11:36    Final result by Medardo Zarate MD (02/16/20 23:11:36)                 Impression:      No acute abnormality.      Electronically signed by: Medardo Zarate  Date:    02/16/2020  Time:    23:11             Narrative:    EXAMINATION:  XR CHEST AP PORTABLE    CLINICAL HISTORY:  Chest Pain;    TECHNIQUE:  Single frontal view of the chest was performed.    COMPARISON:  01/07/2020    FINDINGS:  The lungs are clear, with normal appearance of pulmonary vasculature and no pleural effusion or  pneumothorax.    The cardiac silhouette is normal in size. The hilar and mediastinal contours are unremarkable.    Bones are intact.                                 Medical Decision Making:   History:   Old Medical Records: I decided to obtain old medical records.  Initial Assessment:   This is an 78 y/o male with a PMHx of Asthma, COPD, and AFib, presenting to the ED with a cc of shortness of breath that began last night. Labs, EKG, and Chest Xray will be ordered. Patient will be treated for symptoms and reassessed.  Differential Diagnosis:   Differential Diagnosis includes, but is not limited to:  PE, MI/ACS, pneumothorax, pericardial effusion/tamonade, pneumonia, lung abscess, pericarditis/myocarditis, pleural effusion, lung mass, CHF exacerbation, asthma exacerbation, COPD exacerbation, aspirated/ingested foreign body, airway obstruction, CO poisoning, anemia, metabolic derangement, allergy/atopy, influenza, viral URI, viral syndrome.    Clinical Tests:   Lab Tests: Ordered and Reviewed  Radiological Study: Ordered and Reviewed  Medical Tests: Ordered and Reviewed            Scribe Attestation:   Scribe #1: I performed the above scribed service and the documentation accurately describes the services I performed. I attest to the accuracy of the note.    Pt arrived alert, afebrile, non-toxic in appearance, in no acute respiratory distress with VSS.  Sx's improved with nebs.  Pt comfortable at rest on room air and SpO2 did not drop below 88 to 90% with prolonged ambulation.  EKG revealed no acute findings concerning for ischemia, arrhythmia, heart block or any pathology to warrant cardiology consultation.  Labs showed no acute findings.  Pt discharged and counseled on the need to return to the nearest emergency room if they experience any other concerning symptoms.  Pt counseled to F/U outpatient with a PCP over the next two to three days.    Anthony Winters MD                            Clinical Impression:        ICD-10-CM ICD-9-CM   1. COPD exacerbation J44.1 491.21   2. Chest pain R07.9 786.50   3. SOB (shortness of breath) R06.02 786.05            I, Anthony Winters, personally performed the services described in this documentation. All medical record entries made by the scribe were at my direction and in my presence.  I have reviewed the chart and agree that the record reflects my personal performance and is accurate and complete.                     Anthony Winters MD  02/17/20 0127       Anthony Winters MD  02/17/20 0326

## 2020-03-02 ENCOUNTER — HOSPITAL ENCOUNTER (EMERGENCY)
Facility: HOSPITAL | Age: 80
Discharge: HOME | End: 2020-03-02
Attending: EMERGENCY MEDICINE
Payer: MEDICARE

## 2020-03-02 VITALS
WEIGHT: 160 LBS | TEMPERATURE: 98 F | BODY MASS INDEX: 26.66 KG/M2 | HEART RATE: 84 BPM | HEIGHT: 65 IN | RESPIRATION RATE: 20 BRPM | DIASTOLIC BLOOD PRESSURE: 92 MMHG | OXYGEN SATURATION: 98 % | SYSTOLIC BLOOD PRESSURE: 149 MMHG

## 2020-03-02 DIAGNOSIS — J44.1 COPD EXACERBATION: ICD-10-CM

## 2020-03-02 DIAGNOSIS — R06.2 WHEEZING: ICD-10-CM

## 2020-03-02 DIAGNOSIS — R03.0 ELEVATED BLOOD PRESSURE READING: ICD-10-CM

## 2020-03-02 DIAGNOSIS — R06.02 SHORTNESS OF BREATH: Primary | ICD-10-CM

## 2020-03-02 DIAGNOSIS — R07.89 CHEST PRESSURE: ICD-10-CM

## 2020-03-02 LAB
ALBUMIN SERPL BCP-MCNC: 3.5 G/DL (ref 3.5–5.2)
ALP SERPL-CCNC: 75 U/L (ref 55–135)
ALT SERPL W/O P-5'-P-CCNC: 20 U/L (ref 10–44)
ANION GAP SERPL CALC-SCNC: 10 MMOL/L (ref 8–16)
AST SERPL-CCNC: 28 U/L (ref 10–40)
BASOPHILS # BLD AUTO: 0 K/UL (ref 0–0.2)
BASOPHILS NFR BLD: 0 % (ref 0–1.9)
BILIRUB SERPL-MCNC: 0.3 MG/DL (ref 0.1–1)
BILIRUB UR QL STRIP: NEGATIVE
BNP SERPL-MCNC: 54 PG/ML (ref 0–99)
BUN SERPL-MCNC: 17 MG/DL (ref 8–23)
CALCIUM SERPL-MCNC: 8.9 MG/DL (ref 8.7–10.5)
CHLORIDE SERPL-SCNC: 103 MMOL/L (ref 95–110)
CLARITY UR: CLEAR
CO2 SERPL-SCNC: 26 MMOL/L (ref 23–29)
COLOR UR: NORMAL
CREAT SERPL-MCNC: 1.3 MG/DL (ref 0.5–1.4)
DIFFERENTIAL METHOD: ABNORMAL
EOSINOPHIL # BLD AUTO: 0 K/UL (ref 0–0.5)
EOSINOPHIL NFR BLD: 0 % (ref 0–8)
ERYTHROCYTE [DISTWIDTH] IN BLOOD BY AUTOMATED COUNT: 14.1 % (ref 11.5–14.5)
EST. GFR  (AFRICAN AMERICAN): 60 ML/MIN/1.73 M^2
EST. GFR  (NON AFRICAN AMERICAN): 52 ML/MIN/1.73 M^2
GLUCOSE SERPL-MCNC: 116 MG/DL (ref 70–110)
GLUCOSE UR QL STRIP: NEGATIVE
HCT VFR BLD AUTO: 39.5 % (ref 40–54)
HGB BLD-MCNC: 12.3 G/DL (ref 14–18)
HGB UR QL STRIP: NEGATIVE
IMM GRANULOCYTES # BLD AUTO: 0.01 K/UL (ref 0–0.04)
IMM GRANULOCYTES NFR BLD AUTO: 0.2 % (ref 0–0.5)
KETONES UR QL STRIP: NEGATIVE
LEUKOCYTE ESTERASE UR QL STRIP: NEGATIVE
LYMPHOCYTES # BLD AUTO: 0.9 K/UL (ref 1–4.8)
LYMPHOCYTES NFR BLD: 20 % (ref 18–48)
MCH RBC QN AUTO: 26.5 PG (ref 27–31)
MCHC RBC AUTO-ENTMCNC: 31.1 G/DL (ref 32–36)
MCV RBC AUTO: 85 FL (ref 82–98)
MONOCYTES # BLD AUTO: 0.3 K/UL (ref 0.3–1)
MONOCYTES NFR BLD: 7.3 % (ref 4–15)
NEUTROPHILS # BLD AUTO: 3.4 K/UL (ref 1.8–7.7)
NEUTROPHILS NFR BLD: 72.5 % (ref 38–73)
NITRITE UR QL STRIP: NEGATIVE
NRBC BLD-RTO: 0 /100 WBC
PH UR STRIP: 8 [PH] (ref 5–8)
PLATELET # BLD AUTO: 213 K/UL (ref 150–350)
PMV BLD AUTO: 11.9 FL (ref 9.2–12.9)
POTASSIUM SERPL-SCNC: 4.8 MMOL/L (ref 3.5–5.1)
PROT SERPL-MCNC: 8 G/DL (ref 6–8.4)
PROT UR QL STRIP: NEGATIVE
RBC # BLD AUTO: 4.65 M/UL (ref 4.6–6.2)
SODIUM SERPL-SCNC: 139 MMOL/L (ref 136–145)
SP GR UR STRIP: 1.01 (ref 1–1.03)
T4 FREE SERPL-MCNC: 1.04 NG/DL (ref 0.71–1.51)
TROPONIN I SERPL DL<=0.01 NG/ML-MCNC: 0.01 NG/ML (ref 0–0.03)
TSH SERPL DL<=0.005 MIU/L-ACNC: 0.2 UIU/ML (ref 0.4–4)
URN SPEC COLLECT METH UR: NORMAL
UROBILINOGEN UR STRIP-ACNC: NEGATIVE EU/DL
WBC # BLD AUTO: 4.64 K/UL (ref 3.9–12.7)

## 2020-03-02 PROCEDURE — 84443 ASSAY THYROID STIM HORMONE: CPT

## 2020-03-02 PROCEDURE — 99285 EMERGENCY DEPT VISIT HI MDM: CPT | Mod: 25

## 2020-03-02 PROCEDURE — 96375 TX/PRO/DX INJ NEW DRUG ADDON: CPT

## 2020-03-02 PROCEDURE — 84484 ASSAY OF TROPONIN QUANT: CPT

## 2020-03-02 PROCEDURE — 94640 AIRWAY INHALATION TREATMENT: CPT

## 2020-03-02 PROCEDURE — 96374 THER/PROPH/DIAG INJ IV PUSH: CPT

## 2020-03-02 PROCEDURE — 25000242 PHARM REV CODE 250 ALT 637 W/ HCPCS: Performed by: EMERGENCY MEDICINE

## 2020-03-02 PROCEDURE — 93005 ELECTROCARDIOGRAM TRACING: CPT

## 2020-03-02 PROCEDURE — 93010 EKG 12-LEAD: ICD-10-PCS | Mod: ,,, | Performed by: INTERNAL MEDICINE

## 2020-03-02 PROCEDURE — 84439 ASSAY OF FREE THYROXINE: CPT

## 2020-03-02 PROCEDURE — 94761 N-INVAS EAR/PLS OXIMETRY MLT: CPT

## 2020-03-02 PROCEDURE — 83880 ASSAY OF NATRIURETIC PEPTIDE: CPT

## 2020-03-02 PROCEDURE — 63600175 PHARM REV CODE 636 W HCPCS: Performed by: EMERGENCY MEDICINE

## 2020-03-02 PROCEDURE — 81003 URINALYSIS AUTO W/O SCOPE: CPT

## 2020-03-02 PROCEDURE — 80053 COMPREHEN METABOLIC PANEL: CPT

## 2020-03-02 PROCEDURE — 93010 ELECTROCARDIOGRAM REPORT: CPT | Mod: ,,, | Performed by: INTERNAL MEDICINE

## 2020-03-02 PROCEDURE — 85025 COMPLETE CBC W/AUTO DIFF WBC: CPT

## 2020-03-02 RX ORDER — ALBUTEROL SULFATE 0.83 MG/ML
SOLUTION RESPIRATORY (INHALATION)
Qty: 1 BOX | Refills: 2 | Status: SHIPPED | OUTPATIENT
Start: 2020-03-02 | End: 2020-03-29 | Stop reason: SDUPTHER

## 2020-03-02 RX ORDER — ALBUTEROL SULFATE 90 UG/1
2 AEROSOL, METERED RESPIRATORY (INHALATION) EVERY 4 HOURS PRN
Qty: 18 G | Refills: 2 | Status: SHIPPED | OUTPATIENT
Start: 2020-03-02 | End: 2020-06-17 | Stop reason: SDUPTHER

## 2020-03-02 RX ORDER — METHYLPREDNISOLONE SOD SUCC 125 MG
125 VIAL (EA) INJECTION
Status: COMPLETED | OUTPATIENT
Start: 2020-03-02 | End: 2020-03-02

## 2020-03-02 RX ORDER — HYDRALAZINE HYDROCHLORIDE 20 MG/ML
10 INJECTION INTRAMUSCULAR; INTRAVENOUS
Status: COMPLETED | OUTPATIENT
Start: 2020-03-02 | End: 2020-03-02

## 2020-03-02 RX ORDER — IPRATROPIUM BROMIDE AND ALBUTEROL SULFATE 2.5; .5 MG/3ML; MG/3ML
3 SOLUTION RESPIRATORY (INHALATION)
Status: COMPLETED | OUTPATIENT
Start: 2020-03-02 | End: 2020-03-02

## 2020-03-02 RX ORDER — PREDNISONE 20 MG/1
40 TABLET ORAL DAILY
Qty: 10 TABLET | Refills: 0 | Status: SHIPPED | OUTPATIENT
Start: 2020-03-02 | End: 2020-03-07

## 2020-03-02 RX ADMIN — HYDRALAZINE HYDROCHLORIDE 10 MG: 20 INJECTION INTRAMUSCULAR; INTRAVENOUS at 06:03

## 2020-03-02 RX ADMIN — IPRATROPIUM BROMIDE AND ALBUTEROL SULFATE 3 ML: .5; 3 SOLUTION RESPIRATORY (INHALATION) at 07:03

## 2020-03-02 RX ADMIN — IPRATROPIUM BROMIDE AND ALBUTEROL SULFATE 3 ML: .5; 3 SOLUTION RESPIRATORY (INHALATION) at 06:03

## 2020-03-02 RX ADMIN — METHYLPREDNISOLONE SODIUM SUCCINATE 125 MG: 125 INJECTION, POWDER, FOR SOLUTION INTRAMUSCULAR; INTRAVENOUS at 05:03

## 2020-03-02 NOTE — ED PROVIDER NOTES
Encounter Date: 3/2/2020       History     Chief Complaint   Patient presents with    Hypertension     Reports obtaining a high blood pressure reading this morning around 2am this morning accompanied with blurred vision. Reports a posterior headache. +SOB reported with activity. Midsternal chest tightness-felt like something pushing inside his chest. Took his b/p med regimen and came to the ED for eval of his blood pressure.     Chest Pain    Shortness of Breath     80 yo male presents via wife with chest pressure and elevated BP at home. Patient reports he was in his usual state of health when he went to bed around 10:30pm. He woke up around 2 hours later needing to urinate, and checked his BP around that time, but it was elevated. Subsequently patient developed substernal chest pressure. He continued to check his BPs overnight. Patient had a posterior headache shortly PTA as well. No weakness, numbness, neuro deficits. Patient denies diaphoresis.     Patient also reports having shortness of breath, cough, and wheezing for some time, attributed to COPD exacberation. Patient last used neb tx around 3:30pm .     PMH: COPD/asthma, afib, HTN    Meds:  ASA 81mg PO qday  Prednisone 5mg PO qday  Hydralazine 50mg PO BID  Metoprolol 25mg PO BID  Combivent        Review of patient's allergies indicates:   Allergen Reactions    Lisinopril      Past Medical History:   Diagnosis Date    Asthma     Atrial fibrillation     COPD (chronic obstructive pulmonary disease)     Hypertension      History reviewed. No pertinent surgical history.  History reviewed. No pertinent family history.  Social History     Tobacco Use    Smoking status: Former Smoker     Packs/day: 1.50     Years: 20.00     Pack years: 30.00     Last attempt to quit:      Years since quittin.    Smokeless tobacco: Former User   Substance Use Topics    Alcohol use: No    Drug use: No     Review of Systems   Constitutional: Negative for  "diaphoresis and fever.   HENT: Negative for sore throat.    Eyes: Negative for photophobia.   Respiratory: Positive for cough, shortness of breath and wheezing.    Cardiovascular: Positive for chest pain ("pressure").   Gastrointestinal: Negative for abdominal pain.   Genitourinary: Negative for dysuria.   Musculoskeletal: Negative for neck stiffness.   Skin: Negative for rash.   Neurological: Positive for headaches. Negative for dizziness and light-headedness.       Physical Exam     Initial Vitals [03/02/20 0351]   BP Pulse Resp Temp SpO2   (!) 193/88 67 20 98.3 °F (36.8 °C) 97 %      MAP       --         Physical Exam    Nursing note and vitals reviewed.  Constitutional: He appears well-developed and well-nourished. He is not diaphoretic. He appears distressed (mild increased work of breathing).   Awake, alert older male.   HENT:   Head: Normocephalic and atraumatic.   Mouth/Throat: Oropharynx is clear and moist.   Eyes: Conjunctivae and EOM are normal. Pupils are equal, round, and reactive to light.   Neck: Normal range of motion. Neck supple.   Cardiovascular: Normal rate, regular rhythm, normal heart sounds and intact distal pulses.   No murmur heard.  Pulmonary/Chest: He is in respiratory distress (moderate). He has wheezes (diffuse expiratory). He has no rhonchi. He has no rales.   Abdominal: Soft. There is no tenderness.   Musculoskeletal: Normal range of motion. He exhibits no edema or tenderness.   Neurological: He is alert and oriented to person, place, and time. He has normal strength.   Moving all extremities   Skin: Skin is warm and dry.   Psychiatric: He has a normal mood and affect.         ED Course   Procedures  Labs Reviewed   CBC W/ AUTO DIFFERENTIAL - Abnormal; Notable for the following components:       Result Value    Hemoglobin 12.3 (*)     Hematocrit 39.5 (*)     Mean Corpuscular Hemoglobin 26.5 (*)     Mean Corpuscular Hemoglobin Conc 31.1 (*)     Lymph # 0.9 (*)     All other components " within normal limits   COMPREHENSIVE METABOLIC PANEL - Abnormal; Notable for the following components:    Glucose 116 (*)     eGFR if non  52 (*)     All other components within normal limits   TSH - Abnormal; Notable for the following components:    TSH 0.197 (*)     All other components within normal limits   B-TYPE NATRIURETIC PEPTIDE   TROPONIN I   URINALYSIS, REFLEX TO URINE CULTURE    Narrative:     Preferred Collection Type->Urine, Clean Catch   T4, FREE     EKG Readings: (Independently Interpreted)   03:58: NSR, HR 66. Normal axis. No ectopy. No STEMI.      ECG Results          EKG 12-lead (Final result)  Result time 03/02/20 21:25:36    Final result by Interface, Lab In [facility] (03/02/20 21:25:36)                 Narrative:    Test Reason : R06.02,    Vent. Rate : 066 BPM     Atrial Rate : 066 BPM     P-R Int : 140 ms          QRS Dur : 084 ms      QT Int : 386 ms       P-R-T Axes : 075 062 045 degrees     QTc Int : 404 ms    Sinus rhythm with marked sinus arrythmia  Voltage criteria for left ventricular hypertrophy  Abnormal ECG  When compared with ECG of 16-FEB-2020 22:20,  Significant changes have occurred  Confirmed by Giuseppe Nicholson MD ([id number]) on 3/2/2020 9:25:22 PM    Referred By: [email or web address]   SELF           Confirmed By:Giuseppe Nicholson MD                            Imaging Results          X-Ray Chest AP Portable (Final result)  Result time 03/02/20 05:25:13    Final result by Astrid Mantilla MD (03/02/20 05:25:13)                 Impression:      No acute intrathoracic abnormality identified on this single radiographic view of the chest.      Electronically signed by: Astrid Mantilla MD  Date:    03/02/2020  Time:    05:25             Narrative:    EXAMINATION:  XR CHEST AP PORTABLE    CLINICAL HISTORY:  shortness of breath;    TECHNIQUE:  Single frontal view of the chest was performed.    COMPARISON:  02/16/2020    FINDINGS:  Cardiac monitoring leads overlie the chest.  The  cardiomediastinal silhouette is stable.  The visualized airway is unremarkable.  Lungs are symmetrically expanded with slight coarse interstitial attenuation, similar to prior exams.  No focal consolidation, large pleural effusion or pneumothorax is appreciated.Visualized osseous structures demonstrate stable degenerative change.                              X-Rays:   Independently Interpreted Readings:   Other Readings:  CXR hyperinflation c/w COPD, no acute infiltrate    Medical Decision Making:   History:   Old Medical Records: I decided to obtain old medical records.  Old Records Summarized: records from previous admission(s).  Initial Assessment:   78yo male with chest pressure and elevated BP and headache tonight, as well as more subacute shortness of breath, cough, and wheezing.  Differential Diagnosis:   Ddx includes ACS, CHF, PNA, COPD or asthma exacerbation, other.  Independently Interpreted Test(s):   I have ordered and independently interpreted X-rays - see prior notes.  I have ordered and independently interpreted EKG Reading(s) - see prior notes  Clinical Tests:   Lab Tests: Reviewed and Ordered  Radiological Study: Ordered and Reviewed  Medical Tests: Ordered and Reviewed  ED Management:  EKG no STEMI.    CXR NAD.    CBC, CMP, troponin, BNP reassuring. Chest pain occurred around 2am and labs drawn at 4:53am so troponin x 1 rules out ACS.     Patient had solumedrol 125mg IV and duonebs x 3. His breathing improved though he remained hypertensive. He did have 1 dose of IV hydralazine as well as additional neb tx.    I suspect stress response of COPD exacerbation caused HTN urgency. I have advised patient to continue taking BP meds as rx'ed. I have rx'ed prednisone burst course and have refilled albuterol for COPD. I have advised continued monitoring of BP but have told patient he does not need to come to ED unless he has symptoms (as today he had chest pressure).     F/u PMD.                                   Clinical Impression:       ICD-10-CM ICD-9-CM   1. Shortness of breath R06.02 786.05   2. Chest pressure R07.89 786.59   3. Elevated blood pressure reading R03.0 796.2   4. Wheezing R06.2 786.07   5. COPD exacerbation J44.1 491.21             ED Disposition Condition    Discharge Stable        ED Prescriptions     Medication Sig Dispense Start Date End Date Auth. Provider    albuterol (PROVENTIL) 2.5 mg /3 mL (0.083 %) nebulizer solution Take 3mLs (2.5mg total) by nebulization TID x 4 days.  Then 3 mLs (2.5mg total) by nebulization every 6 (six) hours as needed. 1 Box 3/2/2020  Karly Ordonez MD    albuterol (PROVENTIL/VENTOLIN HFA) 90 mcg/actuation inhaler Inhale 2 puffs into the lungs every 4 (four) hours as needed for Wheezing or Shortness of Breath. Rescue 18 g 3/2/2020  Karly Ordonez MD    predniSONE (DELTASONE) 20 MG tablet Take 2 tablets (40 mg total) by mouth once daily. for 5 days 10 tablet 3/2/2020 3/7/2020 Karly Ordonez MD        Follow-up Information     Follow up With Specialties Details Why Contact Info    Chelsy Torrez MD Endocrinology Schedule an appointment as soon as possible for a visit   [facility] [address] [address]  [phone]                                       Karly Ordonez MD  03/03/20 0146

## 2020-03-02 NOTE — ED NOTES
Pt report from khoi rn , per khoi pt up for discharge, was medicated with antihypertensive  will recheck blood pressure proir to discharge.    RT called for breathing treatment

## 2020-03-29 ENCOUNTER — HOSPITAL ENCOUNTER (EMERGENCY)
Facility: HOSPITAL | Age: 80
Discharge: HOME | End: 2020-03-29
Attending: EMERGENCY MEDICINE
Payer: MEDICARE

## 2020-03-29 VITALS
HEART RATE: 102 BPM | WEIGHT: 160 LBS | DIASTOLIC BLOOD PRESSURE: 94 MMHG | OXYGEN SATURATION: 98 % | HEIGHT: 65 IN | BODY MASS INDEX: 26.66 KG/M2 | TEMPERATURE: 98 F | RESPIRATION RATE: 25 BRPM | SYSTOLIC BLOOD PRESSURE: 148 MMHG

## 2020-03-29 DIAGNOSIS — J44.1 COPD EXACERBATION: Primary | ICD-10-CM

## 2020-03-29 DIAGNOSIS — R06.02 SOB (SHORTNESS OF BREATH): ICD-10-CM

## 2020-03-29 DIAGNOSIS — R07.9 CHEST PAIN: ICD-10-CM

## 2020-03-29 LAB
ALBUMIN SERPL BCP-MCNC: 3.4 G/DL (ref 3.5–5.2)
ALP SERPL-CCNC: 87 U/L (ref 55–135)
ALT SERPL W/O P-5'-P-CCNC: 23 U/L (ref 10–44)
ANION GAP SERPL CALC-SCNC: 12 MMOL/L (ref 8–16)
AST SERPL-CCNC: 29 U/L (ref 10–40)
BASOPHILS # BLD AUTO: 0 K/UL (ref 0–0.2)
BASOPHILS NFR BLD: 0 % (ref 0–1.9)
BILIRUB SERPL-MCNC: 0.4 MG/DL (ref 0.1–1)
BNP SERPL-MCNC: 31 PG/ML (ref 0–99)
BUN SERPL-MCNC: 15 MG/DL (ref 8–23)
CALCIUM SERPL-MCNC: 9 MG/DL (ref 8.7–10.5)
CHLORIDE SERPL-SCNC: 104 MMOL/L (ref 95–110)
CO2 SERPL-SCNC: 23 MMOL/L (ref 23–29)
CREAT SERPL-MCNC: 1.2 MG/DL (ref 0.5–1.4)
CTP QC/QA: YES
DIFFERENTIAL METHOD: ABNORMAL
EOSINOPHIL # BLD AUTO: 0 K/UL (ref 0–0.5)
EOSINOPHIL NFR BLD: 0 % (ref 0–8)
ERYTHROCYTE [DISTWIDTH] IN BLOOD BY AUTOMATED COUNT: 14.3 % (ref 11.5–14.5)
EST. GFR  (AFRICAN AMERICAN): >60 ML/MIN/1.73 M^2
EST. GFR  (NON AFRICAN AMERICAN): 57 ML/MIN/1.73 M^2
GLUCOSE SERPL-MCNC: 120 MG/DL (ref 70–110)
HCT VFR BLD AUTO: 41.6 % (ref 40–54)
HGB BLD-MCNC: 12.2 G/DL (ref 14–18)
IMM GRANULOCYTES # BLD AUTO: 0.01 K/UL (ref 0–0.04)
IMM GRANULOCYTES NFR BLD AUTO: 0.2 % (ref 0–0.5)
LYMPHOCYTES # BLD AUTO: 1.4 K/UL (ref 1–4.8)
LYMPHOCYTES NFR BLD: 29.2 % (ref 18–48)
MCH RBC QN AUTO: 25.8 PG (ref 27–31)
MCHC RBC AUTO-ENTMCNC: 29.3 G/DL (ref 32–36)
MCV RBC AUTO: 88 FL (ref 82–98)
MONOCYTES # BLD AUTO: 0.6 K/UL (ref 0.3–1)
MONOCYTES NFR BLD: 12.7 % (ref 4–15)
NEUTROPHILS # BLD AUTO: 2.8 K/UL (ref 1.8–7.7)
NEUTROPHILS NFR BLD: 57.9 % (ref 38–73)
NRBC BLD-RTO: 0 /100 WBC
PLATELET # BLD AUTO: 180 K/UL (ref 150–350)
PMV BLD AUTO: 10.6 FL (ref 9.2–12.9)
POC MOLECULAR INFLUENZA A AGN: NEGATIVE
POC MOLECULAR INFLUENZA B AGN: NEGATIVE
POTASSIUM SERPL-SCNC: 4.5 MMOL/L (ref 3.5–5.1)
PROT SERPL-MCNC: 8.4 G/DL (ref 6–8.4)
RBC # BLD AUTO: 4.72 M/UL (ref 4.6–6.2)
SODIUM SERPL-SCNC: 139 MMOL/L (ref 136–145)
TROPONIN I SERPL DL<=0.01 NG/ML-MCNC: 0.01 NG/ML (ref 0–0.03)
WBC # BLD AUTO: 4.87 K/UL (ref 3.9–12.7)

## 2020-03-29 PROCEDURE — 63600175 PHARM REV CODE 636 W HCPCS: Performed by: EMERGENCY MEDICINE

## 2020-03-29 PROCEDURE — 85025 COMPLETE CBC W/AUTO DIFF WBC: CPT

## 2020-03-29 PROCEDURE — 87502 INFLUENZA DNA AMP PROBE: CPT

## 2020-03-29 PROCEDURE — 93005 ELECTROCARDIOGRAM TRACING: CPT

## 2020-03-29 PROCEDURE — 25000242 PHARM REV CODE 250 ALT 637 W/ HCPCS: Performed by: EMERGENCY MEDICINE

## 2020-03-29 PROCEDURE — 25000003 PHARM REV CODE 250: Performed by: EMERGENCY MEDICINE

## 2020-03-29 PROCEDURE — 99285 EMERGENCY DEPT VISIT HI MDM: CPT | Mod: 25

## 2020-03-29 PROCEDURE — 93010 ELECTROCARDIOGRAM REPORT: CPT | Mod: ,,, | Performed by: INTERNAL MEDICINE

## 2020-03-29 PROCEDURE — 83880 ASSAY OF NATRIURETIC PEPTIDE: CPT

## 2020-03-29 PROCEDURE — 96374 THER/PROPH/DIAG INJ IV PUSH: CPT

## 2020-03-29 PROCEDURE — 84484 ASSAY OF TROPONIN QUANT: CPT

## 2020-03-29 PROCEDURE — 80053 COMPREHEN METABOLIC PANEL: CPT

## 2020-03-29 PROCEDURE — 93010 EKG 12-LEAD: ICD-10-PCS | Mod: ,,, | Performed by: INTERNAL MEDICINE

## 2020-03-29 RX ORDER — IPRATROPIUM BROMIDE AND ALBUTEROL SULFATE 2.5; .5 MG/3ML; MG/3ML
3 SOLUTION RESPIRATORY (INHALATION)
Status: COMPLETED | OUTPATIENT
Start: 2020-03-29 | End: 2020-03-29

## 2020-03-29 RX ORDER — ASPIRIN 325 MG
325 TABLET ORAL
Status: COMPLETED | OUTPATIENT
Start: 2020-03-29 | End: 2020-03-29

## 2020-03-29 RX ORDER — PREDNISONE 50 MG/1
50 TABLET ORAL DAILY
Qty: 5 TABLET | Refills: 0 | Status: SHIPPED | OUTPATIENT
Start: 2020-03-29 | End: 2020-04-03

## 2020-03-29 RX ORDER — METHYLPREDNISOLONE SOD SUCC 125 MG
125 VIAL (EA) INJECTION
Status: COMPLETED | OUTPATIENT
Start: 2020-03-29 | End: 2020-03-29

## 2020-03-29 RX ORDER — ALBUTEROL SULFATE 0.83 MG/ML
SOLUTION RESPIRATORY (INHALATION)
Qty: 1 BOX | Refills: 11 | Status: SHIPPED | OUTPATIENT
Start: 2020-03-29 | End: 2020-04-28 | Stop reason: SDUPTHER

## 2020-03-29 RX ADMIN — IPRATROPIUM BROMIDE AND ALBUTEROL SULFATE 3 ML: .5; 3 SOLUTION RESPIRATORY (INHALATION) at 02:03

## 2020-03-29 RX ADMIN — ASPIRIN 325 MG ORAL TABLET 325 MG: 325 PILL ORAL at 02:03

## 2020-03-29 RX ADMIN — METHYLPREDNISOLONE SODIUM SUCCINATE 125 MG: 125 INJECTION, POWDER, FOR SOLUTION INTRAMUSCULAR; INTRAVENOUS at 02:03

## 2020-03-29 NOTE — ED PROVIDER NOTES
Encounter Date: 3/29/2020    SCRIBE #1 NOTE: I, Kylie Sanders, am scribing for, and in the presence of,  Anthony Winters MD. I have scribed the following portions of the note - Other sections scribed: HPI, ROS, PE, EKG.       History     Chief Complaint   Patient presents with    Shortness of Breath     O2 sats in triage 88%. Pt with complaints that he can hardly breathe. Symptoms started yesterday around 3pm and has progressively worsened. Pt with hx of COPD and asthma.      CC: SOB      Patient is a 79 y.o male with a PMHx of Asthma and COPD who presents to the ED complaining of worsening SOB since yesterday. He denies fever and CP. Patient is not on home oxygen administration. No Hx of cardiac stents, CABG, or DM.     The history is provided by the patient.     Review of patient's allergies indicates:   Allergen Reactions    Lisinopril      Past Medical History:   Diagnosis Date    Asthma     Atrial fibrillation     COPD (chronic obstructive pulmonary disease)     Hypertension      History reviewed. No pertinent surgical history.  History reviewed. No pertinent family history.  Social History     Tobacco Use    Smoking status: Former Smoker     Packs/day: 1.50     Years: 20.00     Pack years: 30.00     Last attempt to quit:      Years since quittin.2    Smokeless tobacco: Former User   Substance Use Topics    Alcohol use: No    Drug use: No     Review of Systems   Constitutional: Negative for chills and fever.   HENT: Negative for congestion, postnasal drip, rhinorrhea, sinus pressure and sore throat.    Eyes: Negative for pain and redness.   Respiratory: Positive for shortness of breath.    Cardiovascular: Negative for chest pain.   Gastrointestinal: Negative for abdominal distention, abdominal pain, blood in stool, constipation, diarrhea, nausea and vomiting.   Genitourinary: Negative for dysuria, flank pain, hematuria and urgency.   Musculoskeletal: Negative for arthralgias and myalgias.    Skin: Negative for rash and wound.   Neurological: Negative for weakness, light-headedness and headaches.   Hematological: Does not bruise/bleed easily.   Psychiatric/Behavioral: Negative for agitation, behavioral problems, confusion and hallucinations. The patient is not nervous/anxious.        Physical Exam     Initial Vitals [03/29/20 0130]   BP Pulse Resp Temp SpO2   (!) 180/99 102 (!) 26 98.4 °F (36.9 °C) (!) 88 %      MAP       --         Physical Exam    Nursing note and vitals reviewed.  Constitutional: He appears well-developed and well-nourished. He is not diaphoretic. No distress.   HENT:   Head: Normocephalic and atraumatic.   Right Ear: External ear normal.   Left Ear: External ear normal.   Mouth/Throat: Oropharynx is clear and moist.   Eyes: Conjunctivae are normal. Right eye exhibits no discharge. Left eye exhibits no discharge. No scleral icterus.   Neck: No tracheal deviation present. No JVD present.   Cardiovascular: Normal rate, regular rhythm, normal heart sounds and intact distal pulses. Exam reveals no gallop and no friction rub.    No murmur heard.  Pulmonary/Chest: No stridor. No respiratory distress. He has wheezes. He has no rhonchi. He has no rales. He exhibits no tenderness.   Abdominal: Soft. He exhibits no distension and no mass. There is no tenderness. There is no rebound and no guarding.   Musculoskeletal: Normal range of motion. He exhibits no edema or tenderness.   Neurological: He is alert and oriented to person, place, and time. He has normal strength. GCS score is 15. GCS eye subscore is 4. GCS verbal subscore is 5. GCS motor subscore is 6.   CURT with NGND's   Skin: Skin is warm and dry. Capillary refill takes less than 2 seconds. No rash noted. No erythema.   Psychiatric: He has a normal mood and affect. His behavior is normal. Judgment and thought content normal.         ED Course   Procedures  Labs Reviewed   CBC W/ AUTO DIFFERENTIAL - Abnormal; Notable for the following  components:       Result Value    Hemoglobin 12.2 (*)     Mean Corpuscular Hemoglobin 25.8 (*)     Mean Corpuscular Hemoglobin Conc 29.3 (*)     All other components within normal limits   COMPREHENSIVE METABOLIC PANEL - Abnormal; Notable for the following components:    Glucose 120 (*)     Albumin 3.4 (*)     eGFR if non  57 (*)     All other components within normal limits   TROPONIN I   B-TYPE NATRIURETIC PEPTIDE   POCT INFLUENZA A/B MOLECULAR     EKG Readings: (Independently Interpreted)   Initial Reading: No STEMI. Previous EKG: Compared with most recent EKG Previous EKG Date: 03/02/2020. Rhythm: Normal Sinus Rhythm. Heart Rate: 97 bpm. Axis: Left Axis Deviation.       Imaging Results          X-Ray Chest AP Portable (Final result)  Result time 03/29/20 02:43:29    Final result by Astrid Mantilla MD (03/29/20 02:43:29)                 Impression:      No acute intrathoracic abnormality identified on this single radiographic view of the chest.      Electronically signed by: Astrid Mantilla MD  Date:    03/29/2020  Time:    02:43             Narrative:    EXAMINATION:  XR CHEST AP PORTABLE    CLINICAL HISTORY:  Chest Pain;    TECHNIQUE:  Single frontal view of the chest was performed.    COMPARISON:  03/02/2020    FINDINGS:  Cardiac monitoring leads overlie the chest.  Cardiomediastinal silhouette is within normal limits.  The visualized airway is unremarkable.  The lungs are symmetrically expanded with slight coarse interstitial attenuation, similar to prior examinations.  There is minimal basilar atelectasis.  No lobar consolidation, significant volume of pleural fluid or pneumothorax is appreciated.  Visualized osseous structures demonstrate stable degenerative change.                                 Medical Decision Making:   History:   Old Medical Records: I decided to obtain old medical records.  Independently Interpreted Test(s):   I have ordered and independently interpreted EKG Reading(s) -  see prior notes  Clinical Tests:   Lab Tests: Ordered and Reviewed  Radiological Study: Ordered and Reviewed  Medical Tests: Ordered and Reviewed            Scribe Attestation:   Scribe #1: I performed the above scribed service and the documentation accurately describes the services I performed. I attest to the accuracy of the note.      Pt arrived alert, afebrile, non-toxic in appearance, in no acute respiratory distress with mildly increased WOB and wheezing.  Sx's resolved with nebs.  EKG revealed no acute findings concerning for ischemia, arrhythmia, heart block or any pathology to warrant cardiology consultation.  CXR revealed no acute pathology.  Labs showed no acute findings.  Pt discharged and counseled on the need to return to the nearest emergency room if they experience any other concerning symptoms.  Pt counseled to F/U outpatient with a PCP over the next two to three days.    Anthony Winters MD                            Clinical Impression:       ICD-10-CM ICD-9-CM   1. COPD exacerbation J44.1 491.21   2. SOB (shortness of breath) R06.02 786.05   3. Chest pain R07.9 786.50             ED Disposition Condition    Discharge Stable        ED Prescriptions     Medication Sig Dispense Start Date End Date Auth. Provider    albuterol (PROVENTIL) 2.5 mg /3 mL (0.083 %) nebulizer solution Take 3mLs (2.5mg total) by nebulization TID x 4 days.  Then 3 mLs (2.5mg total) by nebulization every 6 (six) hours as needed. 1 Box 3/29/2020  Anthony Winters MD    predniSONE (DELTASONE) 50 MG Tab Take 1 tablet (50 mg total) by mouth once daily. for 5 days 5 tablet 3/29/2020 4/3/2020 Anthony Winters MD        Follow-up Information     Follow up With Specialties Details Why Contact Info    Chelsy Torrez MD Endocrinology Schedule an appointment as soon as possible for a visit in 3 days to follow-up on today's visit [address] [address]  [phone]      [facility]  Emergency Medicine Go to  As needed, If symptoms worsen [address] [place] [address] [phone]                         I, Anthony Winters, personally performed the services described in this documentation. All medical record entries made by the scribe were at my direction and in my presence.  I have reviewed the chart and agree that the record reflects my personal performance and is accurate and complete.                 Anthony Winters MD  03/29/20 0357       Anthony Winters MD  03/29/20 0357

## 2020-04-28 ENCOUNTER — HOSPITAL ENCOUNTER (EMERGENCY)
Facility: HOSPITAL | Age: 80
Discharge: HOME | End: 2020-04-28
Attending: EMERGENCY MEDICINE
Payer: MEDICARE

## 2020-04-28 VITALS
RESPIRATION RATE: 20 BRPM | TEMPERATURE: 99 F | BODY MASS INDEX: 25.07 KG/M2 | OXYGEN SATURATION: 95 % | DIASTOLIC BLOOD PRESSURE: 99 MMHG | HEART RATE: 77 BPM | HEIGHT: 66 IN | WEIGHT: 156 LBS | SYSTOLIC BLOOD PRESSURE: 176 MMHG

## 2020-04-28 DIAGNOSIS — J44.1 COPD EXACERBATION: Primary | ICD-10-CM

## 2020-04-28 DIAGNOSIS — R06.02 SHORTNESS OF BREATH: ICD-10-CM

## 2020-04-28 DIAGNOSIS — R05.9 COUGH: ICD-10-CM

## 2020-04-28 DIAGNOSIS — I10 HYPERTENSION, UNSPECIFIED TYPE: ICD-10-CM

## 2020-04-28 LAB
ALBUMIN SERPL BCP-MCNC: 3.6 G/DL (ref 3.5–5.2)
ALP SERPL-CCNC: 83 U/L (ref 55–135)
ALT SERPL W/O P-5'-P-CCNC: 24 U/L (ref 10–44)
ANION GAP SERPL CALC-SCNC: 10 MMOL/L (ref 8–16)
AST SERPL-CCNC: 29 U/L (ref 10–40)
BASOPHILS # BLD AUTO: 0 K/UL (ref 0–0.2)
BASOPHILS NFR BLD: 0 % (ref 0–1.9)
BILIRUB SERPL-MCNC: 0.4 MG/DL (ref 0.1–1)
BNP SERPL-MCNC: 85 PG/ML (ref 0–99)
BUN SERPL-MCNC: 21 MG/DL (ref 8–23)
CALCIUM SERPL-MCNC: 9.2 MG/DL (ref 8.7–10.5)
CHLORIDE SERPL-SCNC: 102 MMOL/L (ref 95–110)
CO2 SERPL-SCNC: 29 MMOL/L (ref 23–29)
CREAT SERPL-MCNC: 1.3 MG/DL (ref 0.5–1.4)
DIFFERENTIAL METHOD: ABNORMAL
EOSINOPHIL # BLD AUTO: 0 K/UL (ref 0–0.5)
EOSINOPHIL NFR BLD: 0 % (ref 0–8)
ERYTHROCYTE [DISTWIDTH] IN BLOOD BY AUTOMATED COUNT: 14.4 % (ref 11.5–14.5)
EST. GFR  (AFRICAN AMERICAN): 60 ML/MIN/1.73 M^2
EST. GFR  (NON AFRICAN AMERICAN): 52 ML/MIN/1.73 M^2
GLUCOSE SERPL-MCNC: 102 MG/DL (ref 70–110)
HCT VFR BLD AUTO: 39.6 % (ref 40–54)
HGB BLD-MCNC: 12.2 G/DL (ref 14–18)
IMM GRANULOCYTES # BLD AUTO: 0.02 K/UL (ref 0–0.04)
IMM GRANULOCYTES NFR BLD AUTO: 0.4 % (ref 0–0.5)
LYMPHOCYTES # BLD AUTO: 1.6 K/UL (ref 1–4.8)
LYMPHOCYTES NFR BLD: 33.2 % (ref 18–48)
MCH RBC QN AUTO: 26.1 PG (ref 27–31)
MCHC RBC AUTO-ENTMCNC: 30.8 G/DL (ref 32–36)
MCV RBC AUTO: 85 FL (ref 82–98)
MONOCYTES # BLD AUTO: 0.7 K/UL (ref 0.3–1)
MONOCYTES NFR BLD: 15.3 % (ref 4–15)
NEUTROPHILS # BLD AUTO: 2.4 K/UL (ref 1.8–7.7)
NEUTROPHILS NFR BLD: 51.1 % (ref 38–73)
NRBC BLD-RTO: 0 /100 WBC
PLATELET # BLD AUTO: 193 K/UL (ref 150–350)
PMV BLD AUTO: 10.1 FL (ref 9.2–12.9)
POTASSIUM SERPL-SCNC: 3.7 MMOL/L (ref 3.5–5.1)
PROT SERPL-MCNC: 8 G/DL (ref 6–8.4)
RBC # BLD AUTO: 4.68 M/UL (ref 4.6–6.2)
SARS-COV-2 RDRP RESP QL NAA+PROBE: NEGATIVE
SODIUM SERPL-SCNC: 141 MMOL/L (ref 136–145)
TROPONIN I SERPL DL<=0.01 NG/ML-MCNC: <0.006 NG/ML (ref 0–0.03)
WBC # BLD AUTO: 4.7 K/UL (ref 3.9–12.7)

## 2020-04-28 PROCEDURE — 94761 N-INVAS EAR/PLS OXIMETRY MLT: CPT

## 2020-04-28 PROCEDURE — 99285 EMERGENCY DEPT VISIT HI MDM: CPT | Mod: 25

## 2020-04-28 PROCEDURE — 83880 ASSAY OF NATRIURETIC PEPTIDE: CPT

## 2020-04-28 PROCEDURE — 25000242 PHARM REV CODE 250 ALT 637 W/ HCPCS: Performed by: EMERGENCY MEDICINE

## 2020-04-28 PROCEDURE — 93005 ELECTROCARDIOGRAM TRACING: CPT

## 2020-04-28 PROCEDURE — U0002 COVID-19 LAB TEST NON-CDC: HCPCS

## 2020-04-28 PROCEDURE — 27000221 HC OXYGEN, UP TO 24 HOURS

## 2020-04-28 PROCEDURE — 80053 COMPREHEN METABOLIC PANEL: CPT

## 2020-04-28 PROCEDURE — 93010 EKG 12-LEAD: ICD-10-PCS | Mod: ,,, | Performed by: INTERNAL MEDICINE

## 2020-04-28 PROCEDURE — 93010 ELECTROCARDIOGRAM REPORT: CPT | Mod: ,,, | Performed by: INTERNAL MEDICINE

## 2020-04-28 PROCEDURE — 84484 ASSAY OF TROPONIN QUANT: CPT

## 2020-04-28 PROCEDURE — 96374 THER/PROPH/DIAG INJ IV PUSH: CPT

## 2020-04-28 PROCEDURE — 94640 AIRWAY INHALATION TREATMENT: CPT

## 2020-04-28 PROCEDURE — 63600175 PHARM REV CODE 636 W HCPCS: Performed by: EMERGENCY MEDICINE

## 2020-04-28 PROCEDURE — 85025 COMPLETE CBC W/AUTO DIFF WBC: CPT

## 2020-04-28 RX ORDER — IPRATROPIUM BROMIDE AND ALBUTEROL SULFATE 2.5; .5 MG/3ML; MG/3ML
3 SOLUTION RESPIRATORY (INHALATION)
Status: COMPLETED | OUTPATIENT
Start: 2020-04-28 | End: 2020-04-28

## 2020-04-28 RX ORDER — ALBUTEROL SULFATE 0.83 MG/ML
SOLUTION RESPIRATORY (INHALATION)
Qty: 1 BOX | Refills: 0 | Status: SHIPPED | OUTPATIENT
Start: 2020-04-28 | End: 2020-06-17 | Stop reason: SDUPTHER

## 2020-04-28 RX ORDER — PREDNISONE 50 MG/1
50 TABLET ORAL DAILY
Qty: 5 TABLET | Refills: 0 | Status: SHIPPED | OUTPATIENT
Start: 2020-04-28 | End: 2020-05-03

## 2020-04-28 RX ORDER — METHYLPREDNISOLONE SOD SUCC 125 MG
125 VIAL (EA) INJECTION
Status: COMPLETED | OUTPATIENT
Start: 2020-04-28 | End: 2020-04-28

## 2020-04-28 RX ADMIN — IPRATROPIUM BROMIDE AND ALBUTEROL SULFATE 3 ML: .5; 3 SOLUTION RESPIRATORY (INHALATION) at 03:04

## 2020-04-28 RX ADMIN — METHYLPREDNISOLONE SODIUM SUCCINATE 125 MG: 125 INJECTION, POWDER, FOR SOLUTION INTRAMUSCULAR; INTRAVENOUS at 04:04

## 2020-04-28 RX ADMIN — IPRATROPIUM BROMIDE AND ALBUTEROL SULFATE 3 ML: .5; 3 SOLUTION RESPIRATORY (INHALATION) at 04:04

## 2020-04-28 NOTE — PROVIDER PROGRESS NOTES - EMERGENCY DEPT.
" Emergency Department TeleTRIAGE Encounter Note      CHIEF COMPLAINT    Chief Complaint   Patient presents with    Shortness of Breath     hx of COPD ,started last night medication taken with no relief    Chest Pain     pt states ther is no pain "it just hurts"       VITAL SIGNS   Initial Vitals [04/28/20 1443]   BP Pulse Resp Temp SpO2   (!) 180/101 88 20 98.5 °F (36.9 °C) (!) 93 %      MAP       --            ALLERGIES    Review of patient's allergies indicates:   Allergen Reactions    Lisinopril        PROVIDER TRIAGE NOTE  Patient is an 79-year-old male with history of hypertension, AFib, and COPD who presents to the emergency department with chest pain and shortness of breath over the last 24 hr.  He states the shortness of breath is neither worsened or alleviated by any specific factors.  He reports cough with no sputum production.  He denies any fevers.      ORDERS  Labs Reviewed - No data to display    ED Orders (720h ago, onward)    Start Ordered     Status Ordering Provider    04/28/20 1501 04/28/20 1500  Pulse Oximetry Continuous  Continuous      Ordered SARAHI LAWS    Unscheduled 04/28/20 1500  Saline lock IV  Once      Ordered SARAHI LAWS    Unscheduled 04/28/20 1500  CBC auto differential  STAT      Ordered SARAHI LAWS    Unscheduled 04/28/20 1500  Comprehensive metabolic panel  STAT      Ordered SARAHI LAWS    Unscheduled 04/28/20 1500  X-Ray Chest AP Portable  1 time imaging      Ordered SARAHI LAWS    Unscheduled 04/28/20 1500  COVID-19 Routine Screening  STAT      Ordered SARAHI LAWS    Unscheduled 04/28/20 1500  Troponin I  STAT      Ordered SARAHI LAWS    Unscheduled 04/28/20 1500  Brain natriuretic peptide  STAT      Ordered SARAHI LAWS    Unscheduled 04/28/20 1500  Airborne and Contact and Droplet Isolation Status  Continuous      Ordered VETO, " SARAHI CHAIREZ    Unscheduled 04/28/20 1500  Cardiac Monitoring - Adult  Continuous     Comments:  Notify Physician If:    Ordered SARAHI LAWS    Unscheduled 04/28/20 1500  EKG 12-lead  Once      Ordered SARAHI LAWS            Virtual Visit Note: The provider triage portion of this emergency department evaluation and documentation was performed via [employer], a HIPAA-compliant telemedicine application, in concert with a tele-presenter in the room. A face to face patient evaluation with one of my colleagues will occur once the patient is placed in an emergency department room.      DISCLAIMER: This note was prepared with [employer] voice recognition transcription software. Garbled syntax, mangled pronouns, and other bizarre constructions may be attributed to that software system.

## 2020-04-28 NOTE — ED PROVIDER NOTES
"Encounter Date: 2020    SCRIBE #1 NOTE: I, Miki Aguillon, am scribing for, and in the presence of,  Ashley King MD. I have scribed the following portions of the note - Other sections scribed: HPI, ROS, PE.       History     Chief Complaint   Patient presents with    Shortness of Breath     hx of COPD ,started last night medication taken with no relief    Chest Pain     pt states ther is no pain "it just hurts"     Micah Laurent Jr. is a 79 y.o. male who presents to the ED for evaluation of shortness of breath, cough, and wheezing since last night. He says that he feels like his COPD is acting up. Denies feeling any fever, chills, or leg swelling. He is on Proventil and Combivent at home. He reports coming to the ER every 1 month or so to get a nebulizer treatment for COPD exacerbation and thinks he has had to stay in the hospital overnight one time for COPD. Denies any previous intubation to treat his COPD.     The history is provided by the patient.     Review of patient's allergies indicates:   Allergen Reactions    Lisinopril      Past Medical History:   Diagnosis Date    Asthma     Atrial fibrillation     COPD (chronic obstructive pulmonary disease)     Hypertension      History reviewed. No pertinent surgical history.  History reviewed. No pertinent family history.  Social History     Tobacco Use    Smoking status: Former Smoker     Packs/day: 1.50     Years: 20.00     Pack years: 30.00     Last attempt to quit:      Years since quittin.3    Smokeless tobacco: Former User   Substance Use Topics    Alcohol use: No    Drug use: No     Review of Systems   Constitutional: Negative for chills and fever.   HENT: Negative for congestion and sore throat.    Eyes: Negative for visual disturbance.   Respiratory: Positive for cough, shortness of breath and wheezing.    Cardiovascular: Negative for chest pain and leg swelling.   Gastrointestinal: Negative for abdominal pain, nausea and vomiting. "   Genitourinary: Negative for dysuria.   Skin: Negative for rash.   Neurological: Negative for headaches.   Psychiatric/Behavioral: Negative for confusion.       Physical Exam     Initial Vitals [04/28/20 1443]   BP Pulse Resp Temp SpO2   (!) 180/101 88 20 98.5 °F (36.9 °C) (!) 93 %      MAP       --         Physical Exam    Nursing note and vitals reviewed.  Constitutional: He appears well-developed and well-nourished. He is not diaphoretic. No distress.   HENT:   Mouth/Throat: Oropharynx is clear and moist.   Eyes: Pupils are equal, round, and reactive to light.   Neck: Neck supple.   Cardiovascular: Normal rate and regular rhythm.   Pulmonary/Chest: No respiratory distress. He has wheezes.   Diminished breath sounds throughout with expiratory wheezes. Speaking in complete sentences   Abdominal: Soft. Bowel sounds are normal.   Musculoskeletal: He exhibits no edema.   No lower extremity edema.    Neurological: He is alert.   Skin: Skin is warm and dry.   Psychiatric: He has a normal mood and affect.         ED Course   Procedures  Labs Reviewed   CBC W/ AUTO DIFFERENTIAL - Abnormal; Notable for the following components:       Result Value    Hemoglobin 12.2 (*)     Hematocrit 39.6 (*)     Mean Corpuscular Hemoglobin 26.1 (*)     Mean Corpuscular Hemoglobin Conc 30.8 (*)     Mono% 15.3 (*)     All other components within normal limits   COMPREHENSIVE METABOLIC PANEL - Abnormal; Notable for the following components:    eGFR if non  52 (*)     All other components within normal limits   SARS-COV-2 RNA AMPLIFICATION, QUAL    Narrative:     What symptom criteria does the patient meet?->Cough  What symptom criteria does the patient meet?->Shortness of  breath or difficulty breathing   TROPONIN I   B-TYPE NATRIURETIC PEPTIDE     EKG Readings: (Independently Interpreted)   NSR with sinus arrhythmia, rate 83 bpm, normal MO interval, QTc 439 ms, no STEMI       Imaging Results          X-Ray Chest AP Portable  (Final result)  Result time 04/28/20 16:29:46    Final result by Anthony Patel MD (04/28/20 16:29:46)                 Impression:      As above.      Electronically signed by: Anthony Patel  Date:    04/28/2020  Time:    16:29             Narrative:    EXAMINATION:  XR CHEST AP PORTABLE    CLINICAL HISTORY:  Cough    TECHNIQUE:  Single frontal view of the chest was performed.    COMPARISON:  03/29/2020    FINDINGS:  Cardiomediastinal silhouette appears unchanged.  Similar slightly coarsened interstitial attenuation without large airspace opacity or lobar consolidation.  No pleural effusion or pneumothorax.  Age related degenerative change.                                 Medical Decision Making:   Initial Assessment:   78 yo M presents with cough, wheezes, shortness of breath, states he feels symptoms similar to previous COPD flares. On exam, he has diminished breath sounds with end expiratory wheezes, no respiratory distress. Suspect COPD flare. Low suspicion for AVS, new onset CHF, symptomatic anemia. Will treat with duonebs. Will defer steroids until COVID test results.   Clinical Tests:   Lab Tests: Ordered and Reviewed  Radiological Study: Ordered and Reviewed  Medical Tests: Ordered and Reviewed  ED Management:  Patient feeling improved- states ready to go home. BP elevated- reports he will take evening dose of meds when he gets home. No CP/SOB/HA currently. COVID negative, will give solumedrol. Will d/c with refill for nebs, prednisone, advised to follow up with PCP in 2-3 days for BP check.             Scribe Attestation:   Scribe #1: I performed the above scribed service and the documentation accurately describes the services I performed. I attest to the accuracy of the note.                          Clinical Impression:       ICD-10-CM ICD-9-CM   1. COPD exacerbation J44.1 491.21   2. Cough R05 786.2   3. Shortness of breath R06.02 786.05   4. Hypertension, unspecified type I10 401.9              ED Disposition Condition    Discharge Stable        ED Prescriptions     Medication Sig Dispense Start Date End Date Auth. Provider    albuterol (PROVENTIL) 2.5 mg /3 mL (0.083 %) nebulizer solution Take 3mLs (2.5mg total) by nebulization TID x 4 days.  Then 3 mLs (2.5mg total) by nebulization every 6 (six) hours as needed. 1 Box 4/28/2020  Ashley King MD    predniSONE (DELTASONE) 50 MG Tab Take 1 tablet (50 mg total) by mouth once daily. for 5 days 5 tablet 4/28/2020 5/3/2020 Ashley King MD        Follow-up Information     Follow up With Specialties Details Why Contact Info    Chelsy Torrez MD Endocrinology Schedule an appointment as soon as possible for a visit in 3 days To recheck your blood pressure, To recheck your symptoms [address] [facility] [address]  [facility] [address]  [phone]      [facility] Emergency Medicine  As needed, If symptoms worsen [address] [facility] [address] [phone]                                 I, Ashley King MD, personally performed the services described in this documentation. All medical record entries made by the scribe were at my direction and in my presence.  I have reviewed the chart and agree that the record reflects my personal performance and is accurate and complete.       Ashley King MD  04/28/20 1948

## 2020-04-28 NOTE — ED TRIAGE NOTES
"Pt arrived to the ED via POV from with SOB. Reports SOB that began on last night while resting and increases with ambulation. Also c/o cough x2 days. Reports hx of COPD and states "I get SOB when COPD is flaring up". Reports compliance with meds. Denies chest pain/discomfort, n/v/d, dizziness/weakness, numbness/tingling. Rates pain 0/10 on pain scale.  "

## 2020-05-09 ENCOUNTER — HOSPITAL ENCOUNTER (EMERGENCY)
Facility: HOSPITAL | Age: 80
Discharge: HOME | End: 2020-05-09
Attending: EMERGENCY MEDICINE
Payer: MEDICARE

## 2020-05-09 VITALS
DIASTOLIC BLOOD PRESSURE: 91 MMHG | HEIGHT: 65 IN | OXYGEN SATURATION: 95 % | TEMPERATURE: 99 F | HEART RATE: 74 BPM | BODY MASS INDEX: 26.66 KG/M2 | RESPIRATION RATE: 18 BRPM | WEIGHT: 160 LBS | SYSTOLIC BLOOD PRESSURE: 146 MMHG

## 2020-05-09 DIAGNOSIS — R07.9 CHEST PAIN: ICD-10-CM

## 2020-05-09 DIAGNOSIS — M54.9 BACK PAIN, UNSPECIFIED BACK LOCATION, UNSPECIFIED BACK PAIN LATERALITY, UNSPECIFIED CHRONICITY: Primary | ICD-10-CM

## 2020-05-09 LAB
ALBUMIN SERPL BCP-MCNC: 3.5 G/DL (ref 3.5–5.2)
ALP SERPL-CCNC: 80 U/L (ref 55–135)
ALT SERPL W/O P-5'-P-CCNC: 14 U/L (ref 10–44)
ANION GAP SERPL CALC-SCNC: 9 MMOL/L (ref 8–16)
AST SERPL-CCNC: 17 U/L (ref 10–40)
BASOPHILS # BLD AUTO: 0 K/UL (ref 0–0.2)
BASOPHILS NFR BLD: 0 % (ref 0–1.9)
BILIRUB SERPL-MCNC: 0.8 MG/DL (ref 0.1–1)
BNP SERPL-MCNC: 58 PG/ML (ref 0–99)
BUN SERPL-MCNC: 17 MG/DL (ref 8–23)
CALCIUM SERPL-MCNC: 9.1 MG/DL (ref 8.7–10.5)
CHLORIDE SERPL-SCNC: 99 MMOL/L (ref 95–110)
CO2 SERPL-SCNC: 29 MMOL/L (ref 23–29)
CREAT SERPL-MCNC: 1.4 MG/DL (ref 0.5–1.4)
DIFFERENTIAL METHOD: ABNORMAL
EOSINOPHIL # BLD AUTO: 0 K/UL (ref 0–0.5)
EOSINOPHIL NFR BLD: 0 % (ref 0–8)
ERYTHROCYTE [DISTWIDTH] IN BLOOD BY AUTOMATED COUNT: 14.9 % (ref 11.5–14.5)
EST. GFR  (AFRICAN AMERICAN): 54 ML/MIN/1.73 M^2
EST. GFR  (NON AFRICAN AMERICAN): 47 ML/MIN/1.73 M^2
GLUCOSE SERPL-MCNC: 206 MG/DL (ref 70–110)
HCT VFR BLD AUTO: 41.6 % (ref 40–54)
HGB BLD-MCNC: 12.7 G/DL (ref 14–18)
IMM GRANULOCYTES # BLD AUTO: 0.03 K/UL (ref 0–0.04)
IMM GRANULOCYTES NFR BLD AUTO: 0.3 % (ref 0–0.5)
LIPASE SERPL-CCNC: 50 U/L (ref 4–60)
LYMPHOCYTES # BLD AUTO: 1.5 K/UL (ref 1–4.8)
LYMPHOCYTES NFR BLD: 16.3 % (ref 18–48)
MCH RBC QN AUTO: 26.5 PG (ref 27–31)
MCHC RBC AUTO-ENTMCNC: 30.5 G/DL (ref 32–36)
MCV RBC AUTO: 87 FL (ref 82–98)
MONOCYTES # BLD AUTO: 0.6 K/UL (ref 0.3–1)
MONOCYTES NFR BLD: 6.7 % (ref 4–15)
NEUTROPHILS # BLD AUTO: 6.8 K/UL (ref 1.8–7.7)
NEUTROPHILS NFR BLD: 76.7 % (ref 38–73)
NRBC BLD-RTO: 0 /100 WBC
PLATELET # BLD AUTO: 180 K/UL (ref 150–350)
PMV BLD AUTO: 10.2 FL (ref 9.2–12.9)
POTASSIUM SERPL-SCNC: 4.4 MMOL/L (ref 3.5–5.1)
PROT SERPL-MCNC: 7.7 G/DL (ref 6–8.4)
RBC # BLD AUTO: 4.8 M/UL (ref 4.6–6.2)
SARS-COV-2 RDRP RESP QL NAA+PROBE: NEGATIVE
SODIUM SERPL-SCNC: 137 MMOL/L (ref 136–145)
TROPONIN I SERPL DL<=0.01 NG/ML-MCNC: 0.01 NG/ML (ref 0–0.03)
WBC # BLD AUTO: 8.92 K/UL (ref 3.9–12.7)

## 2020-05-09 PROCEDURE — U0002 COVID-19 LAB TEST NON-CDC: HCPCS

## 2020-05-09 PROCEDURE — 96374 THER/PROPH/DIAG INJ IV PUSH: CPT

## 2020-05-09 PROCEDURE — 93010 ELECTROCARDIOGRAM REPORT: CPT | Mod: ,,, | Performed by: INTERNAL MEDICINE

## 2020-05-09 PROCEDURE — 80053 COMPREHEN METABOLIC PANEL: CPT

## 2020-05-09 PROCEDURE — 99285 EMERGENCY DEPT VISIT HI MDM: CPT | Mod: 25

## 2020-05-09 PROCEDURE — 93010 EKG 12-LEAD: ICD-10-PCS | Mod: ,,, | Performed by: INTERNAL MEDICINE

## 2020-05-09 PROCEDURE — 83690 ASSAY OF LIPASE: CPT

## 2020-05-09 PROCEDURE — 25000003 PHARM REV CODE 250: Performed by: NURSE PRACTITIONER

## 2020-05-09 PROCEDURE — 84484 ASSAY OF TROPONIN QUANT: CPT

## 2020-05-09 PROCEDURE — 63600175 PHARM REV CODE 636 W HCPCS: Performed by: NURSE PRACTITIONER

## 2020-05-09 PROCEDURE — 83880 ASSAY OF NATRIURETIC PEPTIDE: CPT

## 2020-05-09 PROCEDURE — 85025 COMPLETE CBC W/AUTO DIFF WBC: CPT

## 2020-05-09 PROCEDURE — 93005 ELECTROCARDIOGRAM TRACING: CPT

## 2020-05-09 RX ORDER — ASPIRIN 325 MG
325 TABLET ORAL
Status: COMPLETED | OUTPATIENT
Start: 2020-05-09 | End: 2020-05-09

## 2020-05-09 RX ORDER — PREDNISONE 5 MG/1
5 TABLET ORAL DAILY
Status: ON HOLD | COMMUNITY
End: 2020-06-22 | Stop reason: HOSPADM

## 2020-05-09 RX ORDER — KETOROLAC TROMETHAMINE 30 MG/ML
10 INJECTION, SOLUTION INTRAMUSCULAR; INTRAVENOUS
Status: COMPLETED | OUTPATIENT
Start: 2020-05-09 | End: 2020-05-09

## 2020-05-09 RX ORDER — BACLOFEN 10 MG/1
5 TABLET ORAL 3 TIMES DAILY
Qty: 11 TABLET | Refills: 0 | Status: ON HOLD | OUTPATIENT
Start: 2020-05-09 | End: 2020-06-22 | Stop reason: HOSPADM

## 2020-05-09 RX ORDER — SULINDAC 150 MG/1
150 TABLET ORAL 2 TIMES DAILY
Qty: 10 TABLET | Refills: 0 | Status: SHIPPED | OUTPATIENT
Start: 2020-05-09 | End: 2020-05-14

## 2020-05-09 RX ADMIN — KETOROLAC TROMETHAMINE 10 MG: 30 INJECTION, SOLUTION INTRAMUSCULAR at 11:05

## 2020-05-09 RX ADMIN — ASPIRIN 325 MG ORAL TABLET 325 MG: 325 PILL ORAL at 11:05

## 2020-05-09 NOTE — ED TRIAGE NOTES
Right and left lateral rib pain started last pm around midnight.  Productive cough - white.  Denies chest pains, sob, n/v/d or fever.  Denies trauma.  Pains worse with deep breathing and cough.

## 2020-05-09 NOTE — ED PROVIDER NOTES
Encounter Date: 2020    SCRIBE #1 NOTE: I, Marjan June, am scribing for, and in the presence of,  Spencer Cardona DNP. I have scribed the following portions of the note - Other sections scribed: HPI, ROS, PE.       History     Chief Complaint   Patient presents with    Abdominal Pain     pt report right and left side pain starting last night worse with deep breathing      This is a 79 y.o. male with no pertinent PMHx who presents to the Emergency Department with a cc of bilateral abdominal pain x10 hours. Pt reports associated mild cough. Pt denies any SOB, nausea, emesis, diarrhea, constipation, fever, or chills. Symptoms are worsened by inhaling deeply.  There are no alleviating or aggravating factors with the exception of standing up for long periods causes worsening pain.  Current severity of pain is 9/10.        The history is provided by the patient. No  was used.     Review of patient's allergies indicates:   Allergen Reactions    Lisinopril      Past Medical History:   Diagnosis Date    Asthma     Atrial fibrillation     COPD (chronic obstructive pulmonary disease)     Hypertension      History reviewed. No pertinent surgical history.  History reviewed. No pertinent family history.  Social History     Tobacco Use    Smoking status: Former Smoker     Packs/day: 1.50     Years: 20.00     Pack years: 30.00     Last attempt to quit:      Years since quittin.3    Smokeless tobacco: Former User   Substance Use Topics    Alcohol use: No    Drug use: No     Review of Systems   Constitutional: Negative for appetite change, chills, diaphoresis, fatigue and fever.   HENT: Negative for congestion, ear discharge, ear pain, postnasal drip, rhinorrhea, sinus pressure, sneezing, sore throat and voice change.    Eyes: Negative for discharge, itching and visual disturbance.   Respiratory: Positive for cough. Negative for shortness of breath and wheezing.    Cardiovascular:  Negative for chest pain, palpitations and leg swelling.   Gastrointestinal: Positive for abdominal pain. Negative for constipation, diarrhea, nausea and vomiting.   Endocrine: Negative for polydipsia, polyphagia and polyuria.   Genitourinary: Negative for difficulty urinating, discharge, dysuria, frequency, hematuria, penile pain, penile swelling and urgency.   Musculoskeletal: Negative for arthralgias, back pain and myalgias.   Skin: Negative for rash and wound.   Neurological: Negative for dizziness, seizures, syncope, weakness and headaches.   Hematological: Negative for adenopathy. Does not bruise/bleed easily.   Psychiatric/Behavioral: Negative for agitation and self-injury. The patient is not nervous/anxious.        Physical Exam     Initial Vitals [05/09/20 1031]   BP Pulse Resp Temp SpO2   (!) 161/89 89 18 98.5 °F (36.9 °C) 96 %      MAP       --         Physical Exam    Constitutional: He appears well-developed and well-nourished.   HENT:   Head: Normocephalic and atraumatic.   Eyes: EOM are normal. Pupils are equal, round, and reactive to light.   Neck: Normal range of motion. Neck supple.   Cardiovascular: Normal rate and regular rhythm. Exam reveals no gallop and no friction rub.    No murmur heard.  Pulmonary/Chest: Breath sounds normal. No respiratory distress.   Abdominal: Soft. Bowel sounds are normal. There is no tenderness.   Musculoskeletal:   Spine is atraumatic, without step-offs or tenderness.    Neurological: He is alert and oriented to person, place, and time. GCS score is 15. GCS eye subscore is 4. GCS verbal subscore is 5. GCS motor subscore is 6.   Skin: Skin is warm and dry. Capillary refill takes less than 2 seconds.         ED Course   Procedures  Labs Reviewed   CBC W/ AUTO DIFFERENTIAL - Abnormal; Notable for the following components:       Result Value    Hemoglobin 12.7 (*)     Mean Corpuscular Hemoglobin 26.5 (*)     Mean Corpuscular Hemoglobin Conc 30.5 (*)     RDW 14.9 (*)      Gran% 76.7 (*)     Lymph% 16.3 (*)     All other components within normal limits   COMPREHENSIVE METABOLIC PANEL - Abnormal; Notable for the following components:    Glucose 206 (*)     eGFR if  54 (*)     eGFR if non  47 (*)     All other components within normal limits   TROPONIN I   B-TYPE NATRIURETIC PEPTIDE   SARS-COV-2 RNA AMPLIFICATION, QUAL    Narrative:     What symptom criteria does the patient meet?->Cough  What symptom criteria does the patient meet?->Muscle pain   LIPASE     EKG Readings: (Independently Interpreted)   This patient is in a normal sinus rhythm with a sinus arrhythmia.  The heart rate is 88.  There are no significant ST segment or T-wave changes.  The patient has a normal axis.  This patient may have left ventricular hypertrophy.     ECG Results          EKG 12-lead (Final result)  Result time 05/09/20 20:29:18    Final result by Interface, Lab In [facility] (05/09/20 20:29:18)                 Narrative:    Test Reason : R07.9,    Vent. Rate : 088 BPM     Atrial Rate : 088 BPM     P-R Int : 132 ms          QRS Dur : 078 ms      QT Int : 360 ms       P-R-T Axes : 059 070 065 degrees     QTc Int : 435 ms    Normal sinus rhythm with sinus arrhythmia  Voltage criteria for left ventricular hypertrophy  Cannot rule out Septal infarct ,age undetermined  Abnormal ECG  When compared with ECG of 28-APR-2020 14:50,  Significant changes have occurred  Confirmed by Inocencio Noble MD ([id number]) on 5/9/2020 8:29:09 PM    Referred By: [email or web address]   SELF           Confirmed By:Inocencio Noble MD                            Imaging Results          X-Ray Chest AP Portable (Final result)  Result time 05/09/20 13:11:46    Final result by Lj Figueroa MD (05/09/20 13:11:46)                 Impression:      No detrimental change or radiographic acute intrathoracic process seen on this single view.      Electronically signed by: Lj Figueroa MD  Date:    05/09/2020  Time:    13:11              Narrative:    EXAMINATION:  XR CHEST AP PORTABLE    CLINICAL HISTORY:  Chest pain, unspecified    TECHNIQUE:  Single frontal view of the chest was performed.    COMPARISON:  Chest radiograph 04/28/2020    FINDINGS:  Monitoring leads overlie the chest.  Patient is slightly rotated.    No detrimental change.  Cardiomediastinal silhouette is midline and within normal limits for age.  Pulmonary vasculature and hilar contours are within normal limits.  Few scattered linear opacities at the bilateral lung bases suggesting platelike scarring versus atelectasis.  The lungs are otherwise well expanded without focal consolidation, pleural effusion or pneumothorax.  No acute osseous process seen.  PA and lateral views can be obtained.                                       APC / Resident Notes:   Patient is an 79-year-old male who reports bilateral mid back pain to bilateral solar plexi.  He states the pain is worse with standing.  He denies shortness of breath, nausea or vomiting, diaphoresis, rash, all other complaints.  On physical exam the patient is afebrile and nontoxic in no apparent distress with reassuring vital signs.  Physical assessment is without abnormality.  Due to the patient's advanced age I will work him up for both abdominal pain as well as chest pain.  Differential diagnosis includes musculoskeletal strain, ACS MI, DVT PE, covd-19, influenza, pneumonia.       Scribe Attestation:   Scribe #1: I performed the above scribed service and the documentation accurately describes the services I performed. I attest to the accuracy of the note.            ED Course as of May 10 0917   Sat May 09, 2020   1126 CBC: leukocyte count was normal, the H&H was reduced. The platelet count was normal. This indicates mild anemia.       CBC auto differential(!) [VC]   1140 Troponin I: 0.012 [VC]   1140 SARS-CoV-2 RNA, Amplification, Qual: Negative [VC]   1140 Lipase: 50 [VC]   1150 Cmp indicates mild hyperglycemia. Otherwise  normal   Comprehensive metabolic panel(!) [VC]   1151 Lipase: 50 [VC]   1151 BNP: 58 [VC]   1151 SARS-CoV-2 RNA, Amplification, Qual: Negative [VC]   1151 Troponin I: 0.012 [VC]   1201 Awaiting cxr only    [VC]   1335 No detrimental change or radiographic acute intrathoracic process seen on this single view.     X-Ray Chest AP Portable [VC]      ED Course User Index  [VC] Spencer Cardona DNP     Patient is discharged home in good condition to follow up with primary care provider.  He should return for any worsening or changes in condition.See above for analyses of radiology, labs, and events during pt's visit and direct actions taken. Symptomatic therapies and return precautions on AVS.   Medication choices were made after reviewing allergies, medications, history, available laboratories. See below for discharge prescriptions if any and disposition.             Clinical Impression:       ICD-10-CM ICD-9-CM   1. Back pain, unspecified back location, unspecified back pain laterality, unspecified chronicity M54.9 724.5   2. Chest pain R07.9 786.50         Disposition:   Disposition: Discharged  Condition: Stable     Scribe attestation: ALMA STRICKLAND DNP ACNP-BC FNP-C  , personally performed the services described in this documentation. All medical record entries made by the scribe were at my direction and in my presence.  I have reviewed the chart and agree that the record reflects my personal performance and is accurate and complete.     ED Disposition Condition    Discharge Stable        ED Prescriptions     Medication Sig Dispense Start Date End Date Auth. Provider    sulindac (CLINORIL) 150 MG tablet Take 1 tablet (150 mg total) by mouth 2 (two) times daily. for 5 days 10 tablet 5/9/2020 5/14/2020 Spencer Cardona DNP    baclofen (LIORESAL) 10 MG tablet Take 0.5 tablets (5 mg total) by mouth 3 (three) times daily. for 7 days 11 tablet 5/9/2020 5/16/2020 Spencer Cardona DNP        Follow-up Information      Follow up With Specialties Details Why Contact Info    Chelsy Torrez MD Endocrinology Schedule an appointment as soon as possible for a visit  As needed [address] [address]  [phone]                                       Spencer Cardona DNP  05/10/20 0917

## 2020-05-09 NOTE — DISCHARGE INSTRUCTIONS
You have been given an anti-inflammatory (clinoril (sulindac)) prescription.  While taking this medication, do not use ibuprofen, motrin, advil, aleve, or any other anti-inflammatory. You have been given a muscle relaxer (baclofen) prescription. While taking this medication, do not drive, operate heavy machinery or  drink alcohol.Return to the Emergency department for any worsening or failure to improve, otherwise follow up with your primary care provider.  Do not take medications prescribed for home use until at least 8h after injections given in ED.

## 2020-06-12 ENCOUNTER — HOSPITAL ENCOUNTER (EMERGENCY)
Facility: HOSPITAL | Age: 80
Discharge: HOME | End: 2020-06-12
Attending: EMERGENCY MEDICINE
Payer: MEDICARE

## 2020-06-12 VITALS
DIASTOLIC BLOOD PRESSURE: 94 MMHG | HEART RATE: 101 BPM | SYSTOLIC BLOOD PRESSURE: 182 MMHG | OXYGEN SATURATION: 95 % | WEIGHT: 156 LBS | HEIGHT: 65 IN | RESPIRATION RATE: 18 BRPM | BODY MASS INDEX: 25.99 KG/M2 | TEMPERATURE: 100 F

## 2020-06-12 DIAGNOSIS — R73.9 HYPERGLYCEMIA: Primary | ICD-10-CM

## 2020-06-12 LAB
ALBUMIN SERPL BCP-MCNC: 3.6 G/DL (ref 3.5–5.2)
ALP SERPL-CCNC: 79 U/L (ref 55–135)
ALT SERPL W/O P-5'-P-CCNC: 19 U/L (ref 10–44)
ANION GAP SERPL CALC-SCNC: 11 MMOL/L (ref 8–16)
AST SERPL-CCNC: 23 U/L (ref 10–40)
B-OH-BUTYR BLD STRIP-SCNC: 0.2 MMOL/L (ref 0–0.5)
BACTERIA #/AREA URNS HPF: ABNORMAL /HPF
BASOPHILS # BLD AUTO: 0.01 K/UL (ref 0–0.2)
BASOPHILS NFR BLD: 0.1 % (ref 0–1.9)
BILIRUB SERPL-MCNC: 0.6 MG/DL (ref 0.1–1)
BILIRUB UR QL STRIP: NEGATIVE
BUN SERPL-MCNC: 25 MG/DL (ref 8–23)
CALCIUM SERPL-MCNC: 9.1 MG/DL (ref 8.7–10.5)
CHLORIDE SERPL-SCNC: 96 MMOL/L (ref 95–110)
CLARITY UR: CLEAR
CO2 SERPL-SCNC: 26 MMOL/L (ref 23–29)
COLOR UR: YELLOW
CREAT SERPL-MCNC: 1.6 MG/DL (ref 0.5–1.4)
DIFFERENTIAL METHOD: ABNORMAL
EOSINOPHIL # BLD AUTO: 0 K/UL (ref 0–0.5)
EOSINOPHIL NFR BLD: 0 % (ref 0–8)
ERYTHROCYTE [DISTWIDTH] IN BLOOD BY AUTOMATED COUNT: 14 % (ref 11.5–14.5)
EST. GFR  (AFRICAN AMERICAN): 46 ML/MIN/1.73 M^2
EST. GFR  (NON AFRICAN AMERICAN): 40 ML/MIN/1.73 M^2
GLUCOSE SERPL-MCNC: 356 MG/DL (ref 70–110)
GLUCOSE UR QL STRIP: ABNORMAL
HCT VFR BLD AUTO: 39.6 % (ref 40–54)
HGB BLD-MCNC: 12 G/DL (ref 14–18)
HGB UR QL STRIP: ABNORMAL
IMM GRANULOCYTES # BLD AUTO: 0.08 K/UL (ref 0–0.04)
IMM GRANULOCYTES NFR BLD AUTO: 0.9 % (ref 0–0.5)
KETONES UR QL STRIP: NEGATIVE
LEUKOCYTE ESTERASE UR QL STRIP: NEGATIVE
LYMPHOCYTES # BLD AUTO: 0.9 K/UL (ref 1–4.8)
LYMPHOCYTES NFR BLD: 9.5 % (ref 18–48)
MCH RBC QN AUTO: 25.4 PG (ref 27–31)
MCHC RBC AUTO-ENTMCNC: 30.3 G/DL (ref 32–36)
MCV RBC AUTO: 84 FL (ref 82–98)
MICROSCOPIC COMMENT: ABNORMAL
MONOCYTES # BLD AUTO: 0.6 K/UL (ref 0.3–1)
MONOCYTES NFR BLD: 6.1 % (ref 4–15)
NEUTROPHILS # BLD AUTO: 7.5 K/UL (ref 1.8–7.7)
NEUTROPHILS NFR BLD: 83.4 % (ref 38–73)
NITRITE UR QL STRIP: NEGATIVE
NRBC BLD-RTO: 0 /100 WBC
PH UR STRIP: 5 [PH] (ref 5–8)
PLATELET # BLD AUTO: 178 K/UL (ref 150–350)
PMV BLD AUTO: 11.5 FL (ref 9.2–12.9)
POCT GLUCOSE: 338 MG/DL (ref 70–110)
POCT GLUCOSE: 361 MG/DL (ref 70–110)
POTASSIUM SERPL-SCNC: 4.4 MMOL/L (ref 3.5–5.1)
PROT SERPL-MCNC: 8.2 G/DL (ref 6–8.4)
PROT UR QL STRIP: NEGATIVE
RBC # BLD AUTO: 4.73 M/UL (ref 4.6–6.2)
RBC #/AREA URNS HPF: 3 /HPF (ref 0–4)
SODIUM SERPL-SCNC: 133 MMOL/L (ref 136–145)
SP GR UR STRIP: 1.02 (ref 1–1.03)
URN SPEC COLLECT METH UR: ABNORMAL
UROBILINOGEN UR STRIP-ACNC: NEGATIVE EU/DL
WBC # BLD AUTO: 8.96 K/UL (ref 3.9–12.7)
WBC #/AREA URNS HPF: 2 /HPF (ref 0–5)
YEAST URNS QL MICRO: ABNORMAL

## 2020-06-12 PROCEDURE — 81000 URINALYSIS NONAUTO W/SCOPE: CPT

## 2020-06-12 PROCEDURE — 82962 GLUCOSE BLOOD TEST: CPT | Mod: 91

## 2020-06-12 PROCEDURE — 25000003 PHARM REV CODE 250: Performed by: NURSE PRACTITIONER

## 2020-06-12 PROCEDURE — 99284 EMERGENCY DEPT VISIT MOD MDM: CPT | Mod: 25

## 2020-06-12 PROCEDURE — 80053 COMPREHEN METABOLIC PANEL: CPT

## 2020-06-12 PROCEDURE — 82010 KETONE BODYS QUAN: CPT

## 2020-06-12 PROCEDURE — 83036 HEMOGLOBIN GLYCOSYLATED A1C: CPT

## 2020-06-12 PROCEDURE — 85025 COMPLETE CBC W/AUTO DIFF WBC: CPT

## 2020-06-12 RX ORDER — SODIUM CHLORIDE 9 MG/ML
500 INJECTION, SOLUTION INTRAVENOUS
Status: COMPLETED | OUTPATIENT
Start: 2020-06-12 | End: 2020-06-12

## 2020-06-12 RX ADMIN — SODIUM CHLORIDE 500 ML: 0.9 INJECTION, SOLUTION INTRAVENOUS at 05:06

## 2020-06-12 NOTE — ED PROVIDER NOTES
"Encounter Date: 2020       History     Chief Complaint   Patient presents with    Urinary Frequency     Pt c/o frequent urination for the passed 3 days. he states "everytime he drinks water about 5-10mins later he has to pee and feels too fast wants to know whats wrong. No pain, No discoloration, No odor, No Burning, no Flank pain.     CC: Urinary frequency    HPI: 79 year old male with COPD presents with urinary frequency x 2 days.  He reports increased thirst and nocturia. Denies flank pain, abdominal pain, dysuria, hematuria, nausea, vomiting, diarrhea, headache, dizziness, blurred vision. Denies history of diabetes.    The history is provided by the patient. No  was used.     Review of patient's allergies indicates:   Allergen Reactions    Lisinopril      Swells lower legs.     Past Medical History:   Diagnosis Date    Asthma     Atrial fibrillation     COPD (chronic obstructive pulmonary disease)     Hypertension      History reviewed. No pertinent surgical history.  History reviewed. No pertinent family history.  Social History     Tobacco Use    Smoking status: Former Smoker     Packs/day: 1.50     Years: 20.00     Pack years: 30.00     Last attempt to quit:      Years since quittin.4    Smokeless tobacco: Former User   Substance Use Topics    Alcohol use: No    Drug use: No     Review of Systems   Constitutional: Negative for chills and fever.   HENT: Negative for sore throat.    Respiratory: Negative for shortness of breath.    Cardiovascular: Negative for chest pain.   Gastrointestinal: Negative for nausea.   Genitourinary: Positive for frequency. Negative for decreased urine volume, difficulty urinating, dysuria, enuresis, flank pain, hematuria, penile swelling, scrotal swelling, testicular pain and urgency.   Musculoskeletal: Negative for back pain.   Skin: Negative for rash.   Neurological: Negative for dizziness, weakness, light-headedness and numbness. "   Hematological: Does not bruise/bleed easily.       Physical Exam     Initial Vitals [06/12/20 1615]   BP Pulse Resp Temp SpO2   132/83 108 16 98.3 °F (36.8 °C) 95 %      MAP       --         Physical Exam    Vitals reviewed.  Constitutional: He appears well-developed and well-nourished. He is not diaphoretic.  Non-toxic appearance. He does not have a sickly appearance. He does not appear ill. No distress.   HENT:   Head: Normocephalic and atraumatic.   Right Ear: External ear normal.   Left Ear: External ear normal.   Moist mucous membranes.   Neck: Normal range of motion.   Cardiovascular: Normal rate, regular rhythm, normal heart sounds and intact distal pulses.   Pulmonary/Chest: Breath sounds normal. No respiratory distress.   No respiratory distress.  No Kussmaul breathing.   Abdominal: Soft. Bowel sounds are normal.   Abdomen soft and nontender.   Musculoskeletal: Normal range of motion.   Neurological: He is alert and oriented to person, place, and time. GCS eye subscore is 4. GCS verbal subscore is 5. GCS motor subscore is 6.   Skin: Skin is warm, dry and intact. No rash noted. No erythema.   Psychiatric: He has a normal mood and affect. Thought content normal.         ED Course   Procedures  Labs Reviewed   URINALYSIS, REFLEX TO URINE CULTURE - Abnormal; Notable for the following components:       Result Value    Glucose, UA 3+ (*)     Occult Blood UA 1+ (*)     All other components within normal limits    Narrative:     Preferred Collection Type->Urine, Clean Catch   URINALYSIS MICROSCOPIC - Abnormal; Notable for the following components:    Bacteria Few (*)     All other components within normal limits    Narrative:     Preferred Collection Type->Urine, Clean Catch   POCT GLUCOSE MONITORING CONTINUOUS          Imaging Results    None          Medical Decision Making:   ED Management:  7-year-old male with COPD presenting for evaluation of urinary frequency.  He reports associated polydipsia.  No  fatigue, chest pain, shortness breath, abdominal pain.  No history of diabetes.  Urinalysis without infection, however he has 3+ glucose.  POCT .  No signs of DKA on CMP.  Creatinine mildly elevated at 1 6.  GFR 46.  Likely from dehydration.  Given 500 cc fluid.  Upon patient chart review, he has been prescribed oral steroids frequently for COPD exacerbations.  He is also prescribed 30 days of prednisone by his PCP.  I believe this is likely causing his hyperglycemia.  Beta hydroxybutyrate 0.2.  Hemoglobin A1c pending.  Discussed with the patient he will discontinue oral steroids.  He will follow-up with his PCP Monday.  Instructed to return to emergency for for any new or worsening symptoms.  Patient verbalized understanding.     This case was discussed with my attending physician who agrees with my plan of care.                                 Clinical Impression:       ICD-10-CM ICD-9-CM   1. Hyperglycemia R73.9 790.29         Disposition:   Disposition: Discharged  Condition: Stable                        So Darden NP  06/12/20 1931

## 2020-06-12 NOTE — ED TRIAGE NOTES
Pt presents to the ED via personal transportation reporting urinary frequency x 3 days. Pt denies any other complaints such as burning upon urination, flank pain, odor with urine, or hx of diabetes. In NAD.

## 2020-06-13 LAB
ESTIMATED AVG GLUCOSE: 275 MG/DL (ref 68–131)
HBA1C MFR BLD HPLC: 11.2 % (ref 4–5.6)

## 2020-06-13 NOTE — DISCHARGE INSTRUCTIONS
Stop taking prednisone.  Follow-up with your PCP Monday.    Please return to the Emergency Department for any new or worsening symptoms including: fever, chest pain, shortness of breath, loss of consciousness, dizziness, weakness, or any other concerns.     Please follow up with your Primary Care Provider within in the week. If you do not have one, you may contact the one listed on your discharge paperwork or you may also call the [facility] Appointment Desk at [phone] to schedule an appointment with one.     Please take all medication as prescribed.

## 2020-06-19 ENCOUNTER — HOSPITAL ENCOUNTER (INPATIENT)
Facility: HOSPITAL | Age: 80
LOS: 4 days | Discharge: HOME | DRG: 872 | End: 2020-06-23
Attending: EMERGENCY MEDICINE | Admitting: FAMILY MEDICINE
Payer: MEDICARE

## 2020-06-19 DIAGNOSIS — R78.81 BACTEREMIA DUE TO ESCHERICHIA COLI: ICD-10-CM

## 2020-06-19 DIAGNOSIS — J90 PLEURAL EFFUSION: ICD-10-CM

## 2020-06-19 DIAGNOSIS — R78.81 BACTEREMIA: Primary | ICD-10-CM

## 2020-06-19 DIAGNOSIS — B96.20 BACTEREMIA DUE TO ESCHERICHIA COLI: ICD-10-CM

## 2020-06-19 PROBLEM — E11.9 DIABETES MELLITUS, NEW ONSET: Status: ACTIVE | Noted: 2020-06-19

## 2020-06-19 PROBLEM — R73.9 HYPERGLYCEMIA: Status: ACTIVE | Noted: 2020-06-19

## 2020-06-19 PROBLEM — N30.90 CYSTITIS: Status: ACTIVE | Noted: 2020-06-19

## 2020-06-19 PROBLEM — J44.9 COPD (CHRONIC OBSTRUCTIVE PULMONARY DISEASE): Status: ACTIVE | Noted: 2020-06-19

## 2020-06-19 LAB
ALBUMIN SERPL BCP-MCNC: 3.1 G/DL (ref 3.5–5.2)
ALP SERPL-CCNC: 74 U/L (ref 55–135)
ALT SERPL W/O P-5'-P-CCNC: 23 U/L (ref 10–44)
ANION GAP SERPL CALC-SCNC: 7 MMOL/L (ref 8–16)
AST SERPL-CCNC: 21 U/L (ref 10–40)
B-OH-BUTYR BLD STRIP-SCNC: 0.6 MMOL/L (ref 0–0.5)
BACTERIA #/AREA URNS HPF: ABNORMAL /HPF
BASOPHILS # BLD AUTO: 0 K/UL (ref 0–0.2)
BASOPHILS NFR BLD: 0 % (ref 0–1.9)
BILIRUB SERPL-MCNC: 0.7 MG/DL (ref 0.1–1)
BILIRUB UR QL STRIP: NEGATIVE
BNP SERPL-MCNC: 32 PG/ML (ref 0–99)
BUN SERPL-MCNC: 19 MG/DL (ref 8–23)
CALCIUM SERPL-MCNC: 8.6 MG/DL (ref 8.7–10.5)
CHLORIDE SERPL-SCNC: 98 MMOL/L (ref 95–110)
CLARITY UR: CLEAR
CO2 SERPL-SCNC: 29 MMOL/L (ref 23–29)
COLOR UR: YELLOW
CREAT SERPL-MCNC: 1.5 MG/DL (ref 0.5–1.4)
DIFFERENTIAL METHOD: ABNORMAL
EOSINOPHIL # BLD AUTO: 0 K/UL (ref 0–0.5)
EOSINOPHIL NFR BLD: 0 % (ref 0–8)
ERYTHROCYTE [DISTWIDTH] IN BLOOD BY AUTOMATED COUNT: 13.9 % (ref 11.5–14.5)
EST. GFR  (AFRICAN AMERICAN): 50 ML/MIN/1.73 M^2
EST. GFR  (NON AFRICAN AMERICAN): 43 ML/MIN/1.73 M^2
GLUCOSE SERPL-MCNC: 375 MG/DL (ref 70–110)
GLUCOSE UR QL STRIP: ABNORMAL
HCT VFR BLD AUTO: 36.8 % (ref 40–54)
HGB BLD-MCNC: 10.9 G/DL (ref 14–18)
HGB UR QL STRIP: NEGATIVE
IMM GRANULOCYTES # BLD AUTO: 0.02 K/UL (ref 0–0.04)
IMM GRANULOCYTES NFR BLD AUTO: 0.3 % (ref 0–0.5)
INR PPP: 0.9 (ref 0.8–1.2)
KETONES UR QL STRIP: NEGATIVE
LACTATE SERPL-SCNC: 0.9 MMOL/L (ref 0.5–2.2)
LACTATE SERPL-SCNC: 1.2 MMOL/L (ref 0.5–2.2)
LEUKOCYTE ESTERASE UR QL STRIP: ABNORMAL
LYMPHOCYTES # BLD AUTO: 1.2 K/UL (ref 1–4.8)
LYMPHOCYTES NFR BLD: 18.5 % (ref 18–48)
MCH RBC QN AUTO: 25.2 PG (ref 27–31)
MCHC RBC AUTO-ENTMCNC: 29.6 G/DL (ref 32–36)
MCV RBC AUTO: 85 FL (ref 82–98)
MICROSCOPIC COMMENT: ABNORMAL
MONOCYTES # BLD AUTO: 0.6 K/UL (ref 0.3–1)
MONOCYTES NFR BLD: 9.8 % (ref 4–15)
NEUTROPHILS # BLD AUTO: 4.4 K/UL (ref 1.8–7.7)
NEUTROPHILS NFR BLD: 71.4 % (ref 38–73)
NITRITE UR QL STRIP: NEGATIVE
NRBC BLD-RTO: 0 /100 WBC
PH UR STRIP: 5 [PH] (ref 5–8)
PLATELET # BLD AUTO: 220 K/UL (ref 150–350)
PMV BLD AUTO: 13.1 FL (ref 9.2–12.9)
POCT GLUCOSE: 236 MG/DL (ref 70–110)
POTASSIUM SERPL-SCNC: 4 MMOL/L (ref 3.5–5.1)
PROCALCITONIN SERPL IA-MCNC: 0.63 NG/ML
PROT SERPL-MCNC: 7.6 G/DL (ref 6–8.4)
PROT UR QL STRIP: NEGATIVE
PROTHROMBIN TIME: 10.2 SEC (ref 9–12.5)
RBC # BLD AUTO: 4.33 M/UL (ref 4.6–6.2)
RBC #/AREA URNS HPF: 3 /HPF (ref 0–4)
SARS-COV-2 RDRP RESP QL NAA+PROBE: NEGATIVE
SODIUM SERPL-SCNC: 134 MMOL/L (ref 136–145)
SP GR UR STRIP: 1.02 (ref 1–1.03)
SQUAMOUS #/AREA URNS HPF: 3 /HPF
T4 FREE SERPL-MCNC: 1.11 NG/DL (ref 0.71–1.51)
TROPONIN I SERPL DL<=0.01 NG/ML-MCNC: <0.006 NG/ML (ref 0–0.03)
TROPONIN I SERPL DL<=0.01 NG/ML-MCNC: <0.006 NG/ML (ref 0–0.03)
TSH SERPL DL<=0.005 MIU/L-ACNC: 0.25 UIU/ML (ref 0.4–4)
URN SPEC COLLECT METH UR: ABNORMAL
UROBILINOGEN UR STRIP-ACNC: NEGATIVE EU/DL
WBC # BLD AUTO: 6.21 K/UL (ref 3.9–12.7)
WBC #/AREA URNS HPF: 9 /HPF (ref 0–5)
YEAST URNS QL MICRO: ABNORMAL

## 2020-06-19 PROCEDURE — 82010 KETONE BODYS QUAN: CPT

## 2020-06-19 PROCEDURE — 25000003 PHARM REV CODE 250: Performed by: PHYSICIAN ASSISTANT

## 2020-06-19 PROCEDURE — 87186 SC STD MICRODIL/AGAR DIL: CPT

## 2020-06-19 PROCEDURE — 80053 COMPREHEN METABOLIC PANEL: CPT

## 2020-06-19 PROCEDURE — 63600175 PHARM REV CODE 636 W HCPCS: Performed by: PHYSICIAN ASSISTANT

## 2020-06-19 PROCEDURE — 84439 ASSAY OF FREE THYROXINE: CPT

## 2020-06-19 PROCEDURE — 87077 CULTURE AEROBIC IDENTIFY: CPT

## 2020-06-19 PROCEDURE — 99285 EMERGENCY DEPT VISIT HI MDM: CPT | Mod: 25

## 2020-06-19 PROCEDURE — 85610 PROTHROMBIN TIME: CPT

## 2020-06-19 PROCEDURE — 81000 URINALYSIS NONAUTO W/SCOPE: CPT

## 2020-06-19 PROCEDURE — 27000221 HC OXYGEN, UP TO 24 HOURS

## 2020-06-19 PROCEDURE — 84484 ASSAY OF TROPONIN QUANT: CPT

## 2020-06-19 PROCEDURE — 11000001 HC ACUTE MED/SURG PRIVATE ROOM

## 2020-06-19 PROCEDURE — 93005 ELECTROCARDIOGRAM TRACING: CPT

## 2020-06-19 PROCEDURE — 84443 ASSAY THYROID STIM HORMONE: CPT

## 2020-06-19 PROCEDURE — 84481 FREE ASSAY (FT-3): CPT

## 2020-06-19 PROCEDURE — 84484 ASSAY OF TROPONIN QUANT: CPT | Mod: 91

## 2020-06-19 PROCEDURE — 93010 EKG 12-LEAD: ICD-10-PCS | Mod: ,,, | Performed by: INTERNAL MEDICINE

## 2020-06-19 PROCEDURE — 94640 AIRWAY INHALATION TREATMENT: CPT

## 2020-06-19 PROCEDURE — 83880 ASSAY OF NATRIURETIC PEPTIDE: CPT

## 2020-06-19 PROCEDURE — 87040 BLOOD CULTURE FOR BACTERIA: CPT | Mod: 59

## 2020-06-19 PROCEDURE — 94761 N-INVAS EAR/PLS OXIMETRY MLT: CPT

## 2020-06-19 PROCEDURE — U0002 COVID-19 LAB TEST NON-CDC: HCPCS

## 2020-06-19 PROCEDURE — 83605 ASSAY OF LACTIC ACID: CPT | Mod: 91

## 2020-06-19 PROCEDURE — 25000242 PHARM REV CODE 250 ALT 637 W/ HCPCS: Performed by: PHYSICIAN ASSISTANT

## 2020-06-19 PROCEDURE — 85025 COMPLETE CBC W/AUTO DIFF WBC: CPT

## 2020-06-19 PROCEDURE — 36415 COLL VENOUS BLD VENIPUNCTURE: CPT

## 2020-06-19 PROCEDURE — 93010 ELECTROCARDIOGRAM REPORT: CPT | Mod: ,,, | Performed by: INTERNAL MEDICINE

## 2020-06-19 PROCEDURE — 83605 ASSAY OF LACTIC ACID: CPT

## 2020-06-19 PROCEDURE — 84145 PROCALCITONIN (PCT): CPT

## 2020-06-19 RX ORDER — HYDRALAZINE HYDROCHLORIDE 25 MG/1
50 TABLET, FILM COATED ORAL EVERY 12 HOURS
Status: DISCONTINUED | OUTPATIENT
Start: 2020-06-19 | End: 2020-06-23 | Stop reason: HOSPADM

## 2020-06-19 RX ORDER — INSULIN ASPART 100 [IU]/ML
0-5 INJECTION, SOLUTION INTRAVENOUS; SUBCUTANEOUS
Status: DISCONTINUED | OUTPATIENT
Start: 2020-06-19 | End: 2020-06-23 | Stop reason: HOSPADM

## 2020-06-19 RX ORDER — ENOXAPARIN SODIUM 100 MG/ML
40 INJECTION SUBCUTANEOUS EVERY 24 HOURS
Status: DISCONTINUED | OUTPATIENT
Start: 2020-06-19 | End: 2020-06-23 | Stop reason: HOSPADM

## 2020-06-19 RX ORDER — IPRATROPIUM BROMIDE AND ALBUTEROL SULFATE 2.5; .5 MG/3ML; MG/3ML
3 SOLUTION RESPIRATORY (INHALATION) EVERY 4 HOURS PRN
Status: DISCONTINUED | OUTPATIENT
Start: 2020-06-19 | End: 2020-06-23 | Stop reason: HOSPADM

## 2020-06-19 RX ORDER — ASPIRIN 81 MG/1
81 TABLET ORAL DAILY
Status: DISCONTINUED | OUTPATIENT
Start: 2020-06-20 | End: 2020-06-23 | Stop reason: HOSPADM

## 2020-06-19 RX ORDER — ALBUTEROL SULFATE 90 UG/1
2 AEROSOL, METERED RESPIRATORY (INHALATION) EVERY 4 HOURS PRN
Status: CANCELLED | OUTPATIENT
Start: 2020-06-19

## 2020-06-19 RX ORDER — IPRATROPIUM BROMIDE AND ALBUTEROL SULFATE 2.5; .5 MG/3ML; MG/3ML
3 SOLUTION RESPIRATORY (INHALATION) ONCE
Status: COMPLETED | OUTPATIENT
Start: 2020-06-19 | End: 2020-06-19

## 2020-06-19 RX ORDER — ACETAMINOPHEN 325 MG/1
650 TABLET ORAL EVERY 8 HOURS PRN
Status: DISCONTINUED | OUTPATIENT
Start: 2020-06-19 | End: 2020-06-23 | Stop reason: HOSPADM

## 2020-06-19 RX ORDER — IBUPROFEN 200 MG
16 TABLET ORAL
Status: DISCONTINUED | OUTPATIENT
Start: 2020-06-19 | End: 2020-06-23 | Stop reason: HOSPADM

## 2020-06-19 RX ORDER — METOPROLOL SUCCINATE 25 MG/1
25 TABLET, EXTENDED RELEASE ORAL 2 TIMES DAILY
Status: DISCONTINUED | OUTPATIENT
Start: 2020-06-19 | End: 2020-06-23 | Stop reason: HOSPADM

## 2020-06-19 RX ORDER — FLUTICASONE FUROATE AND VILANTEROL 100; 25 UG/1; UG/1
1 POWDER RESPIRATORY (INHALATION) DAILY
Status: DISCONTINUED | OUTPATIENT
Start: 2020-06-20 | End: 2020-06-23 | Stop reason: HOSPADM

## 2020-06-19 RX ORDER — SODIUM CHLORIDE 0.9 % (FLUSH) 0.9 %
10 SYRINGE (ML) INJECTION
Status: DISCONTINUED | OUTPATIENT
Start: 2020-06-19 | End: 2020-06-23 | Stop reason: HOSPADM

## 2020-06-19 RX ORDER — IBUPROFEN 200 MG
24 TABLET ORAL
Status: DISCONTINUED | OUTPATIENT
Start: 2020-06-19 | End: 2020-06-23 | Stop reason: HOSPADM

## 2020-06-19 RX ORDER — GLUCAGON 1 MG
1 KIT INJECTION
Status: DISCONTINUED | OUTPATIENT
Start: 2020-06-19 | End: 2020-06-23 | Stop reason: HOSPADM

## 2020-06-19 RX ORDER — HYDRALAZINE HYDROCHLORIDE 20 MG/ML
10 INJECTION INTRAMUSCULAR; INTRAVENOUS EVERY 6 HOURS PRN
Status: DISCONTINUED | OUTPATIENT
Start: 2020-06-19 | End: 2020-06-23 | Stop reason: HOSPADM

## 2020-06-19 RX ADMIN — IPRATROPIUM BROMIDE AND ALBUTEROL SULFATE 3 ML: .5; 3 SOLUTION RESPIRATORY (INHALATION) at 08:06

## 2020-06-19 RX ADMIN — ACETAMINOPHEN 650 MG: 325 TABLET ORAL at 09:06

## 2020-06-19 RX ADMIN — METOPROLOL SUCCINATE 25 MG: 25 TABLET, EXTENDED RELEASE ORAL at 09:06

## 2020-06-19 RX ADMIN — ENOXAPARIN SODIUM 40 MG: 40 INJECTION SUBCUTANEOUS at 09:06

## 2020-06-19 RX ADMIN — INSULIN ASPART 2 UNITS: 100 INJECTION, SOLUTION INTRAVENOUS; SUBCUTANEOUS at 10:06

## 2020-06-19 RX ADMIN — HYDRALAZINE HYDROCHLORIDE 50 MG: 25 TABLET ORAL at 09:06

## 2020-06-19 RX ADMIN — PIPERACILLIN SODIUM AND TAZOBACTAM SODIUM 4.5 G: 4; .5 INJECTION, POWDER, LYOPHILIZED, FOR SOLUTION INTRAVENOUS at 06:06

## 2020-06-19 NOTE — ED TRIAGE NOTES
Patient reports being called by MD office to come into the ER secondary to infection noted in his blood. Patient SOB after walking from , and states has been having CP that started this AM.

## 2020-06-19 NOTE — ED PROVIDER NOTES
Encounter Date: 2020    SCRIBE #1 NOTE: I, Shivam Sanchez, am scribing for, and in the presence of,  Chris Abel PA-C. I have scribed the following portions of the note - Other sections scribed: HPI, ROS.       History     Chief Complaint   Patient presents with    Abnormal Lab     states md called stating he has bacteria in blood.    Chest Pain     left sided chest pain started this am.  non-radiating.  denies n/v/d or fever.  denies sob.   worse with deep breathing.     CC: Abnormal Lab    HPI: This 78 y/o male with PMHx HTN, COPD, asthma, a-fib presents to ED for evaluation of abnormal labs.  Pt reports he was called by MD office today and referred to the ED secondary to positive blood cultures that were drawn 3 days ago.  States he felt well upon his d/c 3 days ago.  He c/o 8/10 L-sided CP starting this morning that is worse with deep breathing.  No alleviating factors.  He denies any fever, chills, cough, myalgias.  No back pain, dysuria.  No HA.  No rashes.    The history is provided by the patient and medical records. No  was used.     Review of patient's allergies indicates:   Allergen Reactions    Lisinopril      Swells lower legs.     Past Medical History:   Diagnosis Date    Asthma     Atrial fibrillation     COPD (chronic obstructive pulmonary disease)     Hypertension      No past surgical history on file.  History reviewed. No pertinent family history.  Social History     Tobacco Use    Smoking status: Former Smoker     Packs/day: 1.50     Years: 20.00     Pack years: 30.00     Quit date:      Years since quittin.4    Smokeless tobacco: Former User   Substance Use Topics    Alcohol use: No    Drug use: No     Review of Systems   Constitutional: Negative for chills and fever.   HENT: Negative for sore throat.    Eyes: Negative for visual disturbance.   Respiratory: Negative for cough and shortness of breath.    Cardiovascular: Positive for chest pain.    Gastrointestinal: Negative for abdominal pain.   Genitourinary: Negative for dysuria.   Musculoskeletal: Negative for back pain and myalgias.   Skin: Negative for rash.   Neurological: Negative for headaches.       Physical Exam     Initial Vitals [06/19/20 1623]   BP Pulse Resp Temp SpO2   (!) 141/70 96 18 99.6 °F (37.6 °C) 96 %      MAP       --         Physical Exam    Vitals reviewed.  Constitutional: He appears well-developed and well-nourished. He is not diaphoretic. No distress.   HENT:   Head: Normocephalic and atraumatic.   Right Ear: External ear normal.   Left Ear: External ear normal.   Nose: Nose normal.   Eyes: Conjunctivae are normal. No scleral icterus.   Neck: Normal range of motion. Neck supple. No JVD present.   Cardiovascular: Normal rate, regular rhythm, normal heart sounds and intact distal pulses.   Pulmonary/Chest: Breath sounds normal. No respiratory distress. He has no wheezes. He has no rhonchi. He has no rales. He exhibits no tenderness.   Abdominal: Soft. He exhibits no distension. There is no abdominal tenderness. There is no rebound.   Musculoskeletal: Normal range of motion.   Neurological: He is alert and oriented to person, place, and time.   Skin: Skin is warm and dry. No rash noted. No erythema.         ED Course   Procedures  Labs Reviewed   CBC W/ AUTO DIFFERENTIAL - Abnormal; Notable for the following components:       Result Value    RBC 4.33 (*)     Hemoglobin 10.9 (*)     Hematocrit 36.8 (*)     Mean Corpuscular Hemoglobin 25.2 (*)     Mean Corpuscular Hemoglobin Conc 29.6 (*)     MPV 13.1 (*)     All other components within normal limits   CULTURE, BLOOD   CULTURE, BLOOD   SARS-COV-2 RNA AMPLIFICATION, QUAL   COMPREHENSIVE METABOLIC PANEL   LACTIC ACID, PLASMA   URINALYSIS, REFLEX TO URINE CULTURE   TROPONIN I   B-TYPE NATRIURETIC PEPTIDE   URINALYSIS MICROSCOPIC          Imaging Results          X-Ray Chest AP Portable (Final result)  Result time 06/19/20 17:00:20     Final result by Fani Dillon MD (06/19/20 17:00:20)                 Impression:      Mild blunting at the left costophrenic angle suggestive of small amount of pleural fluid with some mild atelectasis.      Electronically signed by: Fani Dillon MD  Date:    06/19/2020  Time:    17:00             Narrative:    EXAMINATION:  XR CHEST AP PORTABLE    CLINICAL HISTORY:  Sepsis;    TECHNIQUE:  Single frontal view of the chest was performed.    COMPARISON:  June 16, 2020    FINDINGS:  Heart size is not enlarged.  There is mild blunting at the left costophrenic angle suggesting a small amount of pleural fluid.  There is no right pleural effusion.  There is mild atelectasis at the left lung base.  There is no focal consolidation.  Osseous structures are unremarkable.                              X-Rays:   Independently Interpreted Readings:   Chest X-Ray: Slight blunting of the left costophrenic angle     Medical Decision Making:   Clinical Tests:   Lab Tests: Ordered and Reviewed  Radiological Study: Ordered and Reviewed  Medical Tests: Ordered and Reviewed  ED Management:  79-year-old male with COPD, AFib, hypertension presents to the ED for evaluation of positive blood cultures (E coli x2).  He has some mild left lateral chest pain, but denies shortness of breath, fever, or rigors.  He is well-appearing in no distress.  Recent ED visit with shortness of breath.  Chest x-ray with blunting of left costophrenic angle, with no obvious infiltrates.  Will order blood cultures, start broad-spectrum antibiotics, and admit to hospital medicine.  Case discussed with hospital medicine physician Dr. Galvan.  Orders placed on his behalf.  Patient is amenable to admit.  Case also discussed with ED attending Dr. Hardin.            Scribe Attestation:   Scribe #1: I performed the above scribed service and the documentation accurately describes the services I performed. I attest to the accuracy of the note.                           Clinical Impression:       ICD-10-CM ICD-9-CM   1. Bacteremia  R78.81 790.7       I, Chris Abel, personally performed the services described in this documentation. All medical record entries made by the scribe were at my direction and in my presence.  I have reviewed the chart and agree that the record reflects my personal performance and is accurate and complete.      ED Disposition Condition    Admit                           Chris Abel, YING  06/19/20 3855

## 2020-06-19 NOTE — HPI
80yo M with atrial fibrillation, COPD, HTN, presents to the ED due to recently collected positive blood cultures. Pt was seen in this ED on 6/16 due to SOB 2/2 acute COPD exacerbation. He did have a low grade fever which prompted blood cultures. Cultures came back + E. Coli with good sensitivities. He avidly denies fever, chills, myalgias, unintentional weight loss, nausea, vomiting, abdominal pain, fatigue, weakness, change in appetite. He denies any dysuria, strong odor to urine, or flank pain; he admits to increased urinary frequency, but states not a new complaint. No new rashes. No new cough. He states he has been doing well at home with his neb treatments and daily asthma medications.     He does admit to 3 day hx of L sided chest pain, only during deep inspiration. No CP at rest. No SOB at rest. He admits to baseline GERMAN, not worsened.  Denies leg swelling or calf pain. No hx VTE.  Denies trauma.

## 2020-06-20 LAB
ANION GAP SERPL CALC-SCNC: 6 MMOL/L (ref 8–16)
AORTIC ROOT ANNULUS: 2.79 CM
AORTIC VALVE CUSP SEPERATION: 1.94 CM
AV INDEX (PROSTH): 0.58
AV MEAN GRADIENT: 7 MMHG
AV PEAK GRADIENT: 14 MMHG
AV VALVE AREA: 2.38 CM2
AV VELOCITY RATIO: 0.85
BASOPHILS # BLD AUTO: 0 K/UL (ref 0–0.2)
BASOPHILS NFR BLD: 0 % (ref 0–1.9)
BSA FOR ECHO PROCEDURE: 1.76 M2
BUN SERPL-MCNC: 14 MG/DL (ref 8–23)
CALCIUM SERPL-MCNC: 8.5 MG/DL (ref 8.7–10.5)
CHLORIDE SERPL-SCNC: 102 MMOL/L (ref 95–110)
CO2 SERPL-SCNC: 30 MMOL/L (ref 23–29)
CREAT SERPL-MCNC: 1.2 MG/DL (ref 0.5–1.4)
CV ECHO LV RWT: 0.4 CM
DIFFERENTIAL METHOD: ABNORMAL
DOP CALC AO PEAK VEL: 1.88 M/S
DOP CALC AO VTI: 26.71 CM
DOP CALC LVOT AREA: 4.1 CM2
DOP CALC LVOT DIAMETER: 2.28 CM
DOP CALC LVOT PEAK VEL: 1.59 M/S
DOP CALC LVOT STROKE VOLUME: 63.5 CM3
DOP CALCLVOT PEAK VEL VTI: 15.56 CM
E WAVE DECELERATION TIME: 162.88 MSEC
E/A RATIO: 0.9
ECHO LV POSTERIOR WALL: 0.92 CM (ref 0.6–1.1)
EOSINOPHIL # BLD AUTO: 0 K/UL (ref 0–0.5)
EOSINOPHIL NFR BLD: 0 % (ref 0–8)
ERYTHROCYTE [DISTWIDTH] IN BLOOD BY AUTOMATED COUNT: 13.8 % (ref 11.5–14.5)
EST. GFR  (AFRICAN AMERICAN): >60 ML/MIN/1.73 M^2
EST. GFR  (NON AFRICAN AMERICAN): 57 ML/MIN/1.73 M^2
FRACTIONAL SHORTENING: 30 % (ref 28–44)
GLUCOSE SERPL-MCNC: 195 MG/DL (ref 70–110)
HCT VFR BLD AUTO: 33.8 % (ref 40–54)
HGB BLD-MCNC: 10 G/DL (ref 14–18)
IMM GRANULOCYTES # BLD AUTO: 0.02 K/UL (ref 0–0.04)
IMM GRANULOCYTES NFR BLD AUTO: 0.5 % (ref 0–0.5)
INTERVENTRICULAR SEPTUM: 0.86 CM (ref 0.6–1.1)
IVRT: 102.76 MSEC
LA MAJOR: 4.48 CM
LA MINOR: 4.51 CM
LA WIDTH: 3.68 CM
LACTATE SERPL-SCNC: 0.7 MMOL/L (ref 0.5–2.2)
LACTATE SERPL-SCNC: 0.8 MMOL/L (ref 0.5–2.2)
LEFT ATRIUM SIZE: 3.27 CM
LEFT ATRIUM VOLUME INDEX: 26.3 ML/M2
LEFT ATRIUM VOLUME: 45.98 CM3
LEFT INTERNAL DIMENSION IN SYSTOLE: 3.24 CM (ref 2.1–4)
LEFT VENTRICLE DIASTOLIC VOLUME INDEX: 56.77 ML/M2
LEFT VENTRICLE DIASTOLIC VOLUME: 99.23 ML
LEFT VENTRICLE MASS INDEX: 79 G/M2
LEFT VENTRICLE SYSTOLIC VOLUME INDEX: 24.1 ML/M2
LEFT VENTRICLE SYSTOLIC VOLUME: 42.12 ML
LEFT VENTRICULAR INTERNAL DIMENSION IN DIASTOLE: 4.64 CM (ref 3.5–6)
LEFT VENTRICULAR MASS: 137.64 G
LYMPHOCYTES # BLD AUTO: 1.2 K/UL (ref 1–4.8)
LYMPHOCYTES NFR BLD: 28.2 % (ref 18–48)
MCH RBC QN AUTO: 25.7 PG (ref 27–31)
MCHC RBC AUTO-ENTMCNC: 29.6 G/DL (ref 32–36)
MCV RBC AUTO: 87 FL (ref 82–98)
MONOCYTES # BLD AUTO: 0.4 K/UL (ref 0.3–1)
MONOCYTES NFR BLD: 9.3 % (ref 4–15)
MV PEAK A VEL: 1.05 M/S
MV PEAK E VEL: 0.95 M/S
MV STENOSIS PRESSURE HALF TIME: 45.61 MS
MV VALVE AREA P 1/2 METHOD: 4.82 CM2
NEUTROPHILS # BLD AUTO: 2.7 K/UL (ref 1.8–7.7)
NEUTROPHILS NFR BLD: 62 % (ref 38–73)
NRBC BLD-RTO: 0 /100 WBC
PISA TR MAX VEL: 3.35 M/S
PLATELET # BLD AUTO: 208 K/UL (ref 150–350)
PMV BLD AUTO: 11.1 FL (ref 9.2–12.9)
POCT GLUCOSE: 199 MG/DL (ref 70–110)
POCT GLUCOSE: 261 MG/DL (ref 70–110)
POCT GLUCOSE: 267 MG/DL (ref 70–110)
POCT GLUCOSE: 298 MG/DL (ref 70–110)
POTASSIUM SERPL-SCNC: 4.1 MMOL/L (ref 3.5–5.1)
PULM VEIN S/D RATIO: 1.59
PV PEAK D VEL: 0.44 M/S
PV PEAK S VEL: 0.7 M/S
PV PEAK VELOCITY: 1.63 CM/S
RA MAJOR: 4.57 CM
RA PRESSURE: 3 MMHG
RA WIDTH: 2.87 CM
RBC # BLD AUTO: 3.89 M/UL (ref 4.6–6.2)
RIGHT VENTRICULAR END-DIASTOLIC DIMENSION: 3.28 CM
SODIUM SERPL-SCNC: 138 MMOL/L (ref 136–145)
STJ: 2.59 CM
T3FREE SERPL-MCNC: 2.6 PG/ML (ref 2.3–4.2)
TR MAX PG: 45 MMHG
TRICUSPID ANNULAR PLANE SYSTOLIC EXCURSION: 1.65 CM
TROPONIN I SERPL DL<=0.01 NG/ML-MCNC: <0.006 NG/ML (ref 0–0.03)
TV REST PULMONARY ARTERY PRESSURE: 48 MMHG
WBC # BLD AUTO: 4.39 K/UL (ref 3.9–12.7)

## 2020-06-20 PROCEDURE — 25000242 PHARM REV CODE 250 ALT 637 W/ HCPCS: Performed by: PHYSICIAN ASSISTANT

## 2020-06-20 PROCEDURE — 94799 UNLISTED PULMONARY SVC/PX: CPT

## 2020-06-20 PROCEDURE — 84484 ASSAY OF TROPONIN QUANT: CPT

## 2020-06-20 PROCEDURE — 94640 AIRWAY INHALATION TREATMENT: CPT

## 2020-06-20 PROCEDURE — 80048 BASIC METABOLIC PNL TOTAL CA: CPT

## 2020-06-20 PROCEDURE — 94761 N-INVAS EAR/PLS OXIMETRY MLT: CPT

## 2020-06-20 PROCEDURE — 36415 COLL VENOUS BLD VENIPUNCTURE: CPT

## 2020-06-20 PROCEDURE — 87088 URINE BACTERIA CULTURE: CPT

## 2020-06-20 PROCEDURE — 87186 SC STD MICRODIL/AGAR DIL: CPT

## 2020-06-20 PROCEDURE — 85025 COMPLETE CBC W/AUTO DIFF WBC: CPT

## 2020-06-20 PROCEDURE — 87077 CULTURE AEROBIC IDENTIFY: CPT

## 2020-06-20 PROCEDURE — 63600175 PHARM REV CODE 636 W HCPCS: Performed by: PHYSICIAN ASSISTANT

## 2020-06-20 PROCEDURE — C9399 UNCLASSIFIED DRUGS OR BIOLOG: HCPCS | Performed by: FAMILY MEDICINE

## 2020-06-20 PROCEDURE — 83605 ASSAY OF LACTIC ACID: CPT

## 2020-06-20 PROCEDURE — 25000003 PHARM REV CODE 250: Performed by: FAMILY MEDICINE

## 2020-06-20 PROCEDURE — 11000001 HC ACUTE MED/SURG PRIVATE ROOM

## 2020-06-20 PROCEDURE — 87086 URINE CULTURE/COLONY COUNT: CPT

## 2020-06-20 PROCEDURE — 25000003 PHARM REV CODE 250: Performed by: PHYSICIAN ASSISTANT

## 2020-06-20 PROCEDURE — 27000221 HC OXYGEN, UP TO 24 HOURS

## 2020-06-20 RX ADMIN — PIPERACILLIN SODIUM AND TAZOBACTAM SODIUM 4.5 G: 4; .5 INJECTION, POWDER, LYOPHILIZED, FOR SOLUTION INTRAVENOUS at 09:06

## 2020-06-20 RX ADMIN — METOPROLOL SUCCINATE 25 MG: 25 TABLET, EXTENDED RELEASE ORAL at 09:06

## 2020-06-20 RX ADMIN — HYDRALAZINE HYDROCHLORIDE 50 MG: 25 TABLET ORAL at 09:06

## 2020-06-20 RX ADMIN — PIPERACILLIN SODIUM AND TAZOBACTAM SODIUM 4.5 G: 4; .5 INJECTION, POWDER, LYOPHILIZED, FOR SOLUTION INTRAVENOUS at 02:06

## 2020-06-20 RX ADMIN — FLUTICASONE FUROATE AND VILANTEROL TRIFENATATE 1 PUFF: 100; 25 POWDER RESPIRATORY (INHALATION) at 08:06

## 2020-06-20 RX ADMIN — PIPERACILLIN SODIUM AND TAZOBACTAM SODIUM 4.5 G: 4; .5 INJECTION, POWDER, LYOPHILIZED, FOR SOLUTION INTRAVENOUS at 06:06

## 2020-06-20 RX ADMIN — ASPIRIN 81 MG: 81 TABLET, COATED ORAL at 09:06

## 2020-06-20 RX ADMIN — ENOXAPARIN SODIUM 40 MG: 40 INJECTION SUBCUTANEOUS at 04:06

## 2020-06-20 RX ADMIN — INSULIN ASPART 3 UNITS: 100 INJECTION, SOLUTION INTRAVENOUS; SUBCUTANEOUS at 05:06

## 2020-06-20 RX ADMIN — INSULIN DETEMIR 8 UNITS: 100 INJECTION, SOLUTION SUBCUTANEOUS at 09:06

## 2020-06-20 RX ADMIN — IPRATROPIUM BROMIDE AND ALBUTEROL SULFATE 3 ML: .5; 3 SOLUTION RESPIRATORY (INHALATION) at 08:06

## 2020-06-20 RX ADMIN — INSULIN ASPART 3 UNITS: 100 INJECTION, SOLUTION INTRAVENOUS; SUBCUTANEOUS at 12:06

## 2020-06-20 NOTE — SUBJECTIVE & OBJECTIVE
Past Medical History:   Diagnosis Date    Asthma     Atrial fibrillation     COPD (chronic obstructive pulmonary disease)     Hypertension        No past surgical history on file.    Review of patient's allergies indicates:   Allergen Reactions    Lisinopril      Swells lower legs.       No current facility-administered medications on file prior to encounter.      Current Outpatient Medications on File Prior to Encounter   Medication Sig    albuterol (PROVENTIL) 2.5 mg /3 mL (0.083 %) nebulizer solution Take 3 mLs (2.5 mg total) by nebulization every 4 (four) hours as needed for Wheezing or Shortness of Breath. Take 3mLs (2.5mg total) by nebulization TID x 4 days.  Then 3 mLs (2.5mg total) by nebulization every 6 (six) hours as needed.    albuterol (PROVENTIL/VENTOLIN HFA) 90 mcg/actuation inhaler Inhale 2 puffs into the lungs every 4 (four) hours as needed for Wheezing or Shortness of Breath. Rescue    aspirin (ECOTRIN) 81 MG EC tablet Take 81 mg by mouth once daily.    fluticasone-salmeterol diskus inhaler 250-50 mcg Inhale 1 puff into the lungs 2 (two) times daily. Controller    hydrALAZINE (APRESOLINE) 50 MG tablet Take 1 tablet (50 mg total) by mouth every 12 (twelve) hours.    ipratropium-albuterol (COMBIVENT)  mcg/actuation inhaler Inhale 1 puff into the lungs every 6 (six) hours as needed for Wheezing. Rescue    metoprolol succinate (TOPROL-XL) 25 MG 24 hr tablet Take 25 mg by mouth 2 (two) times daily.    predniSONE (DELTASONE) 5 MG tablet Take 5 mg by mouth once daily.    baclofen (LIORESAL) 10 MG tablet Take 0.5 tablets (5 mg total) by mouth 3 (three) times daily. for 7 days     Family History     None        Tobacco Use    Smoking status: Former Smoker     Packs/day: 1.50     Years: 20.00     Pack years: 30.00     Quit date:      Years since quittin.    Smokeless tobacco: Former User   Substance and Sexual Activity    Alcohol use: No    Drug use: No    Sexual activity:  Never     Review of Systems   Constitutional: Negative for activity change, appetite change, chills, fatigue and fever.   Eyes: Negative for visual disturbance.   Respiratory: Positive for wheezing. Negative for cough, chest tightness and shortness of breath.         L-sided chest pain with deep inspiration   Cardiovascular: Negative for chest pain, palpitations and leg swelling.   Gastrointestinal: Negative for abdominal pain, blood in stool, diarrhea, nausea and vomiting.   Genitourinary: Positive for frequency. Negative for difficulty urinating, dysuria, flank pain and hematuria.   Musculoskeletal: Negative for myalgias, neck pain and neck stiffness.   Skin: Negative for rash and wound.   Neurological: Negative for dizziness, syncope, weakness, light-headedness and headaches.     Objective:     Vital Signs (Most Recent):  Temp: 99.6 °F (37.6 °C) (06/19/20 1623)  Pulse: 92 (06/19/20 1801)  Resp: (!) 25 (06/19/20 1801)  BP: (!) 155/88 (06/19/20 1801)  SpO2: 97 % (06/19/20 1801) Vital Signs (24h Range):  Temp:  [99.6 °F (37.6 °C)] 99.6 °F (37.6 °C)  Pulse:  [92-96] 92  Resp:  [18-30] 25  SpO2:  [96 %-98 %] 97 %  BP: (141-157)/(70-90) 155/88     Weight: 70.3 kg (155 lb)  Body mass index is 25.79 kg/m².    Physical Exam  Vitals signs and nursing note reviewed.   Constitutional:       General: He is not in acute distress.     Appearance: Normal appearance. He is not ill-appearing, toxic-appearing or diaphoretic.      Comments: Well-appearing, nontoxic. Resting comfortably on exam table. Speaking in full sentences without pause or difficulty.   HENT:      Head: Normocephalic and atraumatic.      Mouth/Throat:      Mouth: Mucous membranes are moist.   Eyes:      Extraocular Movements: Extraocular movements intact.      Comments: Arcus senilis   Neck:      Musculoskeletal: Normal range of motion and neck supple.      Comments: No JVD.  Cardiovascular:      Rate and Rhythm: Normal rate and regular rhythm.      Heart  sounds: No murmur.      Comments: NSR.    1+ pitting edema bilaterally. No unilateral leg swelling or calf tenderness.  Pulmonary:      Comments: No hypoxia on room air. Tachypneic @ approx 26-28/min. Prolonged expir phase. Decreased BS throughout bilateral lungs, expir wheeze to PRAMOD zone. Supraclavicular retractions present.  Musculoskeletal: Normal range of motion.   Skin:     General: Skin is warm.   Neurological:      General: No focal deficit present.      Mental Status: He is alert and oriented to person, place, and time.   Psychiatric:         Mood and Affect: Mood normal.         Behavior: Behavior normal.         Thought Content: Thought content normal.         Judgment: Judgment normal.             Significant Labs:   A1C:   Recent Labs   Lab 01/07/20  0355 06/12/20  1713   HGBA1C 5.8* 11.2*     Blood Culture: No results for input(s): LABBLOO in the last 48 hours.  CBC:   Recent Labs   Lab 06/19/20  1700   WBC 6.21   HGB 10.9*   HCT 36.8*        CMP:   Recent Labs   Lab 06/19/20  1755   *   K 4.0   CL 98   CO2 29   *   BUN 19   CREATININE 1.5*   CALCIUM 8.6*   PROT 7.6   ALBUMIN 3.1*   BILITOT 0.7   ALKPHOS 74   AST 21   ALT 23   ANIONGAP 7*   EGFRNONAA 43*     Cardiac Markers:   Recent Labs   Lab 06/19/20  1755   BNP 32     Lactic Acid:   Recent Labs   Lab 06/19/20  1755   LACTATE 1.2     Troponin:   Recent Labs   Lab 06/19/20  1755   TROPONINI <0.006     Urine Culture: No results for input(s): LABURIN in the last 48 hours.  Urine Studies:   Recent Labs   Lab 06/19/20  1650   COLORU Yellow   APPEARANCEUA Clear   PHUR 5.0   SPECGRAV 1.025   PROTEINUA Negative   GLUCUA 3+*   KETONESU Negative   BILIRUBINUA Negative   OCCULTUA Negative   NITRITE Negative   UROBILINOGEN Negative   LEUKOCYTESUR Trace*   RBCUA 3   WBCUA 9*   BACTERIA Many*   SQUAMEPITHEL 3     All pertinent labs within the past 24 hours have been reviewed.    Significant Imaging: I have reviewed and interpreted all pertinent  imaging results/findings within the past 24 hours.     EKG:  Normal sinus rhythm, ventricular rate 93 beats per minute.  No evidence of acute ischemia, arrhythmia, heart block.  No ST elevation.  .  Q-wave V1, V5, V6.  EKG grossly similar previous dated 06/19/2020.    CXR:  FINDINGS:  Heart size is not enlarged.  There is mild blunting at the left costophrenic angle suggesting a small amount of pleural fluid.  There is no right pleural effusion.  There is mild atelectasis at the left lung base.  There is no focal consolidation.  Osseous structures are unremarkable.     Impression:     Mild blunting at the left costophrenic angle suggestive of small amount of pleural fluid with some mild atelectasis.    Echo 2/13/16:  CONCLUSIONS     1 - Normal left ventricular systolic function (EF 60-65%).     2 - Normal left ventricular diastolic function.     3 - Normal right ventricular systolic function.

## 2020-06-20 NOTE — ASSESSMENT & PLAN NOTE
With glucosuria and increased urinary frequency  Denies recent steroids  Last documented use was prednisone burst through 5/3  Daily Advair BID  a1C 11.2 on 6/12  No hx DM  Sliding scale while inpatient  DM education  DM diet

## 2020-06-20 NOTE — RESPIRATORY THERAPY
Pt refusing ABG at this time; stated he had been stuck 4 or 5 times already today and did not want to be stuck again. Pt currently on 2LNC with SpO2 of 98%; denies home oxygen use or shortness of breath.

## 2020-06-20 NOTE — ASSESSMENT & PLAN NOTE
Small L sided pleural effusion, no hx CHF  No obvious consolidation on imaging to suggest respiratory infection  Likely contributing to SOB given c/o new L sided CP--EKG without evidence of acute ischemia, troponin negative  Unlikely source of +culture given E. Coli growth  Normal BNP  Echo in AM

## 2020-06-20 NOTE — PLAN OF CARE
06/20/20 1333   Discharge Assessment   Assessment Type Discharge Planning Assessment   Confirmed/corrected address and phone number on facesheet? Yes   Assessment information obtained from? Medical Record;Patient   Communicated expected length of stay with patient/caregiver no   Prior to hospitilization cognitive status: Alert/Oriented   Prior to hospitalization functional status: Independent   Current cognitive status: Alert/Oriented   Current Functional Status: Independent   Facility Arrived From: Home   Lives With spouse   Able to Return to Prior Arrangements yes   Is patient able to care for self after discharge? Yes   Who are your caregiver(s) and their phone number(s)? Nura-spouse: [phone]   Patient's perception of discharge disposition home or selfcare   Readmission Within the Last 30 Days planned readmission   If yes, most recent facility name: [facility]   Patient currently being followed by outpatient case management? No   Patient currently receives any other outside agency services? No   Equipment Currently Used at Home none   Do you have any problems affording any of your prescribed medications? No   Is the patient taking medications as prescribed? yes   Does the patient have transportation home? Yes   Transportation Anticipated car, drives self;family or friend will provide   Does the patient receive services at the [facility]? No   Discharge Plan A Home with family   Discharge Plan B   (TBD)   DME Needed Upon Discharge  none   Patient/Family in Agreement with Plan yes   Readmission Questionnaire   At the time of your discharge, did someone talk to you about what your health problems were? Yes   At the time of discharge, did someone talk to you about what to watch out for regarding worsening of your health problem? Yes   At the time of discharge, did someone talk to you about what to do if you experienced worsening of your health problem? Yes   At the time of discharge, did someone talk to you  about which medication to take when you left the hospital and which ones to stop taking? Yes   At the time of discharge, did someone talk to you about when and where to follow up with a doctor after you left the hospital? Yes   What do you believe caused you to be sick enough to be re-admitted? Unknown   How often do you need to have someone help you when you read instructions, pamphlets, or other written material from your doctor or pharmacy? Sometimes   Do you have problems taking your medications as prescribed? No   Do you have any problems affording any of  your prescribed medications? No   Do you have problems obtaining/receiving your medications? No   Does the patient have transportation to healthcare appointments? Yes   Living Arrangements apartment   Does the patient have family/friends to help with healtcare needs after discharge? yes   Does your caregiver provide all the help you need? Yes   If no, what kind of help do you need at home? None really   SW Role explained to patient; two patient identifiers recognized; SW contact information placed on Communication board. Discussed patient managing health care at home; determined who would be helping patient at home with recovery: Susanne-spouse is help at home    PCP: Chelsy Torrez MD Prefers morning appointments    Extended Emergency Contact Information  Primary Emergency Contact: MehranSusanne  Address: [address] [place] [address] of [facility]  Home Phone: [phone]  Relation: Spouse     [id number] - [place] - [address] [facility]  [address] [place] [address]  Phone: [phone] Fax: [phone]   Payor: [employer] MANAGED MEDICARE / Plan: [employer] CHOICES 65 / Product Type: Medicare Advantage /

## 2020-06-20 NOTE — ASSESSMENT & PLAN NOTE
With acute exacerbation on initial exam  Denies SOB, no hypoxia  Continue Q4 nebs prn  Continuous pulse oximetry

## 2020-06-20 NOTE — PROGRESS NOTES
"[facility] Hospital Medicine  Progress Note    Patient Name: Micah Laurent Jr.  MRN: [id number]  Date of Admission: 6/19/2020  4:30 PM  Length of Stay: 1  Attending: Guillermo Galvan MD  Primary Care Provider: Chelsy Torrez MD        Subjective:     Pt stable, in NAD.  Denies any new complaints.  Pt informed about being a new diabetic    Objective:     BP (!) 141/77 (BP Location: Right arm, Patient Position: Lying)   Pulse 85   Temp 98.6 °F (37 °C) (Oral)   Resp 20   Ht 5' 5" (1.651 m)   Wt 67.8 kg (149 lb 7.6 oz)   SpO2 96%   BMI 24.87 kg/m²     Physical Exam:  Physical Exam    General: Stable, No acute distress.  HEENT: MMM, EOMI  Lymph: No cervical lymphadenopathy  Cardiovascular: Regular rate and rhythm, with normal S1 and S2. No murmurs, rubs,  or gallops. No JVD.  Lungs: Symmetric air movement, no wheezing, no rales.    Abdomen:Normoactive bowel sounds. Soft, flat, non-tender, and non-distended.  Skin: Warm, dry, well-perfused.   Extremities: No lower extremity pain; legs are symmetric in appearance. Mild edema  Psych: Appropriate affect.       Recent Labs   Lab 06/20/20  0629   WBC 4.39   RBC 3.89*   HGB 10.0*   HCT 33.8*      MCV 87   MCH 25.7*   MCHC 29.6*     Recent Labs   Lab 06/20/20  0629      K 4.1      CO2 30*   BUN 14   CREATININE 1.2      Microbiology Results (last 7 days)     Procedure Component Value Units Date/Time    Blood culture x two cultures. Draw prior to antibiotics. [[id number]] Collected: 06/19/20 1755    Order Status: Completed Specimen: Blood from Peripheral, Hand, Right Updated: 06/20/20 0814     Blood Culture, Routine Gram stain aer bottle: Gram negative rods      Gram stain grace bottle: Gram negative rods      Results called to and read back by:SIMBA STREETER 06/20/2020  08:13    Narrative:      Aerobic and anaerobic    Urine Culture - High Risk **CANNOT BE ORDERED STAT** [[id number]] Collected: 06/20/20 0244    Order Status: Sent Specimen: " Urine, Clean Catch Updated: 06/20/20 0326    Blood culture x two cultures. Draw prior to antibiotics. [[id number]] Collected: 06/19/20 1700    Order Status: Completed Specimen: Blood from Peripheral, Antecubital, Right Updated: 06/20/20 0312     Blood Culture, Routine No Growth to date    Narrative:      Aerobic and anaerobic           No results found in the last 24 hours.  Current Outpatient Medications   Medication Instructions    albuterol (PROVENTIL) 2.5 mg, Nebulization, Every 4 hours PRN, Take 3mLs (2.5mg total) by nebulization TID x 4 days.  Then 3 mLs (2.5mg total) by nebulization every 6 (six) hours as needed.    albuterol (PROVENTIL/VENTOLIN HFA) 90 mcg/actuation inhaler 2 puffs, Inhalation, Every 4 hours PRN, Rescue    aspirin (ECOTRIN) 81 mg, Daily    baclofen (LIORESAL) 5 mg, Oral, 3 times daily    fluticasone-salmeterol diskus inhaler 250-50 mcg 1 puff, Inhalation, 2 times daily, Controller    hydrALAZINE (APRESOLINE) 50 mg, Oral, Every 12 hours    ipratropium-albuterol (COMBIVENT)  mcg/actuation inhaler 1 puff, Inhalation, Every 6 hours PRN, Rescue    metoprolol succinate (TOPROL-XL) 25 mg, 2 times daily    predniSONE (DELTASONE) 5 mg, Oral, Daily        Current Facility-Administered Medications:     acetaminophen tablet 650 mg, 650 mg, Oral, Q8H PRN, Chris Abel PA-C, 650 mg at 06/19/20 2106    acetaminophen tablet 650 mg, 650 mg, Oral, Q8H PRN, Chris Abel PA-C    albuterol-ipratropium 2.5 mg-0.5 mg/3 mL nebulizer solution 3 mL, 3 mL, Nebulization, Q4H PRN, Oseas Duque PA-C, 3 mL at 06/20/20 0805    aspirin EC tablet 81 mg, 81 mg, Oral, Daily, Chris Abel PA-C, 81 mg at 06/20/20 0903    dextrose 50% injection 12.5 g, 12.5 g, Intravenous, PRN, Oseas Duque PA-C    dextrose 50% injection 25 g, 25 g, Intravenous, PRN, Oseas Duque PA-C    enoxaparin injection 40 mg, 40 mg, Subcutaneous, Q24H, Oseas Duque PA-C, 40 mg at 06/20/20 1281     fluticasone furoate-vilanteroL 100-25 mcg/dose diskus inhaler 1 puff, 1 puff, Inhalation, Daily, Chris Abel PA-C, 1 puff at 06/20/20 0805    glucagon (human recombinant) injection 1 mg, 1 mg, Intramuscular, PRN, Oseas Duque PA-C    glucose chewable tablet 16 g, 16 g, Oral, PRN, Oseas Duque PA-C    glucose chewable tablet 24 g, 24 g, Oral, PRN, Oseas Duque PA-C    hydrALAZINE injection 10 mg, 10 mg, Intravenous, Q6H PRN, Guillermo Galvan MD    hydrALAZINE tablet 50 mg, 50 mg, Oral, Q12H, Chris Abel PA-C, 50 mg at 06/20/20 0903    insulin aspart U-100 pen 0-5 Units, 0-5 Units, Subcutaneous, QID (AC + HS) PRN, Oseas Duque PA-C, 3 Units at 06/20/20 1211    insulin detemir U-100 pen 8 Units, 8 Units, Subcutaneous, Daily, Guillermo Galvan MD, 8 Units at 06/20/20 0910    metoprolol succinate (TOPROL-XL) 24 hr tablet 25 mg, 25 mg, Oral, BID, Chris Abel PA-C, 25 mg at 06/20/20 0903    piperacillin-tazobactam 4.5 g in sodium chloride 0.9% 100 mL IVPB (ready to mix system), 4.5 g, Intravenous, Q8H, Chris Abel PA-C, Last Rate: 25 mL/hr at 06/20/20 0905, 4.5 g at 06/20/20 0905    promethazine (PHENERGAN) 6.25 mg in dextrose 5 % 50 mL IVPB, 6.25 mg, Intravenous, Q6H PRN, Chris Abel PA-C    sodium chloride 0.9% flush 10 mL, 10 mL, Intravenous, PRN, Chris Abel PA-C  Anticoagulants   Medication Route Frequency    enoxaparin injection 40 mg Subcutaneous Q24H       Problem List:      Active Problem List with Overview Notes    Diagnosis Date Noted    Bacteremia 06/19/2020    COPD (chronic obstructive pulmonary disease) 06/19/2020    Pleural effusion 06/19/2020    Cystitis 06/19/2020    Diabetes mellitus, new onset 06/19/2020    Hypoxia 01/07/2020    Hypercapnia 01/07/2020    COPD exacerbation 09/18/2019    Anemia of chronic disease 09/18/2019    COPD with acute exacerbation 08/28/2019    Angio-edema 05/18/2018    Shortness of breath     Essential hypertension     LANA  (acute kidney injury) 02/14/2016    Pneumonia of left lower lobe due to infectious organism 02/12/2016    Atrial fibrillation     Asthma 03/07/2014    HTN (hypertension), malignant 07/24/2013    Microcytic anemia 07/24/2013    Asthma with exacerbation 07/23/2013       Assessment/Plan:      1. Bacteremia - likely 2/2 urinary source.  E.coli from 6/16 ED cultures.  2 weeks of abx from negative blood culture date.  Continue IV rocephin for now.  F/u repeat blood cultures  2. Pyelonephritis - as above  3. DM2 - new onset this admission.  A1C 11.2.  Was on prednisone as outpatient per records through 5/3/20 for COPD.  DM education, DM diet, AC/HS accuchecks, SSI.  Outpatient regimen to be determined on discharge.  If unable to comply with insulin shots then metformin + glimepiride maybe somewhat a feasible option until pt takes blood sugar logs to PCP.  Given A1C was WNL just in January I am sure his pancrease is still functioning and not burnt out to the point that he will need ongoing insulin.   4. COPD/Asthma - chronic, stable.  duonebs PRN.  Takes advair BID  5. Anemia of chronic disease - monitor  6. HTN - resume home regimen.  PRN hydralazine.  Monitor  7. Global - lovenox for DVT ppx        Guillermo Galvan MD  Department of Hospital Medicine   [facility]

## 2020-06-20 NOTE — NURSING
Pt BG tested per order, 236 result.  Sliding scale ordered and 2 units given.  Pt resting comfortably, Teds and SCDs in place, 2L O2 via nasal cannula, O2 sat 99%.  Pt able to ambulate and void, instructed to call for assistance.  Bed in low position, bed wheels locked, call light in reach for assistance, all needs addressed.  Will continue to monitor.

## 2020-06-20 NOTE — NURSING
Positive blood cultures from 6/19, 2/4 bottles with gram neg rods. Dr. Galvan notified, no new orders given. Will cont to monitor.

## 2020-06-20 NOTE — ASSESSMENT & PLAN NOTE
E. Coli positive blood cultures x 2 from 6/16 visit  Meets sepsis criteria given tachypnea, source, and initial HR  Hemodynamically stable  No hypoxia  Repeat blood cultures  Started on Zosyn  Consult ID

## 2020-06-20 NOTE — NURSING
Report received and patient care assumed. Complains of SOB. O2 sats 89% on room air. Oxygen applied 2 LPM then O2 sats went to 98 %. Wheezing on expiration. Resp notified for neb tx

## 2020-06-20 NOTE — PLAN OF CARE
Problem: Fall Injury Risk  Goal: Absence of Fall and Fall-Related Injury  Outcome: Ongoing, Progressing     Problem: Adult Inpatient Plan of Care  Goal: Plan of Care Review  Outcome: Ongoing, Progressing     Problem: Adult Inpatient Plan of Care  Goal: Patient-Specific Goal (Individualization)  Outcome: Ongoing, Progressing     Problem: Adult Inpatient Plan of Care  Goal: Optimal Comfort and Wellbeing  Outcome: Ongoing, Progressing     Problem: Diabetes Comorbidity  Goal: Blood Glucose Level Within Desired Range  Outcome: Ongoing, Progressing

## 2020-06-20 NOTE — PLAN OF CARE
Problem: Adult Inpatient Plan of Care  Goal: Plan of Care Review  Outcome: Ongoing, Progressing       Pt on 2L NC, O2 sat 94-97%.     Pt free from falls and injury throughout shift. Pt AAOx4. Continued medications as ordered. No complaints of pain . Pt in no distress. Will continue to monitor.

## 2020-06-20 NOTE — H&P
[facility] Medicine  History & Physical    Patient Name: Micah Laurent Jr.  MRN: [id number]  Admission Date: 6/19/2020  Attending Physician: Guillermo Galvan MD   Primary Care Provider: Chelsy Torrez MD         Patient information was obtained from patient, past medical records and ER records.     Subjective:     Principal Problem:Bacteremia    Chief Complaint:   Chief Complaint   Patient presents with    Abnormal Lab     states md called stating he has bacteria in blood.    Chest Pain     left sided chest pain started this am.  non-radiating.  denies n/v/d or fever.  denies sob.   worse with deep breathing.        HPI: 78yo M with pmh asthma, atrial fibrillation, COPD, HTN, presents to the ED due to recently collected positive blood cultures. Pt was seen in this ED on 6/16 due to SOB 2/2 acute COPD exacerbation. He did have a low grade fever which prompted blood cultures. Cultures came back + E. Coli with good sensitivities. He avidly denies fever, chills, myalgias, unintentional weight loss, nausea, vomiting, abdominal pain, fatigue, weakness, change in appetite. He denies any dysuria, strong odor to urine, or flank pain; he admits to increased urinary frequency, but states not a new complaint. No new rashes. No new cough. He states he has been doing well at home with his neb treatments and daily asthma medications.     He does admit to 3 day hx of L sided chest pain, only during deep inspiration. No CP at rest. No SOB at rest. He admits to baseline GERMAN, not worsened.  Denies leg swelling or calf pain. No hx VTE.  Denies trauma.     Past Medical History:   Diagnosis Date    Asthma     Atrial fibrillation     COPD (chronic obstructive pulmonary disease)     Hypertension        No past surgical history on file.    Review of patient's allergies indicates:   Allergen Reactions    Lisinopril      Swells lower legs.       No current facility-administered medications on file prior to  encounter.      Current Outpatient Medications on File Prior to Encounter   Medication Sig    albuterol (PROVENTIL) 2.5 mg /3 mL (0.083 %) nebulizer solution Take 3 mLs (2.5 mg total) by nebulization every 4 (four) hours as needed for Wheezing or Shortness of Breath. Take 3mLs (2.5mg total) by nebulization TID x 4 days.  Then 3 mLs (2.5mg total) by nebulization every 6 (six) hours as needed.    albuterol (PROVENTIL/VENTOLIN HFA) 90 mcg/actuation inhaler Inhale 2 puffs into the lungs every 4 (four) hours as needed for Wheezing or Shortness of Breath. Rescue    aspirin (ECOTRIN) 81 MG EC tablet Take 81 mg by mouth once daily.    fluticasone-salmeterol diskus inhaler 250-50 mcg Inhale 1 puff into the lungs 2 (two) times daily. Controller    hydrALAZINE (APRESOLINE) 50 MG tablet Take 1 tablet (50 mg total) by mouth every 12 (twelve) hours.    ipratropium-albuterol (COMBIVENT)  mcg/actuation inhaler Inhale 1 puff into the lungs every 6 (six) hours as needed for Wheezing. Rescue    metoprolol succinate (TOPROL-XL) 25 MG 24 hr tablet Take 25 mg by mouth 2 (two) times daily.    predniSONE (DELTASONE) 5 MG tablet Take 5 mg by mouth once daily.    baclofen (LIORESAL) 10 MG tablet Take 0.5 tablets (5 mg total) by mouth 3 (three) times daily. for 7 days     Family History     None        Tobacco Use    Smoking status: Former Smoker     Packs/day: 1.50     Years: 20.00     Pack years: 30.00     Quit date:      Years since quittin.    Smokeless tobacco: Former User   Substance and Sexual Activity    Alcohol use: No    Drug use: No    Sexual activity: Never     Review of Systems   Constitutional: Negative for activity change, appetite change, chills, fatigue and fever.   Eyes: Negative for visual disturbance.   Respiratory: Positive for wheezing. Negative for cough, chest tightness and shortness of breath.         L-sided chest pain with deep inspiration   Cardiovascular: Negative for chest pain,  palpitations and leg swelling.   Gastrointestinal: Negative for abdominal pain, blood in stool, diarrhea, nausea and vomiting.   Genitourinary: Positive for frequency. Negative for difficulty urinating, dysuria, flank pain and hematuria.   Musculoskeletal: Negative for myalgias, neck pain and neck stiffness.   Skin: Negative for rash and wound.   Neurological: Negative for dizziness, syncope, weakness, light-headedness and headaches.     Objective:     Vital Signs (Most Recent):  Temp: 99.6 °F (37.6 °C) (06/19/20 1623)  Pulse: 92 (06/19/20 1801)  Resp: (!) 25 (06/19/20 1801)  BP: (!) 155/88 (06/19/20 1801)  SpO2: 97 % (06/19/20 1801) Vital Signs (24h Range):  Temp:  [99.6 °F (37.6 °C)] 99.6 °F (37.6 °C)  Pulse:  [92-96] 92  Resp:  [18-30] 25  SpO2:  [96 %-98 %] 97 %  BP: (141-157)/(70-90) 155/88     Weight: 70.3 kg (155 lb)  Body mass index is 25.79 kg/m².    Physical Exam  Vitals signs and nursing note reviewed.   Constitutional:       General: He is not in acute distress.     Appearance: Normal appearance. He is not ill-appearing, toxic-appearing or diaphoretic.      Comments: Well-appearing, nontoxic. Resting comfortably on exam table. Speaking in full sentences without pause or difficulty.   HENT:      Head: Normocephalic and atraumatic.      Mouth/Throat:      Mouth: Mucous membranes are moist.   Eyes:      Extraocular Movements: Extraocular movements intact.      Comments: Arcus senilis   Neck:      Musculoskeletal: Normal range of motion and neck supple.      Comments: No JVD.  Cardiovascular:      Rate and Rhythm: Normal rate and regular rhythm.      Heart sounds: No murmur.      Comments: NSR.    1+ pitting edema bilaterally. No unilateral leg swelling or calf tenderness.  Pulmonary:      Comments: No hypoxia on room air. Tachypneic @ approx 26-28/min. Prolonged expir phase. Decreased BS throughout bilateral lungs, expir wheeze to PRAMOD zone. Supraclavicular retractions present.  Musculoskeletal: Normal range  of motion.   Skin:     General: Skin is warm.   Neurological:      General: No focal deficit present.      Mental Status: He is alert and oriented to person, place, and time.   Psychiatric:         Mood and Affect: Mood normal.         Behavior: Behavior normal.         Thought Content: Thought content normal.         Judgment: Judgment normal.             Significant Labs:   A1C:   Recent Labs   Lab 01/07/20  0355 06/12/20  1713   HGBA1C 5.8* 11.2*     Blood Culture: No results for input(s): LABBLOO in the last 48 hours.  CBC:   Recent Labs   Lab 06/19/20  1700   WBC 6.21   HGB 10.9*   HCT 36.8*        CMP:   Recent Labs   Lab 06/19/20  1755   *   K 4.0   CL 98   CO2 29   *   BUN 19   CREATININE 1.5*   CALCIUM 8.6*   PROT 7.6   ALBUMIN 3.1*   BILITOT 0.7   ALKPHOS 74   AST 21   ALT 23   ANIONGAP 7*   EGFRNONAA 43*     Cardiac Markers:   Recent Labs   Lab 06/19/20  1755   BNP 32     Lactic Acid:   Recent Labs   Lab 06/19/20  1755   LACTATE 1.2     Troponin:   Recent Labs   Lab 06/19/20  1755   TROPONINI <0.006     Urine Culture: No results for input(s): LABURIN in the last 48 hours.  Urine Studies:   Recent Labs   Lab 06/19/20  1650   COLORU Yellow   APPEARANCEUA Clear   PHUR 5.0   SPECGRAV 1.025   PROTEINUA Negative   GLUCUA 3+*   KETONESU Negative   BILIRUBINUA Negative   OCCULTUA Negative   NITRITE Negative   UROBILINOGEN Negative   LEUKOCYTESUR Trace*   RBCUA 3   WBCUA 9*   BACTERIA Many*   SQUAMEPITHEL 3     All pertinent labs within the past 24 hours have been reviewed.    Significant Imaging: I have reviewed and interpreted all pertinent imaging results/findings within the past 24 hours.     EKG:  Normal sinus rhythm, ventricular rate 93 beats per minute.  No evidence of acute ischemia, arrhythmia, heart block.  No ST elevation.  .  Q-wave V1, V5, V6.  EKG grossly similar previous dated 06/19/2020.    CXR:  FINDINGS:  Heart size is not enlarged.  There is mild blunting at the left  costophrenic angle suggesting a small amount of pleural fluid.  There is no right pleural effusion.  There is mild atelectasis at the left lung base.  There is no focal consolidation.  Osseous structures are unremarkable.     Impression:     Mild blunting at the left costophrenic angle suggestive of small amount of pleural fluid with some mild atelectasis.    Echo 2/13/16:  CONCLUSIONS     1 - Normal left ventricular systolic function (EF 60-65%).     2 - Normal left ventricular diastolic function.     3 - Normal right ventricular systolic function.    Assessment/Plan:     * Bacteremia  E. Coli positive blood cultures x 2 from 6/16 visit  Meets sepsis criteria given tachypnea, source, and initial HR  Hemodynamically stable  No hypoxia  Repeat blood cultures  Started on Zosyn  Consult ID      Diabetes mellitus, new onset  With glucosuria and increased urinary frequency  Denies recent steroids  Last documented use was prednisone burst through 5/3  Daily Advair BID  a1C 11.2 on 6/12  No hx DM  Sliding scale while inpatient  DM education  DM diet      Cystitis  Urine culture pending, negative UA on 6/12  Possibly source of +cultures      Pleural effusion  Small L sided pleural effusion, no hx CHF  No obvious consolidation on imaging to suggest respiratory infection  Likely contributing to SOB given c/o new L sided CP--EKG without evidence of acute ischemia, troponin negative  Unlikely source of +culture given E. Coli growth  Normal BNP  Echo in AM      COPD (chronic obstructive pulmonary disease)  With acute exacerbation on initial exam  Denies SOB, no hypoxia  Continue Q4 nebs prn  Continuous pulse oximetry    Anemia of chronic disease  No significant anemia at this time, H/H stable  Continue to monitor      Essential hypertension  Continue home meds  IV hydralazine prn      Atrial fibrillation  Rate controlled with Metoprolol  Continue home meds      VTE Risk Mitigation (From admission, onward)         Ordered      enoxaparin injection 40 mg  Every 24 hours      06/19/20 2059     IP VTE HIGH RISK PATIENT  Once      06/19/20 1927     Place sequential compression device  Until discontinued      06/19/20 1927     Place BEATRIZ hose  Until discontinued      06/19/20 1927              VTE: SCDs, lovenox  Diet: diabetic  Admit to Inpatient    Oseas Duque PA-C  Department of Hospital Medicine   [facility]

## 2020-06-21 LAB
ANION GAP SERPL CALC-SCNC: 4 MMOL/L (ref 8–16)
BASOPHILS # BLD AUTO: 0 K/UL (ref 0–0.2)
BASOPHILS NFR BLD: 0 % (ref 0–1.9)
BUN SERPL-MCNC: 17 MG/DL (ref 8–23)
CALCIUM SERPL-MCNC: 8.4 MG/DL (ref 8.7–10.5)
CHLORIDE SERPL-SCNC: 103 MMOL/L (ref 95–110)
CO2 SERPL-SCNC: 32 MMOL/L (ref 23–29)
CREAT SERPL-MCNC: 1.4 MG/DL (ref 0.5–1.4)
DIFFERENTIAL METHOD: ABNORMAL
EOSINOPHIL # BLD AUTO: 0 K/UL (ref 0–0.5)
EOSINOPHIL NFR BLD: 0 % (ref 0–8)
ERYTHROCYTE [DISTWIDTH] IN BLOOD BY AUTOMATED COUNT: 13.7 % (ref 11.5–14.5)
EST. GFR  (AFRICAN AMERICAN): 54 ML/MIN/1.73 M^2
EST. GFR  (NON AFRICAN AMERICAN): 47 ML/MIN/1.73 M^2
GLUCOSE SERPL-MCNC: 201 MG/DL (ref 70–110)
HCT VFR BLD AUTO: 31.4 % (ref 40–54)
HGB BLD-MCNC: 9.1 G/DL (ref 14–18)
IMM GRANULOCYTES # BLD AUTO: 0.01 K/UL (ref 0–0.04)
IMM GRANULOCYTES NFR BLD AUTO: 0.2 % (ref 0–0.5)
LYMPHOCYTES # BLD AUTO: 1.1 K/UL (ref 1–4.8)
LYMPHOCYTES NFR BLD: 26.8 % (ref 18–48)
MCH RBC QN AUTO: 25.4 PG (ref 27–31)
MCHC RBC AUTO-ENTMCNC: 29 G/DL (ref 32–36)
MCV RBC AUTO: 88 FL (ref 82–98)
MONOCYTES # BLD AUTO: 0.5 K/UL (ref 0.3–1)
MONOCYTES NFR BLD: 11.1 % (ref 4–15)
NEUTROPHILS # BLD AUTO: 2.6 K/UL (ref 1.8–7.7)
NEUTROPHILS NFR BLD: 61.9 % (ref 38–73)
NRBC BLD-RTO: 0 /100 WBC
PLATELET # BLD AUTO: 224 K/UL (ref 150–350)
PMV BLD AUTO: 11.1 FL (ref 9.2–12.9)
POCT GLUCOSE: 194 MG/DL (ref 70–110)
POCT GLUCOSE: 205 MG/DL (ref 70–110)
POCT GLUCOSE: 224 MG/DL (ref 70–110)
POCT GLUCOSE: 345 MG/DL (ref 70–110)
POTASSIUM SERPL-SCNC: 4.5 MMOL/L (ref 3.5–5.1)
RBC # BLD AUTO: 3.58 M/UL (ref 4.6–6.2)
SODIUM SERPL-SCNC: 139 MMOL/L (ref 136–145)
WBC # BLD AUTO: 4.14 K/UL (ref 3.9–12.7)

## 2020-06-21 PROCEDURE — 87040 BLOOD CULTURE FOR BACTERIA: CPT

## 2020-06-21 PROCEDURE — 25000003 PHARM REV CODE 250: Performed by: PHYSICIAN ASSISTANT

## 2020-06-21 PROCEDURE — 25000003 PHARM REV CODE 250: Performed by: FAMILY MEDICINE

## 2020-06-21 PROCEDURE — 63600175 PHARM REV CODE 636 W HCPCS: Performed by: PHYSICIAN ASSISTANT

## 2020-06-21 PROCEDURE — 94799 UNLISTED PULMONARY SVC/PX: CPT

## 2020-06-21 PROCEDURE — 94640 AIRWAY INHALATION TREATMENT: CPT

## 2020-06-21 PROCEDURE — 11000001 HC ACUTE MED/SURG PRIVATE ROOM

## 2020-06-21 PROCEDURE — 85025 COMPLETE CBC W/AUTO DIFF WBC: CPT

## 2020-06-21 PROCEDURE — 99900035 HC TECH TIME PER 15 MIN (STAT)

## 2020-06-21 PROCEDURE — 94761 N-INVAS EAR/PLS OXIMETRY MLT: CPT

## 2020-06-21 PROCEDURE — 36415 COLL VENOUS BLD VENIPUNCTURE: CPT

## 2020-06-21 PROCEDURE — 80048 BASIC METABOLIC PNL TOTAL CA: CPT

## 2020-06-21 PROCEDURE — 27000221 HC OXYGEN, UP TO 24 HOURS

## 2020-06-21 PROCEDURE — 25000242 PHARM REV CODE 250 ALT 637 W/ HCPCS: Performed by: PHYSICIAN ASSISTANT

## 2020-06-21 RX ORDER — SODIUM CHLORIDE 9 MG/ML
INJECTION, SOLUTION INTRAVENOUS CONTINUOUS
Status: DISCONTINUED | OUTPATIENT
Start: 2020-06-21 | End: 2020-06-22

## 2020-06-21 RX ORDER — TRAMADOL HYDROCHLORIDE 50 MG/1
50 TABLET ORAL DAILY
Status: DISCONTINUED | OUTPATIENT
Start: 2020-06-21 | End: 2020-06-22

## 2020-06-21 RX ADMIN — INSULIN ASPART 4 UNITS: 100 INJECTION, SOLUTION INTRAVENOUS; SUBCUTANEOUS at 12:06

## 2020-06-21 RX ADMIN — METOPROLOL SUCCINATE 25 MG: 25 TABLET, EXTENDED RELEASE ORAL at 09:06

## 2020-06-21 RX ADMIN — HYDRALAZINE HYDROCHLORIDE 50 MG: 25 TABLET ORAL at 09:06

## 2020-06-21 RX ADMIN — TRAMADOL HYDROCHLORIDE 50 MG: 50 TABLET, FILM COATED ORAL at 02:06

## 2020-06-21 RX ADMIN — ASPIRIN 81 MG: 81 TABLET, COATED ORAL at 09:06

## 2020-06-21 RX ADMIN — SODIUM CHLORIDE: 0.9 INJECTION, SOLUTION INTRAVENOUS at 02:06

## 2020-06-21 RX ADMIN — FLUTICASONE FUROATE AND VILANTEROL TRIFENATATE 1 PUFF: 100; 25 POWDER RESPIRATORY (INHALATION) at 08:06

## 2020-06-21 RX ADMIN — INSULIN ASPART 2 UNITS: 100 INJECTION, SOLUTION INTRAVENOUS; SUBCUTANEOUS at 04:06

## 2020-06-21 RX ADMIN — PIPERACILLIN SODIUM AND TAZOBACTAM SODIUM 4.5 G: 4; .5 INJECTION, POWDER, LYOPHILIZED, FOR SOLUTION INTRAVENOUS at 06:06

## 2020-06-21 RX ADMIN — INSULIN ASPART 1 UNITS: 100 INJECTION, SOLUTION INTRAVENOUS; SUBCUTANEOUS at 09:06

## 2020-06-21 RX ADMIN — IPRATROPIUM BROMIDE AND ALBUTEROL SULFATE 3 ML: .5; 3 SOLUTION RESPIRATORY (INHALATION) at 07:06

## 2020-06-21 RX ADMIN — ENOXAPARIN SODIUM 40 MG: 40 INJECTION SUBCUTANEOUS at 04:06

## 2020-06-21 RX ADMIN — PIPERACILLIN SODIUM AND TAZOBACTAM SODIUM 4.5 G: 4; .5 INJECTION, POWDER, LYOPHILIZED, FOR SOLUTION INTRAVENOUS at 09:06

## 2020-06-21 RX ADMIN — PIPERACILLIN SODIUM AND TAZOBACTAM SODIUM 4.5 G: 4; .5 INJECTION, POWDER, LYOPHILIZED, FOR SOLUTION INTRAVENOUS at 02:06

## 2020-06-21 RX ADMIN — INSULIN DETEMIR 8 UNITS: 100 INJECTION, SOLUTION SUBCUTANEOUS at 09:06

## 2020-06-21 NOTE — PROGRESS NOTES
"[facility]  Progress Note    Patient Name: Micah Laurent Jr.  MRN: [id number]  Date of Admission: 6/19/2020  4:30 PM  Length of Stay: 2  Attending: Guillermo Galvan MD  Primary Care Provider: Chelsy Torrez MD        Subjective:     Pt stable, in NAD.  Denies any new complaints.  Still having chest pain on deep inspirations.  Cardiac enzymes negative.  Patient re-assured    Objective:     BP (!) 109/57 (BP Location: Right arm, Patient Position: Lying)   Pulse 78   Temp 98.5 °F (36.9 °C) (Oral)   Resp 20   Ht 5' 5" (1.651 m)   Wt 67.8 kg (149 lb 7.6 oz)   SpO2 98%   BMI 24.87 kg/m²     Physical Exam:  Physical Exam    General: Stable, No acute distress.  HEENT: MMM, EOMI  Lymph: No cervical lymphadenopathy  Cardiovascular: Regular rate and rhythm, with normal S1 and S2. No murmurs, rubs,  or gallops. No JVD.  Lungs: Symmetric air movement, no wheezing, no rales.    Abdomen:Normoactive bowel sounds. Soft, flat, non-tender, and non-distended.  Skin: Warm, dry, well-perfused.   Extremities: No lower extremity pain; legs are symmetric in appearance. Mild edema  Psych: Appropriate affect.       Recent Labs   Lab 06/21/20  0424   WBC 4.14   RBC 3.58*   HGB 9.1*   HCT 31.4*      MCV 88   MCH 25.4*   MCHC 29.0*     Recent Labs   Lab 06/21/20  0424      K 4.5      CO2 32*   BUN 17   CREATININE 1.4      Microbiology Results (last 7 days)     Procedure Component Value Units Date/Time    Blood culture [[id number]] Collected: 06/21/20 1348    Order Status: Sent Specimen: Blood from Peripheral, Right Hand Updated: 06/21/20 1410    Blood culture [[id number]] Collected: 06/21/20 1335    Order Status: Sent Specimen: Blood from Peripheral, Left Hand Updated: 06/21/20 1409    Urine Culture - High Risk **CANNOT BE ORDERED STAT** [[id number]]  (Abnormal) Collected: 06/20/20 0244    Order Status: Completed Specimen: Urine, Clean Catch Updated: 06/21/20 1223     Urine Culture, " Routine GRAM NEGATIVE ELANA  10,000 - 49,999 cfu/ml  Identification and susceptibility pending      Narrative:      Indicated criteria for high risk culture:->Other  Other (specify):->bacteremia    Blood culture x two cultures. Draw prior to antibiotics. [[id number]]  (Abnormal) Collected: 06/19/20 1755    Order Status: Completed Specimen: Blood from Peripheral, Hand, Right Updated: 06/21/20 0841     Blood Culture, Routine Gram stain aer bottle: Gram negative rods      Gram stain grace bottle: Gram negative rods      Results called to and read back by:SIMBA STREETER 06/20/2020  08:13      PRESUMPTIVE E COLI  Identification and susceptibility pending      Narrative:      Aerobic and anaerobic    Blood culture x two cultures. Draw prior to antibiotics. [[id number]] Collected: 06/19/20 1700    Order Status: Completed Specimen: Blood from Peripheral, Antecubital, Right Updated: 06/20/20 2103     Blood Culture, Routine No Growth to date      No Growth to date    Narrative:      Aerobic and anaerobic           No results found in the last 24 hours.  Current Outpatient Medications   Medication Instructions    albuterol (PROVENTIL) 2.5 mg, Nebulization, Every 4 hours PRN, Take 3mLs (2.5mg total) by nebulization TID x 4 days.  Then 3 mLs (2.5mg total) by nebulization every 6 (six) hours as needed.    albuterol (PROVENTIL/VENTOLIN HFA) 90 mcg/actuation inhaler 2 puffs, Inhalation, Every 4 hours PRN, Rescue    aspirin (ECOTRIN) 81 mg, Daily    baclofen (LIORESAL) 5 mg, Oral, 3 times daily    fluticasone-salmeterol diskus inhaler 250-50 mcg 1 puff, Inhalation, 2 times daily, Controller    hydrALAZINE (APRESOLINE) 50 mg, Oral, Every 12 hours    ipratropium-albuterol (COMBIVENT)  mcg/actuation inhaler 1 puff, Inhalation, Every 6 hours PRN, Rescue    metoprolol succinate (TOPROL-XL) 25 mg, 2 times daily    predniSONE (DELTASONE) 5 mg, Oral, Daily        Current Facility-Administered Medications:     0.9%  NaCl infusion,  , Intravenous, Continuous, Guillermo Galvan MD, Last Rate: 100 mL/hr at 06/21/20 1435    acetaminophen tablet 650 mg, 650 mg, Oral, Q8H PRN, MARCO ANTONIO Pugh-NICKO, 650 mg at 06/19/20 2106    acetaminophen tablet 650 mg, 650 mg, Oral, Q8H PRN, Chris Abel PA-C    albuterol-ipratropium 2.5 mg-0.5 mg/3 mL nebulizer solution 3 mL, 3 mL, Nebulization, Q4H PRN, Oseas Duque PA-C, 3 mL at 06/20/20 0805    aspirin EC tablet 81 mg, 81 mg, Oral, Daily, Chris Abel PA-C, 81 mg at 06/21/20 0932    dextrose 50% injection 12.5 g, 12.5 g, Intravenous, PRN, MARCO ANTONIO Mcneil-NICKO    dextrose 50% injection 25 g, 25 g, Intravenous, PRN, Oseas Duque PA-C    enoxaparin injection 40 mg, 40 mg, Subcutaneous, Q24H, Oseas Duque PA-C, 40 mg at 06/21/20 1641    fluticasone furoate-vilanteroL 100-25 mcg/dose diskus inhaler 1 puff, 1 puff, Inhalation, Daily, Chris Abel PA-C, 1 puff at 06/21/20 0805    glucagon (human recombinant) injection 1 mg, 1 mg, Intramuscular, PRN, Oseas Duque PA-C    glucose chewable tablet 16 g, 16 g, Oral, PRN, Oseas Duque PA-C    glucose chewable tablet 24 g, 24 g, Oral, PRN, Oseas Duque PA-C    hydrALAZINE injection 10 mg, 10 mg, Intravenous, Q6H PRN, Guillermo Galvan MD    hydrALAZINE tablet 50 mg, 50 mg, Oral, Q12H, Chris Abel PA-C, 50 mg at 06/21/20 0932    insulin aspart U-100 pen 0-5 Units, 0-5 Units, Subcutaneous, QID (AC + HS) PRN, Oseas Duque PA-C, 2 Units at 06/21/20 1642    [START ON 6/22/2020] insulin detemir U-100 pen 15 Units, 15 Units, Subcutaneous, Daily, Guillermo Galvan MD    metoprolol succinate (TOPROL-XL) 24 hr tablet 25 mg, 25 mg, Oral, BID, Chris Abel PA-C, 25 mg at 06/21/20 0932    piperacillin-tazobactam 4.5 g in sodium chloride 0.9% 100 mL IVPB (ready to mix system), 4.5 g, Intravenous, Q8H, Chris Abel PA-C, Last Rate: 25 mL/hr at 06/21/20 1435, 4.5 g at 06/21/20 1435    promethazine (PHENERGAN) 6.25 mg in dextrose 5  % 50 mL IVPB, 6.25 mg, Intravenous, Q6H PRN, Chris Abel PA-C    sodium chloride 0.9% flush 10 mL, 10 mL, Intravenous, PRN, Chris Abel PA-C    traMADoL tablet 50 mg, 50 mg, Oral, Daily, Guillermo Galvan MD, 50 mg at 06/21/20 1436  Anticoagulants   Medication Route Frequency    enoxaparin injection 40 mg Subcutaneous Q24H       Problem List:      Active Problem List with Overview Notes    Diagnosis Date Noted    Bacteremia 06/19/2020    COPD (chronic obstructive pulmonary disease) 06/19/2020    Pleural effusion 06/19/2020    Cystitis 06/19/2020    Diabetes mellitus, new onset 06/19/2020    Hypoxia 01/07/2020    Hypercapnia 01/07/2020    COPD exacerbation 09/18/2019    Anemia of chronic disease 09/18/2019    COPD with acute exacerbation 08/28/2019    Angio-edema 05/18/2018    Shortness of breath     Essential hypertension     LANA (acute kidney injury) 02/14/2016    Pneumonia of left lower lobe due to infectious organism 02/12/2016    Atrial fibrillation     Asthma 03/07/2014    HTN (hypertension), malignant 07/24/2013    Microcytic anemia 07/24/2013    Asthma with exacerbation 07/23/2013       Assessment/Plan:      1. Bacteremia - likely 2/2 urinary source.  E.coli from 6/16 ED cultures (patient called back by ED for treatment).  2 weeks of abx from negative blood culture date.  Continue IV zosyn for now.  F/u repeat blood cultures until turn negative.    2. Pyelonephritis - treatment as above.    3. DM2 - new onset this admission.  A1C 11.2.  Was on prednisone as outpatient per records through 5/3/20 for COPD.  DM education, DM diet, AC/HS accuchecks, SSI.  Outpatient regimen to be determined on discharge.  If unable to comply with insulin shots then glimepiride vs januvia maybe a feasible option until pt takes blood sugar logs to PCP.  Given A1C was WNL just in January I am sure his pancrease is still functioning and not burnt out to the point that he will need ongoing insulin.  I would  avoid metformin given CKD and c/o KRISTA  4. CKD - monitor.  Slight rise in crt, IVF for 3 days  5. COPD/Asthma - chronic, stable.  duonebs PRN.  Takes advair BID  6. Anemia of chronic disease - monitor  7. HTN - On metoprolol and hydralazine.  PRN hydralazine.  Monitor  8. Chest pain - with inspirations.  Pleuritic vs MSK.  Pt re-assured.  troponins negative.  Did have trace pleural effusion vs atelectasis on CXR.   9. Global - lovenox for DVT ppx.  DC once repeat blood cultures remain negative.          Guillermo Galvan MD  Department of Hospital Medicine   [facility]

## 2020-06-21 NOTE — PLAN OF CARE
Problem: Adult Inpatient Plan of Care  Goal: Plan of Care Review  Outcome: Ongoing, Progressing     NS 100mL/hr RAC, pt on 2L NC, tele #[id number].    Pt free from falls and injury throughout shift. Pt AAOx4 Continued medications as ordered. No complaints of pain. Pt in no distress. Will continue to monitor.

## 2020-06-21 NOTE — NURSING
Pt educated on how to use insulin flex pen. Pt verbalize understanding and preformed returned demonstration. Pt had no question or concerns.

## 2020-06-22 PROBLEM — J44.1 COPD EXACERBATION: Status: RESOLVED | Noted: 2019-09-18 | Resolved: 2020-06-22

## 2020-06-22 PROBLEM — R06.89 HYPERCAPNIA: Status: RESOLVED | Noted: 2020-01-07 | Resolved: 2020-06-22

## 2020-06-22 PROBLEM — R09.02 HYPOXIA: Status: RESOLVED | Noted: 2020-01-07 | Resolved: 2020-06-22

## 2020-06-22 PROBLEM — J44.1 COPD WITH ACUTE EXACERBATION: Status: RESOLVED | Noted: 2019-08-28 | Resolved: 2020-06-22

## 2020-06-22 PROBLEM — B96.20 BACTEREMIA DUE TO ESCHERICHIA COLI: Status: ACTIVE | Noted: 2020-06-19

## 2020-06-22 LAB
ANION GAP SERPL CALC-SCNC: 6 MMOL/L (ref 8–16)
BACTERIA BLD CULT: ABNORMAL
BACTERIA UR CULT: ABNORMAL
BASOPHILS # BLD AUTO: 0 K/UL (ref 0–0.2)
BASOPHILS NFR BLD: 0 % (ref 0–1.9)
BUN SERPL-MCNC: 12 MG/DL (ref 8–23)
CALCIUM SERPL-MCNC: 8.2 MG/DL (ref 8.7–10.5)
CHLORIDE SERPL-SCNC: 107 MMOL/L (ref 95–110)
CK MB SERPL-MCNC: 0.9 NG/ML (ref 0.1–6.5)
CK MB SERPL-RTO: 1.5 % (ref 0–5)
CK SERPL-CCNC: 62 U/L (ref 20–200)
CO2 SERPL-SCNC: 29 MMOL/L (ref 23–29)
CREAT SERPL-MCNC: 1.1 MG/DL (ref 0.5–1.4)
CRP SERPL-MCNC: 65.6 MG/L (ref 0–8.2)
DIFFERENTIAL METHOD: ABNORMAL
EOSINOPHIL # BLD AUTO: 0 K/UL (ref 0–0.5)
EOSINOPHIL NFR BLD: 0 % (ref 0–8)
ERYTHROCYTE [DISTWIDTH] IN BLOOD BY AUTOMATED COUNT: 13.7 % (ref 11.5–14.5)
EST. GFR  (AFRICAN AMERICAN): >60 ML/MIN/1.73 M^2
EST. GFR  (NON AFRICAN AMERICAN): >60 ML/MIN/1.73 M^2
GLUCOSE SERPL-MCNC: 147 MG/DL (ref 70–110)
HCT VFR BLD AUTO: 32.2 % (ref 40–54)
HGB BLD-MCNC: 9.1 G/DL (ref 14–18)
IMM GRANULOCYTES # BLD AUTO: 0.02 K/UL (ref 0–0.04)
IMM GRANULOCYTES NFR BLD AUTO: 0.5 % (ref 0–0.5)
LYMPHOCYTES # BLD AUTO: 1.3 K/UL (ref 1–4.8)
LYMPHOCYTES NFR BLD: 33.3 % (ref 18–48)
MCH RBC QN AUTO: 25.1 PG (ref 27–31)
MCHC RBC AUTO-ENTMCNC: 28.3 G/DL (ref 32–36)
MCV RBC AUTO: 89 FL (ref 82–98)
MONOCYTES # BLD AUTO: 0.4 K/UL (ref 0.3–1)
MONOCYTES NFR BLD: 10.2 % (ref 4–15)
NEUTROPHILS # BLD AUTO: 2.2 K/UL (ref 1.8–7.7)
NEUTROPHILS NFR BLD: 56 % (ref 38–73)
NRBC BLD-RTO: 0 /100 WBC
PLATELET # BLD AUTO: 236 K/UL (ref 150–350)
PMV BLD AUTO: 10.2 FL (ref 9.2–12.9)
POCT GLUCOSE: 158 MG/DL (ref 70–110)
POCT GLUCOSE: 177 MG/DL (ref 70–110)
POCT GLUCOSE: 188 MG/DL (ref 70–110)
POCT GLUCOSE: 243 MG/DL (ref 70–110)
POTASSIUM SERPL-SCNC: 4 MMOL/L (ref 3.5–5.1)
PROCALCITONIN SERPL IA-MCNC: 0.22 NG/ML
RBC # BLD AUTO: 3.63 M/UL (ref 4.6–6.2)
SODIUM SERPL-SCNC: 142 MMOL/L (ref 136–145)
TROPONIN I SERPL DL<=0.01 NG/ML-MCNC: <0.006 NG/ML (ref 0–0.03)
WBC # BLD AUTO: 3.84 K/UL (ref 3.9–12.7)

## 2020-06-22 PROCEDURE — 63600175 PHARM REV CODE 636 W HCPCS: Performed by: PHYSICIAN ASSISTANT

## 2020-06-22 PROCEDURE — 27000221 HC OXYGEN, UP TO 24 HOURS

## 2020-06-22 PROCEDURE — 80048 BASIC METABOLIC PNL TOTAL CA: CPT

## 2020-06-22 PROCEDURE — 94640 AIRWAY INHALATION TREATMENT: CPT

## 2020-06-22 PROCEDURE — 11000001 HC ACUTE MED/SURG PRIVATE ROOM

## 2020-06-22 PROCEDURE — 25000003 PHARM REV CODE 250: Performed by: PHYSICIAN ASSISTANT

## 2020-06-22 PROCEDURE — 36415 COLL VENOUS BLD VENIPUNCTURE: CPT

## 2020-06-22 PROCEDURE — 84145 PROCALCITONIN (PCT): CPT

## 2020-06-22 PROCEDURE — 82553 CREATINE MB FRACTION: CPT

## 2020-06-22 PROCEDURE — 84484 ASSAY OF TROPONIN QUANT: CPT

## 2020-06-22 PROCEDURE — 94799 UNLISTED PULMONARY SVC/PX: CPT

## 2020-06-22 PROCEDURE — 63600175 PHARM REV CODE 636 W HCPCS: Performed by: HOSPITALIST

## 2020-06-22 PROCEDURE — 86140 C-REACTIVE PROTEIN: CPT

## 2020-06-22 PROCEDURE — 94761 N-INVAS EAR/PLS OXIMETRY MLT: CPT

## 2020-06-22 PROCEDURE — 82550 ASSAY OF CK (CPK): CPT

## 2020-06-22 PROCEDURE — 85025 COMPLETE CBC W/AUTO DIFF WBC: CPT

## 2020-06-22 PROCEDURE — 25000242 PHARM REV CODE 250 ALT 637 W/ HCPCS: Performed by: PHYSICIAN ASSISTANT

## 2020-06-22 RX ORDER — CIPROFLOXACIN 500 MG/1
500 TABLET ORAL 2 TIMES DAILY
Qty: 24 TABLET | Refills: 0 | Status: SHIPPED | OUTPATIENT
Start: 2020-06-22 | End: 2020-07-04

## 2020-06-22 RX ORDER — INSULIN PUMP SYRINGE, 3 ML
EACH MISCELLANEOUS
Qty: 1 EACH | Refills: 0 | Status: SHIPPED | OUTPATIENT
Start: 2020-06-22 | End: 2020-07-01 | Stop reason: ALTCHOICE

## 2020-06-22 RX ORDER — LANCETS
1 EACH MISCELLANEOUS DAILY
Qty: 200 EACH | Refills: 1 | Status: SHIPPED | OUTPATIENT
Start: 2020-06-22 | End: 2020-07-01 | Stop reason: ALTCHOICE

## 2020-06-22 RX ADMIN — METOPROLOL SUCCINATE 25 MG: 25 TABLET, EXTENDED RELEASE ORAL at 09:06

## 2020-06-22 RX ADMIN — IPRATROPIUM BROMIDE AND ALBUTEROL SULFATE 3 ML: .5; 3 SOLUTION RESPIRATORY (INHALATION) at 08:06

## 2020-06-22 RX ADMIN — PIPERACILLIN SODIUM AND TAZOBACTAM SODIUM 4.5 G: 4; .5 INJECTION, POWDER, LYOPHILIZED, FOR SOLUTION INTRAVENOUS at 06:06

## 2020-06-22 RX ADMIN — CEFTRIAXONE 2 G: 2 INJECTION, SOLUTION INTRAVENOUS at 08:06

## 2020-06-22 RX ADMIN — HYDRALAZINE HYDROCHLORIDE 50 MG: 25 TABLET ORAL at 08:06

## 2020-06-22 RX ADMIN — ASPIRIN 81 MG: 81 TABLET, COATED ORAL at 08:06

## 2020-06-22 RX ADMIN — IPRATROPIUM BROMIDE AND ALBUTEROL SULFATE 3 ML: .5; 3 SOLUTION RESPIRATORY (INHALATION) at 03:06

## 2020-06-22 RX ADMIN — INSULIN ASPART 2 UNITS: 100 INJECTION, SOLUTION INTRAVENOUS; SUBCUTANEOUS at 01:06

## 2020-06-22 RX ADMIN — METOPROLOL SUCCINATE 25 MG: 25 TABLET, EXTENDED RELEASE ORAL at 08:06

## 2020-06-22 RX ADMIN — HYDRALAZINE HYDROCHLORIDE 50 MG: 25 TABLET ORAL at 09:06

## 2020-06-22 RX ADMIN — ENOXAPARIN SODIUM 40 MG: 40 INJECTION SUBCUTANEOUS at 05:06

## 2020-06-22 RX ADMIN — FLUTICASONE FUROATE AND VILANTEROL TRIFENATATE 1 PUFF: 100; 25 POWDER RESPIRATORY (INHALATION) at 08:06

## 2020-06-22 NOTE — PHYSICIAN QUERY
PT Name: Micah Laurent Jr.  MR #: [id number]     Physician Query Form - Documentation Clarification      CDS: Shirlene Asher RN, CCDS         Contact information :ext [phone] [email or web address]     This form is a permanent document in the medical record.     Query Date: June 22, 2020    By submitting this query, we are merely seeking further clarification of documentation. Please utilize your independent clinical judgment when addressing the question(s) below.    The Medical Record reflects the following:    Clinical Findings Location in Medical Record     Bacteremia  E. Coli positive blood cultures x 2 from 6/16 visit  Meets sepsis criteria given tachypnea, source, and initial HR  Hemodynamically stable  No hypoxia  Repeat blood cultures  Started on Zosyn  Consult ID  Vital Signs (24h Range):  Temp:  [99.6 °F (37.6 °C)] 99.6 °F (37.6 °C)  Pulse:  [92-96] 92  Resp:  [18-30] 25  SpO2:  [96 %-98 %] 97 %  BP: (141-157)/(70-90) 155/88    WBC 6.21, procalcitonin 0.63    Blood culture  Escherichia Coli  Urine culture  Escherichia Coli      Bacteremia due to Escherichia coli  Admitted with bacteremia.    Source urine- urine culture 6/19 positive for EColi. Treated with ceftriaxone while inpatient. Sensitive to cipro for outpatient treatment. Will need treatment until 7/4/2020.    H&P 6/20/20                                Lab 6/19/20    Lab 6/19/20      Lab 6/19/20      Hospital Medicine PN 6/22/20                                                                                Please clarify the diagnosis of Sepsis      Provider Use Only      [ x  ] Sepsis  ruled in and additional clinical support/ decision making indicators for the diagnosis include (specify):_________________________________       Source:          [ x   ] Escherichia Coli          [    ] Other source, please specify, _____    [    ] Sepsis has been ruled out    [     ] Sepsis has been ruled out, other diagnosis ruled in (specify):___________    [      ] Other clarification (specify): ______________    [  ] Clinically undetermined

## 2020-06-22 NOTE — PROGRESS NOTES
[facility] Hospital Medicine  Progress Note    Patient Name: Micah Laurent Jr.  MRN: [id number]  Patient Class: IP- Inpatient   Admission Date: 6/19/2020  Length of Stay: 3 days  Attending Physician: Jacquelyn Tipton MD  Primary Care Provider: Chelsy Torrez MD        Subjective:     Principal Problem:Bacteremia due to Escherichia coli        HPI:  78yo M with atrial fibrillation, COPD, HTN, presents to the ED due to recently collected positive blood cultures. Pt was seen in this ED on 6/16 due to SOB 2/2 acute COPD exacerbation. He did have a low grade fever which prompted blood cultures. Cultures came back + E. Coli with good sensitivities. He avidly denies fever, chills, myalgias, unintentional weight loss, nausea, vomiting, abdominal pain, fatigue, weakness, change in appetite. He denies any dysuria, strong odor to urine, or flank pain; he admits to increased urinary frequency, but states not a new complaint. No new rashes. No new cough. He states he has been doing well at home with his neb treatments and daily asthma medications.     He does admit to 3 day hx of L sided chest pain, only during deep inspiration. No CP at rest. No SOB at rest. He admits to baseline GERMAN, not worsened.  Denies leg swelling or calf pain. No hx VTE.  Denies trauma.     Overview/Hospital Course:  Admitted with bacteremia. Blood cultures 6/16 and 6/19 positive for EColi. Blood culture 6/21 negative. Source urine- urine culture 6/19 positive for EColi. Treated with ceftriaxone while inpatient. Sensitive to cipro for outpatient treatment. Will need treatment until 7/4/2020.     Interval History: No complaints. Wants to go home.     Review of Systems   Constitutional: Negative for chills and fever.   Respiratory: Negative for cough and shortness of breath.    Cardiovascular: Negative for chest pain and leg swelling.   Gastrointestinal: Negative for abdominal pain, constipation, diarrhea, nausea and vomiting.    Genitourinary: Negative for difficulty urinating.   Psychiatric/Behavioral: Negative for confusion.     Objective:     Vital Signs (Most Recent):  Temp: 98.5 °F (36.9 °C) (06/22/20 1110)  Pulse: 76 (06/22/20 1110)  Resp: 18 (06/22/20 1110)  BP: 138/85 (06/22/20 1110)  SpO2: 97 % (06/22/20 1110) Vital Signs (24h Range):  Temp:  [97.6 °F (36.4 °C)-98.5 °F (36.9 °C)] 98.5 °F (36.9 °C)  Pulse:  [69-79] 76  Resp:  [16-20] 18  SpO2:  [97 %-99 %] 97 %  BP: (109-157)/(57-87) 138/85     Weight: 67.8 kg (149 lb 7.6 oz)  Body mass index is 24.87 kg/m².    Intake/Output Summary (Last 24 hours) at 6/22/2020 1258  Last data filed at 6/22/2020 0815  Gross per 24 hour   Intake 480 ml   Output 725 ml   Net -245 ml      Physical Exam  Vitals signs and nursing note reviewed.   Constitutional:       General: He is not in acute distress.     Appearance: He is not ill-appearing.   HENT:      Head: Normocephalic and atraumatic.   Cardiovascular:      Rate and Rhythm: Normal rate and regular rhythm.      Pulses: Normal pulses.      Heart sounds: No murmur. No gallop.    Pulmonary:      Effort: Pulmonary effort is normal. No respiratory distress.      Breath sounds: Normal breath sounds. No wheezing or rales.      Comments: 1L NC  Abdominal:      General: Abdomen is flat. Bowel sounds are normal. There is no distension.      Tenderness: There is no abdominal tenderness. There is no guarding.   Musculoskeletal:         General: No swelling.   Skin:     General: Skin is warm and dry.   Neurological:      General: No focal deficit present.      Mental Status: He is alert and oriented to person, place, and time. Mental status is at baseline.         Significant Labs: All pertinent labs within the past 24 hours have been reviewed.    Significant Imaging: I have reviewed and interpreted all pertinent imaging results/findings within the past 24 hours.      Assessment/Plan:      * Bacteremia due to Escherichia coli  Blood cultures 6/16, 6/19  positive for EColi  Source is urine- urine culture 6/19 positive for EColi  On CTX currently  Sensitive to cipro for outpatient treatment. Discussed risks with cipro with patient who wants to take pill rather than IV  Will need 2 weeks for bacteremia-- last day will be 7/4/2020      Diabetes mellitus, new onset  A1c:   Lab Results   Component Value Date    HGBA1C 11.2 (H) 06/12/2020   New onset    Meds: basal  insulin + SSI PRN to maintain goal 140-180  ADA diet, accuchecks ACHS, hypoglycemic protocol  DM teaching  Needs insulin Rx, glucometer, strips, lancets at discharge and PCP follow up        Cystitis  See bacteremia      Pleural effusion  Small L sided pleural effusion, no hx CHF  No obvious consolidation on imaging to suggest respiratory infection  Likely contributing to SOB given c/o new L sided CP--EKG without evidence of acute ischemia, troponin negative  Unlikely source of +culture given E. Coli growth  Normal BNP  TTE with PHTN, EF 70%      COPD (chronic obstructive pulmonary disease)  No signs of acute exacerbation  Nebs PRN  SpO2 goal 88-92%    Anemia of chronic disease  Hgb stable with no signs of bleeding      Essential hypertension  Continue metoprolol, hydralazine   BP controlled      Atrial fibrillation  Rate control: metoprolol  Not on anticoagulation at home, only on asa-- discuss as outpatient     [id number] - VASc score:  Congestive Heart Failure or EF < 35% NO   Hypertension YES  +1   Age >= 75 YES  +2   Diabetes Mellitus YES  +1   Stroke/TIA prior history NO   Vascular disease (PAD, MI or Aortic Plaque)? NO   Age 64 - 74 NO   Female NO   Total 4             VTE Risk Mitigation (From admission, onward)         Ordered     enoxaparin injection 40 mg  Every 24 hours      06/19/20 2059     IP VTE HIGH RISK PATIENT  Once      06/19/20 1927     Place sequential compression device  Until discontinued      06/19/20 1927     Place BEATRIZ hose  Until discontinued      06/19/20 1927                       Jacquelyn Tipton MD  Department of Hospital Medicine   [facility]

## 2020-06-22 NOTE — ASSESSMENT & PLAN NOTE
A1c:   Lab Results   Component Value Date    HGBA1C 11.2 (H) 06/12/2020   New onset    Meds: basal  insulin + SSI PRN to maintain goal 140-180  ADA diet, accuchecks ACHS, hypoglycemic protocol  DM teaching  Needs insulin Rx, glucometer, strips, lancets at discharge and PCP follow up

## 2020-06-22 NOTE — UM SECONDARY REVIEW
[facility]    PA - Medical Necessity Issue  79-year-old male with history of COPD asthma, hypertension atrial fibrillation was seen recently in the emergency department and diagnosed with acute COPD exacerbation.  During that time a low-grade fever prompted blood cultures which resulted with E coli.  He was called back to emergency department for further evaluation of the bacteremia.  Further workup in the ED showed evidence of cystitis.  Repeat cultures were obtained and empiric antibiotics were initiated with Zosyn.  Will switch to ceftriaxone based on the sensitivities and then transition to an oral regimen when clinically appropriate.  Awaiting 2nd set of negative blood cultures.  Bacteremia - likely 2/2 urinary source.  E.coli from 6/16 ED cultures (patient called back by ED for treatment).  2 weeks of abx from negative blood culture date.  Continue IV zosyn for now.  F/u repeat blood cultures until turn negative.    Blood culture repeated 6/21 NGTD  Afebrile and WBC WNL    {[facility] CM Review Outcome:[id number]}

## 2020-06-22 NOTE — ASSESSMENT & PLAN NOTE
Small L sided pleural effusion, no hx CHF  No obvious consolidation on imaging to suggest respiratory infection  Likely contributing to SOB given c/o new L sided CP--EKG without evidence of acute ischemia, troponin negative  Unlikely source of +culture given E. Coli growth  Normal BNP  TTE with PHTN, EF 70%

## 2020-06-22 NOTE — ASSESSMENT & PLAN NOTE
Blood cultures 6/16, 6/19 positive for EColi  Source is urine- urine culture 6/19 positive for EColi  On CTX currently  Sensitive to cipro for outpatient treatment. Discussed risks with cipro with patient who wants to take pill rather than IV  Will need 2 weeks for bacteremia-- last day will be 7/4/2020

## 2020-06-22 NOTE — HOSPITAL COURSE
Admitted with bacteremia. Blood cultures 6/16 and 6/19 positive for EColi. Blood culture 6/21 negative. Source urine- urine culture 6/19 positive for EColi. Treated with ceftriaxone while inpatient. Sensitive to cipro for outpatient treatment. Will need treatment until 7/4/2020. Developed antibiotic associated diarrhea. Transitioned to cipro for outpatient treatment of EColi bacteremia. Of note, patient is also newly diagnosed diabetic. A1c 11.2% on 6/12/2020. Started detemir insulin. Patient reluctant to use insulin and would prefer pills, but understands and agrees to use insulin after discharge from hospital until PCP follow up. Discharged with glucometer, strips, lancets, and detemir insulin. Instructed to check glucose once daily and keep a log of glucoses to bring to PCP appointment.

## 2020-06-22 NOTE — PLAN OF CARE
06/22/20 1157   Medicare Message   Important Message from Medicare regarding Discharge Appeal Rights Given to patient/caregiver;Explained to patient/caregiver;Signed/date by patient/caregiver   Date IMM was signed 06/22/20   Time IMM was signed [id number]

## 2020-06-22 NOTE — SUBJECTIVE & OBJECTIVE
Interval History: No complaints. Wants to go home.     Review of Systems   Constitutional: Negative for chills and fever.   Respiratory: Negative for cough and shortness of breath.    Cardiovascular: Negative for chest pain and leg swelling.   Gastrointestinal: Negative for abdominal pain, constipation, diarrhea, nausea and vomiting.   Genitourinary: Negative for difficulty urinating.   Psychiatric/Behavioral: Negative for confusion.     Objective:     Vital Signs (Most Recent):  Temp: 98.5 °F (36.9 °C) (06/22/20 1110)  Pulse: 76 (06/22/20 1110)  Resp: 18 (06/22/20 1110)  BP: 138/85 (06/22/20 1110)  SpO2: 97 % (06/22/20 1110) Vital Signs (24h Range):  Temp:  [97.6 °F (36.4 °C)-98.5 °F (36.9 °C)] 98.5 °F (36.9 °C)  Pulse:  [69-79] 76  Resp:  [16-20] 18  SpO2:  [97 %-99 %] 97 %  BP: (109-157)/(57-87) 138/85     Weight: 67.8 kg (149 lb 7.6 oz)  Body mass index is 24.87 kg/m².    Intake/Output Summary (Last 24 hours) at 6/22/2020 1258  Last data filed at 6/22/2020 0815  Gross per 24 hour   Intake 480 ml   Output 725 ml   Net -245 ml      Physical Exam  Vitals signs and nursing note reviewed.   Constitutional:       General: He is not in acute distress.     Appearance: He is not ill-appearing.   HENT:      Head: Normocephalic and atraumatic.   Cardiovascular:      Rate and Rhythm: Normal rate and regular rhythm.      Pulses: Normal pulses.      Heart sounds: No murmur. No gallop.    Pulmonary:      Effort: Pulmonary effort is normal. No respiratory distress.      Breath sounds: Normal breath sounds. No wheezing or rales.      Comments: 1L NC  Abdominal:      General: Abdomen is flat. Bowel sounds are normal. There is no distension.      Tenderness: There is no abdominal tenderness. There is no guarding.   Musculoskeletal:         General: No swelling.   Skin:     General: Skin is warm and dry.   Neurological:      General: No focal deficit present.      Mental Status: He is alert and oriented to person, place, and time.  Mental status is at baseline.         Significant Labs: All pertinent labs within the past 24 hours have been reviewed.    Significant Imaging: I have reviewed and interpreted all pertinent imaging results/findings within the past 24 hours.

## 2020-06-22 NOTE — PHYSICIAN QUERY
PT Name: Micah Laurent Jr.  MR #: [id number]     ACUITY OF CONDITION CLARIFICATION      CDS: Shirlene Asher RN, CCDS         Contact information :ext [phone] [email or web address]     This form is a permanent document in the medical record.     Query Date: June 22, 2020    By submitting this query, we are merely seeking further clarification of documentation to reflect the severity of illness of your patient. Please utilize your independent clinical judgment when addressing the question(s) below.    The Medical record reflects the following:     Indicators   Supporting Clinical Findings Location in Medical Record   x Documentation of condition Pyelonephritis - as above Hospital medicine PN 6/20/20   x Lab Value(s) Urine culture  ESCHERICHIA COLI   10,000 - 49,999 cfu/ml  Lab 6/18/20    Radiology Findings     x Treatment/Medication Further workup in the ED showed evidence of cystitis.  Repeat cultures were obtained and empiric antibiotics were initiated with Zosyn.  Will switch to ceftriaxone based on the sensitivities and then transition to an oral regimen when clinically appropriate.  Recently he reports malodorous dark colored urine but denies flank pain H&P 6/20/20   x Other Bacteremia - likely 2/2 urinary source.  E.coli from 6/16 ED cultures.  2 weeks of abx from negative blood culture date.  Continue IV rocephin for now.  F/u repeat blood cultures    Cystitis [facility] medicine PN 6/20/20            [facility] medicine PN 6/22/20      Provider, please specify the acuity/chronicity of Pyelonephritis vs Cystitis :    [    ] Acute Pyelonephritis ruled in, related to ESCHERICHIA COLI   [  x  ] Pyelonephritis Ruled out, Acute Cystitis ruled in, related to ESCHERICHIA COLI   [    ] Both Acute pyelonephritis and Acute Cystitis ruled in, related to ESCHERICHIA COLI   [    ] Other clarification, ______   [   ]  Clinically Undetermined         Please document in your progress notes daily for the duration of treatment  until resolved, and include in your discharge summary.

## 2020-06-22 NOTE — PLAN OF CARE
06/22/20 1159   Post-Acute Status   Post-Acute Authorization Other  (Home)   Other Status No Post-Acute Service Needs   Discharge Delays None known at this time   Discharge Plan   Discharge Plan A Home with family     SW to pt's room to discuss d/c planning and help at home. According to pt, he has medical equipment but has no need to use. Pt d/c plan is to return home with spouse.

## 2020-06-22 NOTE — ASSESSMENT & PLAN NOTE
Rate control: metoprolol  Not on anticoagulation at home, only on asa-- discuss as outpatient     [id number] - VASc score:  Congestive Heart Failure or EF < 35% NO   Hypertension YES  +1   Age >= 75 YES  +2   Diabetes Mellitus YES  +1   Stroke/TIA prior history NO   Vascular disease (PAD, MI or Aortic Plaque)? NO   Age 64 - 74 NO   Female NO   Total 4

## 2020-06-23 VITALS
RESPIRATION RATE: 20 BRPM | WEIGHT: 149.5 LBS | HEART RATE: 90 BPM | SYSTOLIC BLOOD PRESSURE: 149 MMHG | BODY MASS INDEX: 24.91 KG/M2 | HEIGHT: 65 IN | TEMPERATURE: 98 F | OXYGEN SATURATION: 96 % | DIASTOLIC BLOOD PRESSURE: 89 MMHG

## 2020-06-23 LAB
BACTERIA BLD CULT: NORMAL
POCT GLUCOSE: 166 MG/DL (ref 70–110)

## 2020-06-23 PROCEDURE — 25000003 PHARM REV CODE 250: Performed by: PHYSICIAN ASSISTANT

## 2020-06-23 PROCEDURE — 94640 AIRWAY INHALATION TREATMENT: CPT

## 2020-06-23 PROCEDURE — 94761 N-INVAS EAR/PLS OXIMETRY MLT: CPT

## 2020-06-23 PROCEDURE — 63600175 PHARM REV CODE 636 W HCPCS: Performed by: HOSPITALIST

## 2020-06-23 PROCEDURE — 25000242 PHARM REV CODE 250 ALT 637 W/ HCPCS: Performed by: PHYSICIAN ASSISTANT

## 2020-06-23 PROCEDURE — 63600175 PHARM REV CODE 636 W HCPCS: Performed by: FAMILY MEDICINE

## 2020-06-23 RX ADMIN — HYDRALAZINE HYDROCHLORIDE 50 MG: 25 TABLET ORAL at 10:06

## 2020-06-23 RX ADMIN — IPRATROPIUM BROMIDE AND ALBUTEROL SULFATE 3 ML: .5; 3 SOLUTION RESPIRATORY (INHALATION) at 01:06

## 2020-06-23 RX ADMIN — IPRATROPIUM BROMIDE AND ALBUTEROL SULFATE 3 ML: .5; 3 SOLUTION RESPIRATORY (INHALATION) at 07:06

## 2020-06-23 RX ADMIN — CEFTRIAXONE 2 G: 2 INJECTION, SOLUTION INTRAVENOUS at 10:06

## 2020-06-23 RX ADMIN — HYDRALAZINE HYDROCHLORIDE 10 MG: 20 INJECTION INTRAMUSCULAR; INTRAVENOUS at 05:06

## 2020-06-23 RX ADMIN — FLUTICASONE FUROATE AND VILANTEROL TRIFENATATE 1 PUFF: 100; 25 POWDER RESPIRATORY (INHALATION) at 07:06

## 2020-06-23 RX ADMIN — ASPIRIN 81 MG: 81 TABLET, COATED ORAL at 10:06

## 2020-06-23 RX ADMIN — METOPROLOL SUCCINATE 25 MG: 25 TABLET, EXTENDED RELEASE ORAL at 10:06

## 2020-06-23 NOTE — PLAN OF CARE
06/23/20 1015   Post-Acute Status   Post-Acute Authorization Other  (Home)   Other Status No Post-Acute Service Needs   Discharge Delays None known at this time   Discharge Plan   Discharge Plan A Home with family     BORIS contacted [facility] to get the price of pt's prescription. According to Ade, the cost of insuline is $8.95.     10:18am  BORIS attempted several times to contact pt's PCP office to schedule follow-up appointment. BORIS did not get an answer and has encouraged pt to contact office to schedule appointment.

## 2020-06-23 NOTE — PLAN OF CARE
"   06/23/20 1128   Final Note   Assessment Type Final Discharge Note   Anticipated Discharge Disposition Home   What phone number can be called within the next 1-3 days to see how you are doing after discharge? [phone]   Hospital Follow Up  Appt(s) scheduled? No  (BORIS unable to get in contact with doctors office. Pt will schedule follow-up.)   Discharge plans and expectations educations in teach back method with documentation complete? Yes   Right Care Referral Info   Post Acute Recommendation   (Home)   Post-Acute Status   Post-Acute Authorization   (Home)   Other Status No Post-Acute Service Needs   Discharge Delays None known at this time     EDUCATION:  Pt provided with educational information on Bacteremia  Information reviewed and placed in :My Healthcare Packet" to be brought home for  use as resource after discharge.  Information included:  signs and symptoms to look for at discharge. BORIS instructed pt to call the doctor if experiencing symptoms that may indicate a medical emergency: CALL 911 if signs and symptoms worsen. Reminded pt things he will be responsible for to manage his healthcare at home: getting Rx filled, attending follow up appointments, and taking medication as prescribed were discussed.   Teach back method used.  All questions answered.  Patient verbalized understanding of all information.     BORIS notified pt's nurse, Corbin, all CM needs have been met.       "

## 2020-06-23 NOTE — NURSING
Pt discharged per MD order. IV dc'd, catheter tip intact. VS stable, afebrile, NAD, no nausea/vomiting. Pt educated on discharge instructions with pt verbally indicating understanding. Education provided in regards to insulin administration with pt able to demonstrate insulin pen usage. Pt discharged to home. Off unit via wheelchair.

## 2020-06-23 NOTE — PLAN OF CARE
Problem: Fall Injury Risk  Goal: Absence of Fall and Fall-Related Injury  Outcome: Ongoing, Progressing     Problem: Adult Inpatient Plan of Care  Goal: Plan of Care Review  Outcome: Ongoing, Progressing  Goal: Patient-Specific Goal (Individualization)  Outcome: Ongoing, Progressing  Goal: Absence of Hospital-Acquired Illness or Injury  Outcome: Ongoing, Progressing  Goal: Optimal Comfort and Wellbeing  Outcome: Ongoing, Progressing  Goal: Readiness for Transition of Care  Outcome: Ongoing, Progressing  Goal: Rounds/Family Conference  Outcome: Ongoing, Progressing     Problem: Electrolyte Imbalance (Acute Kidney Injury/Impairment)  Goal: Serum Electrolyte Balance  Outcome: Ongoing, Progressing     Problem: Fluid Imbalance (Acute Kidney Injury/Impairment)  Goal: Optimal Fluid Balance  Outcome: Ongoing, Progressing     Problem: Hematologic Alteration (Acute Kidney Injury/Impairment)  Goal: Hemoglobin, Hematocrit and Platelets Within Normal Range  Outcome: Ongoing, Progressing     Problem: Oral Intake Inadequate (Acute Kidney Injury/Impairment)  Goal: Optimal Nutrition Intake  Outcome: Ongoing, Progressing     Problem: Renal Function Impairment (Acute Kidney Injury/Impairment)  Goal: Effective Renal Function  Outcome: Ongoing, Progressing     Problem: Diabetes Comorbidity  Goal: Blood Glucose Level Within Desired Range  Outcome: Ongoing, Progressing

## 2020-06-23 NOTE — DISCHARGE SUMMARY
[facility] Hospital Medicine  Discharge Summary      Patient Name: Micah Laurent Jr.  MRN: [id number]  Admission Date: 6/19/2020  Hospital Length of Stay: 4 days  Discharge Date and Time:  06/23/2020 9:37 AM  Attending Physician: Jacquelyn Tipton MD   Discharging Provider: aJcquelyn Tipton MD  Primary Care Provider: Chelsy Torrez MD      HPI:   78yo M with atrial fibrillation, COPD, HTN, presents to the ED due to recently collected positive blood cultures. Pt was seen in this ED on 6/16 due to SOB 2/2 acute COPD exacerbation. He did have a low grade fever which prompted blood cultures. Cultures came back + E. Coli with good sensitivities. He avidly denies fever, chills, myalgias, unintentional weight loss, nausea, vomiting, abdominal pain, fatigue, weakness, change in appetite. He denies any dysuria, strong odor to urine, or flank pain; he admits to increased urinary frequency, but states not a new complaint. No new rashes. No new cough. He states he has been doing well at home with his neb treatments and daily asthma medications.     He does admit to 3 day hx of L sided chest pain, only during deep inspiration. No CP at rest. No SOB at rest. He admits to baseline GERMAN, not worsened.  Denies leg swelling or calf pain. No hx VTE.  Denies trauma.     * No surgery found *      Hospital Course:   Admitted with bacteremia. Blood cultures 6/16 and 6/19 positive for EColi. Blood culture 6/21 negative. Source urine- urine culture 6/19 positive for EColi. Treated with ceftriaxone while inpatient. Sensitive to cipro for outpatient treatment. Will need treatment until 7/4/2020. Developed antibiotic associated diarrhea. Transitioned to cipro for outpatient treatment of EColi bacteremia. Of note, patient is also newly diagnosed diabetic. A1c 11.2% on 6/12/2020. Started detemir insulin. Patient reluctant to use insulin and would prefer pills, but understands and agrees to use insulin after discharge from  hospital until PCP follow up. Discharged with glucometer, strips, lancets, and detemir insulin. Instructed to check glucose once daily and keep a log of glucoses to bring to PCP appointment.      Consults:   Consults (From admission, onward)        Status Ordering Provider     Inpatient consult to Diabetes educator  Once     Provider:  (Not yet assigned)    Acknowledged GIANA MUÑOZ          No new Assessment & Plan notes have been filed under this hospital service since the last note was generated.  Service: Hospital Medicine    Final Active Diagnoses:    Diagnosis Date Noted POA    PRINCIPAL PROBLEM:  Bacteremia due to Escherichia coli [R78.81] 06/19/2020 Yes    COPD (chronic obstructive pulmonary disease) [J44.9] 06/19/2020 Yes    Pleural effusion [J90] 06/19/2020 Yes    Cystitis [N30.90] 06/19/2020 Yes    Diabetes mellitus, new onset [E11.9] 06/19/2020 Yes    Anemia of chronic disease [D63.8] 09/18/2019 Yes    Essential hypertension [I10]  Yes    Atrial fibrillation [I48.91]  Yes      Problems Resolved During this Admission:       Discharged Condition: good    Disposition: Home or Self Care    Follow Up: with PCP    Patient Instructions:      Diet diabetic     Notify your health care provider if you experience any of the following:  temperature >100.4     Notify your health care provider if you experience any of the following:  persistent nausea and vomiting or diarrhea     Notify your health care provider if you experience any of the following:  severe uncontrolled pain     Notify your health care provider if you experience any of the following:  redness, tenderness, or signs of infection (pain, swelling, redness, odor or green/yellow discharge around incision site)     Notify your health care provider if you experience any of the following:  difficulty breathing or increased cough     Notify your health care provider if you experience any of the following:  severe persistent headache     Notify  your health care provider if you experience any of the following:  worsening rash     Notify your health care provider if you experience any of the following:  persistent dizziness, light-headedness, or visual disturbances     Notify your health care provider if you experience any of the following:  increased confusion or weakness     Activity as tolerated       Significant Diagnostic Studies: Labs: All labs within the past 24 hours have been reviewed    Pending Diagnostic Studies:     None         Medications:  Reconciled Home Medications:      Medication List      START taking these medications    blood sugar diagnostic Strp  1 strip by Misc.(Non-Drug; Combo Route) route once daily.     blood-glucose meter kit  Use as instructed     ciprofloxacin HCl 500 MG tablet  Commonly known as: CIPRO  Take 1 tablet (500 mg total) by mouth 2 (two) times daily. for 12 days     insulin detemir U-100 100 unit/mL (3 mL) Inpn pen  Commonly known as: LEVEMIR FLEXTOUCH  Inject 15 Units into the skin once daily.     lancets Misc  Commonly known as: ACCU-CHEK SOFTCLIX LANCETS  1 lancet by Misc.(Non-Drug; Combo Route) route once daily.        CONTINUE taking these medications    aspirin 81 MG EC tablet  Commonly known as: ECOTRIN  Take 81 mg by mouth once daily.     fluticasone-salmeterol 250-50 mcg/dose 250-50 mcg/dose diskus inhaler  Commonly known as: ADVAIR  Inhale 1 puff into the lungs 2 (two) times daily. Controller     hydrALAZINE 50 MG tablet  Commonly known as: APRESOLINE  Take 1 tablet (50 mg total) by mouth every 12 (twelve) hours.     ipratropium-albuteroL  mcg/actuation inhaler  Commonly known as: COMBIVENT  Inhale 1 puff into the lungs every 6 (six) hours as needed for Wheezing. Rescue     metoprolol succinate 25 MG 24 hr tablet  Commonly known as: TOPROL-XL  Take 25 mg by mouth 2 (two) times daily.        STOP taking these medications    albuterol 2.5 mg /3 mL (0.083 %) nebulizer solution  Commonly known as:  PROVENTIL     albuterol 90 mcg/actuation inhaler  Commonly known as: PROVENTIL/VENTOLIN HFA     baclofen 10 MG tablet  Commonly known as: LIORESAL     predniSONE 5 MG tablet  Commonly known as: DELTASONE            Indwelling Lines/Drains at time of discharge:   Lines/Drains/Airways     None                 Time spent on the discharge of patient: 40 minutes  Patient was seen and examined on the date of discharge and determined to be suitable for discharge.         Jacquelyn Tipton MD  Department of Hospital Medicine  [facility]

## 2020-06-23 NOTE — PROGRESS NOTES
WRITTEN HEALTHCARE DISCHARGE INFORMATION     Things that YOU are RESPONSIBLE for to Manage Your Care At Home:    1. Getting your prescriptions filled.  2. Taking you medications as directed. DO NOT MISS ANY DOSES!  3. Going to your follow-up doctor appointments. This is important because it allows the doctor to monitor your progress and to determine if any changes need to be made to your treatment plan.    If you are unable to make your follow up appointments, please call the number listed and reschedule this appointment.     ____________HELP AT HOME____________________    Experiencing any SIGNS or SYMPTOMS: YOU CAN    Schedule a same day appopintment with your Primary Care Doctor or  you can call [facility] Line for 24/7 assistance at [phone]    If you are experience any signs or symptoms that have become severe, Call 911 and come to your nearest Emergency Room.    Thank you for choosing [facility] and allowing us to care for you.   From your care management team: Shahnaz JOHNSON [facility] [phone]    You should receive a call from [facility] within 48-72 hours to help manage your care after discharge. Please try to make sure that you answer your phone for this important phone call.  Follow-up Information     Schedule an appointment as soon as possible for a visit with Chelsy Torrez MD.    Specialty: Endocrinology  Why: Please schedule appointment.   Contact information:  [address] [place] [id number]  [phone]

## 2020-06-24 ENCOUNTER — PATIENT OUTREACH (OUTPATIENT)
Dept: ADMINISTRATIVE | Facility: CLINIC | Age: 80
End: 2020-06-24

## 2020-06-24 DIAGNOSIS — E11.9 DIABETES MELLITUS, NEW ONSET: ICD-10-CM

## 2020-06-24 DIAGNOSIS — B96.20 BACTEREMIA DUE TO ESCHERICHIA COLI: ICD-10-CM

## 2020-06-24 DIAGNOSIS — J44.9 CHRONIC OBSTRUCTIVE PULMONARY DISEASE, UNSPECIFIED COPD TYPE: ICD-10-CM

## 2020-06-24 DIAGNOSIS — R78.81 BACTEREMIA DUE TO ESCHERICHIA COLI: ICD-10-CM

## 2020-06-24 NOTE — PROGRESS NOTES
Patient spouse not familiar with giving insulin. Had problem getting injectors for insulin pen at pharmacy because of insurance card being worn out. Asking questions about diet.  OPCM and NP referral.

## 2020-06-24 NOTE — PATIENT INSTRUCTIONS
"Dyspnea (Shortness Of Breath)  Shortness of Breath (also known as "Dyspnea") is the sense that you can't catch your breath or can't get enough air.  Dyspnea can be caused by many different conditions such as:   Acute asthma attack   Worsening of emphysema (also called "COPD") -- a lung disease that is caused by smoking   A mucus plug blocks a large air passage in the lung -- this can occur with emphysema or chronic bronchitis   Congestive Heart Failure ("CHF") -- when a weak heart muscle allows excess fluid to collect in the lungs   Panic attacks, anxiety -- fear can cause rapid breathing ("hyperventilation")   Pneumonia -- infection in the lung tissue   Exposure to toxic fumes or smoke   Pulmonary embolus (blood clot to the lung)  Based on your visit today, the exact cause of your shortness of breath is not certain. Your tests do not show any of the serious causes of dyspnea. Sometimes, further testing is needed to find out if a serious problem exists. Therefore, it is important for you to watch for any new symptoms or worsening of your condition and follow up with your doctor as directed.  Home Care:   When your symptoms are better, resume your usual activities.   If you smoke, you need to stop. Join a stop-smoking program or ask your doctor for help.  Follow Up  with your doctor or as advised by our staff.  Get Prompt Medical Attention  if any of the following occur:   Increasing shortness of breath or wheezing   Redness, pain or swelling in one leg   Swelling in both legs or ankles   Unexpected weight gain   Chest, arm, shoulder, neck or upper back pain   Dizziness, weakness or fainting   Palpitations (the sense that your heart is fluttering, beating fast or hard)   Fever of 100.4ºF (38ºC) or higher, or as directed by your healthcare provider   Cough with dark colored or bloody sputum (mucus)  © 3870-8894 [employer], [address] [facility], [place] [address]. All rights reserved. This " information is not intended as a substitute for professional medical care. Always follow your healthcare professional's instructions.       Understanding Carbohydrates, Fats, and Protein  Food is a source of fuel and nourishment for your body. Its also a source of pleasure. Having diabetes doesnt mean you have to eat special foods or give up desserts. Instead, your dietitian can show you how to plan meals to suit your body. To start, learn how different foods affect blood sugar.  Carbohydrates  Carbohydrates are the main source of fuel for the body. Carbohydrates raise blood sugar. Many people think carbohydrates are only found in pasta or bread. But carbohydrates are actually in many kinds of foods:  · Sugars occur naturally in foods such as fruit, milk, honey, and molasses. Sugars can also be added to many foods, from cereals and yogurt to candy and desserts. Sugars raise blood sugar.  · Starches are found in bread, cereals, pasta, and dried beans. Theyre also found in corn, peas, potatoes, yam, acorn squash, and butternut squash. Starches also raise blood sugar.   · Fiber is found in foods such as vegetables, fruits, beans, and whole grains. Unlike other carbs, fiber isnt digested or absorbed. So it doesnt raise blood sugar. In fact, fiber can help keep blood sugar from rising too fast. It also helps keep blood cholesterol at a healthy level.  Did you know?  Even though carbohydrates raise blood sugar, its best to have some in every meal. They are an important part of a healthy diet.   Fat  Fat is an energy source that can be stored until needed. Fat does not raise blood sugar. However, it can raise blood cholesterol, increasing the risk of heart disease. Fat is also high in calories, which can cause weight gain. Not all types of fat are the same.  More Healthy:  · Monounsaturated fats are mostly found in vegetable oils, such as olive, canola, and peanut oils. They are also found in avocados and some nuts.  Monounsaturated fats are healthy for your heart. Thats because they lower LDL (unhealthy) cholesterol.  · Polyunsaturated fats are mostly found in vegetable oils, such as corn, safflower, and soybean oils. They are also found in some seeds, nuts, and fish. Polyunsaturated fats lower LDL (unhealthy) cholesterol. So, choosing them instead of saturated fats is healthy for your heart. Certain unsaturated fats can help lower triglycerides.   Less Healthy:  · Saturated fats are found in animal products, such as meat, poultry, whole milk, lard, and butter. Saturated fats raise LDL cholesterol and are not healthy for your heart.  · Hydrogenated oils and trans fats are formed when vegetable oils are processed into solid fats. They are found in many processed foods. Hydrogenated oils and trans fats raise LDL cholesterol and lower HDL (healthy) cholesterol. They are not healthy for your heart.  Protein  Protein helps the body build and repair muscle and other tissue. Protein has little or no effect on blood sugar. However, many foods that contain protein also contain saturated fat. By choosing low-fat protein sources, you can get the benefits of protein without the extra fat:  · Plant protein is found in dry beans and peas, nuts, and soy products, such as tofu and soymilk. These sources tend to be cholesterol-free and low in saturated fat.  · Animal protein is found in fish, poultry, meat, cheese, milk, and eggs. These contain cholesterol and can be high in saturated fat. Aim for lean, lower-fat choices.  Date Last Reviewed: 3/1/2016  © 1717-2093 [employer]. [address] [place] [address], [place] [address]. All rights reserved. This information is not intended as a substitute for professional medical care. Always follow your healthcare professional's instructions.        Diet: Diabetes  Food is an important tool that you can use to control diabetes and stay healthy. Eating well-balanced meals in the correct amounts  will help you control your blood glucose levels and prevent low blood sugar reactions. It will also help you reduce the health risks of diabetes. There is no one specific diet that is right for everyone with diabetes. But there are general guidelines to follow. A registered dietitian (MAHIN) will create a tailored diet approach thats just right for you. He or she will also help you plan healthy meals and snacks. If you have any questions, call your dietitian for advice.     Guidelines for success  Talk with your healthcare provider before starting a diabetes diet or weight loss program. If you haven't talked with a dietitian yet, ask your provider for a referral. The following guidelines can help you succeed:  · Select foods from the 6 food groups below. Your dietitian will help you find food choices within each group. He or she will also show you serving sizes and how many servings you can have at each meal.  ¨ Grains, beans, and starchy vegetables  ¨ Vegetables  ¨ Fruit  ¨ Milk or yogurt  ¨ Meat, poultry, fish, or tofu  ¨ Healthy fats  · Check your blood sugar levels as directed by your provider. Take any medicine as prescribed by your provider.  · Learn to read food labels and pick the right portion sizes.  · Eat only the amount of food in your meal plan. Eat about the same amount of food at regular times each day. Dont skip meals. Eat meals 4 to 5 hours apart, with snacks in between.  · Limit alcohol. It raises blood sugar levels. Drink water or calorie-free diet drinks that use safe sweeteners.  · Eat less fat to help lower your risk of heart disease. Use nonfat or low-fat dairy products and lean meats. Avoid fried foods. Use cooking oils that are unsaturated, such as olive, canola, or peanut oil.  · Talk with your dietitian about safe sugar substitutes.  · Avoid added salt. It can contribute to high blood pressure, which can cause heart disease. People with diabetes already have a risk of high blood pressure  and heart disease.  · Stay at a healthy weight. If you need to lose weight, cut down on your portion sizes. But dont skip meals. Exercise is an important part of any weight management program. Talk with your provider about an exercise program thats right for you.  · For more information about the best diet plan for you, talk with a registered dietitian (RD). To find an RD in your area, contact:  ¨ Academy of Nutrition and Dietetics www.eatright.org  ¨ The American Diabetes Association [phone] www.diabetes.org  Date Last Reviewed: 8/1/2016  © 2779-3352 [employer]. [address] [place] [address], [place] [address]. All rights reserved. This information is not intended as a substitute for professional medical care. Always follow your healthcare professional's instructions.

## 2020-06-25 LAB
BACTERIA BLD CULT: NORMAL
BACTERIA BLD CULT: NORMAL

## 2020-07-01 ENCOUNTER — CARE AT HOME (OUTPATIENT)
Dept: HOME HEALTH SERVICES | Facility: CLINIC | Age: 80
End: 2020-07-01
Payer: MEDICARE

## 2020-07-01 VITALS
HEART RATE: 80 BPM | RESPIRATION RATE: 18 BRPM | SYSTOLIC BLOOD PRESSURE: 124 MMHG | TEMPERATURE: 98 F | DIASTOLIC BLOOD PRESSURE: 70 MMHG | OXYGEN SATURATION: 96 %

## 2020-07-01 DIAGNOSIS — E11.9 DIABETES MELLITUS, NEW ONSET: ICD-10-CM

## 2020-07-01 DIAGNOSIS — B96.20 BACTEREMIA DUE TO ESCHERICHIA COLI: ICD-10-CM

## 2020-07-01 DIAGNOSIS — I48.91 ATRIAL FIBRILLATION, UNSPECIFIED TYPE: ICD-10-CM

## 2020-07-01 DIAGNOSIS — R78.81 BACTEREMIA DUE TO ESCHERICHIA COLI: ICD-10-CM

## 2020-07-01 DIAGNOSIS — I10 HTN (HYPERTENSION), MALIGNANT: ICD-10-CM

## 2020-07-01 DIAGNOSIS — J44.9 CHRONIC OBSTRUCTIVE PULMONARY DISEASE, UNSPECIFIED COPD TYPE: Primary | ICD-10-CM

## 2020-07-01 PROCEDURE — 99495 TCM SERVICES (MODERATE COMPLEXITY): ICD-10-PCS | Mod: S$GLB,,, | Performed by: NURSE PRACTITIONER

## 2020-07-01 PROCEDURE — 99495 TRANSJ CARE MGMT MOD F2F 14D: CPT | Mod: S$GLB,,, | Performed by: NURSE PRACTITIONER

## 2020-07-01 RX ORDER — ALBUTEROL SULFATE 1.25 MG/3ML
1.25 SOLUTION RESPIRATORY (INHALATION) EVERY 6 HOURS PRN
COMMUNITY
End: 2020-10-04 | Stop reason: SDUPTHER

## 2020-07-01 NOTE — PROGRESS NOTES
[facility]  Transition of Care Home Visit    Visit Date: 7/1/2020  Encounter Provider: Natasha Jackson NP  PCP:  Chelsy Torrez MD    PRESENTING HISTORY      Patient ID: Micah Laurent Jr. is a 79 y.o. male.    Consult Requested By:  Dr. Chelsy Torrez  Reason for Consult:  Hospital Follow Up    Micah  is being seen at home due to physical debility that presents a taxing effort to leave the home, to mitigate high risk of hospital readmission and/or due to the limited availability of reliable or safe options for transportation to the point of access to the provider. Prior to treatment on this visit the chart was reviewed and patient consent was obtained.        Chief Complaint: Transitional Care, Hypertension, Urinary Tract Infection, and Diabetes      History of Present Illness: Mr. Micah Laurent Jr. is a 79 y.o. male who was recently admitted to the hospital.    Admit: 6/19/2020   Discharge: 6/23/2020  Hospital Course:   Admitted with bacteremia. Blood cultures 6/16 and 6/19 positive for EColi. Blood culture 6/21 negative. Source urine- urine culture 6/19 positive for EColi. Treated with ceftriaxone while inpatient. Sensitive to cipro for outpatient treatment. Will need treatment until 7/4/2020. Developed antibiotic associated diarrhea. Transitioned to cipro for outpatient treatment of EColi bacteremia. Of note, patient is also newly diagnosed diabetic. A1c 11.2% on 6/12/2020. Started detemir insulin. Patient reluctant to use insulin and would prefer pills, but understands and agrees to use insulin after discharge from hospital until PCP follow up. Discharged with glucometer, strips, lancets, and detemir insulin. Instructed to check glucose once daily and keep a log of glucoses to bring to PCP appointment.   ___________________________________________________________________    Today:    HPI:  Pt is being seen today for hospital follow up after recent admit for bacteremia and UTI. See hospital  course.  Pt was also recently diagnosed as diabetic and discharged with insulin in place. Pt did not want to take injections and has followed up with endocrine and changed to oral medications. He was not able to afford the co-pay for the glucometer and has not been able to check his sugar at home.    Today, he denies any complaints and reports he feels like he is at his usual state of health. He is taking anti-biotics as directed, using his nebulizer prn SOB, denies any problems at this time.  He and his wife's only concern is dark discoloration to ceiling around a/c vents. They are concerned this may worsen his respiratory illness    Review of Systems   Constitutional: Negative for activity change, fatigue and fever.   HENT: Negative.    Eyes: Negative.    Respiratory: Negative for chest tightness.    Cardiovascular: Negative.  Negative for leg swelling.   Gastrointestinal: Negative.    Endocrine: Negative.    Genitourinary: Negative.    Musculoskeletal: Negative.    Skin: Negative.    Allergic/Immunologic: Negative.    Neurological: Negative.    Hematological: Negative.    Psychiatric/Behavioral: Negative.  Negative for agitation.   All other systems reviewed and are negative.      Assessments:  · Environmental: clean uncluttered apartment, mold noted to ceiling and a/c ducts, adequate temp   · Dim lighting  · Functional Status: independent  · Safety: no issues  · Nutritional: adequate  · Home Health/DME/Supplies: declined HH    PAST HISTORY:     Past Medical History:   Diagnosis Date    Asthma     Atrial fibrillation     COPD (chronic obstructive pulmonary disease)     Hypertension        History reviewed. No pertinent surgical history.    History reviewed. No pertinent family history.    Social History     Socioeconomic History    Marital status:      Spouse name: Not on file    Number of children: Not on file    Years of education: Not on file    Highest education level: Not on file    Occupational History    Not on file   Social Needs    Financial resource strain: Not on file    Food insecurity     Worry: Not on file     Inability: Not on file    Transportation needs     Medical: Not on file     Non-medical: Not on file   Tobacco Use    Smoking status: Former Smoker     Packs/day: 1.50     Years: 20.00     Pack years: 30.00     Quit date:      Years since quittin.5    Smokeless tobacco: Former User   Substance and Sexual Activity    Alcohol use: No    Drug use: No    Sexual activity: Never   Lifestyle    Physical activity     Days per week: Not on file     Minutes per session: Not on file    Stress: Not on file   Relationships    Social connections     Talks on phone: Not on file     Gets together: Not on file     Attends Gnosticism service: Not on file     Active member of club or organization: Not on file     Attends meetings of clubs or organizations: Not on file     Relationship status: Not on file   Other Topics Concern    Not on file   Social History Narrative    Not on file       MEDICATIONS & ALLERGIES:     Current Outpatient Medications on File Prior to Visit   Medication Sig Dispense Refill    albuterol (ACCUNEB) 1.25 mg/3 mL Nebu Take 1.25 mg by nebulization every 6 (six) hours as needed. Rescue      aspirin (ECOTRIN) 81 MG EC tablet Take 81 mg by mouth once daily.      ciprofloxacin HCl (CIPRO) 500 MG tablet Take 1 tablet (500 mg total) by mouth 2 (two) times daily. for 12 days 24 tablet 0    fluticasone-salmeterol diskus inhaler 250-50 mcg Inhale 1 puff into the lungs 2 (two) times daily. Controller 60 each 2    hydrALAZINE (APRESOLINE) 50 MG tablet Take 1 tablet (50 mg total) by mouth every 12 (twelve) hours. 60 tablet 11    ipratropium-albuterol (COMBIVENT)  mcg/actuation inhaler Inhale 1 puff into the lungs every 6 (six) hours as needed for Wheezing. Rescue 4 g 2    metoprolol succinate (TOPROL-XL) 25 MG 24 hr tablet Take 25 mg by mouth 2 (two)  times daily.      SITagliptin (JANUVIA) 50 MG Tab       [DISCONTINUED] blood sugar diagnostic Strp 1 strip by Misc.(Non-Drug; Combo Route) route once daily. (Patient not taking: Reported on 6/24/2020) 200 each 1    [DISCONTINUED] blood-glucose meter kit Use as instructed (Patient not taking: Reported on 6/24/2020) 1 each 0    [DISCONTINUED] insulin detemir U-100 (LEVEMIR FLEXTOUCH) 100 unit/mL (3 mL) SubQ InPn pen Inject 15 Units into the skin once daily. (Patient not taking: Reported on 6/24/2020) 13.5 mL 3    [DISCONTINUED] lancets (ACCU-CHEK SOFTCLIX LANCETS) Misc 1 lancet by Misc.(Non-Drug; Combo Route) route once daily. (Patient not taking: Reported on 6/24/2020) 200 each 1     No current facility-administered medications on file prior to visit.         Review of patient's allergies indicates:   Allergen Reactions    Lisinopril      Swells lower legs.       OBJECTIVE:     Vital Signs:  Vitals:    07/01/20 1649   BP: 124/70   Pulse: 80   Resp: 18   Temp: 97.8 °F (36.6 °C)     There is no height or weight on file to calculate BMI.     Physical Exam:  Physical Exam  Vitals signs reviewed.   Constitutional:       General: He is not in acute distress.     Appearance: He is well-developed.   HENT:      Head: Normocephalic and atraumatic.   Eyes:      Pupils: Pupils are equal, round, and reactive to light.   Neck:      Musculoskeletal: Normal range of motion and neck supple.   Cardiovascular:      Rate and Rhythm: Normal rate and regular rhythm.      Heart sounds: Normal heart sounds.   Pulmonary:      Effort: Pulmonary effort is normal.      Comments: Clear but diminished  Abdominal:      General: Bowel sounds are normal.      Palpations: Abdomen is soft.   Musculoskeletal: Normal range of motion.   Skin:     General: Skin is warm and dry.   Neurological:      Mental Status: He is alert and oriented to person, place, and time.      Cranial Nerves: No cranial nerve deficit.   Psychiatric:         Behavior:  Behavior normal.         Thought Content: Thought content normal.         Judgment: Judgment normal.         Laboratory  Lab Results   Component Value Date    WBC 3.84 (L) 06/22/2020    HGB 9.1 (L) 06/22/2020    HCT 32.2 (L) 06/22/2020    MCV 89 06/22/2020     06/22/2020     Lab Results   Component Value Date    INR 0.9 06/19/2020    INR 1.0 10/02/2019    INR 1.0 05/05/2019     Lab Results   Component Value Date    HGBA1C 11.2 (H) 06/12/2020     No results for input(s): POCTGLUCOSE in the last 72 hours.    Diagnostic Results:      TRANSITION OF CARE:     [facility] Contact Note: 6/24/2020    Family and/or Caretaker present at visit?  No.  Diagnostic tests reviewed/disposition: No diagnosic tests pending after this hospitalization.  Disease/illness education: DM  Home health/community services discussion/referrals: Patient does not have home health established from hospital visit.  They do not need home health.  If needed, we will set up home health for the patient.   Establishment or re-establishment of referral orders for community resources: No other necessary community resources.   Discussion with other health care providers: No discussion with other health care providers necessary.     Transition of Care Visit:     I have reviewed and updated the history and problem list.  I have reconciled the medication list.  I have discussed the hospitalization and current medical issues, prognosis and plans with the patient/family.  I  spent more than 50% of time discussing the care with the patient/family.  Total Face-to-Face Encounter: 60 minutes.    Medications Reconciliation:   I have reconciled the patient's home medications and discharge medications with the patient/family. I have updated all changes.  Refer to After-Visit Medication List.    ASSESSMENT & PLAN:     HIGH RISK CONDITION(S):      Micah was seen today for transitional care, hypertension, urinary tract infection and diabetes.    Diagnoses  and all orders for this visit:    Chronic obstructive pulmonary disease, unspecified COPD type  Stable, no SOB today  Using nebulizer prn  Avoid aggravating factors  Continue to monitor    HTN (hypertension), malignant  Stable, B/p wnl today  Continue current plan of care    Atrial fibrillation, unspecified type  Stable, followed by cardiology  Continue beta blocker and ASA  Continue current plan of care  Bacteremia due to Escherichia coli  Pt will complete course of Cipro in 2 days  No symptoms of UTI, no fever  Reinforced importance of completing anti-biotics    Diabetes mellitus, new onset  Newly diagnosed during recent admit  Pt is taking januvia  He has had a endocrine visit with Dr Torrez  He was not able to afford the glucometer ordered at discharge  Discussed ADA diet and reports endo gave him handouts to assist with meal planning  Continue to monitor      Were controlled substances prescribed?  No    Instructions for the patient:  Keep upcoming PCP appt  Take all medications as prescribed  Compliance with ADA reviewed    Scheduled Follow-up :  No future appointments.    After Visit Medication List :     Medication List          Accurate as of July 1, 2020  5:02 PM. If you have any questions, ask your nurse or doctor.            CONTINUE taking these medications    albuterol 1.25 mg/3 mL Nebu  Commonly known as: ACCUNEB     aspirin 81 MG EC tablet  Commonly known as: ECOTRIN     ciprofloxacin HCl 500 MG tablet  Commonly known as: CIPRO  Take 1 tablet (500 mg total) by mouth 2 (two) times daily. for 12 days     fluticasone-salmeterol 250-50 mcg/dose 250-50 mcg/dose diskus inhaler  Commonly known as: ADVAIR  Inhale 1 puff into the lungs 2 (two) times daily. Controller     hydrALAZINE 50 MG tablet  Commonly known as: APRESOLINE  Take 1 tablet (50 mg total) by mouth every 12 (twelve) hours.     ipratropium-albuteroL  mcg/actuation inhaler  Commonly known as: COMBIVENT  Inhale 1 puff into the lungs every 6  (six) hours as needed for Wheezing. Rescue     metoprolol succinate 25 MG 24 hr tablet  Commonly known as: TOPROL-XL     SITagliptin 50 MG Tab  Commonly known as: JANUVIA        STOP taking these medications    blood sugar diagnostic Strp  Stopped by: Natasha Jackson NP     blood-glucose meter kit  Stopped by: Natasha Jackson NP     insulin detemir U-100 100 unit/mL (3 mL) Inpn pen  Commonly known as: LEVEMIR FLEXTOUCH  Stopped by: Natasha Jackson NP     lancets Misc  Commonly known as: ACCU-CHEK SOFTCLIX LANCETS  Stopped by: Natasha Jackson NP          Attestation: Screening criteria to assess the level of the patient's risk for infection with COVID-19 as recommended by the CDC at the time of the above documented home visit concluded appropriateness to proceed. Universal precautions were maintained at all times, including provider use of >60% alcohol gel hand  immediately prior to entry and upon departing the patient's home as well as cleaning of equipment used in home visit with antibacterial/germicidal disposable wipes.    Signature:  Natasha Jackson NP    Patient consent obtained prior to treatment

## 2020-07-03 ENCOUNTER — HOSPITAL ENCOUNTER (EMERGENCY)
Facility: HOSPITAL | Age: 80
Discharge: HOME | End: 2020-07-04
Attending: EMERGENCY MEDICINE
Payer: MEDICARE

## 2020-07-03 DIAGNOSIS — J44.1 COPD EXACERBATION: Primary | ICD-10-CM

## 2020-07-03 DIAGNOSIS — R07.9 CHEST PAIN: ICD-10-CM

## 2020-07-03 PROCEDURE — 93010 ELECTROCARDIOGRAM REPORT: CPT | Mod: ,,, | Performed by: INTERNAL MEDICINE

## 2020-07-03 PROCEDURE — 99285 EMERGENCY DEPT VISIT HI MDM: CPT | Mod: 25

## 2020-07-03 PROCEDURE — 93005 ELECTROCARDIOGRAM TRACING: CPT

## 2020-07-03 PROCEDURE — 93010 EKG 12-LEAD: ICD-10-PCS | Mod: ,,, | Performed by: INTERNAL MEDICINE

## 2020-07-03 RX ORDER — PREDNISONE 20 MG/1
60 TABLET ORAL
Status: COMPLETED | OUTPATIENT
Start: 2020-07-03 | End: 2020-07-04

## 2020-07-03 RX ORDER — ALBUTEROL SULFATE 90 UG/1
10 AEROSOL, METERED RESPIRATORY (INHALATION) ONCE
Status: COMPLETED | OUTPATIENT
Start: 2020-07-04 | End: 2020-07-04

## 2020-07-03 RX ORDER — IPRATROPIUM BROMIDE AND ALBUTEROL SULFATE 2.5; .5 MG/3ML; MG/3ML
3 SOLUTION RESPIRATORY (INHALATION)
Status: DISCONTINUED | OUTPATIENT
Start: 2020-07-03 | End: 2020-07-03

## 2020-07-04 VITALS
DIASTOLIC BLOOD PRESSURE: 92 MMHG | HEART RATE: 78 BPM | HEIGHT: 65 IN | RESPIRATION RATE: 18 BRPM | BODY MASS INDEX: 24.83 KG/M2 | OXYGEN SATURATION: 99 % | SYSTOLIC BLOOD PRESSURE: 177 MMHG | WEIGHT: 149 LBS | TEMPERATURE: 99 F

## 2020-07-04 LAB
ALBUMIN SERPL BCP-MCNC: 3.7 G/DL (ref 3.5–5.2)
ALLENS TEST: ABNORMAL
ALP SERPL-CCNC: 63 U/L (ref 55–135)
ALT SERPL W/O P-5'-P-CCNC: 17 U/L (ref 10–44)
ANION GAP SERPL CALC-SCNC: 10 MMOL/L (ref 8–16)
AST SERPL-CCNC: 29 U/L (ref 10–40)
BASOPHILS # BLD AUTO: 0 K/UL (ref 0–0.2)
BASOPHILS NFR BLD: 0 % (ref 0–1.9)
BILIRUB SERPL-MCNC: 0.5 MG/DL (ref 0.1–1)
BNP SERPL-MCNC: 183 PG/ML (ref 0–99)
BUN SERPL-MCNC: 19 MG/DL (ref 8–23)
CALCIUM SERPL-MCNC: 8.8 MG/DL (ref 8.7–10.5)
CHLORIDE SERPL-SCNC: 108 MMOL/L (ref 95–110)
CO2 SERPL-SCNC: 26 MMOL/L (ref 23–29)
CREAT SERPL-MCNC: 1.4 MG/DL (ref 0.5–1.4)
DELSYS: ABNORMAL
DIFFERENTIAL METHOD: ABNORMAL
EOSINOPHIL # BLD AUTO: 0 K/UL (ref 0–0.5)
EOSINOPHIL NFR BLD: 0 % (ref 0–8)
ERYTHROCYTE [DISTWIDTH] IN BLOOD BY AUTOMATED COUNT: 15.5 % (ref 11.5–14.5)
EST. GFR  (AFRICAN AMERICAN): 54 ML/MIN/1.73 M^2
EST. GFR  (NON AFRICAN AMERICAN): 47 ML/MIN/1.73 M^2
FLOW: 2
GLUCOSE SERPL-MCNC: 134 MG/DL (ref 70–110)
HCO3 UR-SCNC: 28.8 MMOL/L (ref 24–28)
HCT VFR BLD AUTO: 33.3 % (ref 40–54)
HGB BLD-MCNC: 9.6 G/DL (ref 14–18)
IMM GRANULOCYTES # BLD AUTO: 0.01 K/UL (ref 0–0.04)
IMM GRANULOCYTES NFR BLD AUTO: 0.2 % (ref 0–0.5)
LYMPHOCYTES # BLD AUTO: 1 K/UL (ref 1–4.8)
LYMPHOCYTES NFR BLD: 15.2 % (ref 18–48)
MCH RBC QN AUTO: 25.9 PG (ref 27–31)
MCHC RBC AUTO-ENTMCNC: 28.8 G/DL (ref 32–36)
MCV RBC AUTO: 90 FL (ref 82–98)
MODE: ABNORMAL
MONOCYTES # BLD AUTO: 0.6 K/UL (ref 0.3–1)
MONOCYTES NFR BLD: 8.3 % (ref 4–15)
NEUTROPHILS # BLD AUTO: 5.1 K/UL (ref 1.8–7.7)
NEUTROPHILS NFR BLD: 76.3 % (ref 38–73)
NRBC BLD-RTO: 0 /100 WBC
PCO2 BLDA: 53.7 MMHG (ref 35–45)
PH SMN: 7.34 [PH] (ref 7.35–7.45)
PLATELET # BLD AUTO: 311 K/UL (ref 150–350)
PMV BLD AUTO: 10.9 FL (ref 9.2–12.9)
PO2 BLDA: 46 MMHG (ref 40–60)
POC BE: 2 MMOL/L
POC SATURATED O2: 78 % (ref 95–100)
POC TCO2: 30 MMOL/L (ref 24–29)
POTASSIUM SERPL-SCNC: 4.3 MMOL/L (ref 3.5–5.1)
PROT SERPL-MCNC: 7.8 G/DL (ref 6–8.4)
RBC # BLD AUTO: 3.71 M/UL (ref 4.6–6.2)
SAMPLE: ABNORMAL
SARS-COV-2 RDRP RESP QL NAA+PROBE: NEGATIVE
SITE: ABNORMAL
SODIUM SERPL-SCNC: 144 MMOL/L (ref 136–145)
TROPONIN I SERPL DL<=0.01 NG/ML-MCNC: <0.006 NG/ML (ref 0–0.03)
TROPONIN I SERPL DL<=0.01 NG/ML-MCNC: <0.006 NG/ML (ref 0–0.03)
WBC # BLD AUTO: 6.63 K/UL (ref 3.9–12.7)

## 2020-07-04 PROCEDURE — 63600175 PHARM REV CODE 636 W HCPCS: Performed by: EMERGENCY MEDICINE

## 2020-07-04 PROCEDURE — 85025 COMPLETE CBC W/AUTO DIFF WBC: CPT

## 2020-07-04 PROCEDURE — 82803 BLOOD GASES ANY COMBINATION: CPT

## 2020-07-04 PROCEDURE — 83880 ASSAY OF NATRIURETIC PEPTIDE: CPT

## 2020-07-04 PROCEDURE — 84484 ASSAY OF TROPONIN QUANT: CPT

## 2020-07-04 PROCEDURE — 94640 AIRWAY INHALATION TREATMENT: CPT

## 2020-07-04 PROCEDURE — 80053 COMPREHEN METABOLIC PANEL: CPT

## 2020-07-04 PROCEDURE — 27100098 HC SPACER

## 2020-07-04 PROCEDURE — 25000242 PHARM REV CODE 250 ALT 637 W/ HCPCS: Performed by: EMERGENCY MEDICINE

## 2020-07-04 PROCEDURE — U0002 COVID-19 LAB TEST NON-CDC: HCPCS

## 2020-07-04 PROCEDURE — 99900035 HC TECH TIME PER 15 MIN (STAT)

## 2020-07-04 PROCEDURE — 94761 N-INVAS EAR/PLS OXIMETRY MLT: CPT

## 2020-07-04 PROCEDURE — 84484 ASSAY OF TROPONIN QUANT: CPT | Mod: 91

## 2020-07-04 PROCEDURE — 27000221 HC OXYGEN, UP TO 24 HOURS

## 2020-07-04 RX ORDER — PREDNISONE 50 MG/1
50 TABLET ORAL DAILY
Qty: 4 TABLET | Refills: 0 | Status: SHIPPED | OUTPATIENT
Start: 2020-07-04 | End: 2020-07-08

## 2020-07-04 RX ORDER — AZITHROMYCIN 250 MG/1
250 TABLET, FILM COATED ORAL DAILY
Qty: 6 TABLET | Refills: 0 | Status: SHIPPED | OUTPATIENT
Start: 2020-07-04 | End: 2020-07-09

## 2020-07-04 RX ORDER — ALBUTEROL SULFATE 90 UG/1
8 AEROSOL, METERED RESPIRATORY (INHALATION) ONCE
Status: COMPLETED | OUTPATIENT
Start: 2020-07-04 | End: 2020-07-04

## 2020-07-04 RX ADMIN — IPRATROPIUM BROMIDE 4 PUFF: 17 AEROSOL, METERED RESPIRATORY (INHALATION) at 12:07

## 2020-07-04 RX ADMIN — ALBUTEROL SULFATE 8 PUFF: 90 AEROSOL, METERED RESPIRATORY (INHALATION) at 03:07

## 2020-07-04 RX ADMIN — ALBUTEROL SULFATE 10 PUFF: 90 AEROSOL, METERED RESPIRATORY (INHALATION) at 12:07

## 2020-07-04 RX ADMIN — PREDNISONE 60 MG: 20 TABLET ORAL at 12:07

## 2020-07-04 RX ADMIN — IPRATROPIUM BROMIDE 4 PUFF: 17 AEROSOL, METERED RESPIRATORY (INHALATION) at 03:07

## 2020-07-04 NOTE — ED TRIAGE NOTES
"Patient presented to the ED stating that he has been having SOB since last night that is unrelieved by his inhalers. Patient stated that he thinks his "COPD is flaring back up!" Patient denies any CP, HA or dizziness.   "

## 2020-07-04 NOTE — DISCHARGE INSTRUCTIONS
You were seen in the emergency department for difficulty breathing due to COPD. We gave you steroids and a breathing treatment and you felt better.  You've planned to go home and get another breathing treatment.  Please continue this every 4 hours while awake for the next day or so until you are completely back to normal.  We have given you a prescription for steroids.  Please take it daily for the next 4 days.  Please follow-up with your primary care provider.  Please return immediately for any new or worsening wheezing, difficulty breathing, dizziness, or you become concerned in any way.

## 2020-07-04 NOTE — ED PROVIDER NOTES
"Encounter Date: 7/3/2020    SCRIBE #1 NOTE: I, Patricia Red, am scribing for, and in the presence of,  Rogerio Marquez MD. I have scribed the entire note.       History     Chief Complaint   Patient presents with    Shortness of Breath     " I think my COPD flaring up again". Reports SOB/labored breathing that started around 830pm tonight. Denies CP. Used him home nebulizer and inhaler meds without any help. Hx of asthma. RA O2 sats in triage is 89%      This is an 87 y/o male with a PMHx of Asthma, Hypertension, COPD, and Atrial Fibrillation. He presents to the ED with a CC of shortness of breath that began 5 hours PTA. The patient reports his shortness of breath started before he started feeling his heart "skip beats." He used his nebulizer and inhaler with no relief. Patient does not have O2 at home. He denies any fever, chills, nausea, vomiting, swelling of the testicles, or coughing. He also denies any recent sick contacts or travel. The patient has been to the hospital for similar symptoms in the past. He does not smoke cigarettes, consume alcohol, or participate in recreational drug use. Patient has allergy to Lisinopril.     The history is provided by the patient.     Review of patient's allergies indicates:   Allergen Reactions    Lisinopril      Swells lower legs.     Past Medical History:   Diagnosis Date    Asthma     Atrial fibrillation     COPD (chronic obstructive pulmonary disease)     Diabetes mellitus, new onset     Hypertension      History reviewed. No pertinent surgical history.  History reviewed. No pertinent family history.  Social History     Tobacco Use    Smoking status: Former Smoker     Packs/day: 1.50     Years: 20.00     Pack years: 30.00     Quit date:      Years since quittin.    Smokeless tobacco: Former User   Substance Use Topics    Alcohol use: No    Drug use: No     Review of Systems   Constitutional: Negative for chills, diaphoresis, fatigue and " fever.   HENT: Negative for congestion, sinus pain and sore throat.    Respiratory: Positive for shortness of breath. Negative for cough and stridor.    Cardiovascular: Positive for palpitations. Negative for chest pain and leg swelling.   Gastrointestinal: Negative for abdominal pain, diarrhea, nausea and vomiting.   Genitourinary: Negative for dysuria, flank pain, frequency and scrotal swelling.   Musculoskeletal: Negative for back pain and joint swelling.   Skin: Negative for rash and wound.   Neurological: Negative for dizziness, syncope, facial asymmetry, speech difficulty, weakness, numbness and headaches.   Psychiatric/Behavioral: Negative for confusion.       Physical Exam     Initial Vitals [07/03/20 2322]   BP Pulse Resp Temp SpO2   (!) 189/87 97 (!) 26 98.2 °F (36.8 °C) (!) 89 %      MAP       --         Physical Exam    Nursing note and vitals reviewed.  Constitutional: He appears well-developed and well-nourished. He is not diaphoretic. No distress.   HENT:   Head: Normocephalic and atraumatic.   Eyes: Conjunctivae and EOM are normal. Pupils are equal, round, and reactive to light. No scleral icterus.   Neck: Normal range of motion. Neck supple.   Cardiovascular: Normal rate, regular rhythm, intact distal pulses and normal pulses. Exam reveals no gallop and no friction rub.    Pulmonary/Chest: Tachypnea noted. No respiratory distress. He has wheezes. He has no rhonchi. He has no rales.   Tight, bilateral wheezing with prolonged expiratory phase.    Abdominal: Soft. Bowel sounds are normal. He exhibits no distension. There is no abdominal tenderness. There is no rebound and no guarding.   Musculoskeletal: Normal range of motion. No tenderness or edema.   Neurological: He is alert and oriented to person, place, and time. He has normal strength. No cranial nerve deficit. GCS score is 15. GCS eye subscore is 4. GCS verbal subscore is 5. GCS motor subscore is 6.   Spoke with short sentences    Skin: Skin is  warm and dry. No rash noted.   Psychiatric: He has a normal mood and affect. His behavior is normal.         ED Course   Procedures  Labs Reviewed   CBC W/ AUTO DIFFERENTIAL - Abnormal; Notable for the following components:       Result Value    RBC 3.71 (*)     Hemoglobin 9.6 (*)     Hematocrit 33.3 (*)     Mean Corpuscular Hemoglobin 25.9 (*)     Mean Corpuscular Hemoglobin Conc 28.8 (*)     RDW 15.5 (*)     Gran% 76.3 (*)     Lymph% 15.2 (*)     All other components within normal limits   COMPREHENSIVE METABOLIC PANEL - Abnormal; Notable for the following components:    Glucose 134 (*)     eGFR if  54 (*)     eGFR if non  47 (*)     All other components within normal limits   B-TYPE NATRIURETIC PEPTIDE - Abnormal; Notable for the following components:     (*)     All other components within normal limits   ISTAT PROCEDURE - Abnormal; Notable for the following components:    POC PH 7.337 (*)     POC PCO2 53.7 (*)     POC HCO3 28.8 (*)     POC SATURATED O2 78 (*)     POC TCO2 30 (*)     All other components within normal limits   TROPONIN I   SARS-COV-2 RNA AMPLIFICATION, QUAL   TROPONIN I     EKG Readings: (Independently Interpreted)   Initial Reading: No STEMI. Rhythm: Normal Sinus Rhythm. Heart Rate: 95. Ectopy: PACs.   No ST segment elevation or depression concerning for acute ischemia.          Imaging Results          X-Ray Chest AP Portable (Final result)  Result time 07/04/20 00:05:46    Final result by Tessa Colbert MD (07/04/20 00:05:46)                 Impression:      No acute cardiopulmonary process identified.      Electronically signed by: Tessa Colbert MD  Date:    07/04/2020  Time:    00:05             Narrative:    EXAMINATION:  XR CHEST AP PORTABLE    CLINICAL HISTORY:  Chest Pain;    TECHNIQUE:  Single frontal view of the chest was performed.    COMPARISON:  06/19/2020.    FINDINGS:  Cardiac silhouette is normal in size.  Lungs are symmetrically  expanded.  No evidence of new focal consolidative process, pneumothorax, or significant pleural effusion.  No acute osseous abnormality identified.                                 Medical Decision Making:   Initial Assessment:   79-year-old male with a history of reactive airway disease presenting with shortness of breath and wheezing.  On exam well appearing no acute distress.  Mild tachypnea with mildly shortened sentences, tight bilateral wheezing.  Quiet breath sounds.  History of presentations with the same.  Denies fevers chills chest pain, nausea, vomiting.  Did note some palpitations.  Otherwise usual state of health.  Patient with mild O2 requirement at triage.  Patient given prednisone, MDI albuterol and ipratropium.  Substantial improvement in wheezing.  Patient reports feeling back to baseline.  EKG, lab workup unremarkable.  Minimally elevated BNP, not likely consistent with CHF.  No other evidence of fluid overload.  No fevers, COVID negative.  Chest x-ray without evidence of pneumonia.  Patient feels back at baseline, well-appearing, exam improved.  Believe he is safe for discharge home.  Will discharge home with doxycycline as well as prednisone.  Discussed return precautions as well as need for pulmonology or primary care follow-up.  Clinical Tests:   Lab Tests: Ordered and Reviewed  Radiological Study: Ordered and Reviewed  ED Management:  1:20am  Discussed results with patient            Scribe Attestation:   Scribe #1: I performed the above scribed service and the documentation accurately describes the services I performed. I attest to the accuracy of the note.                          Clinical Impression:       ICD-10-CM ICD-9-CM   1. COPD exacerbation  J44.1 491.21   2. Chest pain  R07.9 786.50         Disposition:   Disposition: Discharged  Condition: Stable     ED Disposition Condition    Discharge Stable       I, Rogerio Marquez, personally performed the services described in this  documentation. All medical record entries made by the scribe were at my direction and in my presence.  I have reviewed the chart and agree that the record reflects my personal performance and is accurate and complete.  ED Prescriptions     None        Follow-up Information     Follow up With Specialties Details Why Contact Info    Chelsy Torrez MD Endocrinology Schedule an appointment as soon as possible for a visit   [address]  [facility] [address]  [phone]      [facility] Emergency Medicine  As needed, If symptoms worsen [address] [facility] [address] [phone]                                     Rogerio Marquez MD  07/04/20 0210

## 2020-07-22 ENCOUNTER — SSC ENCOUNTER (OUTPATIENT)
Dept: ADMINISTRATIVE | Facility: OTHER | Age: 80
End: 2020-07-22

## 2020-07-22 NOTE — PROGRESS NOTES
Please note the following patient's information was forwarded to [facility] (N) for case management and/or  on 7/22/20.    Please contact Ext. [phone] with any questions.    Thank you,    Joanna Meeks, [facility]  Outpatient Case Mgmnt  [phone]

## 2020-07-23 ENCOUNTER — SSC ENCOUNTER (OUTPATIENT)
Dept: ADMINISTRATIVE | Facility: OTHER | Age: 80
End: 2020-07-23

## 2020-07-23 NOTE — PROGRESS NOTES
Please see the below information from N regarding case management referral      Joanna,  Response to outpatient CM referral from [facility]:  Member Micah Laurent of [facility] has been in case management for 3 months with our care coordinator. He has been frequently non-responsive to outreach and educational efforts. He has been approved for Medicaid and will transition to the [facility] dual eligible plan by 8/1/20 and will be followed in Complex Case management.   Thank you,  Mona Louis, RN, BSN  Market Clinical Director  [address], [address]  [place] [address]  Direct Dial Number: [phone]  Main Number: [phone], ext. [phone]  Mobile: [phone]  Fax: [phone]          Thank you,  Joanna Meeks, [facility]  Outpatient Case Mgmnt  [phone]

## 2020-10-04 ENCOUNTER — HOSPITAL ENCOUNTER (EMERGENCY)
Facility: HOSPITAL | Age: 80
Discharge: HOME | End: 2020-10-04
Attending: EMERGENCY MEDICINE
Payer: MEDICARE

## 2020-10-04 VITALS
DIASTOLIC BLOOD PRESSURE: 89 MMHG | WEIGHT: 155 LBS | HEIGHT: 65 IN | RESPIRATION RATE: 24 BRPM | TEMPERATURE: 99 F | SYSTOLIC BLOOD PRESSURE: 174 MMHG | HEART RATE: 88 BPM | OXYGEN SATURATION: 96 % | BODY MASS INDEX: 25.83 KG/M2

## 2020-10-04 DIAGNOSIS — J44.1 COPD EXACERBATION: Primary | ICD-10-CM

## 2020-10-04 DIAGNOSIS — R06.02 SHORTNESS OF BREATH: ICD-10-CM

## 2020-10-04 LAB
ALBUMIN SERPL BCP-MCNC: 4.1 G/DL (ref 3.5–5.2)
ALP SERPL-CCNC: 70 U/L (ref 55–135)
ALT SERPL W/O P-5'-P-CCNC: 26 U/L (ref 10–44)
ANION GAP SERPL CALC-SCNC: 9 MMOL/L (ref 8–16)
AST SERPL-CCNC: 31 U/L (ref 10–40)
BASOPHILS # BLD AUTO: 0 K/UL (ref 0–0.2)
BASOPHILS NFR BLD: 0 % (ref 0–1.9)
BILIRUB SERPL-MCNC: 0.4 MG/DL (ref 0.1–1)
BNP SERPL-MCNC: 203 PG/ML (ref 0–99)
BUN SERPL-MCNC: 26 MG/DL (ref 8–23)
CALCIUM SERPL-MCNC: 9 MG/DL (ref 8.7–10.5)
CHLORIDE SERPL-SCNC: 106 MMOL/L (ref 95–110)
CO2 SERPL-SCNC: 29 MMOL/L (ref 23–29)
CREAT SERPL-MCNC: 1.4 MG/DL (ref 0.5–1.4)
CTP QC/QA: YES
DIFFERENTIAL METHOD: ABNORMAL
EOSINOPHIL # BLD AUTO: 0 K/UL (ref 0–0.5)
EOSINOPHIL NFR BLD: 0 % (ref 0–8)
ERYTHROCYTE [DISTWIDTH] IN BLOOD BY AUTOMATED COUNT: 15.6 % (ref 11.5–14.5)
EST. GFR  (AFRICAN AMERICAN): 54 ML/MIN/1.73 M^2
EST. GFR  (NON AFRICAN AMERICAN): 47 ML/MIN/1.73 M^2
GLUCOSE SERPL-MCNC: 99 MG/DL (ref 70–110)
HCT VFR BLD AUTO: 34.7 % (ref 40–54)
HGB BLD-MCNC: 10.9 G/DL (ref 14–18)
IMM GRANULOCYTES # BLD AUTO: 0.02 K/UL (ref 0–0.04)
IMM GRANULOCYTES NFR BLD AUTO: 0.4 % (ref 0–0.5)
INR PPP: 0.9 (ref 0.8–1.2)
LACTATE SERPL-SCNC: 1 MMOL/L (ref 0.5–2.2)
LYMPHOCYTES # BLD AUTO: 1.1 K/UL (ref 1–4.8)
LYMPHOCYTES NFR BLD: 18.9 % (ref 18–48)
MCH RBC QN AUTO: 27.3 PG (ref 27–31)
MCHC RBC AUTO-ENTMCNC: 31.4 G/DL (ref 32–36)
MCV RBC AUTO: 87 FL (ref 82–98)
MONOCYTES # BLD AUTO: 0.3 K/UL (ref 0.3–1)
MONOCYTES NFR BLD: 6.1 % (ref 4–15)
NEUTROPHILS # BLD AUTO: 4.2 K/UL (ref 1.8–7.7)
NEUTROPHILS NFR BLD: 74.6 % (ref 38–73)
NRBC BLD-RTO: 0 /100 WBC
PLATELET # BLD AUTO: 169 K/UL (ref 150–350)
PMV BLD AUTO: 12.1 FL (ref 9.2–12.9)
POTASSIUM SERPL-SCNC: 4.2 MMOL/L (ref 3.5–5.1)
PROT SERPL-MCNC: 8.7 G/DL (ref 6–8.4)
PROTHROMBIN TIME: 10.4 SEC (ref 9–12.5)
RBC # BLD AUTO: 3.99 M/UL (ref 4.6–6.2)
SARS-COV-2 RDRP RESP QL NAA+PROBE: NEGATIVE
SODIUM SERPL-SCNC: 144 MMOL/L (ref 136–145)
TROPONIN I SERPL DL<=0.01 NG/ML-MCNC: <0.006 NG/ML (ref 0–0.03)
WBC # BLD AUTO: 5.56 K/UL (ref 3.9–12.7)

## 2020-10-04 PROCEDURE — 87040 BLOOD CULTURE FOR BACTERIA: CPT

## 2020-10-04 PROCEDURE — 93005 ELECTROCARDIOGRAM TRACING: CPT

## 2020-10-04 PROCEDURE — 94640 AIRWAY INHALATION TREATMENT: CPT

## 2020-10-04 PROCEDURE — 94761 N-INVAS EAR/PLS OXIMETRY MLT: CPT

## 2020-10-04 PROCEDURE — 96374 THER/PROPH/DIAG INJ IV PUSH: CPT

## 2020-10-04 PROCEDURE — 63600175 PHARM REV CODE 636 W HCPCS: Performed by: EMERGENCY MEDICINE

## 2020-10-04 PROCEDURE — 25000242 PHARM REV CODE 250 ALT 637 W/ HCPCS: Performed by: EMERGENCY MEDICINE

## 2020-10-04 PROCEDURE — 85610 PROTHROMBIN TIME: CPT

## 2020-10-04 PROCEDURE — U0002 COVID-19 LAB TEST NON-CDC: HCPCS | Performed by: EMERGENCY MEDICINE

## 2020-10-04 PROCEDURE — 94644 CONT INHLJ TX 1ST HOUR: CPT

## 2020-10-04 PROCEDURE — 80053 COMPREHEN METABOLIC PANEL: CPT

## 2020-10-04 PROCEDURE — 99284 EMERGENCY DEPT VISIT MOD MDM: CPT | Mod: 25

## 2020-10-04 PROCEDURE — 93010 EKG 12-LEAD: ICD-10-PCS | Mod: ,,, | Performed by: INTERNAL MEDICINE

## 2020-10-04 PROCEDURE — 84484 ASSAY OF TROPONIN QUANT: CPT

## 2020-10-04 PROCEDURE — 25000003 PHARM REV CODE 250: Performed by: EMERGENCY MEDICINE

## 2020-10-04 PROCEDURE — 93010 ELECTROCARDIOGRAM REPORT: CPT | Mod: ,,, | Performed by: INTERNAL MEDICINE

## 2020-10-04 PROCEDURE — 83880 ASSAY OF NATRIURETIC PEPTIDE: CPT

## 2020-10-04 PROCEDURE — 85025 COMPLETE CBC W/AUTO DIFF WBC: CPT

## 2020-10-04 PROCEDURE — 83605 ASSAY OF LACTIC ACID: CPT

## 2020-10-04 RX ORDER — ALBUTEROL SULFATE 2.5 MG/.5ML
10 SOLUTION RESPIRATORY (INHALATION)
Status: COMPLETED | OUTPATIENT
Start: 2020-10-04 | End: 2020-10-04

## 2020-10-04 RX ORDER — PREDNISONE 20 MG/1
40 TABLET ORAL DAILY
Qty: 6 TABLET | Refills: 0 | Status: SHIPPED | OUTPATIENT
Start: 2020-10-04 | End: 2020-10-07

## 2020-10-04 RX ORDER — ALBUTEROL SULFATE 1.25 MG/3ML
1.25 SOLUTION RESPIRATORY (INHALATION) EVERY 6 HOURS PRN
Qty: 1 BOX | Refills: 0 | Status: SHIPPED | OUTPATIENT
Start: 2020-10-04 | End: 2020-10-26 | Stop reason: SDUPTHER

## 2020-10-04 RX ORDER — METHYLPREDNISOLONE SOD SUCC 125 MG
125 VIAL (EA) INJECTION
Status: COMPLETED | OUTPATIENT
Start: 2020-10-04 | End: 2020-10-04

## 2020-10-04 RX ORDER — IPRATROPIUM BROMIDE 0.5 MG/2.5ML
1 SOLUTION RESPIRATORY (INHALATION)
Status: COMPLETED | OUTPATIENT
Start: 2020-10-04 | End: 2020-10-04

## 2020-10-04 RX ORDER — AZITHROMYCIN 250 MG/1
250 TABLET, FILM COATED ORAL DAILY
Qty: 6 TABLET | Refills: 0 | OUTPATIENT
Start: 2020-10-04 | End: 2020-10-26

## 2020-10-04 RX ORDER — HYDRALAZINE HYDROCHLORIDE 25 MG/1
50 TABLET, FILM COATED ORAL
Status: COMPLETED | OUTPATIENT
Start: 2020-10-04 | End: 2020-10-04

## 2020-10-04 RX ADMIN — METHYLPREDNISOLONE SODIUM SUCCINATE 125 MG: 125 INJECTION, POWDER, FOR SOLUTION INTRAMUSCULAR; INTRAVENOUS at 06:10

## 2020-10-04 RX ADMIN — HYDRALAZINE HYDROCHLORIDE 50 MG: 25 TABLET, FILM COATED ORAL at 07:10

## 2020-10-04 RX ADMIN — ALBUTEROL SULFATE 10 MG: 2.5 SOLUTION RESPIRATORY (INHALATION) at 07:10

## 2020-10-04 RX ADMIN — IPRATROPIUM BROMIDE 1 MG: 0.5 SOLUTION RESPIRATORY (INHALATION) at 07:10

## 2020-10-04 NOTE — ED TRIAGE NOTES
Patient presents to the ED via personal transport with c/o SOB. Pt has a hx of COPD and asthma and states that nothing helps unless he comes here. Audible wheezing noted. Mild distress noted. AAO x4.

## 2020-10-04 NOTE — ED PROVIDER NOTES
Encounter Date: 10/4/2020    SCRIBE #1 NOTE: I, Edmond Estevez, am scribing for, and in the presence of,  Krishna Michel MD. I have scribed the following portions of the note - Other sections scribed: HPI, ROS, PE.       History     Chief Complaint   Patient presents with    Shortness of Breath     hx of COPD got worse yesterday      This 79 y.o M with a hx of Asthma, A-fibrillation, COPD, DM and HTN presents to the ED c/o SOB with associated intermittent productive cough with green sputum which worsened yesterday. He states his current SOB is similar to previous episodes of COPD exacerbation. The pt is a former smoker. He denies any known sick contacts. No new colognes or shampoos. He denies fever, chills, diaphoresis, nausea, emesis, abdominal pain, chest pain, leg swelling, urinary frequency, dysuria, difficulty urinating, rash and any other associated symptoms. He does not consume EtOH or use illicit drugs.     The history is provided by the patient.     Review of patient's allergies indicates:   Allergen Reactions    Lisinopril      Swells lower legs.     Past Medical History:   Diagnosis Date    Asthma     Atrial fibrillation     COPD (chronic obstructive pulmonary disease)     Diabetes mellitus, new onset     Hypertension      History reviewed. No pertinent surgical history.  History reviewed. No pertinent family history.  Social History     Tobacco Use    Smoking status: Former Smoker     Packs/day: 1.50     Years: 20.00     Pack years: 30.00     Quit date:      Years since quittin.7    Smokeless tobacco: Former User    Tobacco comment: quit in    Substance Use Topics    Alcohol use: No    Drug use: No     Review of Systems   Constitutional: Negative for chills, diaphoresis and fever.   HENT: Negative for rhinorrhea and sore throat.    Eyes: Negative for redness.   Respiratory: Positive for cough (productive) and shortness of breath.    Cardiovascular: Negative for chest pain.    Gastrointestinal: Negative for abdominal pain, diarrhea, nausea and vomiting.   Genitourinary: Negative for dysuria.   Musculoskeletal: Negative for back pain.   Skin: Negative for color change and rash.   Neurological: Negative for syncope and weakness.   Psychiatric/Behavioral: The patient is not nervous/anxious.        Physical Exam     Initial Vitals [10/04/20 1759]   BP Pulse Resp Temp SpO2   (!) 192/93 91 20 98.8 °F (37.1 °C) (S) (!) 94 %      MAP       --         Physical Exam    Nursing note and vitals reviewed.  Constitutional: He appears well-developed and well-nourished.   HENT:   Head: Normocephalic and atraumatic.   Mouth/Throat: Mucous membranes are normal.   Patent   Eyes: Conjunctivae and EOM are normal. Pupils are equal, round, and reactive to light. Right conjunctiva is not injected. Left conjunctiva is not injected.   sclera anicteric   Neck: Full passive range of motion without pain. Neck supple.   Cardiovascular: Regular rhythm, S1 normal, S2 normal and normal heart sounds. Exam reveals no gallop and no friction rub.    No murmur heard.  Pulses:       Radial pulses are 2+ on the right side and 2+ on the left side.   Pulmonary/Chest: Effort normal. No respiratory distress. He has decreased breath sounds (bilaterally). He has wheezes (bilaterally).   Speaking in full sentences.   Abdominal: Soft. Normal appearance. He exhibits no distension. There is no abdominal tenderness.   Musculoskeletal:      Comments: good active ROM of all extremities, no lower extremity edema or cyanosis   Neurological: He is alert. No cranial nerve deficit or sensory deficit. Gait normal.   A&Ox4, normal speech   Skin: Skin is warm. No ecchymosis and no rash noted.   Psychiatric: He has a normal mood and affect. Thought content normal.         ED Course   Procedures  Labs Reviewed   CBC W/ AUTO DIFFERENTIAL - Abnormal; Notable for the following components:       Result Value    RBC 3.99 (*)     Hemoglobin 10.9 (*)      Hematocrit 34.7 (*)     Mean Corpuscular Hemoglobin Conc 31.4 (*)     RDW 15.6 (*)     Gran% 74.6 (*)     All other components within normal limits   COMPREHENSIVE METABOLIC PANEL - Abnormal; Notable for the following components:    BUN, Bld 26 (*)     Total Protein 8.7 (*)     eGFR if  54 (*)     eGFR if non  47 (*)     All other components within normal limits   B-TYPE NATRIURETIC PEPTIDE - Abnormal; Notable for the following components:     (*)     All other components within normal limits   CULTURE, BLOOD   CULTURE, BLOOD   TROPONIN I   PROTIME-INR   LACTIC ACID, PLASMA   SARS-COV-2 RDRP GENE     EKG Readings: (Independently Interpreted)   EKG done 18 15 showed normal sinus rhythm with LVH.  No ST elevation major T-wave abnormalities.  Normal axis.  QRS is 82 and QTC is 435.  Abnormal EKG but compared to previous and similar.       Imaging Results          X-Ray Chest AP Portable (Final result)  Result time 10/04/20 19:20:30    Final result by Barbra Hutchinson MD (10/04/20 19:20:30)                 Impression:      No acute intrathoracic abnormality detected.      Electronically signed by: Barbra Hutchinson  Date:    10/04/2020  Time:    19:20             Narrative:    EXAMINATION:  AP PORTABLE CHEST    CLINICAL HISTORY:  CHF;    TECHNIQUE:  AP portable chest radiograph was submitted.    COMPARISON:  07/03/2020    FINDINGS:  AP portable chest radiograph demonstrates a cardiac silhouette within normal limits.  There is no focal consolidation, pneumothorax, or pleural effusion.                                 Medical Decision Making:   Initial Assessment:   Pt presenting 2/2 wheezing and SOB c/w COPD exacerbation. Patient started on 10mg albuterol, 1 mg ipratropium, IV steroids. Will monitor for worsening respiratory distress to considered bipap vs intubation in patient. Will reassess after treatments. Starting on azithromycin.     Also considered but less likely:  Acs: ekg  "doesn't show stemi. Troponin ordered  Pna: symptoms bilaterally and no fevers.   Bronchitis: considered but hpi most c/w copd  CHF: considered. Will compare bnp to previous and cxr for fluid overload. Possible  Pneumothorax: bilateral breath sounds  COVID: Covid negative    Patient feels markedly better after interventions. satting 100% with rr at 15. States he feels "great". Dc with pcp f/u and azithromycin and outpatient steroids.     Please put in 35 minutes of critical care due to patient having a high risk of respiratory failure.   Separate from teaching and exclusive of procedure and ekg time  Includes:  Time at bedside  Time reviewing test results  Time discussing case with staff  Time documenting the medical record  Time spent with family members  Time spent with consults  Management    Independently Interpreted Test(s):   I have ordered and independently interpreted EKG Reading(s) - see prior notes  Clinical Tests:   Lab Tests: Ordered and Reviewed  Radiological Study: Ordered and Reviewed  Medical Tests: Ordered and Reviewed            Scribe Attestation:   Scribe #1: I performed the above scribed service and the documentation accurately describes the services I performed. I attest to the accuracy of the note.                      Clinical Impression:       ICD-10-CM ICD-9-CM   1. COPD exacerbation  J44.1 491.21   2. Shortness of breath  R06.02 786.05                          ED Disposition Condition    Discharge Stable        ED Prescriptions     Medication Sig Dispense Start Date End Date Auth. Provider    albuterol (ACCUNEB) 1.25 mg/3 mL Nebu Take 3 mLs (1.25 mg total) by nebulization every 6 (six) hours as needed. Rescue 1 Box 10/4/2020  Krishna Michel MD    predniSONE (DELTASONE) 20 MG tablet Take 2 tablets (40 mg total) by mouth once daily. for 3 days 6 tablet 10/4/2020 10/7/2020 Krishna Michel MD    azithromycin (Z-TERRENCE) 250 MG tablet Take 1 tablet (250 mg total) by mouth once daily. Take first 2 " tablets together, then 1 every day until finished. 6 tablet 10/4/2020  Krishna Michel MD        Follow-up Information     Follow up With Specialties Details Why Contact Info    Chelsy Torrez MD Endocrinology Schedule an appointment as soon as possible for a visit in 2 days  [address] [address]  [phone]                            I, krishna michel, personally performed the services described in this documentation. All medical record entries made by the scribe were at my direction and in my presence. I have reviewed the chart and agree that the record reflects my personal performance and is accurate and complete.               Krishan Michel MD  10/04/20 2029

## 2020-10-05 NOTE — DISCHARGE INSTRUCTIONS
Thank you for coming to our Emergency Department today. It is important to remember that some problems are difficult to diagnose and may not be found during your first visit. Be sure to follow up with your primary care doctor and review any labs/imaging that was performed with them. If you do not have a primary care doctor, you may contact the one listed on your discharge paperwork or you may also call the [facility] Appointment Desk at [phone] to schedule an appointment with one.     All medications may potentially have side effects and it is impossible to predict which medications may give you side effects. If you feel that you are having a negative effect of any medication you should immediately stop taking them and seek medical attention.    Return to the ER with any questions/concerns, new/concerning symptoms, worsening or failure to improve. Do not drive or make any important decisions for 24 hours if you have received any pain medications, sedatives or mood altering drugs during your ER visit.

## 2020-10-09 LAB
BACTERIA BLD CULT: NORMAL
BACTERIA BLD CULT: NORMAL

## 2020-10-20 ENCOUNTER — HOSPITAL ENCOUNTER (EMERGENCY)
Facility: HOSPITAL | Age: 80
Discharge: HOME | End: 2020-10-20
Attending: EMERGENCY MEDICINE
Payer: MEDICARE

## 2020-10-20 VITALS
DIASTOLIC BLOOD PRESSURE: 89 MMHG | RESPIRATION RATE: 20 BRPM | BODY MASS INDEX: 25.99 KG/M2 | SYSTOLIC BLOOD PRESSURE: 169 MMHG | HEIGHT: 65 IN | TEMPERATURE: 98 F | HEART RATE: 96 BPM | WEIGHT: 156 LBS | OXYGEN SATURATION: 97 %

## 2020-10-20 DIAGNOSIS — J44.1 COPD EXACERBATION: Primary | ICD-10-CM

## 2020-10-20 DIAGNOSIS — R06.02 SOB (SHORTNESS OF BREATH): ICD-10-CM

## 2020-10-20 LAB
ALBUMIN SERPL BCP-MCNC: 3.9 G/DL (ref 3.5–5.2)
ALP SERPL-CCNC: 69 U/L (ref 55–135)
ALT SERPL W/O P-5'-P-CCNC: 35 U/L (ref 10–44)
ANION GAP SERPL CALC-SCNC: 8 MMOL/L (ref 8–16)
AST SERPL-CCNC: 38 U/L (ref 10–40)
BASOPHILS # BLD AUTO: 0 K/UL (ref 0–0.2)
BASOPHILS NFR BLD: 0 % (ref 0–1.9)
BILIRUB SERPL-MCNC: 0.5 MG/DL (ref 0.1–1)
BNP SERPL-MCNC: 91 PG/ML (ref 0–99)
BUN SERPL-MCNC: 16 MG/DL (ref 8–23)
CALCIUM SERPL-MCNC: 9 MG/DL (ref 8.7–10.5)
CHLORIDE SERPL-SCNC: 104 MMOL/L (ref 95–110)
CO2 SERPL-SCNC: 29 MMOL/L (ref 23–29)
CREAT SERPL-MCNC: 1.3 MG/DL (ref 0.5–1.4)
CTP QC/QA: YES
DIFFERENTIAL METHOD: ABNORMAL
EOSINOPHIL # BLD AUTO: 0 K/UL (ref 0–0.5)
EOSINOPHIL NFR BLD: 0 % (ref 0–8)
ERYTHROCYTE [DISTWIDTH] IN BLOOD BY AUTOMATED COUNT: 15.7 % (ref 11.5–14.5)
EST. GFR  (AFRICAN AMERICAN): 59 ML/MIN/1.73 M^2
EST. GFR  (NON AFRICAN AMERICAN): 51 ML/MIN/1.73 M^2
GLUCOSE SERPL-MCNC: 104 MG/DL (ref 70–110)
HCT VFR BLD AUTO: 36.1 % (ref 40–54)
HGB BLD-MCNC: 11.5 G/DL (ref 14–18)
IMM GRANULOCYTES # BLD AUTO: 0.04 K/UL (ref 0–0.04)
IMM GRANULOCYTES NFR BLD AUTO: 0.9 % (ref 0–0.5)
LYMPHOCYTES # BLD AUTO: 1.1 K/UL (ref 1–4.8)
LYMPHOCYTES NFR BLD: 24.4 % (ref 18–48)
MCH RBC QN AUTO: 27.7 PG (ref 27–31)
MCHC RBC AUTO-ENTMCNC: 31.9 G/DL (ref 32–36)
MCV RBC AUTO: 87 FL (ref 82–98)
MONOCYTES # BLD AUTO: 0.4 K/UL (ref 0.3–1)
MONOCYTES NFR BLD: 8.5 % (ref 4–15)
NEUTROPHILS # BLD AUTO: 3.1 K/UL (ref 1.8–7.7)
NEUTROPHILS NFR BLD: 66.2 % (ref 38–73)
NRBC BLD-RTO: 0 /100 WBC
PLATELET # BLD AUTO: 196 K/UL (ref 150–350)
PMV BLD AUTO: 11.2 FL (ref 9.2–12.9)
POTASSIUM SERPL-SCNC: 4 MMOL/L (ref 3.5–5.1)
PROT SERPL-MCNC: 8.4 G/DL (ref 6–8.4)
RBC # BLD AUTO: 4.15 M/UL (ref 4.6–6.2)
SARS-COV-2 RDRP RESP QL NAA+PROBE: NEGATIVE
SODIUM SERPL-SCNC: 141 MMOL/L (ref 136–145)
TROPONIN I SERPL DL<=0.01 NG/ML-MCNC: <0.006 NG/ML (ref 0–0.03)
WBC # BLD AUTO: 4.68 K/UL (ref 3.9–12.7)

## 2020-10-20 PROCEDURE — 93010 ELECTROCARDIOGRAM REPORT: CPT | Mod: ,,, | Performed by: INTERNAL MEDICINE

## 2020-10-20 PROCEDURE — 94761 N-INVAS EAR/PLS OXIMETRY MLT: CPT

## 2020-10-20 PROCEDURE — 83880 ASSAY OF NATRIURETIC PEPTIDE: CPT

## 2020-10-20 PROCEDURE — 96374 THER/PROPH/DIAG INJ IV PUSH: CPT

## 2020-10-20 PROCEDURE — 94640 AIRWAY INHALATION TREATMENT: CPT

## 2020-10-20 PROCEDURE — 99285 EMERGENCY DEPT VISIT HI MDM: CPT | Mod: 25

## 2020-10-20 PROCEDURE — U0002 COVID-19 LAB TEST NON-CDC: HCPCS | Performed by: EMERGENCY MEDICINE

## 2020-10-20 PROCEDURE — 93010 EKG 12-LEAD: ICD-10-PCS | Mod: ,,, | Performed by: INTERNAL MEDICINE

## 2020-10-20 PROCEDURE — 85025 COMPLETE CBC W/AUTO DIFF WBC: CPT

## 2020-10-20 PROCEDURE — 84484 ASSAY OF TROPONIN QUANT: CPT

## 2020-10-20 PROCEDURE — 93005 ELECTROCARDIOGRAM TRACING: CPT

## 2020-10-20 PROCEDURE — 80053 COMPREHEN METABOLIC PANEL: CPT

## 2020-10-20 PROCEDURE — 25000242 PHARM REV CODE 250 ALT 637 W/ HCPCS: Performed by: EMERGENCY MEDICINE

## 2020-10-20 PROCEDURE — 63600175 PHARM REV CODE 636 W HCPCS: Performed by: EMERGENCY MEDICINE

## 2020-10-20 RX ORDER — FLUTICASONE PROPIONATE AND SALMETEROL 250; 50 UG/1; UG/1
1 POWDER RESPIRATORY (INHALATION) 2 TIMES DAILY
Qty: 60 EACH | Refills: 0 | Status: SHIPPED | OUTPATIENT
Start: 2020-10-20 | End: 2021-04-04 | Stop reason: SDUPTHER

## 2020-10-20 RX ORDER — IPRATROPIUM BROMIDE AND ALBUTEROL SULFATE 2.5; .5 MG/3ML; MG/3ML
3 SOLUTION RESPIRATORY (INHALATION)
Status: COMPLETED | OUTPATIENT
Start: 2020-10-20 | End: 2020-10-20

## 2020-10-20 RX ORDER — METHYLPREDNISOLONE SOD SUCC 125 MG
125 VIAL (EA) INJECTION
Status: COMPLETED | OUTPATIENT
Start: 2020-10-20 | End: 2020-10-20

## 2020-10-20 RX ORDER — PREDNISONE 50 MG/1
50 TABLET ORAL DAILY
Qty: 5 TABLET | Refills: 0 | Status: SHIPPED | OUTPATIENT
Start: 2020-10-20 | End: 2020-10-25

## 2020-10-20 RX ORDER — ALBUTEROL SULFATE 90 UG/1
1-2 AEROSOL, METERED RESPIRATORY (INHALATION) EVERY 4 HOURS PRN
Qty: 18 G | Refills: 0 | Status: SHIPPED | OUTPATIENT
Start: 2020-10-20 | End: 2020-10-26 | Stop reason: SDUPTHER

## 2020-10-20 RX ORDER — ALBUTEROL SULFATE 90 UG/1
1-2 AEROSOL, METERED RESPIRATORY (INHALATION) EVERY 4 HOURS PRN
Qty: 18 G | Refills: 0 | Status: SHIPPED | OUTPATIENT
Start: 2020-10-20 | End: 2020-10-20 | Stop reason: SDUPTHER

## 2020-10-20 RX ADMIN — IPRATROPIUM BROMIDE AND ALBUTEROL SULFATE 3 ML: .5; 3 SOLUTION RESPIRATORY (INHALATION) at 10:10

## 2020-10-20 RX ADMIN — IPRATROPIUM BROMIDE AND ALBUTEROL SULFATE 3 ML: .5; 3 SOLUTION RESPIRATORY (INHALATION) at 12:10

## 2020-10-20 RX ADMIN — METHYLPREDNISOLONE SODIUM SUCCINATE 125 MG: 125 INJECTION, POWDER, FOR SOLUTION INTRAMUSCULAR; INTRAVENOUS at 10:10

## 2020-10-20 RX ADMIN — IPRATROPIUM BROMIDE AND ALBUTEROL SULFATE 3 ML: .5; 3 SOLUTION RESPIRATORY (INHALATION) at 11:10

## 2020-10-20 NOTE — ED PROVIDER NOTES
Encounter Date: 10/20/2020    SCRIBE #1 NOTE: I, Kylie Sanders, am scribing for, and in the presence of,  Ashley King MD. I have scribed the following portions of the note - Other sections scribed: HPI, ROS, PE.       History     Chief Complaint   Patient presents with    Shortness of Breath     hx of COPD,started yesterday and getting worse     CC: SOB    Patient is an 80 year old male with a PMHx of Atrial fibrillation, COPD, and HTN who presents to the ED complaining of worsening SOB since yesterday. Pt reports of chest pressure since this morning. He states his symptoms are consistent with previous episodes of COPD exacerbation but not relieved with use of home inhalers and nebulizer. Pt denies chest pain, fever, cough, nausea, emesis, and any other associated symptoms. He states he took his HTN medication this morning. Pt is not on home oxygen. No Hx of intubation for COPD.         The history is provided by the patient.     Review of patient's allergies indicates:   Allergen Reactions    Lisinopril      Swells lower legs.     Past Medical History:   Diagnosis Date    Asthma     Atrial fibrillation     COPD (chronic obstructive pulmonary disease)     Diabetes mellitus, new onset     Hypertension      History reviewed. No pertinent surgical history.  History reviewed. No pertinent family history.  Social History     Tobacco Use    Smoking status: Former Smoker     Packs/day: 1.50     Years: 20.00     Pack years: 30.00     Quit date:      Years since quittin.8    Smokeless tobacco: Former User    Tobacco comment: quit in    Substance Use Topics    Alcohol use: No    Drug use: No     Review of Systems   Constitutional: Negative for chills and fever.   HENT: Negative for congestion and sore throat.    Eyes: Negative for visual disturbance.   Respiratory: Positive for shortness of breath. Negative for cough.    Cardiovascular: Negative for chest pain.   Gastrointestinal: Negative for  abdominal pain, nausea and vomiting.   Genitourinary: Negative for dysuria.   Skin: Negative for rash.   Neurological: Negative for headaches.   Psychiatric/Behavioral: Negative for confusion.       Physical Exam     Initial Vitals [10/20/20 0937]   BP Pulse Resp Temp SpO2   (S) (!) 171/101 88 20 98.1 °F (36.7 °C) 95 %      MAP       --         Physical Exam    Nursing note and vitals reviewed.  Constitutional: He appears well-developed and well-nourished. He is not diaphoretic. No distress.   HENT:   Head: Normocephalic and atraumatic.   Mouth/Throat: Oropharynx is clear and moist.   Eyes: EOM are normal. Pupils are equal, round, and reactive to light.   Neck: Neck supple.   Cardiovascular: Normal rate and regular rhythm.   Pulmonary/Chest: Accessory muscle usage present. He is in respiratory distress (mild, speaks in complete sentences). He has wheezes.   Mild respiratory distress. Diminished breath sounds with expiratory wheezes throughout.    Abdominal: Soft. Bowel sounds are normal.   Musculoskeletal: No edema.      Comments: No peripheral edema.    Neurological: He is alert and oriented to person, place, and time.   Skin: Skin is warm and dry.   Psychiatric: He has a normal mood and affect.         ED Course   Procedures  Labs Reviewed   CBC W/ AUTO DIFFERENTIAL - Abnormal; Notable for the following components:       Result Value    RBC 4.15 (*)     Hemoglobin 11.5 (*)     Hematocrit 36.1 (*)     Mean Corpuscular Hemoglobin Conc 31.9 (*)     RDW 15.7 (*)     Immature Granulocytes 0.9 (*)     All other components within normal limits   COMPREHENSIVE METABOLIC PANEL - Abnormal; Notable for the following components:    eGFR if  59 (*)     eGFR if non  51 (*)     All other components within normal limits   B-TYPE NATRIURETIC PEPTIDE   TROPONIN I   SARS-COV-2 RDRP GENE     EKG Readings: (Independently Interpreted)   Normal sinus rhythm, rate 86 beats per minute, normal CO interval,   milliseconds.  No STEMI.     ECG Results          EKG 12-lead (In process)  Result time 10/20/20 13:14:12    In process by Interface, Lab In [facility] (10/20/20 13:14:12)                 Narrative:    Test Reason : R06.02,    Vent. Rate : 086 BPM     Atrial Rate : 086 BPM     P-R Int : 152 ms          QRS Dur : 078 ms      QT Int : 374 ms       P-R-T Axes : 085 067 -09 degrees     QTc Int : 447 ms    Sinus rhythm with Premature supraventricular complexes  Voltage criteria for left ventricular hypertrophy  Abnormal QRS-T angle, consider primary T wave abnormality  Abnormal ECG  When compared with ECG of 04-OCT-2020 18:15,  Significant changes have occurred    Referred By: [email or web address]   SELF           Confirmed By:                   In process by Interface, Lab In [facility] (10/20/20 13:13:49)                 Narrative:    Test Reason : R06.02,    Vent. Rate : 086 BPM     Atrial Rate : 086 BPM     P-R Int : 152 ms          QRS Dur : 078 ms      QT Int : 374 ms       P-R-T Axes : 085 067 -09 degrees     QTc Int : 447 ms    Sinus rhythm with Premature supraventricular complexes  Voltage criteria for left ventricular hypertrophy  Abnormal QRS-T angle, consider primary T wave abnormality  Abnormal ECG      Referred By: [email or web address]   SELF           Confirmed By:                             Imaging Results          X-Ray Chest AP Portable (Final result)  Result time 10/20/20 10:40:26    Final result by Wili Alexander MD (10/20/20 10:40:26)                 Impression:      See above      Electronically signed by: Wili Alexander MD  Date:    10/20/2020  Time:    10:40             Narrative:    EXAMINATION:  XR CHEST AP PORTABLE    CLINICAL HISTORY:  shortness of breath;    TECHNIQUE:  Single frontal view of the chest was performed.    COMPARISON:  N 10/04/2020 one    FINDINGS:  Heart size normal.  No significant airspace consolidation or pleural effusion identified                                 Medical Decision Making:    History:   Old Medical Records: I decided to obtain old medical records.  Initial Assessment:   80-year-old male with history of COPD presenting with shortness of breath.  He reports symptoms consistent with previous COPD flares.  Differential includes but not limited to COPD exacerbation, URI, pneumonia, considered PE but feel less likely given exam and history.  I plan to assess this patient with labs including cardiac markers, chest x-ray, will treat with steroids and DuoNebs and reassess.  Independently Interpreted Test(s):   I have ordered and independently interpreted EKG Reading(s) - see prior notes  Clinical Tests:   Lab Tests: Ordered and Reviewed  Radiological Study: Ordered and Reviewed  Medical Tests: Ordered and Reviewed            Scribe Attestation:   Scribe #1: I performed the above scribed service and the documentation accurately describes the services I performed. I attest to the accuracy of the note.            ED Course as of Oct 20 1726   Tue Oct 20, 2020   1144 Patient reassessed, he is feeling improved.  Still with extra kelvin wheezing but now improved air movement on exam.  Will give additional round of DuoNeb.    [LH]   1242 Patient reports feeling improved and feels back to his baseline.  On final reassessment, he has slight end expiratory wheezes but overall improved breath sounds.  I will refill his rescue inhaler, start on course of prednisone.  Patient denies any change in cough or sputum production so will defer antibiotics.  I will refer him to pulmonology.    [LH]      ED Course User Index  [LH] Ashley King MD            Clinical Impression:       ICD-10-CM ICD-9-CM   1. COPD exacerbation  J44.1 491.21   2. SOB (shortness of breath)  R06.02 786.05                          ED Disposition Condition    Discharge Stable        ED Prescriptions     Medication Sig Dispense Start Date End Date Auth. Provider    fluticasone-salmeterol diskus inhaler 250-50 mcg Inhale 1 puff into the  lungs 2 (two) times daily. Controller 60 each 10/20/2020 10/20/2021 Ashley King MD    albuterol (PROVENTIL/VENTOLIN HFA) 90 mcg/actuation inhaler  (Status: Discontinued) Inhale 1-2 puffs into the lungs every 4 (four) hours as needed for Wheezing or Shortness of Breath. Rescue 18 g 10/20/2020 10/20/2020 Ashley King MD    predniSONE (DELTASONE) 50 MG Tab Take 1 tablet (50 mg total) by mouth once daily. for 5 days 5 tablet 10/20/2020 10/25/2020 Ashley King MD    albuterol (PROVENTIL/VENTOLIN HFA) 90 mcg/actuation inhaler Inhale 1-2 puffs into the lungs every 4 (four) hours as needed for Wheezing or Shortness of Breath. Rescue 18 g 10/20/2020  Ashley King MD        Follow-up Information     Follow up With Specialties Details Why Contact Info    Eh Franco MD Pulmonary Disease Schedule an appointment as soon as possible for a visit on 10/27/2020 To recheck your symptoms [address] [facility] [address]  [facility] [address]  [phone]      [facility]  As needed, If symptoms worsen [address]  [facility] [id number] [phone]                        I, Ashley King MD, personally performed the services described in this documentation. All medical record entries made by the scribe were at my direction and in my presence.  I have reviewed the chart and agree that the record reflects my personal performance and is accurate and complete.      This dictation has been generated using M[employer] Direct dictation; some phonetic errors may occur.              Ashley King MD  10/20/20 1239

## 2020-10-26 ENCOUNTER — HOSPITAL ENCOUNTER (EMERGENCY)
Facility: HOSPITAL | Age: 80
Discharge: HOME | End: 2020-10-26
Attending: EMERGENCY MEDICINE
Payer: MEDICARE

## 2020-10-26 VITALS
TEMPERATURE: 99 F | SYSTOLIC BLOOD PRESSURE: 169 MMHG | BODY MASS INDEX: 25.07 KG/M2 | OXYGEN SATURATION: 95 % | HEART RATE: 86 BPM | DIASTOLIC BLOOD PRESSURE: 97 MMHG | RESPIRATION RATE: 23 BRPM | HEIGHT: 66 IN | WEIGHT: 156 LBS

## 2020-10-26 DIAGNOSIS — J44.1 COPD EXACERBATION: Primary | ICD-10-CM

## 2020-10-26 DIAGNOSIS — I10 HYPERTENSION, UNSPECIFIED TYPE: ICD-10-CM

## 2020-10-26 DIAGNOSIS — R07.9 CHEST PAIN: ICD-10-CM

## 2020-10-26 LAB
ALBUMIN SERPL BCP-MCNC: 3.8 G/DL (ref 3.5–5.2)
ALP SERPL-CCNC: 64 U/L (ref 55–135)
ALT SERPL W/O P-5'-P-CCNC: 26 U/L (ref 10–44)
ANION GAP SERPL CALC-SCNC: 9 MMOL/L (ref 8–16)
AST SERPL-CCNC: 25 U/L (ref 10–40)
BASOPHILS # BLD AUTO: 0.01 K/UL (ref 0–0.2)
BASOPHILS NFR BLD: 0.1 % (ref 0–1.9)
BILIRUB SERPL-MCNC: 0.5 MG/DL (ref 0.1–1)
BNP SERPL-MCNC: 183 PG/ML (ref 0–99)
BUN SERPL-MCNC: 22 MG/DL (ref 8–23)
CALCIUM SERPL-MCNC: 8.4 MG/DL (ref 8.7–10.5)
CHLORIDE SERPL-SCNC: 102 MMOL/L (ref 95–110)
CO2 SERPL-SCNC: 33 MMOL/L (ref 23–29)
CREAT SERPL-MCNC: 1.3 MG/DL (ref 0.5–1.4)
DIFFERENTIAL METHOD: ABNORMAL
EOSINOPHIL # BLD AUTO: 0 K/UL (ref 0–0.5)
EOSINOPHIL NFR BLD: 0 % (ref 0–8)
ERYTHROCYTE [DISTWIDTH] IN BLOOD BY AUTOMATED COUNT: 16.6 % (ref 11.5–14.5)
EST. GFR  (AFRICAN AMERICAN): 59 ML/MIN/1.73 M^2
EST. GFR  (NON AFRICAN AMERICAN): 51 ML/MIN/1.73 M^2
GLUCOSE SERPL-MCNC: 101 MG/DL (ref 70–110)
HCT VFR BLD AUTO: 37.8 % (ref 40–54)
HGB BLD-MCNC: 11.8 G/DL (ref 14–18)
IMM GRANULOCYTES # BLD AUTO: 0.07 K/UL (ref 0–0.04)
IMM GRANULOCYTES NFR BLD AUTO: 1 % (ref 0–0.5)
LYMPHOCYTES # BLD AUTO: 2 K/UL (ref 1–4.8)
LYMPHOCYTES NFR BLD: 27.2 % (ref 18–48)
MCH RBC QN AUTO: 27.9 PG (ref 27–31)
MCHC RBC AUTO-ENTMCNC: 31.2 G/DL (ref 32–36)
MCV RBC AUTO: 89 FL (ref 82–98)
MONOCYTES # BLD AUTO: 0.6 K/UL (ref 0.3–1)
MONOCYTES NFR BLD: 8 % (ref 4–15)
NEUTROPHILS # BLD AUTO: 4.7 K/UL (ref 1.8–7.7)
NEUTROPHILS NFR BLD: 63.7 % (ref 38–73)
NRBC BLD-RTO: 1 /100 WBC
PLATELET # BLD AUTO: 190 K/UL (ref 150–350)
PMV BLD AUTO: 11.2 FL (ref 9.2–12.9)
POTASSIUM SERPL-SCNC: 4 MMOL/L (ref 3.5–5.1)
PROT SERPL-MCNC: 7.7 G/DL (ref 6–8.4)
RBC # BLD AUTO: 4.23 M/UL (ref 4.6–6.2)
ROULEAUX BLD QL SMEAR: PRESENT
SODIUM SERPL-SCNC: 144 MMOL/L (ref 136–145)
TARGETS BLD QL SMEAR: ABNORMAL
TROPONIN I SERPL DL<=0.01 NG/ML-MCNC: <0.006 NG/ML (ref 0–0.03)
TROPONIN I SERPL DL<=0.01 NG/ML-MCNC: <0.006 NG/ML (ref 0–0.03)
WBC # BLD AUTO: 7.36 K/UL (ref 3.9–12.7)

## 2020-10-26 PROCEDURE — 93010 ELECTROCARDIOGRAM REPORT: CPT | Mod: ,,, | Performed by: INTERNAL MEDICINE

## 2020-10-26 PROCEDURE — 80053 COMPREHEN METABOLIC PANEL: CPT

## 2020-10-26 PROCEDURE — 84484 ASSAY OF TROPONIN QUANT: CPT | Mod: 91

## 2020-10-26 PROCEDURE — 25000003 PHARM REV CODE 250: Performed by: PHYSICIAN ASSISTANT

## 2020-10-26 PROCEDURE — 99285 EMERGENCY DEPT VISIT HI MDM: CPT | Mod: 25

## 2020-10-26 PROCEDURE — 83880 ASSAY OF NATRIURETIC PEPTIDE: CPT

## 2020-10-26 PROCEDURE — 93005 ELECTROCARDIOGRAM TRACING: CPT

## 2020-10-26 PROCEDURE — 85025 COMPLETE CBC W/AUTO DIFF WBC: CPT

## 2020-10-26 PROCEDURE — 93010 EKG 12-LEAD: ICD-10-PCS | Mod: ,,, | Performed by: INTERNAL MEDICINE

## 2020-10-26 PROCEDURE — 94640 AIRWAY INHALATION TREATMENT: CPT

## 2020-10-26 PROCEDURE — 63600175 PHARM REV CODE 636 W HCPCS: Performed by: EMERGENCY MEDICINE

## 2020-10-26 PROCEDURE — 96374 THER/PROPH/DIAG INJ IV PUSH: CPT

## 2020-10-26 PROCEDURE — 94761 N-INVAS EAR/PLS OXIMETRY MLT: CPT

## 2020-10-26 PROCEDURE — 25000242 PHARM REV CODE 250 ALT 637 W/ HCPCS: Performed by: EMERGENCY MEDICINE

## 2020-10-26 PROCEDURE — 84484 ASSAY OF TROPONIN QUANT: CPT

## 2020-10-26 RX ORDER — IPRATROPIUM BROMIDE AND ALBUTEROL SULFATE 2.5; .5 MG/3ML; MG/3ML
3 SOLUTION RESPIRATORY (INHALATION)
Status: COMPLETED | OUTPATIENT
Start: 2020-10-26 | End: 2020-10-26

## 2020-10-26 RX ORDER — DEXAMETHASONE SODIUM PHOSPHATE 4 MG/ML
6 INJECTION, SOLUTION INTRA-ARTICULAR; INTRALESIONAL; INTRAMUSCULAR; INTRAVENOUS; SOFT TISSUE
Status: COMPLETED | OUTPATIENT
Start: 2020-10-26 | End: 2020-10-26

## 2020-10-26 RX ORDER — HYDRALAZINE HYDROCHLORIDE 25 MG/1
50 TABLET, FILM COATED ORAL
Status: DISCONTINUED | OUTPATIENT
Start: 2020-10-26 | End: 2020-10-26

## 2020-10-26 RX ORDER — IPRATROPIUM BROMIDE AND ALBUTEROL SULFATE 2.5; .5 MG/3ML; MG/3ML
9 SOLUTION RESPIRATORY (INHALATION) CONTINUOUS
Status: ACTIVE | OUTPATIENT
Start: 2020-10-26 | End: 2020-10-26

## 2020-10-26 RX ORDER — PREDNISONE 20 MG/1
40 TABLET ORAL DAILY
Qty: 10 TABLET | Refills: 0 | Status: SHIPPED | OUTPATIENT
Start: 2020-10-26 | End: 2020-10-31

## 2020-10-26 RX ORDER — ASPIRIN 325 MG
325 TABLET ORAL
Status: COMPLETED | OUTPATIENT
Start: 2020-10-26 | End: 2020-10-26

## 2020-10-26 RX ORDER — ALBUTEROL SULFATE 1.25 MG/3ML
1.25 SOLUTION RESPIRATORY (INHALATION) EVERY 6 HOURS PRN
Qty: 1 BOX | Refills: 2 | Status: ON HOLD | OUTPATIENT
Start: 2020-10-26 | End: 2020-11-12 | Stop reason: SDUPTHER

## 2020-10-26 RX ORDER — ALBUTEROL SULFATE 90 UG/1
1-2 AEROSOL, METERED RESPIRATORY (INHALATION) EVERY 4 HOURS PRN
Qty: 18 G | Refills: 2 | Status: SHIPPED | OUTPATIENT
Start: 2020-10-26 | End: 2021-01-21

## 2020-10-26 RX ORDER — METOPROLOL SUCCINATE 25 MG/1
25 TABLET, EXTENDED RELEASE ORAL ONCE
Status: DISCONTINUED | OUTPATIENT
Start: 2020-10-26 | End: 2020-10-26

## 2020-10-26 RX ADMIN — IPRATROPIUM BROMIDE AND ALBUTEROL SULFATE 3 ML: .5; 3 SOLUTION RESPIRATORY (INHALATION) at 07:10

## 2020-10-26 RX ADMIN — DEXAMETHASONE SODIUM PHOSPHATE 6 MG: 4 INJECTION, SOLUTION INTRAMUSCULAR; INTRAVENOUS at 05:10

## 2020-10-26 RX ADMIN — ASPIRIN 325 MG ORAL TABLET 325 MG: 325 PILL ORAL at 05:10

## 2020-10-26 RX ADMIN — IPRATROPIUM BROMIDE AND ALBUTEROL SULFATE 9 ML: .5; 3 SOLUTION RESPIRATORY (INHALATION) at 05:10

## 2020-10-26 NOTE — FIRST PROVIDER EVALUATION
Emergency Department TeleTriage Encounter Note      CHIEF COMPLAINT    Chief Complaint   Patient presents with    Chest Pain     Pt c/o Cp substernal pain that started about 2 am the pain felt like a shock acorss patient chest, intermitted pain. (Pt denies CP currently. Pt denies incdient of symptoms before. Pt was at home laying in bed.        VITAL SIGNS   Initial Vitals [10/26/20 1554]   BP Pulse Resp Temp SpO2   (!) 192/98 79 18 98.7 °F (37.1 °C) 96 %      MAP       --            ALLERGIES    Review of patient's allergies indicates:   Allergen Reactions    Lisinopril      Swells lower legs.       PROVIDER TRIAGE NOTE  Patient is a 80 y.o. male presenting for chest pain. Pain started around 2am and has been intermittent since. Also reports SOB, admits a history of COPD. Chest pain work up initiated.       ORDERS  Labs Reviewed - No data to display    ED Orders (720h ago, onward)    Start Ordered     Status Ordering Provider    10/26/20 1556 10/26/20 1555  EKG 12-lead  Once      Ordered ADALGISA AMOR            Virtual Visit Note: The provider triage portion of this emergency department evaluation and documentation was performed via [employer], a HIPAA-compliant telemedicine application, in concert with a tele-presenter in the room. A face to face patient evaluation with one of my colleagues will occur once the patient is placed in an emergency department room.      DISCLAIMER: This note was prepared with [employer] voice recognition transcription software. Garbled syntax, mangled pronouns, and other bizarre constructions may be attributed to that software system.

## 2020-10-26 NOTE — ED PROVIDER NOTES
EM PHYSICIAN NOTE    HPI  This patient presents with a complaint of   Chief Complaint   Patient presents with    Chest Pain     Pt c/o Cp substernal pain that started about 2 am the pain felt like a shock acorss patient chest, intermitted pain. (Pt denies CP currently. Pt denies incdient of symptoms before. Pt was at home laying in bed.        HPI: This is an 80 y.o. male with a PMHx of A-Fib and COPD who presents to the ED complaining of intermittent chest pain that woke him up this morning.  The pain is dull and intermittent.  He reports that was moderately severe in the morning and mild when it returned at at lunchtime. He reports that he's never had any heart problems before. He notes that he does have SOB and he's been using his inhaler more frequently, until he ran out of his inhaler 2 days ago. He reports allergies to Lisinopril (swelling). Denies fever, cough, nausea, and vomiting. No other associated symptoms.     REVIEW of PMH, SOC History and Family History:  Past Medical History:   Diagnosis Date    Asthma     Atrial fibrillation     COPD (chronic obstructive pulmonary disease)     Diabetes mellitus, new onset     Hypertension      No past surgical history on file.  Social History     Socioeconomic History    Marital status:      Spouse name: Not on file    Number of children: Not on file    Years of education: Not on file    Highest education level: Not on file   Occupational History    Not on file   Social Needs    Financial resource strain: Not on file    Food insecurity     Worry: Not on file     Inability: Not on file    Transportation needs     Medical: Not on file     Non-medical: Not on file   Tobacco Use    Smoking status: Former Smoker     Packs/day: 1.50     Years: 20.00     Pack years: 30.00     Quit date:      Years since quittin.8    Smokeless tobacco: Former User    Tobacco comment: quit in    Substance and Sexual Activity    Alcohol use: No    Drug  use: No    Sexual activity: Never   Lifestyle    Physical activity     Days per week: Not on file     Minutes per session: Not on file    Stress: Not on file   Relationships    Social connections     Talks on phone: Not on file     Gets together: Not on file     Attends Anabaptism service: Not on file     Active member of club or organization: Not on file     Attends meetings of clubs or organizations: Not on file     Relationship status: Not on file   Other Topics Concern    Not on file   Social History Narrative    Not on file     Patient Active Problem List   Diagnosis    Asthma with exacerbation    HTN (hypertension), malignant    Microcytic anemia    Asthma    Atrial fibrillation    Essential hypertension    Angio-edema    Anemia of chronic disease    Bacteremia due to Escherichia coli    COPD (chronic obstructive pulmonary disease)    Pleural effusion    Cystitis    Diabetes mellitus, new onset     Current Facility-Administered Medications   Medication Dose Route Frequency Provider Last Rate Last Dose    albuterol-ipratropium 2.5 mg-0.5 mg/3 mL nebulizer solution 9 mL  9 mL Nebulization Continuous Micelle JOYCE Soto MD   9 mL at 10/26/20 9050     Current Outpatient Medications   Medication Sig Dispense Refill    albuterol (ACCUNEB) 1.25 mg/3 mL Nebu Take 3 mLs (1.25 mg total) by nebulization every 6 (six) hours as needed. Rescue 1 Box 0    albuterol (PROVENTIL/VENTOLIN HFA) 90 mcg/actuation inhaler Inhale 1-2 puffs into the lungs every 4 (four) hours as needed for Wheezing or Shortness of Breath. Rescue 18 g 0    aspirin (ECOTRIN) 81 MG EC tablet Take 81 mg by mouth once daily.      azithromycin (Z-TERRENCE) 250 MG tablet Take 1 tablet (250 mg total) by mouth once daily. Take first 2 tablets together, then 1 every day until finished. 6 tablet 0    fluticasone-salmeterol diskus inhaler 250-50 mcg Inhale 1 puff into the lungs 2 (two) times daily. Controller 60 each 0    hydrALAZINE  (APRESOLINE) 50 MG tablet Take 1 tablet (50 mg total) by mouth every 12 (twelve) hours. (Patient taking differently: Take 50 mg by mouth every 8 (eight) hours. ) 60 tablet 11    ipratropium-albuterol (COMBIVENT)  mcg/actuation inhaler Inhale 1 puff into the lungs every 6 (six) hours as needed for Wheezing. Rescue 4 g 2    metoprolol succinate (TOPROL-XL) 25 MG 24 hr tablet Take 25 mg by mouth 2 (two) times daily.      SITagliptin (JANUVIA) 50 MG Tab        Current Outpatient Medications   Medication Sig Dispense Refill    albuterol (ACCUNEB) 1.25 mg/3 mL Nebu Take 3 mLs (1.25 mg total) by nebulization every 6 (six) hours as needed. Rescue 1 Box 0    albuterol (PROVENTIL/VENTOLIN HFA) 90 mcg/actuation inhaler Inhale 1-2 puffs into the lungs every 4 (four) hours as needed for Wheezing or Shortness of Breath. Rescue 18 g 0    aspirin (ECOTRIN) 81 MG EC tablet Take 81 mg by mouth once daily.      azithromycin (Z-TERRENCE) 250 MG tablet Take 1 tablet (250 mg total) by mouth once daily. Take first 2 tablets together, then 1 every day until finished. 6 tablet 0    fluticasone-salmeterol diskus inhaler 250-50 mcg Inhale 1 puff into the lungs 2 (two) times daily. Controller 60 each 0    hydrALAZINE (APRESOLINE) 50 MG tablet Take 1 tablet (50 mg total) by mouth every 12 (twelve) hours. (Patient taking differently: Take 50 mg by mouth every 8 (eight) hours. ) 60 tablet 11    ipratropium-albuterol (COMBIVENT)  mcg/actuation inhaler Inhale 1 puff into the lungs every 6 (six) hours as needed for Wheezing. Rescue 4 g 2    metoprolol succinate (TOPROL-XL) 25 MG 24 hr tablet Take 25 mg by mouth 2 (two) times daily.      SITagliptin (JANUVIA) 50 MG Tab         Allergen Reactions    Lisinopril      Swells lower legs.     There is no immunization history for the selected administration types on file for this patient.  No family history on file.    REVIEW of SYSTEMS  Source: patient  The nurse's notes and triage  "vital signs were reviewed.  GENERAL/CONSTITUTIONAL: There is no report of fever, fatigue, weakness, or unexplained weight loss.  CARDIOVASCULAR: SEE HPI.   RESPIRATORY: SEE HPI.  GASTROINTESTINAL: There is no report of nausea, vomiting, diarrhea  MUSCULOSKELETAL: There is no report of joint or muscle pain. No muscle weakness or tenderness.  SKIN AND BREASTS: There is no report of easy bruising, skin redness, skin rash.  HEMATOLOGIC/LYMPHATIC: There is no report of anemia, bleeding or clotting defects. There is no report of anticoagulant use.  The remainder of the ROS is negative.    PHYSICAL EXAMINATION    ED Triage Vitals [10/26/20 1554]   Enc Vitals Group      BP (!) 192/98      Pulse 79      Resp 18      Temp 98.7 °F (37.1 °C)      Temp src Oral      SpO2 96 %      Weight 156 lb      Height 5' 6"      Head Circumference       Peak Flow       Pain Score       Pain Loc       Pain Edu?       Excl. in GC?      Vital signs and Pulse Ox reviewed in clinical context. Abnormalities noted: Hypertension noted and patient will be referred to primary care physician for close follow-up  Pt's level of consciousness is alert, and the patient is in mild distress.  Skin: warm, pink and dry  Mucosa:moist  Head and Neck: WNL  Cardiac exam: RRR, heart tones slightly distant.  Pulmonary exam: inspiratory and expiratory wheezing with a prolonged expiratory phase.  Abd Exam: soft nontender  Musculoskeletal: no joint tenderness, deformity or swelling   Neurologic: GCS 15. 5 over 5 strength, cranial nerves intact, neck supple     Medical decision making: Nursing notes reviewed and incorporated.  Patient's complaint of chest pain is concerning due to his age and I have ordered a troponin.  His heart score is 3 due to his age, history of chest pain and risk factors.  Impression:  Chest pain, bronchospasm  Plan:  EKG, labs, duo nebs, steroids and reassess  Micheline Soto MD, 5:29 PM 10/26/2020           ED Course as of Oct 26 1956   Mon " Oct 26, 2020   1728 My independent interpretation of the EKG is normal sinus rhythm at a rate of 83.  There is an occasional PAC.  There is no ST segment elevation or depression.  The EKG is grossly unchanged from prior.    []   1741 Cxr: no infiltrate    [MH]   1810 Troponin is normal    [MH]   1811 CBC reviewed and there is mild anemia at 11 and 37    [MH]   1811 CMP reveals a bicarb of 33 but is otherwise normal    []   1848 BNP is 183.  It has been higher in the past and lower in the past    []   1855 Will repeat trop at 1930    []   1955 Patient is much improved after DuoNebs.  He reports that he does have the nebulizer machine at home and he still has medications for that machine but he is out of the hand held in the eyes.    []   1955 If the 2nd troponin is normal he will be discharged home.  I had a long discussion with him about the risks versus benefits of admission versus discharge and he appears to have the capacity to understand the risks of discharge in certainly has capacity to make decisions about his health care.    []      ED Course User Index  [] Micheline Soto MD         Diagnoses that have been ruled out:   None   Diagnoses that are still under consideration:   None   Final diagnoses:   Chest pain   COPD exacerbation     Results for orders placed during the hospital encounter of 06/19/20   Echo Color Flow Doppler? Yes    Narrative · Normal left ventricular systolic function. The estimated ejection   fraction is 70%.  · No wall motion abnormalities.  · Normal right ventricular systolic function.  · The estimated PA systolic pressure is 48 mmHg.  · Pulmonary hypertension present.            This note was created using Dictation Software.  This program may occasionally misinterpret certain words and phrases.      I attest that I personally performed the services documented by the scribe and acknowledged and confirm the content of the note.   Nurses notes were reviewed.  Micheline ZAZUETA  Charles                Micelle JOYCE Soto MD  10/26/20 1818    Micheline Soto MD  10/26/20 1858    Micelle JOYCE Soto MD  10/26/20 1956

## 2020-10-26 NOTE — ED TRIAGE NOTES
The patient presents to the ED via personal transportation alone. The patient reports wheezing, sob, and and episode of chest pain today. Patient states that sob is chronic related to COPD but worsened yesterday. He states that he is our of his inhaler treatments but has been using his nebulizer treatments. Patient also states that he had an episode of CP at about 3am today that woke him up out of his sleep. He states that he had another episode of CP around noon today while lying down and lasted a couple of minutes. Patient states the pain was across his anterior chest and it resolved by itself.

## 2020-10-27 NOTE — ED NOTES
"Asked patient had he taken his BP meds today, stated, "no their in my bag, I can take them now." Patient took own BP meds  "

## 2020-10-27 NOTE — DISCHARGE INSTRUCTIONS
As we discussed, it is important that you return to the ER for any new concerns or symptoms, worsening of your existing symptoms, if you do not completely improve, or if you are unable to be seen by your primary care provider.    Some medical conditions are difficult to diagnose and may not be identified during an ER visit. Today, we did not find a medical condition that required inpatient admission, but please remember that medical conditions can change, and we cannot predict how you will be feeling tomorrow or the next day, so if you have any worsening or new symptoms, you should not hesitate to return to the emergency department for reevaluation.     Be sure to follow up with your primary care doctor for a recheck and to review any labs/imaging that were performed today.  If you do not have a primary care doctor, you may contact the one listed on your discharge paperwork or you may also call the [facility] Appointment Desk at [phone] to schedule an appointment with one.       All medications have side effects.  We have done our best to select a medication for you that will treat your health problem and have the least amount of side effects.  If at any time while or after you take this medication you develops new symptoms or have any concerns, it is important you stop taking the medication and call your primary care provider or return to the emergency department for evaluation.    Do not drive or operate heavy machinery for 24 hours if you have received any pain medications, sedatives or mood altering drugs during your ER visit.

## 2020-11-11 ENCOUNTER — HOSPITAL ENCOUNTER (OUTPATIENT)
Facility: HOSPITAL | Age: 80
Discharge: HOME | End: 2020-11-12
Attending: EMERGENCY MEDICINE | Admitting: HOSPITALIST
Payer: MEDICARE

## 2020-11-11 DIAGNOSIS — E11.22 TYPE 2 DIABETES MELLITUS WITH STAGE 3B CHRONIC KIDNEY DISEASE, WITHOUT LONG-TERM CURRENT USE OF INSULIN: ICD-10-CM

## 2020-11-11 DIAGNOSIS — R07.9 CHEST PAIN: ICD-10-CM

## 2020-11-11 DIAGNOSIS — N18.31 STAGE 3A CHRONIC KIDNEY DISEASE: ICD-10-CM

## 2020-11-11 DIAGNOSIS — J44.1 COPD EXACERBATION: ICD-10-CM

## 2020-11-11 DIAGNOSIS — N18.32 TYPE 2 DIABETES MELLITUS WITH STAGE 3B CHRONIC KIDNEY DISEASE, WITHOUT LONG-TERM CURRENT USE OF INSULIN: ICD-10-CM

## 2020-11-11 DIAGNOSIS — I10 HTN (HYPERTENSION), MALIGNANT: Primary | ICD-10-CM

## 2020-11-11 PROBLEM — E11.9 TYPE 2 DIABETES MELLITUS, WITHOUT LONG-TERM CURRENT USE OF INSULIN: Status: ACTIVE | Noted: 2020-11-11

## 2020-11-11 LAB
ALBUMIN SERPL BCP-MCNC: 3.4 G/DL (ref 3.5–5.2)
ALBUMIN SERPL BCP-MCNC: 3.6 G/DL (ref 3.5–5.2)
ALP SERPL-CCNC: 65 U/L (ref 55–135)
ALP SERPL-CCNC: 66 U/L (ref 55–135)
ALT SERPL W/O P-5'-P-CCNC: 19 U/L (ref 10–44)
ALT SERPL W/O P-5'-P-CCNC: 20 U/L (ref 10–44)
ANION GAP SERPL CALC-SCNC: 7 MMOL/L (ref 8–16)
ANION GAP SERPL CALC-SCNC: 9 MMOL/L (ref 8–16)
AORTIC ROOT ANNULUS: 4.02 CM
AORTIC VALVE CUSP SEPERATION: 1.72 CM
AST SERPL-CCNC: 21 U/L (ref 10–40)
AST SERPL-CCNC: 23 U/L (ref 10–40)
AV INDEX (PROSTH): 0.81
AV MEAN GRADIENT: 5 MMHG
AV PEAK GRADIENT: 8 MMHG
AV VALVE AREA: 2.66 CM2
AV VELOCITY RATIO: 0.86
BASOPHILS # BLD AUTO: 0 K/UL (ref 0–0.2)
BASOPHILS # BLD AUTO: 0.01 K/UL (ref 0–0.2)
BASOPHILS NFR BLD: 0 % (ref 0–1.9)
BASOPHILS NFR BLD: 0.1 % (ref 0–1.9)
BILIRUB SERPL-MCNC: 0.5 MG/DL (ref 0.1–1)
BILIRUB SERPL-MCNC: 0.6 MG/DL (ref 0.1–1)
BNP SERPL-MCNC: 219 PG/ML (ref 0–99)
BSA FOR ECHO PROCEDURE: 1.8 M2
BUN SERPL-MCNC: 17 MG/DL (ref 8–23)
BUN SERPL-MCNC: 19 MG/DL (ref 8–23)
CALCIUM SERPL-MCNC: 8.4 MG/DL (ref 8.7–10.5)
CALCIUM SERPL-MCNC: 9.1 MG/DL (ref 8.7–10.5)
CHLORIDE SERPL-SCNC: 100 MMOL/L (ref 95–110)
CHLORIDE SERPL-SCNC: 100 MMOL/L (ref 95–110)
CHOLEST SERPL-MCNC: 199 MG/DL (ref 120–199)
CHOLEST/HDLC SERPL: 3.2 {RATIO} (ref 2–5)
CO2 SERPL-SCNC: 32 MMOL/L (ref 23–29)
CO2 SERPL-SCNC: 32 MMOL/L (ref 23–29)
CREAT SERPL-MCNC: 1.2 MG/DL (ref 0.5–1.4)
CREAT SERPL-MCNC: 1.2 MG/DL (ref 0.5–1.4)
CTP QC/QA: YES
CV ECHO LV RWT: 0.62 CM
DIFFERENTIAL METHOD: ABNORMAL
DIFFERENTIAL METHOD: ABNORMAL
DOP CALC AO PEAK VEL: 1.42 M/S
DOP CALC AO VTI: 27.4 CM
DOP CALC LVOT AREA: 3.3 CM2
DOP CALC LVOT DIAMETER: 2.05 CM
DOP CALC LVOT PEAK VEL: 1.22 M/S
DOP CALC LVOT STROKE VOLUME: 72.87 CM3
DOP CALCLVOT PEAK VEL VTI: 22.09 CM
E WAVE DECELERATION TIME: 244.93 MSEC
E/A RATIO: 0.71
ECHO LV POSTERIOR WALL: 1.29 CM (ref 0.6–1.1)
EOSINOPHIL # BLD AUTO: 0 K/UL (ref 0–0.5)
EOSINOPHIL # BLD AUTO: 0 K/UL (ref 0–0.5)
EOSINOPHIL NFR BLD: 0 % (ref 0–8)
EOSINOPHIL NFR BLD: 0 % (ref 0–8)
ERYTHROCYTE [DISTWIDTH] IN BLOOD BY AUTOMATED COUNT: 15.6 % (ref 11.5–14.5)
ERYTHROCYTE [DISTWIDTH] IN BLOOD BY AUTOMATED COUNT: 15.7 % (ref 11.5–14.5)
EST. GFR  (AFRICAN AMERICAN): >60 ML/MIN/1.73 M^2
EST. GFR  (AFRICAN AMERICAN): >60 ML/MIN/1.73 M^2
EST. GFR  (NON AFRICAN AMERICAN): 56 ML/MIN/1.73 M^2
EST. GFR  (NON AFRICAN AMERICAN): 56 ML/MIN/1.73 M^2
FRACTIONAL SHORTENING: 31 % (ref 28–44)
GLUCOSE SERPL-MCNC: 107 MG/DL (ref 70–110)
GLUCOSE SERPL-MCNC: 209 MG/DL (ref 70–110)
HCT VFR BLD AUTO: 35.9 % (ref 40–54)
HCT VFR BLD AUTO: 38.4 % (ref 40–54)
HDLC SERPL-MCNC: 62 MG/DL (ref 40–75)
HDLC SERPL: 31.2 % (ref 20–50)
HGB BLD-MCNC: 11.6 G/DL (ref 14–18)
HGB BLD-MCNC: 12.1 G/DL (ref 14–18)
IMM GRANULOCYTES # BLD AUTO: 0.07 K/UL (ref 0–0.04)
IMM GRANULOCYTES # BLD AUTO: 0.08 K/UL (ref 0–0.04)
IMM GRANULOCYTES NFR BLD AUTO: 1.1 % (ref 0–0.5)
IMM GRANULOCYTES NFR BLD AUTO: 1.1 % (ref 0–0.5)
INTERVENTRICULAR SEPTUM: 1.24 CM (ref 0.6–1.1)
IVRT: 128.03 MSEC
LA MAJOR: 4.23 CM
LA MINOR: 4.92 CM
LA WIDTH: 4.47 CM
LDLC SERPL CALC-MCNC: 124.6 MG/DL (ref 63–159)
LEFT ATRIUM SIZE: 2.81 CM
LEFT ATRIUM VOLUME INDEX: 27.4 ML/M2
LEFT ATRIUM VOLUME: 48.57 CM3
LEFT INTERNAL DIMENSION IN SYSTOLE: 2.89 CM (ref 2.1–4)
LEFT VENTRICLE DIASTOLIC VOLUME INDEX: 43.65 ML/M2
LEFT VENTRICLE DIASTOLIC VOLUME: 77.48 ML
LEFT VENTRICLE MASS INDEX: 107 G/M2
LEFT VENTRICLE SYSTOLIC VOLUME INDEX: 18 ML/M2
LEFT VENTRICLE SYSTOLIC VOLUME: 31.96 ML
LEFT VENTRICULAR INTERNAL DIMENSION IN DIASTOLE: 4.17 CM (ref 3.5–6)
LEFT VENTRICULAR MASS: 190.51 G
LYMPHOCYTES # BLD AUTO: 0.5 K/UL (ref 1–4.8)
LYMPHOCYTES # BLD AUTO: 1.2 K/UL (ref 1–4.8)
LYMPHOCYTES NFR BLD: 17.7 % (ref 18–48)
LYMPHOCYTES NFR BLD: 6.5 % (ref 18–48)
MCH RBC QN AUTO: 27.7 PG (ref 27–31)
MCH RBC QN AUTO: 28 PG (ref 27–31)
MCHC RBC AUTO-ENTMCNC: 31.5 G/DL (ref 32–36)
MCHC RBC AUTO-ENTMCNC: 32.3 G/DL (ref 32–36)
MCV RBC AUTO: 87 FL (ref 82–98)
MCV RBC AUTO: 88 FL (ref 82–98)
MONOCYTES # BLD AUTO: 0.1 K/UL (ref 0.3–1)
MONOCYTES # BLD AUTO: 0.6 K/UL (ref 0.3–1)
MONOCYTES NFR BLD: 1.4 % (ref 4–15)
MONOCYTES NFR BLD: 8.8 % (ref 4–15)
MV PEAK A VEL: 1.04 M/S
MV PEAK E VEL: 0.74 M/S
NEUTROPHILS # BLD AUTO: 4.8 K/UL (ref 1.8–7.7)
NEUTROPHILS # BLD AUTO: 6.7 K/UL (ref 1.8–7.7)
NEUTROPHILS NFR BLD: 72.4 % (ref 38–73)
NEUTROPHILS NFR BLD: 90.9 % (ref 38–73)
NONHDLC SERPL-MCNC: 137 MG/DL
NRBC BLD-RTO: 0 /100 WBC
NRBC BLD-RTO: 0 /100 WBC
PISA TR MAX VEL: 2.35 M/S
PLATELET # BLD AUTO: 159 K/UL (ref 150–350)
PLATELET # BLD AUTO: 162 K/UL (ref 150–350)
PMV BLD AUTO: 10.6 FL (ref 9.2–12.9)
PMV BLD AUTO: ABNORMAL FL (ref 9.2–12.9)
POCT GLUCOSE: 109 MG/DL (ref 70–110)
POCT GLUCOSE: 196 MG/DL (ref 70–110)
POCT GLUCOSE: 199 MG/DL (ref 70–110)
POCT GLUCOSE: 220 MG/DL (ref 70–110)
POTASSIUM SERPL-SCNC: 3.8 MMOL/L (ref 3.5–5.1)
POTASSIUM SERPL-SCNC: 4 MMOL/L (ref 3.5–5.1)
PROT SERPL-MCNC: 7.1 G/DL (ref 6–8.4)
PROT SERPL-MCNC: 7.2 G/DL (ref 6–8.4)
PULM VEIN S/D RATIO: 2.11
PV PEAK D VEL: 0.35 M/S
PV PEAK S VEL: 0.74 M/S
PV PEAK VELOCITY: 1.48 CM/S
RA MAJOR: 4.91 CM
RA PRESSURE: 3 MMHG
RA WIDTH: 3.19 CM
RBC # BLD AUTO: 4.15 M/UL (ref 4.6–6.2)
RBC # BLD AUTO: 4.37 M/UL (ref 4.6–6.2)
RIGHT VENTRICULAR END-DIASTOLIC DIMENSION: 3.97 CM
RV TISSUE DOPPLER FREE WALL SYSTOLIC VELOCITY 1 (APICAL 4 CHAMBER VIEW): 15.28 CM/S
SARS-COV-2 RDRP RESP QL NAA+PROBE: NEGATIVE
SINUS: 3.65 CM
SODIUM SERPL-SCNC: 139 MMOL/L (ref 136–145)
SODIUM SERPL-SCNC: 141 MMOL/L (ref 136–145)
STJ: 2.32 CM
TR MAX PG: 22 MMHG
TRICUSPID ANNULAR PLANE SYSTOLIC EXCURSION: 2.44 CM
TRIGL SERPL-MCNC: 62 MG/DL (ref 30–150)
TROPONIN I SERPL DL<=0.01 NG/ML-MCNC: 0.01 NG/ML (ref 0–0.03)
TROPONIN I SERPL DL<=0.01 NG/ML-MCNC: 0.01 NG/ML (ref 0–0.03)
TROPONIN I SERPL DL<=0.01 NG/ML-MCNC: <0.006 NG/ML (ref 0–0.03)
TSH SERPL DL<=0.005 MIU/L-ACNC: 0.77 UIU/ML (ref 0.4–4)
TV REST PULMONARY ARTERY PRESSURE: 25 MMHG
WBC # BLD AUTO: 6.57 K/UL (ref 3.9–12.7)
WBC # BLD AUTO: 7.34 K/UL (ref 3.9–12.7)

## 2020-11-11 PROCEDURE — 85025 COMPLETE CBC W/AUTO DIFF WBC: CPT

## 2020-11-11 PROCEDURE — 94644 CONT INHLJ TX 1ST HOUR: CPT

## 2020-11-11 PROCEDURE — 96365 THER/PROPH/DIAG IV INF INIT: CPT

## 2020-11-11 PROCEDURE — 96376 TX/PRO/DX INJ SAME DRUG ADON: CPT

## 2020-11-11 PROCEDURE — 83036 HEMOGLOBIN GLYCOSYLATED A1C: CPT

## 2020-11-11 PROCEDURE — 84484 ASSAY OF TROPONIN QUANT: CPT | Mod: 91

## 2020-11-11 PROCEDURE — 93005 ELECTROCARDIOGRAM TRACING: CPT

## 2020-11-11 PROCEDURE — 94640 AIRWAY INHALATION TREATMENT: CPT

## 2020-11-11 PROCEDURE — 96366 THER/PROPH/DIAG IV INF ADDON: CPT

## 2020-11-11 PROCEDURE — 80053 COMPREHEN METABOLIC PANEL: CPT | Mod: 91

## 2020-11-11 PROCEDURE — 96375 TX/PRO/DX INJ NEW DRUG ADDON: CPT

## 2020-11-11 PROCEDURE — 25000242 PHARM REV CODE 250 ALT 637 W/ HCPCS: Performed by: EMERGENCY MEDICINE

## 2020-11-11 PROCEDURE — 93010 EKG 12-LEAD: ICD-10-PCS | Mod: ,,, | Performed by: INTERNAL MEDICINE

## 2020-11-11 PROCEDURE — U0002 COVID-19 LAB TEST NON-CDC: HCPCS | Performed by: EMERGENCY MEDICINE

## 2020-11-11 PROCEDURE — 80061 LIPID PANEL: CPT

## 2020-11-11 PROCEDURE — 25000242 PHARM REV CODE 250 ALT 637 W/ HCPCS: Performed by: NURSE PRACTITIONER

## 2020-11-11 PROCEDURE — 82962 GLUCOSE BLOOD TEST: CPT

## 2020-11-11 PROCEDURE — 80053 COMPREHEN METABOLIC PANEL: CPT

## 2020-11-11 PROCEDURE — C9399 UNCLASSIFIED DRUGS OR BIOLOG: HCPCS | Performed by: NURSE PRACTITIONER

## 2020-11-11 PROCEDURE — 63600175 PHARM REV CODE 636 W HCPCS: Performed by: EMERGENCY MEDICINE

## 2020-11-11 PROCEDURE — 87040 BLOOD CULTURE FOR BACTERIA: CPT

## 2020-11-11 PROCEDURE — 25000003 PHARM REV CODE 250: Performed by: NURSE PRACTITIONER

## 2020-11-11 PROCEDURE — 84443 ASSAY THYROID STIM HORMONE: CPT

## 2020-11-11 PROCEDURE — 99219 PR INITIAL OBSERVATION CARE,LEVL II: CPT | Mod: ,,, | Performed by: INTERNAL MEDICINE

## 2020-11-11 PROCEDURE — 99285 EMERGENCY DEPT VISIT HI MDM: CPT | Mod: 25

## 2020-11-11 PROCEDURE — 93010 ELECTROCARDIOGRAM REPORT: CPT | Mod: ,,, | Performed by: INTERNAL MEDICINE

## 2020-11-11 PROCEDURE — 84484 ASSAY OF TROPONIN QUANT: CPT

## 2020-11-11 PROCEDURE — 94761 N-INVAS EAR/PLS OXIMETRY MLT: CPT

## 2020-11-11 PROCEDURE — 36415 COLL VENOUS BLD VENIPUNCTURE: CPT

## 2020-11-11 PROCEDURE — 99219 PR INITIAL OBSERVATION CARE,LEVL II: ICD-10-PCS | Mod: ,,, | Performed by: INTERNAL MEDICINE

## 2020-11-11 PROCEDURE — 96372 THER/PROPH/DIAG INJ SC/IM: CPT | Mod: 59

## 2020-11-11 PROCEDURE — 63600175 PHARM REV CODE 636 W HCPCS: Performed by: NURSE PRACTITIONER

## 2020-11-11 PROCEDURE — G0378 HOSPITAL OBSERVATION PER HR: HCPCS

## 2020-11-11 PROCEDURE — 83880 ASSAY OF NATRIURETIC PEPTIDE: CPT

## 2020-11-11 RX ORDER — INSULIN ASPART 100 [IU]/ML
3 INJECTION, SOLUTION INTRAVENOUS; SUBCUTANEOUS
Status: DISCONTINUED | OUTPATIENT
Start: 2020-11-11 | End: 2020-11-12 | Stop reason: HOSPADM

## 2020-11-11 RX ORDER — IPRATROPIUM BROMIDE 0.5 MG/2.5ML
1 SOLUTION RESPIRATORY (INHALATION)
Status: COMPLETED | OUTPATIENT
Start: 2020-11-11 | End: 2020-11-11

## 2020-11-11 RX ORDER — METHYLPREDNISOLONE SOD SUCC 125 MG
125 VIAL (EA) INJECTION
Status: COMPLETED | OUTPATIENT
Start: 2020-11-11 | End: 2020-11-11

## 2020-11-11 RX ORDER — IPRATROPIUM BROMIDE AND ALBUTEROL SULFATE 2.5; .5 MG/3ML; MG/3ML
3 SOLUTION RESPIRATORY (INHALATION)
Status: DISCONTINUED | OUTPATIENT
Start: 2020-11-11 | End: 2020-11-12 | Stop reason: HOSPADM

## 2020-11-11 RX ORDER — METOPROLOL SUCCINATE 25 MG/1
25 TABLET, EXTENDED RELEASE ORAL 2 TIMES DAILY
Status: DISCONTINUED | OUTPATIENT
Start: 2020-11-11 | End: 2020-11-12 | Stop reason: HOSPADM

## 2020-11-11 RX ORDER — HYDRALAZINE HYDROCHLORIDE 25 MG/1
50 TABLET, FILM COATED ORAL EVERY 8 HOURS
Status: DISCONTINUED | OUTPATIENT
Start: 2020-11-11 | End: 2020-11-12 | Stop reason: HOSPADM

## 2020-11-11 RX ORDER — IBUPROFEN 200 MG
16 TABLET ORAL
Status: DISCONTINUED | OUTPATIENT
Start: 2020-11-11 | End: 2020-11-12 | Stop reason: HOSPADM

## 2020-11-11 RX ORDER — ENOXAPARIN SODIUM 100 MG/ML
1 INJECTION SUBCUTANEOUS ONCE
Status: COMPLETED | OUTPATIENT
Start: 2020-11-11 | End: 2020-11-11

## 2020-11-11 RX ORDER — IBUPROFEN 200 MG
24 TABLET ORAL
Status: DISCONTINUED | OUTPATIENT
Start: 2020-11-11 | End: 2020-11-12 | Stop reason: HOSPADM

## 2020-11-11 RX ORDER — ONDANSETRON 2 MG/ML
8 INJECTION INTRAMUSCULAR; INTRAVENOUS
Status: DISCONTINUED | OUTPATIENT
Start: 2020-11-11 | End: 2020-11-11

## 2020-11-11 RX ORDER — HYDRALAZINE HYDROCHLORIDE 25 MG/1
25 TABLET, FILM COATED ORAL EVERY 8 HOURS PRN
Status: DISCONTINUED | OUTPATIENT
Start: 2020-11-11 | End: 2020-11-12 | Stop reason: HOSPADM

## 2020-11-11 RX ORDER — IPRATROPIUM BROMIDE AND ALBUTEROL SULFATE 2.5; .5 MG/3ML; MG/3ML
3 SOLUTION RESPIRATORY (INHALATION) EVERY 4 HOURS
Status: DISCONTINUED | OUTPATIENT
Start: 2020-11-11 | End: 2020-11-11

## 2020-11-11 RX ORDER — LEVOFLOXACIN 5 MG/ML
750 INJECTION, SOLUTION INTRAVENOUS
Status: COMPLETED | OUTPATIENT
Start: 2020-11-11 | End: 2020-11-11

## 2020-11-11 RX ORDER — ALBUTEROL SULFATE 2.5 MG/.5ML
15 SOLUTION RESPIRATORY (INHALATION)
Status: COMPLETED | OUTPATIENT
Start: 2020-11-11 | End: 2020-11-11

## 2020-11-11 RX ORDER — DEXAMETHASONE SODIUM PHOSPHATE 4 MG/ML
12 INJECTION, SOLUTION INTRA-ARTICULAR; INTRALESIONAL; INTRAMUSCULAR; INTRAVENOUS; SOFT TISSUE
Status: COMPLETED | OUTPATIENT
Start: 2020-11-11 | End: 2020-11-11

## 2020-11-11 RX ORDER — IPRATROPIUM BROMIDE AND ALBUTEROL SULFATE 2.5; .5 MG/3ML; MG/3ML
3 SOLUTION RESPIRATORY (INHALATION) EVERY 6 HOURS PRN
Status: DISCONTINUED | OUTPATIENT
Start: 2020-11-11 | End: 2020-11-11

## 2020-11-11 RX ORDER — GLUCAGON 1 MG
1 KIT INJECTION
Status: DISCONTINUED | OUTPATIENT
Start: 2020-11-11 | End: 2020-11-12 | Stop reason: HOSPADM

## 2020-11-11 RX ORDER — INSULIN ASPART 100 [IU]/ML
0-5 INJECTION, SOLUTION INTRAVENOUS; SUBCUTANEOUS
Status: DISCONTINUED | OUTPATIENT
Start: 2020-11-11 | End: 2020-11-12 | Stop reason: HOSPADM

## 2020-11-11 RX ORDER — ASPIRIN 81 MG/1
81 TABLET ORAL DAILY
Status: DISCONTINUED | OUTPATIENT
Start: 2020-11-11 | End: 2020-11-12 | Stop reason: HOSPADM

## 2020-11-11 RX ORDER — ENOXAPARIN SODIUM 100 MG/ML
40 INJECTION SUBCUTANEOUS EVERY 24 HOURS
Status: DISCONTINUED | OUTPATIENT
Start: 2020-11-11 | End: 2020-11-12 | Stop reason: HOSPADM

## 2020-11-11 RX ORDER — ASPIRIN 325 MG
325 TABLET ORAL
Status: COMPLETED | OUTPATIENT
Start: 2020-11-11 | End: 2020-11-11

## 2020-11-11 RX ADMIN — LEVOFLOXACIN 750 MG: 750 INJECTION, SOLUTION INTRAVENOUS at 09:11

## 2020-11-11 RX ADMIN — IPRATROPIUM BROMIDE AND ALBUTEROL SULFATE 3 ML: .5; 3 SOLUTION RESPIRATORY (INHALATION) at 12:11

## 2020-11-11 RX ADMIN — ASPIRIN 81 MG: 81 TABLET, COATED ORAL at 05:11

## 2020-11-11 RX ADMIN — INSULIN ASPART 3 UNITS: 100 INJECTION, SOLUTION INTRAVENOUS; SUBCUTANEOUS at 05:11

## 2020-11-11 RX ADMIN — METHYLPREDNISOLONE SODIUM SUCCINATE 125 MG: 125 INJECTION, POWDER, FOR SOLUTION INTRAMUSCULAR; INTRAVENOUS at 07:11

## 2020-11-11 RX ADMIN — METOPROLOL SUCCINATE 25 MG: 25 TABLET, EXTENDED RELEASE ORAL at 08:11

## 2020-11-11 RX ADMIN — INSULIN DETEMIR 10 UNITS: 100 INJECTION, SOLUTION SUBCUTANEOUS at 09:11

## 2020-11-11 RX ADMIN — ALBUTEROL SULFATE 15 MG: 2.5 SOLUTION RESPIRATORY (INHALATION) at 07:11

## 2020-11-11 RX ADMIN — DEXAMETHASONE SODIUM PHOSPHATE 12 MG: 4 INJECTION, SOLUTION INTRA-ARTICULAR; INTRALESIONAL; INTRAMUSCULAR; INTRAVENOUS; SOFT TISSUE at 07:11

## 2020-11-11 RX ADMIN — HYDRALAZINE HYDROCHLORIDE 50 MG: 25 TABLET, FILM COATED ORAL at 09:11

## 2020-11-11 RX ADMIN — HYDRALAZINE HYDROCHLORIDE 50 MG: 25 TABLET, FILM COATED ORAL at 05:11

## 2020-11-11 RX ADMIN — METHYLPREDNISOLONE SODIUM SUCCINATE 60 MG: 40 INJECTION, POWDER, FOR SOLUTION INTRAMUSCULAR; INTRAVENOUS at 09:11

## 2020-11-11 RX ADMIN — IPRATROPIUM BROMIDE 1 MG: 0.5 SOLUTION RESPIRATORY (INHALATION) at 07:11

## 2020-11-11 RX ADMIN — IPRATROPIUM BROMIDE AND ALBUTEROL SULFATE 3 ML: .5; 3 SOLUTION RESPIRATORY (INHALATION) at 05:11

## 2020-11-11 RX ADMIN — ENOXAPARIN SODIUM 70 MG: 80 INJECTION SUBCUTANEOUS at 11:11

## 2020-11-11 RX ADMIN — IPRATROPIUM BROMIDE AND ALBUTEROL SULFATE 3 ML: .5; 3 SOLUTION RESPIRATORY (INHALATION) at 07:11

## 2020-11-11 RX ADMIN — ASPIRIN 325 MG ORAL TABLET 325 MG: 325 PILL ORAL at 07:11

## 2020-11-11 NOTE — ASSESSMENT & PLAN NOTE
Lab Results   Component Value Date    HGBA1C 11.2 (H) 06/12/2020   On home januvia only?  Start basal prandial and adjust accordingly

## 2020-11-11 NOTE — HPI
" Mr. Laurent is a 79 yo male with significant history for COPD, remote smoker quit 30 years ago, hx of atrial fibrillation? No on OAC, DMT2, and hypertension who present to ED for right chest pressure and sob that occurred at 6 pm yesterday. Used home nebulizer without relief. Denies fever or chills. Had productive cough in ED "white sputum" but currently denies cough.     EKG NSR with SR . First set troponin neg. O2 sat 95-97% on RA.   CXR clear.   6/20/20 2 D echo showed normal EF, normal diastolic, normal wall motion, and PH 48 mmHG.  Symptoms improved with steroid and duoneb in ED. Currently he denies any chest pain.     "

## 2020-11-11 NOTE — CONSULTS
[facility]  Cardiology  Consult Note    Patient Name: Micah Laurent Jr.  MRN: [id number]  Admission Date: 11/11/2020  Hospital Length of Stay: 0 days  Code Status: Prior   Attending Provider: Malika Palafox, *   Consulting Provider: Giuseppe Nicholson MD  Primary Care Physician: Chelsy Torrez MD  Principal Problem:COPD exacerbation    Patient information was obtained from patient and ER records.     Inpatient consult to Cardiology  Consult performed by: Giuseppe Nicholson MD  Consult ordered by: Malika Palafox MD  Reason for consult: ?ACS        Subjective:     Chief Complaint:  SOB     HPI:   80 y.o. male Past Medical History:  No date: Asthma  No date: Atrial fibrillation  No date: COPD (chronic obstructive pulmonary disease)  No date: Diabetes mellitus, new onset  No date: Hypertension   Presents c/o copd flare starting this morning at 5am associated with R chest pressure. Denies n/v/diarrhea/dysuria. Denies recent illness    The patient presents the emergency room with complaints of dyspnea similar to prior COPD exacerbations.  He denies any anginal complaints such as chest discomfort, tightness, burning, or squeezing.  He has had no prior cardiac history.  His EKG is unremarkable, his initial troponin negative.    Past Medical History:   Diagnosis Date    Asthma     Atrial fibrillation     COPD (chronic obstructive pulmonary disease)     Diabetes mellitus, new onset     Hypertension        History reviewed. No pertinent surgical history.    Review of patient's allergies indicates:   Allergen Reactions    Lisinopril      Swells lower legs.       No current facility-administered medications on file prior to encounter.      Current Outpatient Medications on File Prior to Encounter   Medication Sig    albuterol (ACCUNEB) 1.25 mg/3 mL Nebu Take 3 mLs (1.25 mg total) by nebulization every 6 (six) hours as needed. Rescue    albuterol (PROVENTIL/VENTOLIN HFA) 90  mcg/actuation inhaler Inhale 1-2 puffs into the lungs every 4 (four) hours as needed for Wheezing or Shortness of Breath. Rescue    aspirin (ECOTRIN) 81 MG EC tablet Take 81 mg by mouth once daily.    hydrALAZINE (APRESOLINE) 50 MG tablet Take 1 tablet (50 mg total) by mouth every 12 (twelve) hours. (Patient taking differently: Take 50 mg by mouth every 8 (eight) hours. )    metoprolol succinate (TOPROL-XL) 25 MG 24 hr tablet Take 25 mg by mouth 2 (two) times daily.    SITagliptin (JANUVIA) 50 MG Tab     fluticasone-salmeterol diskus inhaler 250-50 mcg Inhale 1 puff into the lungs 2 (two) times daily. Controller    ipratropium-albuterol (COMBIVENT)  mcg/actuation inhaler Inhale 1 puff into the lungs every 6 (six) hours as needed for Wheezing. Rescue     Family History     None        Tobacco Use    Smoking status: Former Smoker     Packs/day: 1.50     Years: 20.00     Pack years: 30.00     Quit date:      Years since quittin.8    Smokeless tobacco: Former User    Tobacco comment: quit in    Substance and Sexual Activity    Alcohol use: No    Drug use: No    Sexual activity: Never     Review of Systems   Constitution: Negative for chills, diaphoresis, fever and malaise/fatigue.   HENT: Negative for nosebleeds.    Eyes: Negative for blurred vision and double vision.   Cardiovascular: Negative for chest pain, claudication, cyanosis, dyspnea on exertion, leg swelling, orthopnea, palpitations, paroxysmal nocturnal dyspnea and syncope.   Respiratory: Positive for shortness of breath. Negative for cough and wheezing.    Skin: Negative for dry skin and poor wound healing.   Musculoskeletal: Negative for back pain, joint swelling and myalgias.   Gastrointestinal: Negative for abdominal pain, nausea and vomiting.   Genitourinary: Negative for hematuria.   Neurological: Negative for dizziness, headaches, numbness, seizures and weakness.   Psychiatric/Behavioral: Negative for altered mental status  and depression.     Objective:     Vital Signs (Most Recent):  Temp: 98.6 °F (37 °C) (11/11/20 0944)  Pulse: 75 (11/11/20 0944)  Resp: 19 (11/11/20 0942)  BP: (!) 163/95 (11/11/20 0944)  SpO2: 95 % (11/11/20 0940) Vital Signs (24h Range):  Temp:  [98.6 °F (37 °C)-98.7 °F (37.1 °C)] 98.6 °F (37 °C)  Pulse:  [69-85] 75  Resp:  [18-23] 19  SpO2:  [95 %-97 %] 95 %  BP: (163-178)/() 163/95     Weight: 70.3 kg (155 lb)  Body mass index is 25.79 kg/m².    SpO2: 95 %  O2 Device (Oxygen Therapy): room air    No intake or output data in the 24 hours ending 11/11/20 1046    Lines/Drains/Airways     Peripheral Intravenous Line                 Peripheral IV - Single Lumen 11/11/20 0734 18 G Right Antecubital less than 1 day                Physical Exam   Constitutional: He is oriented to person, place, and time. He appears well-developed and well-nourished. No distress.   HENT:   Head: Normocephalic and atraumatic.   Eyes: Pupils are equal, round, and reactive to light. Conjunctivae and EOM are normal. No scleral icterus.   Neck: Normal range of motion. Neck supple. No JVD present. No tracheal deviation present. No thyromegaly present.   Cardiovascular: Normal rate, regular rhythm, S1 normal and S2 normal. Exam reveals no gallop and no friction rub.   No murmur heard.  Pulmonary/Chest: Effort normal and breath sounds normal. No respiratory distress. He has no wheezes. He has no rales. He exhibits no tenderness.   Abdominal: Soft. He exhibits no distension.   Musculoskeletal: Normal range of motion.         General: No edema.   Neurological: He is alert and oriented to person, place, and time. He has normal strength. No cranial nerve deficit.   Skin: Skin is warm and dry. No rash noted. He is not diaphoretic.   Psychiatric: He has a normal mood and affect. His behavior is normal.       Current Medications:   enoxaparin  1 mg/kg Subcutaneous Once    levoFLOXacin  750 mg Intravenous ED 1 Time           Laboratory (all labs  reviewed):  CBC:  Recent Labs   Lab 07/04/20  0001 10/04/20  1827 10/20/20  1001 10/26/20  1729 11/11/20  0733   WBC 6.63 5.56 4.68 7.36 6.57   Hemoglobin 9.6 L 10.9 L 11.5 L 11.8 L 11.6 L   Hematocrit 33.3 L 34.7 L 36.1 L 37.8 L 35.9 L   Platelets 311 169 196 190 159       CHEMISTRIES:  Recent Labs   Lab 05/05/19  2218  08/28/19  1958  12/01/19  2110 12/14/19  2230  07/04/20  0001 10/04/20  1827 10/20/20  1001 10/26/20  1729 11/11/20  0733   Glucose 91   < > 99   < > 88 184 H   < > 134 H 99 104 101 107   Sodium 142   < > 145   < > 143 142   < > 144 144 141 144 141   Potassium 3.7   < > 3.8   < > 3.8 4.0   < > 4.3 4.2 4.0 4.0 3.8   BUN 16   < > 16   < > 18 21   < > 19 26 H 16 22 17   Creatinine 1.2   < > 1.3   < > 1.3 1.3   < > 1.4 1.4 1.3 1.3 1.2   eGFR if  >60   < > 60   < > 60 60   < > 54 A 54 A 59 A 59 A >60   eGFR if non  57 A   < > 52 A   < > 52 A 52 A   < > 47 A 47 A 51 A 51 A 56 A   Calcium 9.3   < > 9.2   < > 9.2 8.9   < > 8.8 9.0 9.0 8.4 L 9.1   Magnesium 1.8  --  1.8  --  1.9 1.9  --   --   --   --   --   --     < > = values in this interval not displayed.       CARDIAC BIOMARKERS:  Recent Labs   Lab 06/22/20  0539  10/04/20  1827 10/20/20  1001 10/26/20  1729 10/26/20  2015 11/11/20  0733   CPK 62  --   --   --   --   --   --    CPK MB 0.9  --   --   --   --   --   --    Troponin I <0.006   < > <0.006 <0.006 <0.006 <0.006 0.009    < > = values in this interval not displayed.       COAGS:  Recent Labs   Lab 05/05/19  2218 10/02/19  1123 06/19/20  1755 10/04/20  1827   INR 1.0 1.0 0.9 0.9       LIPIDS/LFTS:  Recent Labs   Lab 07/04/20  0001 10/04/20  1827 10/20/20  1001 10/26/20  1729 11/11/20  0733   Cholesterol  --   --   --   --  199   Triglycerides  --   --   --   --  62   HDL  --   --   --   --  62   LDL Cholesterol  --   --   --   --  124.6   Non-HDL Cholesterol  --   --   --   --  137   AST 29 31 38 25 23   ALT 17 26 35 26 20       BNP:  Recent Labs   Lab  07/04/20  0001 10/04/20  1827 10/20/20  1001 10/26/20  1729 11/11/20  0733    H 203 H 91 183 H 219 H       TSH:  Recent Labs   Lab 03/02/20  0453 06/19/20  1755   TSH 0.197 L 0.248 L       Free T4:  Recent Labs   Lab 03/02/20  0453 06/19/20  1755   Free T4 1.04 1.11       Diagnostic Results:  ECG (personally reviewed and interpreted tracing(s)):  11/11/20 0722 SR 80, PACs, LVH    Chest X-Ray (personally reviewed and interpreted image(s)): 11/11/20 NAD, flattened diaph    Echo: 6/20/20 (repeat ordered)  · Normal left ventricular systolic function. The estimated ejection fraction is 70%.  · No wall motion abnormalities.  · Normal right ventricular systolic function.  · The estimated PA systolic pressure is 48 mmHg.  · Pulmonary hypertension present.          Assessment and Plan:     * COPD exacerbation  Seems to be presenting issue  CP reported in ER note but not on my interview  EKG with LVH, no ST changes, initial trop neg, doubt ACS  Check echo  Consider isch eval as outpatient when acute pulm issues subside    Essential hypertension  Cont med rx    Stage 3a chronic kidney disease  Creat stable        VTE Risk Mitigation (From admission, onward)         Ordered     enoxaparin injection 70 mg  Once      11/11/20 0949                Thank you for your consult. I will follow-up with patient. Please contact us if you have any additional questions.    Giuseppe Nicholson MD  Cardiology   [facility]

## 2020-11-11 NOTE — SUBJECTIVE & OBJECTIVE
Past Medical History:   Diagnosis Date    Asthma     Atrial fibrillation     COPD (chronic obstructive pulmonary disease)     Diabetes mellitus, new onset     Hypertension        History reviewed. No pertinent surgical history.    Review of patient's allergies indicates:   Allergen Reactions    Lisinopril      Swells lower legs.       No current facility-administered medications on file prior to encounter.      Current Outpatient Medications on File Prior to Encounter   Medication Sig    albuterol (ACCUNEB) 1.25 mg/3 mL Nebu Take 3 mLs (1.25 mg total) by nebulization every 6 (six) hours as needed. Rescue    albuterol (PROVENTIL/VENTOLIN HFA) 90 mcg/actuation inhaler Inhale 1-2 puffs into the lungs every 4 (four) hours as needed for Wheezing or Shortness of Breath. Rescue    aspirin (ECOTRIN) 81 MG EC tablet Take 81 mg by mouth once daily.    hydrALAZINE (APRESOLINE) 50 MG tablet Take 1 tablet (50 mg total) by mouth every 12 (twelve) hours. (Patient taking differently: Take 50 mg by mouth every 8 (eight) hours. )    metoprolol succinate (TOPROL-XL) 25 MG 24 hr tablet Take 25 mg by mouth 2 (two) times daily.    SITagliptin (JANUVIA) 50 MG Tab     fluticasone-salmeterol diskus inhaler 250-50 mcg Inhale 1 puff into the lungs 2 (two) times daily. Controller    ipratropium-albuterol (COMBIVENT)  mcg/actuation inhaler Inhale 1 puff into the lungs every 6 (six) hours as needed for Wheezing. Rescue     Family History     None        Tobacco Use    Smoking status: Former Smoker     Packs/day: 1.50     Years: 20.00     Pack years: 30.00     Quit date:      Years since quittin.8    Smokeless tobacco: Former User    Tobacco comment: quit in    Substance and Sexual Activity    Alcohol use: No    Drug use: No    Sexual activity: Never     Review of Systems   Constitution: Negative for chills, diaphoresis, fever and malaise/fatigue.   HENT: Negative for nosebleeds.    Eyes: Negative for  blurred vision and double vision.   Cardiovascular: Negative for chest pain, claudication, cyanosis, dyspnea on exertion, leg swelling, orthopnea, palpitations, paroxysmal nocturnal dyspnea and syncope.   Respiratory: Positive for shortness of breath. Negative for cough and wheezing.    Skin: Negative for dry skin and poor wound healing.   Musculoskeletal: Negative for back pain, joint swelling and myalgias.   Gastrointestinal: Negative for abdominal pain, nausea and vomiting.   Genitourinary: Negative for hematuria.   Neurological: Negative for dizziness, headaches, numbness, seizures and weakness.   Psychiatric/Behavioral: Negative for altered mental status and depression.     Objective:     Vital Signs (Most Recent):  Temp: 98.6 °F (37 °C) (11/11/20 0944)  Pulse: 75 (11/11/20 0944)  Resp: 19 (11/11/20 0942)  BP: (!) 163/95 (11/11/20 0944)  SpO2: 95 % (11/11/20 0940) Vital Signs (24h Range):  Temp:  [98.6 °F (37 °C)-98.7 °F (37.1 °C)] 98.6 °F (37 °C)  Pulse:  [69-85] 75  Resp:  [18-23] 19  SpO2:  [95 %-97 %] 95 %  BP: (163-178)/() 163/95     Weight: 70.3 kg (155 lb)  Body mass index is 25.79 kg/m².    SpO2: 95 %  O2 Device (Oxygen Therapy): room air    No intake or output data in the 24 hours ending 11/11/20 1046    Lines/Drains/Airways     Peripheral Intravenous Line                 Peripheral IV - Single Lumen 11/11/20 0734 18 G Right Antecubital less than 1 day                Physical Exam   Constitutional: He is oriented to person, place, and time. He appears well-developed and well-nourished. No distress.   HENT:   Head: Normocephalic and atraumatic.   Eyes: Pupils are equal, round, and reactive to light. Conjunctivae and EOM are normal. No scleral icterus.   Neck: Normal range of motion. Neck supple. No JVD present. No tracheal deviation present. No thyromegaly present.   Cardiovascular: Normal rate, regular rhythm, S1 normal and S2 normal. Exam reveals no gallop and no friction rub.   No murmur  heard.  Pulmonary/Chest: Effort normal and breath sounds normal. No respiratory distress. He has no wheezes. He has no rales. He exhibits no tenderness.   Abdominal: Soft. He exhibits no distension.   Musculoskeletal: Normal range of motion.         General: No edema.   Neurological: He is alert and oriented to person, place, and time. He has normal strength. No cranial nerve deficit.   Skin: Skin is warm and dry. No rash noted. He is not diaphoretic.   Psychiatric: He has a normal mood and affect. His behavior is normal.       Current Medications:   enoxaparin  1 mg/kg Subcutaneous Once    levoFLOXacin  750 mg Intravenous ED 1 Time           Laboratory (all labs reviewed):  CBC:  Recent Labs   Lab 07/04/20  0001 10/04/20  1827 10/20/20  1001 10/26/20  1729 11/11/20  0733   WBC 6.63 5.56 4.68 7.36 6.57   Hemoglobin 9.6 L 10.9 L 11.5 L 11.8 L 11.6 L   Hematocrit 33.3 L 34.7 L 36.1 L 37.8 L 35.9 L   Platelets 311 169 196 190 159       CHEMISTRIES:  Recent Labs   Lab 05/05/19  2218  08/28/19  1958  12/01/19  2110 12/14/19  2230  07/04/20  0001 10/04/20  1827 10/20/20  1001 10/26/20  1729 11/11/20  0733   Glucose 91   < > 99   < > 88 184 H   < > 134 H 99 104 101 107   Sodium 142   < > 145   < > 143 142   < > 144 144 141 144 141   Potassium 3.7   < > 3.8   < > 3.8 4.0   < > 4.3 4.2 4.0 4.0 3.8   BUN 16   < > 16   < > 18 21   < > 19 26 H 16 22 17   Creatinine 1.2   < > 1.3   < > 1.3 1.3   < > 1.4 1.4 1.3 1.3 1.2   eGFR if  >60   < > 60   < > 60 60   < > 54 A 54 A 59 A 59 A >60   eGFR if non  57 A   < > 52 A   < > 52 A 52 A   < > 47 A 47 A 51 A 51 A 56 A   Calcium 9.3   < > 9.2   < > 9.2 8.9   < > 8.8 9.0 9.0 8.4 L 9.1   Magnesium 1.8  --  1.8  --  1.9 1.9  --   --   --   --   --   --     < > = values in this interval not displayed.       CARDIAC BIOMARKERS:  Recent Labs   Lab 06/22/20  0539  10/04/20  1827 10/20/20  1001 10/26/20  1729 10/26/20  2015 11/11/20  0733   CPK 62  --   --   --    --   --   --    CPK MB 0.9  --   --   --   --   --   --    Troponin I <0.006   < > <0.006 <0.006 <0.006 <0.006 0.009    < > = values in this interval not displayed.       COAGS:  Recent Labs   Lab 05/05/19 2218 10/02/19  1123 06/19/20  1755 10/04/20  1827   INR 1.0 1.0 0.9 0.9       LIPIDS/LFTS:  Recent Labs   Lab 07/04/20  0001 10/04/20  1827 10/20/20  1001 10/26/20  1729 11/11/20  0733   Cholesterol  --   --   --   --  199   Triglycerides  --   --   --   --  62   HDL  --   --   --   --  62   LDL Cholesterol  --   --   --   --  124.6   Non-HDL Cholesterol  --   --   --   --  137   AST 29 31 38 25 23   ALT 17 26 35 26 20       BNP:  Recent Labs   Lab 07/04/20  0001 10/04/20  1827 10/20/20  1001 10/26/20  1729 11/11/20  0733    H 203 H 91 183 H 219 H       TSH:  Recent Labs   Lab 03/02/20 0453 06/19/20  1755   TSH 0.197 L 0.248 L       Free T4:  Recent Labs   Lab 03/02/20 0453 06/19/20  1755   Free T4 1.04 1.11       Diagnostic Results:  ECG (personally reviewed and interpreted tracing(s)):  11/11/20 0722 SR 80, PACs, LVH    Chest X-Ray (personally reviewed and interpreted image(s)): 11/11/20 NAD, flattened diaph    Echo: 6/20/20 (repeat ordered)  · Normal left ventricular systolic function. The estimated ejection fraction is 70%.  · No wall motion abnormalities.  · Normal right ventricular systolic function.  · The estimated PA systolic pressure is 48 mmHg.  · Pulmonary hypertension present.

## 2020-11-11 NOTE — ED PROVIDER NOTES
"Encounter Date: 2020       History     Chief Complaint   Patient presents with    Shortness of Breath     "I've been sob since yesterday. I have COPD and I keep going to the bathroom." Pt is a diabetic.    Urinary Frequency     80 y.o. male Past Medical History:  No date: Asthma  No date: Atrial fibrillation  No date: COPD (chronic obstructive pulmonary disease)  No date: Diabetes mellitus, new onset  No date: Hypertension     Presents c/o copd flare starting this morning at 5am associated with R chest pressure. Denies n/v/diarrhea/dysuria. Denies recent illness    2020 TTE  · Normal left ventricular systolic function. The estimated ejection fraction is 70%.  · No wall motion abnormalities.  · Normal right ventricular systolic function.  · The estimated PA systolic pressure is 48 mmHg.  · Pulmonary hypertension present.        Review of patient's allergies indicates:   Allergen Reactions    Lisinopril      Swells lower legs.     Past Medical History:   Diagnosis Date    Asthma     Atrial fibrillation     COPD (chronic obstructive pulmonary disease)     Diabetes mellitus, new onset     Hypertension      No past surgical history on file.  No family history on file.  Social History     Tobacco Use    Smoking status: Former Smoker     Packs/day: 1.50     Years: 20.00     Pack years: 30.00     Quit date:      Years since quittin.8    Smokeless tobacco: Former User    Tobacco comment: quit in    Substance Use Topics    Alcohol use: No    Drug use: No     Review of Systems   Constitutional: Negative for fever.   HENT: Negative for sore throat.    Respiratory: Positive for shortness of breath.    Cardiovascular: Positive for chest pain.   Gastrointestinal: Negative for nausea.   Genitourinary: Negative for dysuria.   Musculoskeletal: Negative for back pain.   Skin: Negative for rash.   Neurological: Negative for weakness.   Hematological: Does not bruise/bleed easily.   All other systems " reviewed and are negative.      Physical Exam     Initial Vitals [11/11/20 0710]   BP Pulse Resp Temp SpO2   (!) 177/97 84 18 98.7 °F (37.1 °C) 95 %      MAP       --         Physical Exam    Nursing note and vitals reviewed.  Constitutional: He appears well-developed and well-nourished.   HENT:   Head: Normocephalic and atraumatic.   Eyes: EOM are normal. Pupils are equal, round, and reactive to light.   Cardiovascular: Normal rate and regular rhythm.   Pulmonary/Chest: Effort normal. No respiratory distress. He has wheezes.   Abdominal: He exhibits no distension.   Musculoskeletal: No tenderness or edema.   Neurological: He is alert and oriented to person, place, and time.   Skin: Skin is warm and dry.   Psychiatric: He has a normal mood and affect.         ED Course   Procedures  Labs Reviewed   CBC W/ AUTO DIFFERENTIAL   COMPREHENSIVE METABOLIC PANEL   TROPONIN I   B-TYPE NATRIURETIC PEPTIDE   POCT GLUCOSE MONITORING CONTINUOUS     EKG Readings: (Independently Interpreted)   Hr 80, sinus, nl axis/intervals, no froy, possible 1/2mm STD inferiorly II, III, AVF, no stemi.       Imaging Results    None             Additional MDM:   Heart Score:    History:          Moderately suspicious.  ECG:             Nonspecific repolarisation disturbance  Age:               >65 years  Risk factors: >= 3 risk factors or history of atherosclerotic disease  Troponin:       Less than or equal to normal limit  Final Score: 6      pt had resolution of symptoms with nebs. Trop is .009 detectable, and typically he is not elevated. Given his chest pain/comorbidities will observe to trend trop.      I have ordered a dose of lovenox and aspirin. Will consult cards                  Clinical Impression:       ICD-10-CM ICD-9-CM   1. Chest pain  R07.9 786.50                                               Malika Palafox MD  11/11/20 0955       Malika Palafox MD  11/11/20 8876

## 2020-11-11 NOTE — H&P
"[facility] Hospital Medicine  History & Physical    Patient Name: Micah Laurent Jr.  MRN: [id number]  Admission Date: 11/11/2020  Attending Physician: Neil Fairbanks MD   Primary Care Provider: Chelsy Torrez MD         Patient information was obtained from patient and ER records.     Subjective:     Principal Problem:COPD exacerbation    Chief Complaint:   Chief Complaint   Patient presents with    Shortness of Breath     "I've been sob since yesterday. I have COPD and I keep going to the bathroom." Pt is a diabetic.    Urinary Frequency        HPI:  Mr. Laurent is a 81 yo male with significant history for COPD, remote smoker quit 30 years ago, hx of atrial fibrillation? No on OAC, DMT2, and hypertension who present to ED for right chest pressure and sob that occurred at 6 pm yesterday. Used home nebulizer without relief. Denies fever or chills. Had productive cough in ED "white sputum" but currently denies cough.     EKG NSR with SR . First set troponin neg. O2 sat 95-97% on RA.   CXR clear.   6/20/20 2 D echo showed normal EF, normal diastolic, normal wall motion, and PH 48 mmHG.  Symptoms improved with steroid and duoneb in ED. Currently he denies any chest pain.       Past Medical History:   Diagnosis Date    Asthma     Atrial fibrillation     COPD (chronic obstructive pulmonary disease)     Diabetes mellitus, new onset     [place] (hard of hearing)     Hypertension        Past Surgical History:   Procedure Laterality Date    NO PAST SURGERIES  11/11/2020       Review of patient's allergies indicates:   Allergen Reactions    Lisinopril      Swells lower legs.       No current facility-administered medications on file prior to encounter.      Current Outpatient Medications on File Prior to Encounter   Medication Sig    albuterol (ACCUNEB) 1.25 mg/3 mL Nebu Take 3 mLs (1.25 mg total) by nebulization every 6 (six) hours as needed. Rescue    albuterol (PROVENTIL/VENTOLIN HFA) 90 " mcg/actuation inhaler Inhale 1-2 puffs into the lungs every 4 (four) hours as needed for Wheezing or Shortness of Breath. Rescue    aspirin (ECOTRIN) 81 MG EC tablet Take 81 mg by mouth once daily.    hydrALAZINE (APRESOLINE) 50 MG tablet Take 1 tablet (50 mg total) by mouth every 12 (twelve) hours. (Patient taking differently: Take 50 mg by mouth every 8 (eight) hours. )    metoprolol succinate (TOPROL-XL) 25 MG 24 hr tablet Take 25 mg by mouth 2 (two) times daily.    SITagliptin (JANUVIA) 50 MG Tab     fluticasone-salmeterol diskus inhaler 250-50 mcg Inhale 1 puff into the lungs 2 (two) times daily. Controller    ipratropium-albuterol (COMBIVENT)  mcg/actuation inhaler Inhale 1 puff into the lungs every 6 (six) hours as needed for Wheezing. Rescue     Family History     None        Tobacco Use    Smoking status: Former Smoker     Packs/day: 1.50     Years: 20.00     Pack years: 30.00     Types: Cigarettes     Quit date:      Years since quittin.8    Smokeless tobacco: Former User    Tobacco comment: quit in    Substance and Sexual Activity    Alcohol use: No    Drug use: No    Sexual activity: Not Currently     Review of Systems   Constitutional: Positive for activity change and fatigue. Negative for appetite change and fever.   HENT: Negative.    Eyes: Negative.    Respiratory: Positive for chest tightness and shortness of breath. Negative for wheezing.    Cardiovascular: Positive for chest pain. Negative for palpitations and leg swelling.   Gastrointestinal: Negative.    Endocrine: Negative.    Musculoskeletal: Negative.    Neurological: Negative.      Objective:     Vital Signs (Most Recent):  Temp: 98.6 °F (37 °C) (20 1230)  Pulse: 83 (20 1258)  Resp: 18 (20 1258)  BP: (!) 170/98 (20 1230)  SpO2: 97 % (20 1258) Vital Signs (24h Range):  Temp:  [98.6 °F (37 °C)-98.7 °F (37.1 °C)] 98.6 °F (37 °C)  Pulse:  [69-89] 83  Resp:  [18-23] 18  SpO2:  [94 %-97  %] 97 %  BP: (159-178)/() 170/98     Weight: 67.9 kg (149 lb 11.1 oz)  Body mass index is 24.91 kg/m².    Physical Exam  Constitutional:       Appearance: Normal appearance.   HENT:      Head: Atraumatic.      Nose: Nose normal.      Mouth/Throat:      Mouth: Mucous membranes are dry.   Neck:      Musculoskeletal: Normal range of motion.   Cardiovascular:      Rate and Rhythm: Normal rate.   Pulmonary:      Effort: No respiratory distress.      Breath sounds: No wheezing.      Comments: Poor air flow on exam   Abdominal:      General: Bowel sounds are normal.   Musculoskeletal: Normal range of motion.   Neurological:      General: No focal deficit present.      Mental Status: He is alert and oriented to person, place, and time.             Significant Labs: All pertinent labs within the past 24 hours have been reviewed.    Significant Imaging: I have reviewed and interpreted all pertinent imaging results/findings within the past 24 hours.    Assessment/Plan:     * COPD exacerbation  Patient presents to hospital for right side chest pressure and sob that occurred yesterday at 6 pm. Chest pain free and SOB improved after duoneb and steroid in ED  O2 sat 95-97% on RA  Cardiology following-agrees symptoms due to COPD  2 D echo  Trend troponin  Continue duoneb, no further abx for now, decrease steroid        Chest pain  See above #1.     Type 2 DM   HgbA1c 11.2 in June  Only on januvia at home?  Repeat hgbA1c  Start basal and prandial SSI    Stage 3a chronic kidney disease  Stable.       Anemia of chronic disease  Hgb consistent with previous labs. Denies history of or current melena, hematochezia, hemoptysis, or hematemesis.   Stable. Add iron studies        Essential hypertension  Uncontrolled. Resume home metoprolol, hydralazine. Titrate up in need      Atrial fibrillation  Currently NSR. I cannot find on records hx of a fib?   Need to further review records        VTE Risk Mitigation (From admission, onward)     Kalin Wall NP  Department of Hospital Medicine   [facility]

## 2020-11-11 NOTE — ASSESSMENT & PLAN NOTE
Patient presents to hospital for right side chest pressure and sob that occurred yesterday at 6 pm. Chest pain free and SOB improved after duoneb and steroid in ED  Continue duoneb, no further abx for now, decrease steroid

## 2020-11-11 NOTE — ASSESSMENT & PLAN NOTE
Seems to be presenting issue  CP reported in ER note but not on my interview  EKG with LVH, no ST changes, initial trop neg, doubt ACS  Check echo  Consider isch eval as outpatient when acute pulm issues subside

## 2020-11-11 NOTE — NURSING
Patient arrived to floor via wheelchair via transporter from ED. Patient transferred to bed independently. AAOx4. Patient was oriented to room, information on whiteboard, and medication regimen. Bed low, adequate lighting provided, side rails x2 up, call bell within reach. Admission assessment completed. VSS. Patient denied having any acute distress at this time. None observed. Will continue to monitor and follow treatment plan.

## 2020-11-11 NOTE — ED TRIAGE NOTES
80 y.o male presents to the ED with chief complaint of SOB and urinary frequency. Pt reports SOB x 1 day, hx of COPD, states his neb and inhaler are not working. Pt reports R sided chest pressure since this morning. Pt reports urinary frequency since yesterday. Pt denies CP, abdominal pain, N/V/D, and dysuria. AAOx4, NAD. Family member at bedside.     Past Medical History:   Diagnosis Date    Asthma     Atrial fibrillation     COPD (chronic obstructive pulmonary disease)     Diabetes mellitus, new onset     Hypertension

## 2020-11-11 NOTE — SUBJECTIVE & OBJECTIVE
Past Medical History:   Diagnosis Date    Asthma     Atrial fibrillation     COPD (chronic obstructive pulmonary disease)     Diabetes mellitus, new onset     [place] (hard of hearing)     Hypertension        Past Surgical History:   Procedure Laterality Date    NO PAST SURGERIES  2020       Review of patient's allergies indicates:   Allergen Reactions    Lisinopril      Swells lower legs.       No current facility-administered medications on file prior to encounter.      Current Outpatient Medications on File Prior to Encounter   Medication Sig    albuterol (ACCUNEB) 1.25 mg/3 mL Nebu Take 3 mLs (1.25 mg total) by nebulization every 6 (six) hours as needed. Rescue    albuterol (PROVENTIL/VENTOLIN HFA) 90 mcg/actuation inhaler Inhale 1-2 puffs into the lungs every 4 (four) hours as needed for Wheezing or Shortness of Breath. Rescue    aspirin (ECOTRIN) 81 MG EC tablet Take 81 mg by mouth once daily.    hydrALAZINE (APRESOLINE) 50 MG tablet Take 1 tablet (50 mg total) by mouth every 12 (twelve) hours. (Patient taking differently: Take 50 mg by mouth every 8 (eight) hours. )    metoprolol succinate (TOPROL-XL) 25 MG 24 hr tablet Take 25 mg by mouth 2 (two) times daily.    SITagliptin (JANUVIA) 50 MG Tab     fluticasone-salmeterol diskus inhaler 250-50 mcg Inhale 1 puff into the lungs 2 (two) times daily. Controller    ipratropium-albuterol (COMBIVENT)  mcg/actuation inhaler Inhale 1 puff into the lungs every 6 (six) hours as needed for Wheezing. Rescue     Family History     None        Tobacco Use    Smoking status: Former Smoker     Packs/day: 1.50     Years: 20.00     Pack years: 30.00     Types: Cigarettes     Quit date:      Years since quittin.8    Smokeless tobacco: Former User    Tobacco comment: quit in    Substance and Sexual Activity    Alcohol use: No    Drug use: No    Sexual activity: Not Currently     Review of Systems   Constitutional: Positive for activity  change and fatigue. Negative for appetite change and fever.   HENT: Negative.    Eyes: Negative.    Respiratory: Positive for chest tightness and shortness of breath. Negative for wheezing.    Cardiovascular: Positive for chest pain. Negative for palpitations and leg swelling.   Gastrointestinal: Negative.    Endocrine: Negative.    Musculoskeletal: Negative.    Neurological: Negative.      Objective:     Vital Signs (Most Recent):  Temp: 98.6 °F (37 °C) (11/11/20 1230)  Pulse: 83 (11/11/20 1258)  Resp: 18 (11/11/20 1258)  BP: (!) 170/98 (11/11/20 1230)  SpO2: 97 % (11/11/20 1258) Vital Signs (24h Range):  Temp:  [98.6 °F (37 °C)-98.7 °F (37.1 °C)] 98.6 °F (37 °C)  Pulse:  [69-89] 83  Resp:  [18-23] 18  SpO2:  [94 %-97 %] 97 %  BP: (159-178)/() 170/98     Weight: 67.9 kg (149 lb 11.1 oz)  Body mass index is 24.91 kg/m².    Physical Exam  Constitutional:       Appearance: Normal appearance.   HENT:      Head: Atraumatic.      Nose: Nose normal.      Mouth/Throat:      Mouth: Mucous membranes are dry.   Neck:      Musculoskeletal: Normal range of motion.   Cardiovascular:      Rate and Rhythm: Normal rate.   Pulmonary:      Effort: No respiratory distress.      Breath sounds: No wheezing.      Comments: Poor air flow on exam   Abdominal:      General: Bowel sounds are normal.   Musculoskeletal: Normal range of motion.   Neurological:      General: No focal deficit present.      Mental Status: He is alert and oriented to person, place, and time.             Significant Labs: All pertinent labs within the past 24 hours have been reviewed.    Significant Imaging: I have reviewed and interpreted all pertinent imaging results/findings within the past 24 hours.

## 2020-11-11 NOTE — HPI
80 y.o. male Past Medical History:  No date: Asthma  No date: Atrial fibrillation  No date: COPD (chronic obstructive pulmonary disease)  No date: Diabetes mellitus, new onset  No date: Hypertension   Presents c/o copd flare starting this morning at 5am associated with R chest pressure. Denies n/v/diarrhea/dysuria. Denies recent illness    The patient presents the emergency room with complaints of dyspnea similar to prior COPD exacerbations.  He denies any anginal complaints such as chest discomfort, tightness, burning, or squeezing.  He has had no prior cardiac history.  His EKG is unremarkable, his initial troponin negative.

## 2020-11-11 NOTE — ASSESSMENT & PLAN NOTE
Hgb consistent with previous labs. Denies history of or current melena, hematochezia, hemoptysis, or hematemesis.   Stable. Add iron studies

## 2020-11-12 VITALS
RESPIRATION RATE: 18 BRPM | HEIGHT: 65 IN | WEIGHT: 155 LBS | SYSTOLIC BLOOD PRESSURE: 154 MMHG | OXYGEN SATURATION: 96 % | TEMPERATURE: 98 F | DIASTOLIC BLOOD PRESSURE: 93 MMHG | BODY MASS INDEX: 25.83 KG/M2 | HEART RATE: 88 BPM

## 2020-11-12 LAB
ALBUMIN SERPL BCP-MCNC: 3.2 G/DL (ref 3.5–5.2)
ALP SERPL-CCNC: 62 U/L (ref 55–135)
ALT SERPL W/O P-5'-P-CCNC: 13 U/L (ref 10–44)
ANION GAP SERPL CALC-SCNC: 10 MMOL/L (ref 8–16)
AST SERPL-CCNC: 16 U/L (ref 10–40)
BASOPHILS # BLD AUTO: 0.01 K/UL (ref 0–0.2)
BASOPHILS NFR BLD: 0.1 % (ref 0–1.9)
BILIRUB SERPL-MCNC: 0.4 MG/DL (ref 0.1–1)
BUN SERPL-MCNC: 30 MG/DL (ref 8–23)
CALCIUM SERPL-MCNC: 8.8 MG/DL (ref 8.7–10.5)
CHLORIDE SERPL-SCNC: 102 MMOL/L (ref 95–110)
CO2 SERPL-SCNC: 30 MMOL/L (ref 23–29)
CREAT SERPL-MCNC: 1.4 MG/DL (ref 0.5–1.4)
DIFFERENTIAL METHOD: ABNORMAL
EOSINOPHIL # BLD AUTO: 0 K/UL (ref 0–0.5)
EOSINOPHIL NFR BLD: 0 % (ref 0–8)
ERYTHROCYTE [DISTWIDTH] IN BLOOD BY AUTOMATED COUNT: 15.3 % (ref 11.5–14.5)
EST. GFR  (AFRICAN AMERICAN): 54 ML/MIN/1.73 M^2
EST. GFR  (NON AFRICAN AMERICAN): 47 ML/MIN/1.73 M^2
ESTIMATED AVG GLUCOSE: 108 MG/DL (ref 68–131)
GLUCOSE SERPL-MCNC: 157 MG/DL (ref 70–110)
HBA1C MFR BLD HPLC: 5.4 % (ref 4–5.6)
HCT VFR BLD AUTO: 33.2 % (ref 40–54)
HGB BLD-MCNC: 11.2 G/DL (ref 14–18)
IMM GRANULOCYTES # BLD AUTO: 0.11 K/UL (ref 0–0.04)
IMM GRANULOCYTES NFR BLD AUTO: 1.5 % (ref 0–0.5)
LYMPHOCYTES # BLD AUTO: 0.5 K/UL (ref 1–4.8)
LYMPHOCYTES NFR BLD: 7 % (ref 18–48)
MCH RBC QN AUTO: 27.9 PG (ref 27–31)
MCHC RBC AUTO-ENTMCNC: 33.7 G/DL (ref 32–36)
MCV RBC AUTO: 83 FL (ref 82–98)
MONOCYTES # BLD AUTO: 0.2 K/UL (ref 0.3–1)
MONOCYTES NFR BLD: 2.9 % (ref 4–15)
NEUTROPHILS # BLD AUTO: 6.3 K/UL (ref 1.8–7.7)
NEUTROPHILS NFR BLD: 88.5 % (ref 38–73)
NRBC BLD-RTO: 0 /100 WBC
PLATELET # BLD AUTO: 143 K/UL (ref 150–350)
PMV BLD AUTO: 10.7 FL (ref 9.2–12.9)
POCT GLUCOSE: 152 MG/DL (ref 70–110)
POTASSIUM SERPL-SCNC: 4 MMOL/L (ref 3.5–5.1)
PROT SERPL-MCNC: 6.7 G/DL (ref 6–8.4)
RBC # BLD AUTO: 4.02 M/UL (ref 4.6–6.2)
SODIUM SERPL-SCNC: 142 MMOL/L (ref 136–145)
TROPONIN I SERPL DL<=0.01 NG/ML-MCNC: 0.01 NG/ML (ref 0–0.03)
TROPONIN I SERPL DL<=0.01 NG/ML-MCNC: <0.006 NG/ML (ref 0–0.03)
WBC # BLD AUTO: 7.15 K/UL (ref 3.9–12.7)

## 2020-11-12 PROCEDURE — 85025 COMPLETE CBC W/AUTO DIFF WBC: CPT

## 2020-11-12 PROCEDURE — G0378 HOSPITAL OBSERVATION PER HR: HCPCS

## 2020-11-12 PROCEDURE — 36415 COLL VENOUS BLD VENIPUNCTURE: CPT

## 2020-11-12 PROCEDURE — 99225 PR SUBSEQUENT OBSERVATION CARE,LEVEL II: ICD-10-PCS | Mod: ,,, | Performed by: INTERNAL MEDICINE

## 2020-11-12 PROCEDURE — 94761 N-INVAS EAR/PLS OXIMETRY MLT: CPT

## 2020-11-12 PROCEDURE — 25000242 PHARM REV CODE 250 ALT 637 W/ HCPCS: Performed by: NURSE PRACTITIONER

## 2020-11-12 PROCEDURE — 94640 AIRWAY INHALATION TREATMENT: CPT

## 2020-11-12 PROCEDURE — 25000003 PHARM REV CODE 250: Performed by: NURSE PRACTITIONER

## 2020-11-12 PROCEDURE — 63600175 PHARM REV CODE 636 W HCPCS: Performed by: NURSE PRACTITIONER

## 2020-11-12 PROCEDURE — 99225 PR SUBSEQUENT OBSERVATION CARE,LEVEL II: CPT | Mod: ,,, | Performed by: INTERNAL MEDICINE

## 2020-11-12 PROCEDURE — 80053 COMPREHEN METABOLIC PANEL: CPT

## 2020-11-12 PROCEDURE — 96372 THER/PROPH/DIAG INJ SC/IM: CPT

## 2020-11-12 PROCEDURE — 84484 ASSAY OF TROPONIN QUANT: CPT

## 2020-11-12 PROCEDURE — 96376 TX/PRO/DX INJ SAME DRUG ADON: CPT

## 2020-11-12 RX ORDER — ATORVASTATIN CALCIUM 20 MG/1
20 TABLET, FILM COATED ORAL NIGHTLY
Qty: 90 TABLET | Refills: 3 | Status: SHIPPED | OUTPATIENT
Start: 2020-11-12 | End: 2020-12-18

## 2020-11-12 RX ORDER — ALBUTEROL SULFATE 1.25 MG/3ML
1.25 SOLUTION RESPIRATORY (INHALATION)
Qty: 1 BOX | Refills: 2 | Status: SHIPPED | OUTPATIENT
Start: 2020-11-12 | End: 2021-04-03

## 2020-11-12 RX ORDER — HYDRALAZINE HYDROCHLORIDE 50 MG/1
50 TABLET, FILM COATED ORAL EVERY 8 HOURS
Start: 2020-11-12

## 2020-11-12 RX ORDER — IPRATROPIUM BROMIDE 0.5 MG/2.5ML
500 SOLUTION RESPIRATORY (INHALATION)
Qty: 1 BOX | Refills: 0 | Status: ON HOLD | OUTPATIENT
Start: 2020-11-12 | End: 2021-04-11 | Stop reason: SDUPTHER

## 2020-11-12 RX ORDER — ATORVASTATIN CALCIUM 10 MG/1
20 TABLET, FILM COATED ORAL NIGHTLY
Status: DISCONTINUED | OUTPATIENT
Start: 2020-11-12 | End: 2020-11-12 | Stop reason: HOSPADM

## 2020-11-12 RX ORDER — IPRATROPIUM BROMIDE AND ALBUTEROL SULFATE 2.5; .5 MG/3ML; MG/3ML
3 SOLUTION RESPIRATORY (INHALATION) EVERY 4 HOURS PRN
Status: DISCONTINUED | OUTPATIENT
Start: 2020-11-12 | End: 2020-11-12 | Stop reason: HOSPADM

## 2020-11-12 RX ORDER — PREDNISONE 20 MG/1
40 TABLET ORAL DAILY
Qty: 10 TABLET | Refills: 0 | Status: SHIPPED | OUTPATIENT
Start: 2020-11-12 | End: 2020-11-17

## 2020-11-12 RX ADMIN — IPRATROPIUM BROMIDE AND ALBUTEROL SULFATE 3 ML: .5; 3 SOLUTION RESPIRATORY (INHALATION) at 08:11

## 2020-11-12 RX ADMIN — IPRATROPIUM BROMIDE AND ALBUTEROL SULFATE 3 ML: .5; 3 SOLUTION RESPIRATORY (INHALATION) at 03:11

## 2020-11-12 RX ADMIN — METOPROLOL SUCCINATE 25 MG: 25 TABLET, EXTENDED RELEASE ORAL at 09:11

## 2020-11-12 RX ADMIN — ASPIRIN 81 MG: 81 TABLET, COATED ORAL at 09:11

## 2020-11-12 RX ADMIN — HYDRALAZINE HYDROCHLORIDE 50 MG: 25 TABLET, FILM COATED ORAL at 05:11

## 2020-11-12 RX ADMIN — METHYLPREDNISOLONE SODIUM SUCCINATE 60 MG: 40 INJECTION, POWDER, FOR SOLUTION INTRAMUSCULAR; INTRAVENOUS at 05:11

## 2020-11-12 RX ADMIN — INSULIN ASPART 3 UNITS: 100 INJECTION, SOLUTION INTRAVENOUS; SUBCUTANEOUS at 08:11

## 2020-11-12 NOTE — DISCHARGE SUMMARY
"[facility]  Discharge Summary      Patient Name: Micah Laurent Jr.  MRN: [id number]  Admission Date: 11/11/2020  Hospital Length of Stay: 0 days  Discharge Date and Time:  11/12/2020 10:15 AM  Attending Physician: Neil Fairbanks MD   Discharging Provider: Giovanna Wall NP  Primary Care Provider: Chelsy Torrez MD      HPI:    Mr. Laurent is a 79 yo male with significant history for COPD, remote smoker quit 30 years ago, hx of atrial fibrillation? No on OAC, DMT2, and hypertension who present to ED for right chest pressure and sob that occurred at 6 pm yesterday. Used home nebulizer without relief. Denies fever or chills. Had productive cough in ED "white sputum" but currently denies cough.     EKG NSR with SR . First set troponin neg. O2 sat 95-97% on RA.   CXR clear.   6/20/20 2 D echo showed normal EF, normal diastolic, normal wall motion, and PH 48 mmHG.  Symptoms improved with steroid and duoneb in ED. Currently he denies any chest pain.       * No surgery found *      Hospital Course:   Mr. Laurent is a 79 yo male who placed in observation for COPD exacerbation. Cardiology consulted. Symptoms significantly improve with IV steroid and duoneb. ACS ruled out. HgbA1c 5.4. Instruct patient to continue to log blood sugars and bring log to next PCP visit. Started on low dose statin. Continue prednisone 40 mg daily x 5 days. No chest pain during observation stay and breathing comfortable as baseline on discharge. Patient stable for discharge.       Consults:   Consults (From admission, onward)        Status Ordering Provider     Inpatient consult to Cardiology  Once     Provider:  Giuseppe Barnett MD    Completed LARY CADE     Inpatient consult to Social Work  Once     Provider:  (Not yet assigned)    Acknowledged GIUSEPPE BARNETT          Type 2 diabetes mellitus, without long-term current use of insulin  Lab Results   Component Value Date    HGBA1C 11.2 " (H) 06/12/2020   On home januvia only?  Start basal prandial and adjust accordingly          Final Active Diagnoses:    Diagnosis Date Noted POA    PRINCIPAL PROBLEM:  COPD exacerbation [J44.1] 09/18/2019 Yes    Chest pain [R07.9] 11/11/2020 Yes    Stage 3a chronic kidney disease [N18.31] 11/11/2020 Yes    Type 2 diabetes mellitus, without long-term current use of insulin [E11.9] 11/11/2020 Yes    Anemia of chronic disease [D63.8] 09/18/2019 Yes    Essential hypertension [I10]  Yes    Atrial fibrillation [I48.91]  Yes    HTN (hypertension), malignant [I10] 07/24/2013 Yes      Problems Resolved During this Admission:       Discharged Condition: good    Disposition: Home or Self Care    Follow Up:  Follow-up Information     Chelsy Torrez MD.    Specialty: Endocrinology  Why: call as needed for follow up with PCP post hospital discharge.  Contact information:  [address] [place] [address] [address]  [facility] [id number] [phone]             Giuseppe Nicholson MD On 12/1/2020.    Specialties: Cardiology, INTERVENTIONAL CARDIOLOGY  Why: Appointment scheduled for December 1st at 4:40pm  Contact information:  [address] [facility] [address]  [address]  [facility] [address]  [phone]                 Patient Instructions:      Diet diabetic     Diet Cardiac     Notify your health care provider if you experience any of the following:  temperature >100.4     Notify your health care provider if you experience any of the following:  difficulty breathing or increased cough     Notify your health care provider if you experience any of the following:  increased confusion or weakness     Activity as tolerated       Significant Diagnostic Studies: Labs: All labs within the past 24 hours have been reviewed    Pending Diagnostic Studies:     Procedure Component Value Units Date/Time    Troponin I [[id number]]     Order Status: Sent Lab Status: No result     Specimen: Blood          Medications:  Reconciled Home Medications:      Medication List       START taking these medications    atorvastatin 20 MG tablet  Commonly known as: LIPITOR  Take 1 tablet (20 mg total) by mouth every evening.     ipratropium 0.02 % nebulizer solution  Commonly known as: ATROVENT  Take 2.5 mLs (500 mcg total) by nebulization every 4 (four) hours while awake. Rescue     predniSONE 20 MG tablet  Commonly known as: DELTASONE  Take 2 tablets (40 mg total) by mouth once daily. for 5 days        CHANGE how you take these medications    * albuterol 90 mcg/actuation inhaler  Commonly known as: PROVENTIL/VENTOLIN HFA  Inhale 1-2 puffs into the lungs every 4 (four) hours as needed for Wheezing or Shortness of Breath. Rescue  What changed: Another medication with the same name was changed. Make sure you understand how and when to take each.     * albuterol 1.25 mg/3 mL Nebu  Commonly known as: ACCUNEB  Take 3 mLs (1.25 mg total) by nebulization every 4 (four) hours while awake. Rescue  What changed:   · when to take this  · reasons to take this         * This list has 2 medication(s) that are the same as other medications prescribed for you. Read the directions carefully, and ask your doctor or other care provider to review them with you.            CONTINUE taking these medications    aspirin 81 MG EC tablet  Commonly known as: ECOTRIN  Take 81 mg by mouth once daily.     fluticasone-salmeterol 250-50 mcg/dose 250-50 mcg/dose diskus inhaler  Commonly known as: ADVAIR  Inhale 1 puff into the lungs 2 (two) times daily. Controller     hydrALAZINE 50 MG tablet  Commonly known as: APRESOLINE  Take 1 tablet (50 mg total) by mouth every 8 (eight) hours.     ipratropium-albuteroL  mcg/actuation inhaler  Commonly known as: COMBIVENT  Inhale 1 puff into the lungs every 6 (six) hours as needed for Wheezing. Rescue     metoprolol succinate 25 MG 24 hr tablet  Commonly known as: TOPROL-XL  Take 25 mg by mouth 2 (two) times daily.     SITagliptin 50 MG Tab  Commonly known as: EVIE             Indwelling Lines/Drains at time of discharge:   Lines/Drains/Airways     None                 Time spent on the discharge of patient: 30 minutes  Patient was seen and examined on the date of discharge and determined to be suitable for discharge.         Giovanna Wall NP  Department of Hospital Medicine  [facility]

## 2020-11-12 NOTE — PLAN OF CARE
"   11/12/20 1009   Final Note   Assessment Type Final Discharge Note   Anticipated Discharge Disposition Home   Hospital Follow Up  Appt(s) scheduled? Yes   Discharge plans and expectations educations in teach back method with documentation complete? Yes   Right Care Referral Info   Post Acute Recommendation No Care   Post-Acute Status   Post-Acute Authorization Other   Other Status No Post-Acute Service Needs   Pts nurse Yoselin notified that the pt can d/c from CM standpoint.    EDUCATION:  provided with educational information on COPD exacerbation.  Information reviewed and placed in :My Healthcare Packet" to be brought home to use as resource after discharge.  Information included:  problems and symptoms to look for and call the doctor if experiencing, and symptoms that may indicate a medical emergency: CALL 911.      All questions answered.  Teach back method used.    Patient stated, " will monitor for SOB/ chest tightness ".       "

## 2020-11-12 NOTE — NURSING
Pt alert able to make needs known,sam meds well,no s/s adverse reaction noted,reposition self q 2hrs,pain controlled by prn pain medication,POC explained,iv removed and pressure dressing applied,f/u appt and d/c instructions discussed and given to pt ,pt now waiting on pt transport.remains free from falls and pressure injuries,safety maintained,continue monitoring.

## 2020-11-12 NOTE — PROGRESS NOTES
WRITTEN HEALTHCARE DISCHARGE INFORMATION      Things that YOU are RESPONSIBLE for to Manage Your Care At Home:     1. Getting your prescriptions filled.  2. Taking you medications as directed. DO NOT MISS ANY DOSES!  3. Going to your follow-up doctor appointments. This is important because it allows the doctor to monitor your progress and to determine if any changes need to be made to your treatment plan.     If you are unable to make your follow up appointments, please call the number listed and reschedule this appointment.      ____________HELP AT HOME____________________     Experiencing any SIGNS or SYMPTOMS: YOU CAN     Schedule a same day appopintment with your Primary Care Doctor or  you can call [facility] On [facility] Care Line for 24/7 assistance at [phone]     If you are experience any signs or symptoms that have become severe, Call 911 and come to your nearest Emergency Room.     Thank you for choosing [facility] and allowing us to care for you.   From your care management team:      You should receive a call from [facility] within 48-72 hours to help manage your care after discharge. Please try to make sure that you answer your phone for this important phone call.    Follow-up Information     Chelsy Torrez MD.    Specialty: Endocrinology  Why: call as needed for follow up with PCP post hospital discharge.  Contact information:  [address] [facility] [address]  [place] [address]  [phone]             Giuseppe Nicholson MD On 12/1/2020.    Specialties: Cardiology, INTERVENTIONAL CARDIOLOGY  Why: Appointment scheduled for December 1st at 4:40pm  Contact information:  [address] [facility] [address]  [address]  [place] [address]  [phone]

## 2020-11-12 NOTE — HOSPITAL COURSE
11/11/20 adm with COPD exac    Interval Hx: feeling better, no cp/sob/palps    Tele: SR 90s, PSVT (personally reviewed and interpreted)

## 2020-11-12 NOTE — PLAN OF CARE
Patient is alert and oriented x 4.  Patient remained free from injury throughout shift.  Call light within reach.  Patient given PRN breathing treatment once.      Problem: Fall Injury Risk  Goal: Absence of Fall and Fall-Related Injury  Outcome: Ongoing, Not Progressing     Problem: Adult Inpatient Plan of Care  Goal: Plan of Care Review  Outcome: Ongoing, Not Progressing     Problem: Diabetes Comorbidity  Goal: Blood Glucose Level Within Desired Range  Outcome: Ongoing, Not Progressing

## 2020-11-12 NOTE — SUBJECTIVE & OBJECTIVE
Past Medical History:   Diagnosis Date    Asthma     Atrial fibrillation     COPD (chronic obstructive pulmonary disease)     Diabetes mellitus, new onset     [place] (hard of hearing)     Hypertension        Past Surgical History:   Procedure Laterality Date    NO PAST SURGERIES  2020       Review of patient's allergies indicates:   Allergen Reactions    Lisinopril      Swells lower legs.       No current facility-administered medications on file prior to encounter.      Current Outpatient Medications on File Prior to Encounter   Medication Sig    albuterol (ACCUNEB) 1.25 mg/3 mL Nebu Take 3 mLs (1.25 mg total) by nebulization every 6 (six) hours as needed. Rescue    albuterol (PROVENTIL/VENTOLIN HFA) 90 mcg/actuation inhaler Inhale 1-2 puffs into the lungs every 4 (four) hours as needed for Wheezing or Shortness of Breath. Rescue    aspirin (ECOTRIN) 81 MG EC tablet Take 81 mg by mouth once daily.    hydrALAZINE (APRESOLINE) 50 MG tablet Take 1 tablet (50 mg total) by mouth every 12 (twelve) hours. (Patient taking differently: Take 50 mg by mouth every 8 (eight) hours. )    metoprolol succinate (TOPROL-XL) 25 MG 24 hr tablet Take 25 mg by mouth 2 (two) times daily.    SITagliptin (JANUVIA) 50 MG Tab     fluticasone-salmeterol diskus inhaler 250-50 mcg Inhale 1 puff into the lungs 2 (two) times daily. Controller    ipratropium-albuterol (COMBIVENT)  mcg/actuation inhaler Inhale 1 puff into the lungs every 6 (six) hours as needed for Wheezing. Rescue     Family History     None        Tobacco Use    Smoking status: Former Smoker     Packs/day: 1.50     Years: 20.00     Pack years: 30.00     Types: Cigarettes     Quit date:      Years since quittin.8    Smokeless tobacco: Former User    Tobacco comment: quit in    Substance and Sexual Activity    Alcohol use: No    Drug use: No    Sexual activity: Not Currently     Review of Systems   Gastrointestinal: Negative for melena.    Genitourinary: Negative for hematuria.     Objective:     Vital Signs (Most Recent):  Temp: 98.4 °F (36.9 °C) (11/12/20 0736)  Pulse: 88 (11/12/20 0803)  Resp: 18 (11/12/20 0803)  BP: (!) 154/93 (11/12/20 0736)  SpO2: 96 % (11/12/20 0803) Vital Signs (24h Range):  Temp:  [98.1 °F (36.7 °C)-98.6 °F (37 °C)] 98.4 °F (36.9 °C)  Pulse:  [74-92] 88  Resp:  [16-20] 18  SpO2:  [94 %-98 %] 96 %  BP: (154-195)/() 154/93     Weight: 70.3 kg (155 lb)  Body mass index is 25.79 kg/m².    SpO2: 96 %  O2 Device (Oxygen Therapy): room air      Intake/Output Summary (Last 24 hours) at 11/12/2020 0917  Last data filed at 11/12/2020 0820  Gross per 24 hour   Intake 390 ml   Output 840 ml   Net -450 ml       Lines/Drains/Airways     Peripheral Intravenous Line                 Peripheral IV - Single Lumen 11/11/20 0734 18 G Right Antecubital 1 day              Exam unchanged vs 11/11/20  Physical Exam   Constitutional: He is oriented to person, place, and time. He appears well-developed and well-nourished. No distress.   HENT:   Head: Normocephalic and atraumatic.   Eyes: Pupils are equal, round, and reactive to light. Conjunctivae and EOM are normal. No scleral icterus.   Neck: Normal range of motion. Neck supple. No JVD present. No tracheal deviation present. No thyromegaly present.   Cardiovascular: Normal rate, regular rhythm, S1 normal and S2 normal. Exam reveals no gallop and no friction rub.   No murmur heard.  Pulmonary/Chest: Effort normal and breath sounds normal. No respiratory distress. He has no wheezes. He has no rales. He exhibits no tenderness.   Abdominal: Soft. He exhibits no distension.   Musculoskeletal: Normal range of motion.         General: No edema.   Neurological: He is alert and oriented to person, place, and time. He has normal strength. No cranial nerve deficit.   Skin: Skin is warm and dry. No rash noted. He is not diaphoretic.   Psychiatric: He has a normal mood and affect. His behavior is normal.        Current Medications:   albuterol-ipratropium  3 mL Nebulization Q4H WAKE    aspirin  81 mg Oral Daily    enoxaparin  40 mg Subcutaneous Q24H    hydrALAZINE  50 mg Oral Q8H    insulin aspart U-100  3 Units Subcutaneous TIDWM    insulin detemir U-100  10 Units Subcutaneous QHS    methylPREDNISolone sodium succinate  60 mg Intravenous Q8H    metoprolol succinate  25 mg Oral BID           Laboratory (all labs reviewed):  CBC:  Recent Labs   Lab 10/20/20  1001 10/26/20  1729 11/11/20  0733 11/11/20  1233 11/12/20  0500   WBC 4.68 7.36 6.57 7.34 7.15   Hemoglobin 11.5 L 11.8 L 11.6 L 12.1 L 11.2 L   Hematocrit 36.1 L 37.8 L 35.9 L 38.4 L 33.2 L   Platelets 196 190 159 162 143 L       CHEMISTRIES:  Recent Labs   Lab 05/05/19  2218  08/28/19 1958  12/01/19  2110 12/14/19  2230  10/20/20  1001 10/26/20  1729 11/11/20  0733 11/11/20  1113 11/12/20  0500   Glucose 91   < > 99   < > 88 184 H   < > 104 101 107 209 H 157 H   Sodium 142   < > 145   < > 143 142   < > 141 144 141 139 142   Potassium 3.7   < > 3.8   < > 3.8 4.0   < > 4.0 4.0 3.8 4.0 4.0   BUN 16   < > 16   < > 18 21   < > 16 22 17 19 30 H   Creatinine 1.2   < > 1.3   < > 1.3 1.3   < > 1.3 1.3 1.2 1.2 1.4   eGFR if African American >60   < > 60   < > 60 60   < > 59 A 59 A >60 >60 54 A   eGFR if non  57 A   < > 52 A   < > 52 A 52 A   < > 51 A 51 A 56 A 56 A 47 A   Calcium 9.3   < > 9.2   < > 9.2 8.9   < > 9.0 8.4 L 9.1 8.4 L 8.8   Magnesium 1.8  --  1.8  --  1.9 1.9  --   --   --   --   --   --     < > = values in this interval not displayed.       CARDIAC BIOMARKERS:  Recent Labs   Lab 06/22/20  0539  11/11/20  0733 11/11/20  1113 11/11/20  1724 11/11/20  2357 11/12/20  0500   CPK 62  --   --   --   --   --   --    CPK MB 0.9  --   --   --   --   --   --    Troponin I <0.006   < > 0.009 0.013 <0.006 <0.006 0.010    < > = values in this interval not displayed.       COAGS:  Recent Labs   Lab 05/05/19  2218 10/02/19  1123 06/19/20  1757  10/04/20  1827   INR 1.0 1.0 0.9 0.9       LIPIDS/LFTS:  Recent Labs   Lab 10/20/20  1001 10/26/20  1729 11/11/20  0733 11/11/20  1113 11/12/20  0500   Cholesterol  --   --  199  --   --    Triglycerides  --   --  62  --   --    HDL  --   --  62  --   --    LDL Cholesterol  --   --  124.6  --   --    Non-HDL Cholesterol  --   --  137  --   --    AST 38 25 23 21 16   ALT 35 26 20 19 13       BNP:  Recent Labs   Lab 07/04/20  0001 10/04/20  1827 10/20/20  1001 10/26/20  1729 11/11/20  0733    H 203 H 91 183 H 219 H       TSH:  Recent Labs   Lab 03/02/20  0453 06/19/20  1755 11/11/20  0739   TSH 0.197 L 0.248 L 0.769       Free T4:  Recent Labs   Lab 03/02/20  0453 06/19/20  1755   Free T4 1.04 1.11       Diagnostic Results:  ECG (personally reviewed and interpreted tracing(s)):  11/11/20 0722 SR 80, PACs, LVH    Chest X-Ray (personally reviewed and interpreted image(s)): 11/11/20 NAD, flattened diaph    Echo: 11/11/20 (images personally reviewed and interpreted)  · The left ventricle is normal in size with normal systolic function. The estimated ejection fraction is 60%.  · There is mild left ventricular concentric hypertrophy.  · Normal right ventricular size with normal right ventricular systolic function.  · The estimated PA systolic pressure is 25 mmHg.

## 2020-11-12 NOTE — ASSESSMENT & PLAN NOTE
Seems to be presenting issue  CP reported in ER note but not on my interview  EKG with LVH, no ST changes, trop neg, echo normal, doubt ACS  Consider isch eval as outpatient when acute pulm issues subside

## 2020-11-12 NOTE — HOSPITAL COURSE
Mr. Laurent is a 79 yo male who placed in observation for COPD exacerbation. Cardiology consulted. Symptoms significantly improve with IV steroid and duoneb. ACS ruled out. HgbA1c 5.4. Instruct patient to continue to log blood sugars and bring log to next PCP visit. Started on low dose statin. Continue prednisone 40 mg daily x 5 days. No chest pain during observation stay and breathing comfortable as baseline on discharge. Patient stable for discharge.

## 2020-11-16 LAB
BACTERIA BLD CULT: NORMAL
BACTERIA BLD CULT: NORMAL

## 2020-12-05 ENCOUNTER — HOSPITAL ENCOUNTER (EMERGENCY)
Facility: HOSPITAL | Age: 80
Discharge: HOME | End: 2020-12-05
Attending: EMERGENCY MEDICINE
Payer: MEDICARE

## 2020-12-05 VITALS
BODY MASS INDEX: 25.83 KG/M2 | WEIGHT: 155 LBS | SYSTOLIC BLOOD PRESSURE: 195 MMHG | HEIGHT: 65 IN | OXYGEN SATURATION: 95 % | TEMPERATURE: 98 F | DIASTOLIC BLOOD PRESSURE: 102 MMHG | HEART RATE: 92 BPM | RESPIRATION RATE: 18 BRPM

## 2020-12-05 DIAGNOSIS — J44.1 COPD WITH ACUTE EXACERBATION: Primary | ICD-10-CM

## 2020-12-05 DIAGNOSIS — R06.02 SHORTNESS OF BREATH: ICD-10-CM

## 2020-12-05 LAB
ALBUMIN SERPL BCP-MCNC: 3.6 G/DL (ref 3.5–5.2)
ALP SERPL-CCNC: 77 U/L (ref 55–135)
ALT SERPL W/O P-5'-P-CCNC: 24 U/L (ref 10–44)
ANION GAP SERPL CALC-SCNC: 5 MMOL/L (ref 8–16)
AST SERPL-CCNC: 26 U/L (ref 10–40)
BASOPHILS # BLD AUTO: 0 K/UL (ref 0–0.2)
BASOPHILS NFR BLD: 0 % (ref 0–1.9)
BILIRUB SERPL-MCNC: 0.4 MG/DL (ref 0.1–1)
BNP SERPL-MCNC: 176 PG/ML (ref 0–99)
BUN SERPL-MCNC: 17 MG/DL (ref 8–23)
CALCIUM SERPL-MCNC: 9 MG/DL (ref 8.7–10.5)
CHLORIDE SERPL-SCNC: 103 MMOL/L (ref 95–110)
CO2 SERPL-SCNC: 32 MMOL/L (ref 23–29)
CREAT SERPL-MCNC: 1.3 MG/DL (ref 0.5–1.4)
CTP QC/QA: YES
DIFFERENTIAL METHOD: ABNORMAL
EOSINOPHIL # BLD AUTO: 0 K/UL (ref 0–0.5)
EOSINOPHIL NFR BLD: 0 % (ref 0–8)
ERYTHROCYTE [DISTWIDTH] IN BLOOD BY AUTOMATED COUNT: 14.2 % (ref 11.5–14.5)
EST. GFR  (AFRICAN AMERICAN): 59 ML/MIN/1.73 M^2
EST. GFR  (NON AFRICAN AMERICAN): 51 ML/MIN/1.73 M^2
GLUCOSE SERPL-MCNC: 116 MG/DL (ref 70–110)
HCT VFR BLD AUTO: 36.2 % (ref 40–54)
HGB BLD-MCNC: 11.4 G/DL (ref 14–18)
IMM GRANULOCYTES # BLD AUTO: 0.03 K/UL (ref 0–0.04)
IMM GRANULOCYTES NFR BLD AUTO: 0.6 % (ref 0–0.5)
LYMPHOCYTES # BLD AUTO: 0.9 K/UL (ref 1–4.8)
LYMPHOCYTES NFR BLD: 18.1 % (ref 18–48)
MCH RBC QN AUTO: 27.2 PG (ref 27–31)
MCHC RBC AUTO-ENTMCNC: 31.5 G/DL (ref 32–36)
MCV RBC AUTO: 86 FL (ref 82–98)
MONOCYTES # BLD AUTO: 0.3 K/UL (ref 0.3–1)
MONOCYTES NFR BLD: 5.5 % (ref 4–15)
NEUTROPHILS # BLD AUTO: 3.6 K/UL (ref 1.8–7.7)
NEUTROPHILS NFR BLD: 75.8 % (ref 38–73)
NRBC BLD-RTO: 0 /100 WBC
PLATELET # BLD AUTO: 209 K/UL (ref 150–350)
PMV BLD AUTO: 10.8 FL (ref 9.2–12.9)
POTASSIUM SERPL-SCNC: 4.3 MMOL/L (ref 3.5–5.1)
PROT SERPL-MCNC: 8 G/DL (ref 6–8.4)
RBC # BLD AUTO: 4.19 M/UL (ref 4.6–6.2)
SARS-COV-2 RDRP RESP QL NAA+PROBE: NEGATIVE
SODIUM SERPL-SCNC: 140 MMOL/L (ref 136–145)
TROPONIN I SERPL DL<=0.01 NG/ML-MCNC: 0.01 NG/ML (ref 0–0.03)
WBC # BLD AUTO: 4.75 K/UL (ref 3.9–12.7)

## 2020-12-05 PROCEDURE — 93010 ELECTROCARDIOGRAM REPORT: CPT | Mod: ,,, | Performed by: INTERNAL MEDICINE

## 2020-12-05 PROCEDURE — 25000003 PHARM REV CODE 250: Performed by: EMERGENCY MEDICINE

## 2020-12-05 PROCEDURE — 93010 EKG 12-LEAD: ICD-10-PCS | Mod: ,,, | Performed by: INTERNAL MEDICINE

## 2020-12-05 PROCEDURE — 80053 COMPREHEN METABOLIC PANEL: CPT

## 2020-12-05 PROCEDURE — 99285 EMERGENCY DEPT VISIT HI MDM: CPT | Mod: 25

## 2020-12-05 PROCEDURE — U0002 COVID-19 LAB TEST NON-CDC: HCPCS | Performed by: EMERGENCY MEDICINE

## 2020-12-05 PROCEDURE — 94640 AIRWAY INHALATION TREATMENT: CPT

## 2020-12-05 PROCEDURE — 84484 ASSAY OF TROPONIN QUANT: CPT

## 2020-12-05 PROCEDURE — 93005 ELECTROCARDIOGRAM TRACING: CPT

## 2020-12-05 PROCEDURE — 85025 COMPLETE CBC W/AUTO DIFF WBC: CPT

## 2020-12-05 PROCEDURE — 83880 ASSAY OF NATRIURETIC PEPTIDE: CPT

## 2020-12-05 PROCEDURE — 25000242 PHARM REV CODE 250 ALT 637 W/ HCPCS: Performed by: EMERGENCY MEDICINE

## 2020-12-05 RX ORDER — CLONIDINE HYDROCHLORIDE 0.1 MG/1
0.1 TABLET ORAL
Status: COMPLETED | OUTPATIENT
Start: 2020-12-05 | End: 2020-12-05

## 2020-12-05 RX ORDER — IPRATROPIUM BROMIDE AND ALBUTEROL SULFATE 2.5; .5 MG/3ML; MG/3ML
3 SOLUTION RESPIRATORY (INHALATION)
Status: COMPLETED | OUTPATIENT
Start: 2020-12-05 | End: 2020-12-05

## 2020-12-05 RX ORDER — ALBUTEROL SULFATE 2.5 MG/.5ML
5 SOLUTION RESPIRATORY (INHALATION)
Status: COMPLETED | OUTPATIENT
Start: 2020-12-05 | End: 2020-12-05

## 2020-12-05 RX ADMIN — IPRATROPIUM BROMIDE AND ALBUTEROL SULFATE 3 ML: .5; 3 SOLUTION RESPIRATORY (INHALATION) at 04:12

## 2020-12-05 RX ADMIN — ALBUTEROL SULFATE 5 MG: 2.5 SOLUTION RESPIRATORY (INHALATION) at 04:12

## 2020-12-05 RX ADMIN — CLONIDINE HYDROCHLORIDE 0.1 MG: 0.1 TABLET ORAL at 04:12

## 2020-12-05 NOTE — ED NOTES
clonidine 0.1mg given as ordered  Blood pressure taken 195/102 MD aware. No acute distress noted.

## 2020-12-05 NOTE — ED PROVIDER NOTES
Encounter Date: 2020    SCRIBE #1 NOTE: I, Giovanny Adams, am scribing for, and in the presence of,  Wili Hardin MD. I have scribed the following portions of the note - Other sections scribed: HPI, ROS.       History     Chief Complaint   Patient presents with    Shortness of Breath     Pt c/o SOB that started this AM. O2 sat 85% in triage. Reports he feels his pulse and it's irregular. Denies chest pain     Micah Laurent Jr. Is a 80 y.o. male with a PMHx of Asthma, Afib, COPD, DM, and HTN who presents to the ED with complaints of shortness of breath. Patient reports he woke up at 3 AM this morning and began to feel short of breath and an irregular heartbeat. Endorses mild shortness of breath at time of exam. Also endorses tingling on his occipital scalp. Denies any fever, chills, eye problems, ear problems, sore throat, chest pain, dyspnea, abdominal pain, nausea, vomiting, diarrhea, urinary problems, rash, or extremity problems. Denies coughing more than normal. Patient is not on home O2.     The history is provided by the patient. No  was used.     Review of patient's allergies indicates:   Allergen Reactions    Lisinopril      Swells lower legs.     Past Medical History:   Diagnosis Date    Asthma     Atrial fibrillation     COPD (chronic obstructive pulmonary disease)     Diabetes mellitus, new onset     [place] (hard of hearing)     Hypertension      Past Surgical History:   Procedure Laterality Date    NO PAST SURGERIES  2020     History reviewed. No pertinent family history.  Social History     Tobacco Use    Smoking status: Former Smoker     Packs/day: 1.50     Years: 20.00     Pack years: 30.00     Types: Cigarettes     Quit date:      Years since quittin.9    Smokeless tobacco: Former User    Tobacco comment: quit in    Substance Use Topics    Alcohol use: No    Drug use: No     Review of Systems   Constitutional: Negative for chills, diaphoresis  and fever.   HENT: Negative for ear pain and sore throat.    Eyes: Negative for pain.   Respiratory: Positive for shortness of breath. Negative for cough.         Negative for dyspnea.    Cardiovascular: Positive for palpitations. Negative for chest pain.   Gastrointestinal: Negative for abdominal pain, diarrhea, nausea and vomiting.   Genitourinary: Negative for difficulty urinating, dysuria and hematuria.   Musculoskeletal:        Negative for arm or leg problems.    Skin: Negative for rash.   Neurological: Negative for headaches.        Positive for tingling on occipital scalp.        Physical Exam     Initial Vitals [12/05/20 1352]   BP Pulse Resp Temp SpO2   (!) 188/103 103 (!) 26 97.8 °F (36.6 °C) (!) 85 %      MAP       --         Physical Exam  The patient was examined specifically for the following:   General:No significant distress, Good color, Warm and dry. Head and neck:Scalp atraumatic, Neck supple. Neurological:Appropriate conversation, Gross motor deficits. Eyes:Conjugate gaze, Clear corneas. ENT: No epistaxis. Cardiac: Regular rate and rhythm, Grossly normal heart tones. Pulmonary: Wheezing, Rales. Gastrointestinal: Abdominal tenderness, Abdominal distention. Musculoskeletal: Extremity deformity, Apparent pain with range of motion of the joints. Skin: Rash.   The findings on examination were normal except for the following:  Patient has mild scattered wheezing on his pulmonary examination.  There is no clinical evidence of significant respiratory distress.  Oxygen saturations are 85% on arrival.  ED Course   Procedures  Labs Reviewed   CBC W/ AUTO DIFFERENTIAL - Abnormal; Notable for the following components:       Result Value    RBC 4.19 (*)     Hemoglobin 11.4 (*)     Hematocrit 36.2 (*)     MCHC 31.5 (*)     Immature Granulocytes 0.6 (*)     Lymph # 0.9 (*)     Gran % 75.8 (*)     All other components within normal limits   COMPREHENSIVE METABOLIC PANEL - Abnormal; Notable for the following  components:    CO2 32 (*)     Glucose 116 (*)     Anion Gap 5 (*)     eGFR if  59 (*)     eGFR if non  51 (*)     All other components within normal limits   B-TYPE NATRIURETIC PEPTIDE - Abnormal; Notable for the following components:     (*)     All other components within normal limits   TROPONIN I   SARS-COV-2 RDRP GENE     EKG Readings: (Independently Interpreted)    The axis is normal.  There is poor R-wave progression across precordium.  This patient may have left ventricular hypertrophy.  There are nonspecific T-wave changes.  There is no evidence of acute ischemia or malignant arrhythmia.       Imaging Results          X-Ray Chest AP Portable (Final result)  Result time 12/05/20 16:36:40    Final result by Lj Figueroa MD (12/05/20 16:36:40)                 Impression:      Grossly stable chronic findings as above without detrimental change or radiographic acute intrathoracic process seen on this single view.      Electronically signed by: Lj Figueroa MD  Date:    12/05/2020  Time:    16:36             Narrative:    EXAMINATION:  XR CHEST AP PORTABLE    CLINICAL HISTORY:  chest pain;    TECHNIQUE:  Single frontal view of the chest was performed.    COMPARISON:  Chest radiograph 11/11/2020    FINDINGS:  No detrimental change.  Cardiomediastinal silhouette is midline and stable without evidence of failure, noting unchanged thickening of the right paratracheal stripe, commonly secondary to ectatic vasculature in this age group.  The lungs are symmetrically hyperexpanded with increased lucency of the upper zones and bilateral minimal nonspecific interstitial coarsening suggesting sequela of underlying COPD/emphysema.  Symmetric appearing nipple shadows project over both lung bases.  Bibasilar platelike scarring versus atelectasis unchanged.  No large consolidation or new focal opacity.  No pleural effusion or pneumothorax.  Pulmonary vasculature and hilar contours are  within normal limits.  No acute osseous process seen.  PA and lateral views can be obtained.                              Medical decision making:  This patient presents to the emergency room with a history of COPD complaining of shortness of breath.  He had oxygen saturations of 85% at triage.  He does not have oxygen at home.  He does have a nebulizer machine.  He was treated with nebulized albuterol and ipratropium in the emergency room.  I walked him and checked his oxygen saturation before discharge.  He was 93% after exercise with his pulse oximetry.  He states that he feels well now and is comfortable going home.  I will release him to outpatient evaluation and treatment.                Scribe Attestation:   Scribe #1: I performed the above scribed service and the documentation accurately describes the services I performed. I attest to the accuracy of the note.                      Clinical Impression:     ICD-10-CM ICD-9-CM   1. COPD with acute exacerbation  J44.1 491.21   2. Shortness of breath  R06.02 786.05                          ED Disposition Condition    Discharge Stable        ED Prescriptions     None        Follow-up Information     Follow up With Specialties Details Why Contact Info    Chelsy Torrez MD Endocrinology In 3 days  [address] [address]  [phone]                        I personally performed the services described in this documentation.  All medical record  entries made by the scribe are at my direction and in my presence.  Signed, Dr. Lashawn Hardin MD  12/05/20 [id number]

## 2020-12-05 NOTE — ED TRIAGE NOTES
Pt arrived to ED with wife at bedside  c/o Shortness of Breath, headache, and irregular heart beat that started around 3am. Pt  reports he feels his pulse is  Skipping a beat. Denies chest pain, blurred vision. Pt has a history of A-FIB.

## 2020-12-05 NOTE — DISCHARGE INSTRUCTIONS
Continue your nebulizer treatments every 4 hr.  Please use your Diskus inhaler as prescribed.  Please return if you get worse or if new problems develop.  Please follow-up with your primary care doctor this week.

## 2020-12-05 NOTE — Clinical Note
"Micah "Raymond Laurent was seen and treated in our emergency department on 12/5/2020.     COVID-19 is present in our communities across the state. There is limited testing for COVID at this time, so not all patients can be tested. In this situation, your employee meets the following criteria:    Micah Laurent Jr. has met the criteria for COVID-19 testing based upon symptoms, travel, and/or potential exposure. The test has been completed and is pending results at this time. During this time the employee is not able to work and should be quarantined per the Centers for Disease Control timelines.     If you have any questions or concerns, or if I can be of further assistance, please do not hesitate to contact me.    Sincerely,             Wili Hardin MD"

## 2020-12-08 ENCOUNTER — HOSPITAL ENCOUNTER (EMERGENCY)
Facility: HOSPITAL | Age: 80
Discharge: HOME | End: 2020-12-08
Attending: EMERGENCY MEDICINE
Payer: MEDICARE

## 2020-12-08 VITALS
HEART RATE: 89 BPM | WEIGHT: 155 LBS | HEIGHT: 65 IN | RESPIRATION RATE: 18 BRPM | BODY MASS INDEX: 25.83 KG/M2 | OXYGEN SATURATION: 95 % | SYSTOLIC BLOOD PRESSURE: 149 MMHG | DIASTOLIC BLOOD PRESSURE: 91 MMHG | TEMPERATURE: 99 F

## 2020-12-08 DIAGNOSIS — J44.1 COPD EXACERBATION: Primary | ICD-10-CM

## 2020-12-08 DIAGNOSIS — R06.02 SOB (SHORTNESS OF BREATH): ICD-10-CM

## 2020-12-08 LAB
ALBUMIN SERPL BCP-MCNC: 3.3 G/DL (ref 3.5–5.2)
ALP SERPL-CCNC: 71 U/L (ref 55–135)
ALT SERPL W/O P-5'-P-CCNC: 24 U/L (ref 10–44)
ANION GAP SERPL CALC-SCNC: 8 MMOL/L (ref 8–16)
AST SERPL-CCNC: 30 U/L (ref 10–40)
BASOPHILS # BLD AUTO: 0.01 K/UL (ref 0–0.2)
BASOPHILS NFR BLD: 0.2 % (ref 0–1.9)
BILIRUB SERPL-MCNC: 0.3 MG/DL (ref 0.1–1)
BILIRUB UR QL STRIP: NEGATIVE
BNP SERPL-MCNC: 64 PG/ML (ref 0–99)
BUN SERPL-MCNC: 18 MG/DL (ref 8–23)
CALCIUM SERPL-MCNC: 8.7 MG/DL (ref 8.7–10.5)
CHLORIDE SERPL-SCNC: 102 MMOL/L (ref 95–110)
CLARITY UR: CLEAR
CO2 SERPL-SCNC: 31 MMOL/L (ref 23–29)
COLOR UR: NORMAL
CREAT SERPL-MCNC: 1.4 MG/DL (ref 0.5–1.4)
CTP QC/QA: YES
DIFFERENTIAL METHOD: ABNORMAL
EOSINOPHIL # BLD AUTO: 0 K/UL (ref 0–0.5)
EOSINOPHIL NFR BLD: 0 % (ref 0–8)
ERYTHROCYTE [DISTWIDTH] IN BLOOD BY AUTOMATED COUNT: 14.3 % (ref 11.5–14.5)
EST. GFR  (AFRICAN AMERICAN): 54 ML/MIN/1.73 M^2
EST. GFR  (NON AFRICAN AMERICAN): 47 ML/MIN/1.73 M^2
GLUCOSE SERPL-MCNC: 135 MG/DL (ref 70–110)
GLUCOSE UR QL STRIP: NEGATIVE
HCT VFR BLD AUTO: 35.2 % (ref 40–54)
HGB BLD-MCNC: 11.3 G/DL (ref 14–18)
HGB UR QL STRIP: NEGATIVE
IMM GRANULOCYTES # BLD AUTO: 0.04 K/UL (ref 0–0.04)
IMM GRANULOCYTES NFR BLD AUTO: 0.8 % (ref 0–0.5)
KETONES UR QL STRIP: NEGATIVE
LEUKOCYTE ESTERASE UR QL STRIP: NEGATIVE
LYMPHOCYTES # BLD AUTO: 1 K/UL (ref 1–4.8)
LYMPHOCYTES NFR BLD: 19.4 % (ref 18–48)
MCH RBC QN AUTO: 27.8 PG (ref 27–31)
MCHC RBC AUTO-ENTMCNC: 32.1 G/DL (ref 32–36)
MCV RBC AUTO: 87 FL (ref 82–98)
MONOCYTES # BLD AUTO: 0.4 K/UL (ref 0.3–1)
MONOCYTES NFR BLD: 8.3 % (ref 4–15)
NEUTROPHILS # BLD AUTO: 3.5 K/UL (ref 1.8–7.7)
NEUTROPHILS NFR BLD: 71.3 % (ref 38–73)
NITRITE UR QL STRIP: NEGATIVE
NRBC BLD-RTO: 0 /100 WBC
PH UR STRIP: 7 [PH] (ref 5–8)
PLATELET # BLD AUTO: 265 K/UL (ref 150–350)
PMV BLD AUTO: 11.8 FL (ref 9.2–12.9)
POTASSIUM SERPL-SCNC: 4.6 MMOL/L (ref 3.5–5.1)
PROT SERPL-MCNC: 7.4 G/DL (ref 6–8.4)
PROT UR QL STRIP: NEGATIVE
RBC # BLD AUTO: 4.07 M/UL (ref 4.6–6.2)
SARS-COV-2 RDRP RESP QL NAA+PROBE: NEGATIVE
SODIUM SERPL-SCNC: 141 MMOL/L (ref 136–145)
SP GR UR STRIP: 1.01 (ref 1–1.03)
TROPONIN I SERPL DL<=0.01 NG/ML-MCNC: <0.006 NG/ML (ref 0–0.03)
TROPONIN I SERPL DL<=0.01 NG/ML-MCNC: <0.006 NG/ML (ref 0–0.03)
TSH SERPL DL<=0.005 MIU/L-ACNC: 0.97 UIU/ML (ref 0.4–4)
URN SPEC COLLECT METH UR: NORMAL
UROBILINOGEN UR STRIP-ACNC: NEGATIVE EU/DL
WBC # BLD AUTO: 4.94 K/UL (ref 3.9–12.7)

## 2020-12-08 PROCEDURE — 94640 AIRWAY INHALATION TREATMENT: CPT

## 2020-12-08 PROCEDURE — 93010 ELECTROCARDIOGRAM REPORT: CPT | Mod: ,,, | Performed by: INTERNAL MEDICINE

## 2020-12-08 PROCEDURE — 84443 ASSAY THYROID STIM HORMONE: CPT

## 2020-12-08 PROCEDURE — 25000003 PHARM REV CODE 250: Performed by: EMERGENCY MEDICINE

## 2020-12-08 PROCEDURE — U0002 COVID-19 LAB TEST NON-CDC: HCPCS | Performed by: EMERGENCY MEDICINE

## 2020-12-08 PROCEDURE — 85025 COMPLETE CBC W/AUTO DIFF WBC: CPT

## 2020-12-08 PROCEDURE — 93005 ELECTROCARDIOGRAM TRACING: CPT

## 2020-12-08 PROCEDURE — 96374 THER/PROPH/DIAG INJ IV PUSH: CPT

## 2020-12-08 PROCEDURE — 94645 CONT INHLJ TX EACH ADDL HOUR: CPT

## 2020-12-08 PROCEDURE — 81003 URINALYSIS AUTO W/O SCOPE: CPT

## 2020-12-08 PROCEDURE — 94644 CONT INHLJ TX 1ST HOUR: CPT

## 2020-12-08 PROCEDURE — 99284 EMERGENCY DEPT VISIT MOD MDM: CPT | Mod: 25

## 2020-12-08 PROCEDURE — 84484 ASSAY OF TROPONIN QUANT: CPT | Mod: 91

## 2020-12-08 PROCEDURE — 83880 ASSAY OF NATRIURETIC PEPTIDE: CPT

## 2020-12-08 PROCEDURE — 80053 COMPREHEN METABOLIC PANEL: CPT

## 2020-12-08 PROCEDURE — 93010 EKG 12-LEAD: ICD-10-PCS | Mod: ,,, | Performed by: INTERNAL MEDICINE

## 2020-12-08 PROCEDURE — 96375 TX/PRO/DX INJ NEW DRUG ADDON: CPT

## 2020-12-08 PROCEDURE — 25000242 PHARM REV CODE 250 ALT 637 W/ HCPCS: Performed by: EMERGENCY MEDICINE

## 2020-12-08 PROCEDURE — 63600175 PHARM REV CODE 636 W HCPCS: Performed by: EMERGENCY MEDICINE

## 2020-12-08 RX ORDER — ALBUTEROL SULFATE 2.5 MG/.5ML
10 SOLUTION RESPIRATORY (INHALATION)
Status: COMPLETED | OUTPATIENT
Start: 2020-12-08 | End: 2020-12-08

## 2020-12-08 RX ORDER — HYDRALAZINE HYDROCHLORIDE 25 MG/1
50 TABLET, FILM COATED ORAL
Status: COMPLETED | OUTPATIENT
Start: 2020-12-08 | End: 2020-12-08

## 2020-12-08 RX ORDER — IPRATROPIUM BROMIDE 0.5 MG/2.5ML
1.5 SOLUTION RESPIRATORY (INHALATION) CONTINUOUS
Status: DISCONTINUED | OUTPATIENT
Start: 2020-12-08 | End: 2020-12-08 | Stop reason: HOSPADM

## 2020-12-08 RX ORDER — KETOROLAC TROMETHAMINE 30 MG/ML
15 INJECTION, SOLUTION INTRAMUSCULAR; INTRAVENOUS
Status: COMPLETED | OUTPATIENT
Start: 2020-12-08 | End: 2020-12-08

## 2020-12-08 RX ORDER — PREDNISONE 20 MG/1
40 TABLET ORAL DAILY
Qty: 8 TABLET | Refills: 0 | Status: SHIPPED | OUTPATIENT
Start: 2020-12-08 | End: 2020-12-12

## 2020-12-08 RX ORDER — METHYLPREDNISOLONE SOD SUCC 125 MG
125 VIAL (EA) INJECTION
Status: COMPLETED | OUTPATIENT
Start: 2020-12-08 | End: 2020-12-08

## 2020-12-08 RX ORDER — AZITHROMYCIN 250 MG/1
250 TABLET, FILM COATED ORAL DAILY
Qty: 6 TABLET | Refills: 0 | Status: SHIPPED | OUTPATIENT
Start: 2020-12-08 | End: 2020-12-18

## 2020-12-08 RX ORDER — ACETAMINOPHEN 500 MG
1000 TABLET ORAL
Status: COMPLETED | OUTPATIENT
Start: 2020-12-08 | End: 2020-12-08

## 2020-12-08 RX ADMIN — METHYLPREDNISOLONE SODIUM SUCCINATE 125 MG: 125 INJECTION, POWDER, FOR SOLUTION INTRAMUSCULAR; INTRAVENOUS at 02:12

## 2020-12-08 RX ADMIN — ALBUTEROL SULFATE 10 MG: 2.5 SOLUTION RESPIRATORY (INHALATION) at 05:12

## 2020-12-08 RX ADMIN — KETOROLAC TROMETHAMINE 15 MG: 30 INJECTION, SOLUTION INTRAMUSCULAR at 05:12

## 2020-12-08 RX ADMIN — IPRATROPIUM BROMIDE 1.5 MG: 0.5 SOLUTION RESPIRATORY (INHALATION) at 05:12

## 2020-12-08 RX ADMIN — ACETAMINOPHEN 1000 MG: 500 TABLET ORAL at 05:12

## 2020-12-08 RX ADMIN — ALBUTEROL SULFATE 10 MG: 2.5 SOLUTION RESPIRATORY (INHALATION) at 03:12

## 2020-12-08 RX ADMIN — HYDRALAZINE HYDROCHLORIDE 50 MG: 25 TABLET, FILM COATED ORAL at 04:12

## 2020-12-08 RX ADMIN — IPRATROPIUM BROMIDE 1.5 MG: 0.5 SOLUTION RESPIRATORY (INHALATION) at 03:12

## 2020-12-08 NOTE — ED PROVIDER NOTES
Encounter Date: 2020       History     Chief Complaint   Patient presents with    Shortness of Breath     patient reports being SOB since 2300. Patient denies n/v. states he has chest tightness. patient with hx of COPD and asthma     Pt is an 81 y/o M with PMHx as per below who presents with concern for persistent SOB and chest tightness that began around 11 PM.  Pt attempted to use his home nebulizer with minimal improvement of his symptoms.  Pt states the SOB is present at rest and is exacerbated with minimal exertion.  Pt denies fever or any sick contacts.         Review of patient's allergies indicates:   Allergen Reactions    Lisinopril      Swells lower legs.     Past Medical History:   Diagnosis Date    Asthma     Atrial fibrillation     COPD (chronic obstructive pulmonary disease)     Diabetes mellitus, new onset     [place] (hard of hearing)     Hypertension      Past Surgical History:   Procedure Laterality Date    NO PAST SURGERIES  2020     History reviewed. No pertinent family history.  Social History     Tobacco Use    Smoking status: Former Smoker     Packs/day: 1.50     Years: 20.00     Pack years: 30.00     Types: Cigarettes     Quit date:      Years since quittin.9    Smokeless tobacco: Former User    Tobacco comment: quit in    Substance Use Topics    Alcohol use: No    Drug use: No     Review of Systems   Constitutional: Negative for fatigue and fever.   Respiratory: Positive for chest tightness, shortness of breath and wheezing. Negative for apnea, cough, choking and stridor.    Cardiovascular: Negative for chest pain, palpitations and leg swelling.   All other systems reviewed and are negative.      Physical Exam     Initial Vitals [20 0224]   BP Pulse Resp Temp SpO2   (!) 209/118 96 (!) 24 98.7 °F (37.1 °C) (!) 94 %      MAP       --         Physical Exam    Nursing note and vitals reviewed.  Constitutional: He appears well-developed and well-nourished.  He is not diaphoretic. No distress.   HENT:   Head: Normocephalic and atraumatic.   Mouth/Throat: Oropharynx is clear and moist.   Eyes: Conjunctivae are normal. Right eye exhibits no discharge. Left eye exhibits no discharge. No scleral icterus.   Neck: No tracheal deviation present. No JVD present.   Cardiovascular: Normal rate, regular rhythm, normal heart sounds and intact distal pulses. Exam reveals no gallop and no friction rub.    No murmur heard.  Pulmonary/Chest: No stridor. No respiratory distress. He has wheezes (breath sounds moderately diminished with expiratory wheezing noted on exam). He has no rhonchi. He has no rales. He exhibits no tenderness.   RR of approximately 20 with mild accessory muscle use.  Speaking is still speaking without pausing mid-sentence.     Abdominal: Soft. He exhibits no distension and no mass. There is no abdominal tenderness. There is no rebound and no guarding.   Musculoskeletal: Normal range of motion. No tenderness or edema.   Neurological: He is alert and oriented to person, place, and time. He has normal strength. GCS score is 15. GCS eye subscore is 4. GCS verbal subscore is 5. GCS motor subscore is 6.   CURT with NGND's   Skin: Skin is warm and dry. Capillary refill takes less than 2 seconds. No rash and no abscess noted. No erythema. No pallor.   Psychiatric: He has a normal mood and affect. His behavior is normal. Judgment and thought content normal.         ED Course   Procedures  Labs Reviewed   CBC W/ AUTO DIFFERENTIAL - Abnormal; Notable for the following components:       Result Value    RBC 4.07 (*)     Hemoglobin 11.3 (*)     Hematocrit 35.2 (*)     Immature Granulocytes 0.8 (*)     All other components within normal limits   COMPREHENSIVE METABOLIC PANEL - Abnormal; Notable for the following components:    CO2 31 (*)     Glucose 135 (*)     Albumin 3.3 (*)     eGFR if  54 (*)     eGFR if non  47 (*)     All other components  within normal limits    Narrative:     Recoll. [id number] by [facility] at 12/08/2020 04:12, reason: Specimen   hemolyzed.Tube has been refrigerated. called to nurse Daisy   12/08/2020  04:12   B-TYPE NATRIURETIC PEPTIDE   TROPONIN I   TSH   URINALYSIS, REFLEX TO URINE CULTURE    Narrative:     Specimen Source->Urine   TROPONIN I   SARS-COV-2 RDRP GENE     EKG Readings: (Independently Interpreted)   Initial Reading: No STEMI. Rhythm: Normal Sinus Rhythm. Heart Rate: 90. Ectopy: No Ectopy. Conduction: Normal. ST Segments: Normal ST Segments. T Waves Flipped: AVR and V1. Axis: Normal. Clinical Impression: Normal Sinus Rhythm       Imaging Results          X-Ray Chest AP Portable (Final result)  Result time 12/08/20 03:21:31    Final result by Astrid Mantilla MD (12/08/20 03:21:31)                 Impression:      No acute intrathoracic abnormality or significant interval detrimental change in cardiopulmonary status identified on this single radiographic view of the chest.      Electronically signed by: Astrid Mantilla MD  Date:    12/08/2020  Time:    03:21             Narrative:    EXAMINATION:  XR CHEST AP PORTABLE    CLINICAL HISTORY:  shortness of breath;    TECHNIQUE:  Single frontal view of the chest was performed.    COMPARISON:  12/05/2020    FINDINGS:  Cardiac monitoring leads overlie the chest.  The cardiomediastinal silhouette is stable.  The lungs are symmetrically hyperexpanded with coarse interstitial attenuation, similar to prior examinations.  No evidence of superimposed confluent airspace consolidation, significant volume of pleural fluid or pneumothorax.  Visualized osseous structures are intact.                              Pt arrived alert, afebrile, non-toxic in appearance and with HTN as well as slightly increased WOB but no hypoxia present on room air.  EKG revealed no acute findings concerning for ischemia, arrhythmia, heart block or any pathology to warrant cardiology consultation.  CXR revealed no  acute pathology.  Negative troponin with labs unremarkable except for pending CMP.  Nebs and steroids given following negative COVID swab with presentation likely secondary to COPD.  Pt care handed off to Dr. Ordonez pending re-evaluation and final disposition.    Anthony Winters MD  4:05 AM    Physician Update    Patient received as signout from Dr. Winters pending reassessment.    81yo male with COPD presents via wife with shortness of breath and wheezing, onset Monday 12/7/20 around 3pm, worsening. No relief with home treatments. Patient has frequent visits for same. No chest pain. Patient does report posterior neck pain which he states is c/w prior flares of COPD.     EKG 02:30: NSR, HR 90. Normal axis. No ectopy. No STEMI.    CXR NAD.    Labs: CBC, CMP, TSH, BNP, troponin, COVID-19, UA reassuring except bicarb 31, c/w priors. Second troponin pending.    Patient had solumedrol 125mg IV and albuterol 10mg / ipratropium 1.5mg neb. He states he feels better. There is still some wheezing to my reexam and accessory muscle use.    I will give additional albuterol 10mg / ipratropium 1.5mg neb. Likely d/c thereafter.    Krishna Michel  5:51 AM                                           Clinical Impression:       ICD-10-CM ICD-9-CM   1. COPD exacerbation  J44.1 491.21   2. SOB (shortness of breath)  R06.02 786.05                          ED Disposition Condition    Discharge Stable        ED Prescriptions     Medication Sig Dispense Start Date End Date Auth. Provider    predniSONE (DELTASONE) 20 MG tablet Take 2 tablets (40 mg total) by mouth once daily. for 4 days 8 tablet 12/8/2020 12/12/2020 Krishna Michel MD    azithromycin (Z-TERRENCE) 250 MG tablet Take 1 tablet (250 mg total) by mouth once daily. Take first 2 tablets together, then 1 every day until finished. 6 tablet 12/8/2020  Krishna Michel MD        Follow-up Information     Follow up With Specialties Details Why Contact Info    Chelsy Torrez MD  Endocrinology Schedule an appointment as soon as possible for a visit in 2 days  [address] [facility] [address]  [place] [address]  [phone]                                         Anthony Winters MD  12/08/20 0405       Karly Ordonez MD  12/08/20 0551    Patient was signed out to me pending reassessment after breathing treatments.  I went in to evaluate patient and he is currently on a breathing treatment stating he feels better.  I reviewed labs and imaging.  Reassuring.  Second troponin is reassuring.  Some wheezing on exam but not in respiratory distress    7:28 AM  Patient finished up treatments and states that he would like to go home.  Patient feels much better.  Lung exam is improved on my last exam.  States that when he got up to move around he is no longer markedly short of breath and feels okay.  Discharging with close follow-up along with steroids and azithromycin. I discussed with the patient/family the diagnosis, treatment plan, indications for return to the emergency department, and for expected follow-up. The patient/family verbalized an understanding. The patient/family is asked if there are any questions or concerns. We discuss the case, until all issues are addressed to the patient/familys satisfaction. Patient/family understands and is agreeable to the plan.   Krishna Michel MD  12/08/20 [id number]

## 2020-12-08 NOTE — DISCHARGE INSTRUCTIONS
Thank you for coming to our Emergency Department today. It is important to remember that some problems are difficult to diagnose and may not be found during your first visit. Be sure to follow up with your primary care doctor and review any labs/imaging that was performed with them. If you do not have a primary care doctor, you may contact the one listed on your discharge paperwork or you may also call the [facility] Appointment Desk at [phone] to schedule an appointment with one.     All medications may potentially have side effects and it is impossible to predict which medications may give you side effects. If you feel that you are having a negative effect of any medication you should immediately stop taking them and seek medical attention.    Return to the ER with any questions/concerns, new/concerning symptoms, worsening or failure to improve. Do not drive or make any important decisions for 24 hours if you have received any pain medications, sedatives or mood altering drugs during your ER visit.

## 2020-12-08 NOTE — ED TRIAGE NOTES
Pt presents to ED c/o SOB that started this evening, also reports chest tightness. Hx COPD and asthma.

## 2020-12-18 ENCOUNTER — HOSPITAL ENCOUNTER (EMERGENCY)
Facility: HOSPITAL | Age: 80
Discharge: HOME | End: 2020-12-18
Attending: EMERGENCY MEDICINE
Payer: MEDICARE

## 2020-12-18 VITALS
BODY MASS INDEX: 25.83 KG/M2 | HEIGHT: 65 IN | SYSTOLIC BLOOD PRESSURE: 162 MMHG | OXYGEN SATURATION: 96 % | HEART RATE: 82 BPM | TEMPERATURE: 98 F | DIASTOLIC BLOOD PRESSURE: 92 MMHG | WEIGHT: 155 LBS | RESPIRATION RATE: 21 BRPM

## 2020-12-18 DIAGNOSIS — R07.9 CHEST PAIN, UNSPECIFIED TYPE: ICD-10-CM

## 2020-12-18 DIAGNOSIS — I49.9 IRREGULAR HEART BEAT: ICD-10-CM

## 2020-12-18 DIAGNOSIS — N40.1 BPH ASSOCIATED WITH NOCTURIA: ICD-10-CM

## 2020-12-18 DIAGNOSIS — R35.1 BPH ASSOCIATED WITH NOCTURIA: ICD-10-CM

## 2020-12-18 DIAGNOSIS — R00.2 PALPITATION: Primary | ICD-10-CM

## 2020-12-18 LAB
ALBUMIN SERPL BCP-MCNC: 3.8 G/DL (ref 3.5–5.2)
ALP SERPL-CCNC: 66 U/L (ref 55–135)
ALT SERPL W/O P-5'-P-CCNC: 21 U/L (ref 10–44)
ANION GAP SERPL CALC-SCNC: 7 MMOL/L (ref 8–16)
AST SERPL-CCNC: 23 U/L (ref 10–40)
BASOPHILS # BLD AUTO: 0.01 K/UL (ref 0–0.2)
BASOPHILS NFR BLD: 0.1 % (ref 0–1.9)
BILIRUB SERPL-MCNC: 0.3 MG/DL (ref 0.1–1)
BNP SERPL-MCNC: 135 PG/ML (ref 0–99)
BUN SERPL-MCNC: 19 MG/DL (ref 8–23)
CALCIUM SERPL-MCNC: 8.9 MG/DL (ref 8.7–10.5)
CHLORIDE SERPL-SCNC: 102 MMOL/L (ref 95–110)
CO2 SERPL-SCNC: 33 MMOL/L (ref 23–29)
CREAT SERPL-MCNC: 1.3 MG/DL (ref 0.5–1.4)
DIFFERENTIAL METHOD: ABNORMAL
EOSINOPHIL # BLD AUTO: 0 K/UL (ref 0–0.5)
EOSINOPHIL NFR BLD: 0 % (ref 0–8)
ERYTHROCYTE [DISTWIDTH] IN BLOOD BY AUTOMATED COUNT: 14.8 % (ref 11.5–14.5)
EST. GFR  (AFRICAN AMERICAN): 59 ML/MIN/1.73 M^2
EST. GFR  (NON AFRICAN AMERICAN): 51 ML/MIN/1.73 M^2
GLUCOSE SERPL-MCNC: 96 MG/DL (ref 70–110)
HCT VFR BLD AUTO: 37.6 % (ref 40–54)
HGB BLD-MCNC: 11.7 G/DL (ref 14–18)
IMM GRANULOCYTES # BLD AUTO: 0.04 K/UL (ref 0–0.04)
IMM GRANULOCYTES NFR BLD AUTO: 0.5 % (ref 0–0.5)
LYMPHOCYTES # BLD AUTO: 1.7 K/UL (ref 1–4.8)
LYMPHOCYTES NFR BLD: 22.4 % (ref 18–48)
MCH RBC QN AUTO: 27.5 PG (ref 27–31)
MCHC RBC AUTO-ENTMCNC: 31.1 G/DL (ref 32–36)
MCV RBC AUTO: 88 FL (ref 82–98)
MONOCYTES # BLD AUTO: 0.9 K/UL (ref 0.3–1)
MONOCYTES NFR BLD: 11.7 % (ref 4–15)
NEUTROPHILS # BLD AUTO: 5 K/UL (ref 1.8–7.7)
NEUTROPHILS NFR BLD: 65.3 % (ref 38–73)
NRBC BLD-RTO: 0 /100 WBC
PLATELET # BLD AUTO: 215 K/UL (ref 150–350)
PMV BLD AUTO: 10.9 FL (ref 9.2–12.9)
POTASSIUM SERPL-SCNC: 3.9 MMOL/L (ref 3.5–5.1)
PROT SERPL-MCNC: 8 G/DL (ref 6–8.4)
RBC # BLD AUTO: 4.26 M/UL (ref 4.6–6.2)
SODIUM SERPL-SCNC: 142 MMOL/L (ref 136–145)
TROPONIN I SERPL DL<=0.01 NG/ML-MCNC: 0.01 NG/ML (ref 0–0.03)
TROPONIN I SERPL DL<=0.01 NG/ML-MCNC: 0.01 NG/ML (ref 0–0.03)
WBC # BLD AUTO: 7.6 K/UL (ref 3.9–12.7)

## 2020-12-18 PROCEDURE — 93005 ELECTROCARDIOGRAM TRACING: CPT

## 2020-12-18 PROCEDURE — 80053 COMPREHEN METABOLIC PANEL: CPT

## 2020-12-18 PROCEDURE — 93010 ELECTROCARDIOGRAM REPORT: CPT | Mod: 76,,, | Performed by: INTERNAL MEDICINE

## 2020-12-18 PROCEDURE — 99285 EMERGENCY DEPT VISIT HI MDM: CPT | Mod: 25

## 2020-12-18 PROCEDURE — 85025 COMPLETE CBC W/AUTO DIFF WBC: CPT

## 2020-12-18 PROCEDURE — 84484 ASSAY OF TROPONIN QUANT: CPT

## 2020-12-18 PROCEDURE — 25000003 PHARM REV CODE 250: Performed by: EMERGENCY MEDICINE

## 2020-12-18 PROCEDURE — 93010 ELECTROCARDIOGRAM REPORT: CPT | Mod: ,,, | Performed by: INTERNAL MEDICINE

## 2020-12-18 PROCEDURE — 96374 THER/PROPH/DIAG INJ IV PUSH: CPT

## 2020-12-18 PROCEDURE — 93010 EKG 12-LEAD: ICD-10-PCS | Mod: 76,,, | Performed by: INTERNAL MEDICINE

## 2020-12-18 PROCEDURE — 94640 AIRWAY INHALATION TREATMENT: CPT

## 2020-12-18 PROCEDURE — 25000242 PHARM REV CODE 250 ALT 637 W/ HCPCS: Performed by: EMERGENCY MEDICINE

## 2020-12-18 PROCEDURE — 83880 ASSAY OF NATRIURETIC PEPTIDE: CPT

## 2020-12-18 PROCEDURE — 84484 ASSAY OF TROPONIN QUANT: CPT | Mod: 91

## 2020-12-18 RX ORDER — METOPROLOL TARTRATE 1 MG/ML
5 INJECTION, SOLUTION INTRAVENOUS
Status: COMPLETED | OUTPATIENT
Start: 2020-12-18 | End: 2020-12-18

## 2020-12-18 RX ORDER — IPRATROPIUM BROMIDE AND ALBUTEROL SULFATE 2.5; .5 MG/3ML; MG/3ML
3 SOLUTION RESPIRATORY (INHALATION) ONCE
Status: COMPLETED | OUTPATIENT
Start: 2020-12-18 | End: 2020-12-18

## 2020-12-18 RX ORDER — HYDRALAZINE HYDROCHLORIDE 25 MG/1
50 TABLET, FILM COATED ORAL
Status: COMPLETED | OUTPATIENT
Start: 2020-12-18 | End: 2020-12-18

## 2020-12-18 RX ADMIN — HYDRALAZINE HYDROCHLORIDE 50 MG: 25 TABLET, FILM COATED ORAL at 07:12

## 2020-12-18 RX ADMIN — IPRATROPIUM BROMIDE AND ALBUTEROL SULFATE 3 ML: .5; 3 SOLUTION RESPIRATORY (INHALATION) at 07:12

## 2020-12-18 RX ADMIN — METOROPROLOL TARTRATE 5 MG: 5 INJECTION, SOLUTION INTRAVENOUS at 07:12

## 2020-12-18 NOTE — FIRST PROVIDER EVALUATION
Emergency Department TeleTriage Encounter Note      CHIEF COMPLAINT    Chief Complaint   Patient presents with    Irregular Heart Beat     Pt c/o intermittent irregular heartbeat that's causing him to be SOB x10 days. Denies chest pain    Shortness of Breath       VITAL SIGNS   Initial Vitals [12/18/20 1631]   BP Pulse Resp Temp SpO2   (!) 185/85 85 18 98.9 °F (37.2 °C) 96 %      MAP       --            ALLERGIES    Review of patient's allergies indicates:   Allergen Reactions    Lisinopril      Swells lower legs.       PROVIDER TRIAGE NOTE  Patient is an 80-year-old male with past medical history asthma, AFib, COPD, diabetes, hypertension presents to the ED for evaluation of palpitations.  Patient states he has had intermittent irregular heartbeat for the past week.  He reports associated shortness of breath.  He denies chest pain, abdominal pain, nausea, vomiting.  This has happened several times in the past but he has not been to the doctor for it because the resolved on its own.  Patient states this time it is occurring more frequently.      ORDERS  Labs Reviewed   CBC W/ AUTO DIFFERENTIAL   COMPREHENSIVE METABOLIC PANEL   TROPONIN I   B-TYPE NATRIURETIC PEPTIDE       ED Orders (720h ago, onward)    Start Ordered     Status Ordering Provider    12/18/20 1640 12/18/20 1639  X-Ray Chest AP Portable  1 time imaging      Ordered BRENDA, GABE G.    12/18/20 1639 12/18/20 1639  Vital signs  Every 15 min      Ordered BRENDA, GABE G.    12/18/20 1639 12/18/20 1639  Cardiac Monitoring - Adult  Continuous     Comments: Notify Physician If:    Ordered BRENDA, GABE G.    12/18/20 1639 12/18/20 1639  Pulse Oximetry Continuous  Continuous      Ordered BRENDA, GABE G.    12/18/20 1639 12/18/20 1639  Diet NPO  Diet effective now      Ordered BRENDA, GABE G.    12/18/20 1639 12/18/20 1639  Saline lock IV  Once      Ordered BRENDA, GABE G.    12/18/20 1639 12/18/20 1639  CBC auto differential  STAT  Collect    Ordered BRENDA,  GABE LEYVA.    12/18/20 1639 12/18/20 1639  Comprehensive metabolic panel  STAT  Collect    Ordered BRENDAGABE SEVILLA.    12/18/20 1639 12/18/20 1639  Troponin I #1  STAT  Collect    Ordered BRENDAGABE SEVILLA.    12/18/20 1639 12/18/20 1639  B-Type natriuretic peptide (BNP)  STAT  Collect    Ordered GABE GUTIERREZ.    12/18/20 1634 12/18/20 1633  EKG 12-lead  Once  Completed    Completed by MANASA KAUR on 12/18/2020 at  4:34 PM JOHN VUONG            Virtual Visit Note: The provider triage portion of this emergency department evaluation and documentation was performed via [employer], a HIPAA-compliant telemedicine application, in concert with a tele-presenter in the room. A face to face patient evaluation with one of my colleagues will occur once the patient is placed in an emergency department room.      DISCLAIMER: This note was prepared with [employer] voice recognition transcription software. Garbled syntax, mangled pronouns, and other bizarre constructions may be attributed to that software system.

## 2020-12-19 NOTE — ED TRIAGE NOTES
Patient presents to the ER with increased shortness of breath and irregular heart beat. Patient states this has been ongoing since December 5th and comes and goes so patient decided to return to ER due to recurrent episodes.

## 2020-12-19 NOTE — DISCHARGE INSTRUCTIONS
As we discussed, it is important that you return to the ER for any new concerns or symptoms, worsening of your existing symptoms, if you do not completely improve, or if you are unable to be seen by your primary care provider.    Some medical conditions are difficult to diagnose and may not be identified during an ER visit. Today, we did not find a medical condition that required inpatient admission, but please remember that medical conditions can change, and we cannot predict how you will be feeling tomorrow or the next day, so if you have any worsening or new symptoms, you should not hesitate to return to the emergency department for reevaluation.     Be sure to follow up with your primary care doctor for a recheck and to review any labs/imaging that were performed today.  If you do not have a primary care doctor, you may contact the one listed on your discharge paperwork or you may also call the [facility] Appointment Desk at [phone] to schedule an appointment with one.       All medications have side effects.  We have done our best to select a medication for you that will treat your health problem and have the least amount of side effects.  If at any time while or after you take this medication you develops new symptoms or have any concerns, it is important you stop taking the medication and call your primary care provider or return to the emergency department for evaluation.    Do not drive or operate heavy machinery for 24 hours if you have received any pain medications, sedatives or mood altering drugs during your ER visit.

## 2020-12-19 NOTE — ED PROVIDER NOTES
EM PHYSICIAN NOTE     I, Patricia Moon, am scribing for, and in the presence of,  Micheline Soto MD. I have scribed the following portions of the note - Other sections scribed: HPI, ROS, PE.    HPI  This patient presents with a complaint of   Chief Complaint   Patient presents with    Irregular Heart Beat     Pt c/o intermittent irregular heartbeat that's causing him to be SOB x10 days. Denies chest pain    Shortness of Breath       HPI:   This is an 81 y/o male with a PMHx of Hypertension, COPD, Afib, Diabetes mellitus, and Asthma. He presents to the ED with a CC of intermittent irregular heart beats and shortness of breath that began 3-4 days ago. Episodes last 30-60 minutes at a time, and has associated leg swelling. Patient takes breathing treatment at home every four hours. He also takes 81mg Asprin everyday. He does not have home O2. Patient does not have a cardiologist. Hasn't smoked cigarettes in 30 years. Allergy to Lisinopril.     REVIEW of PMH, SOC History and Family History:  Past Medical History:   Diagnosis Date    Asthma     Atrial fibrillation     COPD (chronic obstructive pulmonary disease)     Diabetes mellitus, new onset     [place] (hard of hearing)     Hypertension      Past Surgical History:   Procedure Laterality Date    NO PAST SURGERIES  11/11/2020       Review of patient's allergies indicates:   Allergen Reactions    Lisinopril      Swells lower legs.       Family history and social history reviewed.      REVIEW of SYSTEMS  Source: Patient and Spouse  The nurse's notes and triage vital signs were reviewed.  GENERAL/CONSTITUTIONAL: There is no report of fever, fatigue, weakness, or unexplained weight loss.  CARDIOVASCULAR: SEE HPI - There is report of irregular heart beats. There is no report of chest pain   RESPIRATORY: SEE HPI - There is report of SOB. There is no report of cough.  GASTROINTESTINAL: There is no report of nausea, vomiting, diarrhea  MUSCULOSKELETAL: There is no  "report of joint or muscle pain. No muscle weakness or tenderness.  SKIN AND BREASTS: There is no report of easy bruising, skin redness, skin rash.  HEMATOLOGIC/LYMPHATIC: There is no report of anemia, bleeding or clotting defects. There is no report of anticoagulant use.  The remainder of the ROS is negative.    PHYSICAL EXAMINATION    ED Triage Vitals [12/18/20 1631]   Enc Vitals Group      BP (!) 185/85      Pulse 85      Resp 18      Temp 98.9 °F (37.2 °C)      Temp src Oral      SpO2 96 %      Weight 155 lb      Height 5' 5"      Head Circumference       Peak Flow       Pain Score       Pain Loc       Pain Edu?       Excl. in GC?      Vital signs and Pulse Ox reviewed in clinical context. Abnormalities noted:  Heart rate, temperature, respiratory rate and pulse ox are all within normal limits for age.  BP is elevated.  Patient reports that he has taken his home medication.  Pt's level of consciousness is alert, and the patient is in mild distress.  GCS: 4 - Opens eyes on own, 6 - Follows simple motor commands, 5 - Alert and oriented = total 15  Skin: warm, pink and dry  Mucosa:moist  Head and Neck: WNL  Cardiac exam: occasional irregular beat. 2+ radial and DP pulses  Pulmonary exam: Distant breath sounds. Barrel chest.  Abd Exam: soft nontender   Musculoskeletal: no joint tenderness, deformity or swelling   Neurologic: GCS 15. 5 over 5 strength, cranial nerves intact, neck supple       Initial Impression:  Palpitation  Plan:  Labs, imaging, reassess  Micelle JOYCE Soto MD, 7:39 PM 12/18/2020      Medical decision making:   ED Course as of Dec 18 2154   Fri Dec 18, 2020   1914 My independent interpretation of the initial EKG performed at arrival is normal sinus rhythm with PACs.  The rate is 81.  There are prominent T-waves in the anterior lateral leads.  There is left axis deviation.  There are occasional PVCs    [MH]   1915 BNP slightly elevated at 135    [MH]   1915 CBC reviewed.    [MH]   1915 CMP reveals an " elevated bicarb consistent with history of COPD    []   1915 Troponin 1.  Is negative    []   1915 Chest x-ray normal    []   2114 Repeat troponin is normal.    []   2153 Patient is adamant that he does not wish to be admitted.  I explained to him that the safest course would be to watch him overnight although we did not find any abnormalities in our workup today.  He reports he would prefer to follow-up with cardiologist as an outpatient and understands the risks.  He demonstrates the capacity to understand and certainly has the ability to make decisions about his health care.    []   9:57 PM  Patient requests referral to Urology due to occasion difficulty with urination   ED Course User Index  [] Micelle JOYCE Soto MD       Nurses notes and Vital Signs reviewed.  Orders Placed This Encounter   Procedures    X-Ray Chest AP Portable    CBC auto differential    Comprehensive metabolic panel    Troponin I #1    B-Type natriuretic peptide (BNP)    Troponin I    Ambulatory referral/consult to Cardiology    Ambulatory referral/consult to Urology    Diet NPO    Vital signs    Cardiac Monitoring - Adult    Pulse Oximetry Continuous    Inhalation Treatment Once    EKG 12-lead    EKG 12-lead    Saline lock IV         Diagnoses that have been ruled out:   None   Diagnoses that are still under consideration:   None   Final diagnoses:   Irregular heart beat   Palpitation   BPH associated with nocturia   Chest pain, unspecified type            Follow-up Information     Follow up With Specialties Details Why Contact Info    Chelsy Torrez MD Endocrinology Schedule an appointment as soon as possible for a visit in 2 days Primary Care Provider: Blood Pressure check ASAP [address] [facility]    [place] [address]  [phone]          ED Prescriptions     None          This note was created using Dictation Software.  This program may occasionally misinterpret certain words and phrases.      SCRIBE  ATTESTATION NOTE:  I attest that I personally performed the services documented by the scribe and acknowledged and confirm the content of the note.   Nurses notes were reviewed.  Micheline Soto        Nurses notes were reviewed.      CRITICAL CARE TIME:  Based on this patient's presenting history and physical exam, this patient had high probability of sudden, clinically significant deterioration and the services I provided to this patient were to treat and/or prevent clinically significant deterioration. Review and interpretation of radiologic studies with re-assessment of plan of care  Review and interpretation of laboratory studies with re-assessment of plan of care  Review of diagnosis, treatment options and prognosis with patient  Review of diagnosis, treatment options and prognosis with family/caregiver. Aggregate critical care time was approximately  30 minutes, which includes only time during which I was engaged in work directly related to the patient's care, as described above, whether at the bedside or elsewhere in the Emergency Department.  It did not include time spent performing other reported procedures or the services of residents, students, nurses or physician assistants.                                Micheline Soto MD  12/18/20 7994

## 2020-12-25 ENCOUNTER — HOSPITAL ENCOUNTER (EMERGENCY)
Facility: HOSPITAL | Age: 80
Discharge: HOME | End: 2020-12-25
Attending: EMERGENCY MEDICINE
Payer: MEDICARE

## 2020-12-25 VITALS
OXYGEN SATURATION: 96 % | HEART RATE: 75 BPM | RESPIRATION RATE: 12 BRPM | TEMPERATURE: 98 F | DIASTOLIC BLOOD PRESSURE: 100 MMHG | WEIGHT: 155 LBS | BODY MASS INDEX: 25.83 KG/M2 | HEIGHT: 65 IN | SYSTOLIC BLOOD PRESSURE: 163 MMHG

## 2020-12-25 DIAGNOSIS — R05.9 COUGH: ICD-10-CM

## 2020-12-25 DIAGNOSIS — J44.1 COPD EXACERBATION: Primary | ICD-10-CM

## 2020-12-25 DIAGNOSIS — R06.02 SHORTNESS OF BREATH: ICD-10-CM

## 2020-12-25 LAB
ALBUMIN SERPL BCP-MCNC: 3.7 G/DL (ref 3.5–5.2)
ALP SERPL-CCNC: 62 U/L (ref 55–135)
ALT SERPL W/O P-5'-P-CCNC: 23 U/L (ref 10–44)
ANION GAP SERPL CALC-SCNC: 8 MMOL/L (ref 8–16)
AST SERPL-CCNC: 27 U/L (ref 10–40)
BASOPHILS # BLD AUTO: 0.01 K/UL (ref 0–0.2)
BASOPHILS NFR BLD: 0.2 % (ref 0–1.9)
BILIRUB SERPL-MCNC: 0.4 MG/DL (ref 0.1–1)
BILIRUB UR QL STRIP: NEGATIVE
BNP SERPL-MCNC: 355 PG/ML (ref 0–99)
BUN SERPL-MCNC: 19 MG/DL (ref 8–23)
CALCIUM SERPL-MCNC: 8.8 MG/DL (ref 8.7–10.5)
CHLORIDE SERPL-SCNC: 105 MMOL/L (ref 95–110)
CLARITY UR: CLEAR
CO2 SERPL-SCNC: 28 MMOL/L (ref 23–29)
COLOR UR: YELLOW
CREAT SERPL-MCNC: 1.2 MG/DL (ref 0.5–1.4)
CTP QC/QA: YES
DIFFERENTIAL METHOD: ABNORMAL
EOSINOPHIL # BLD AUTO: 0 K/UL (ref 0–0.5)
EOSINOPHIL NFR BLD: 0 % (ref 0–8)
ERYTHROCYTE [DISTWIDTH] IN BLOOD BY AUTOMATED COUNT: 14.6 % (ref 11.5–14.5)
EST. GFR  (AFRICAN AMERICAN): >60 ML/MIN/1.73 M^2
EST. GFR  (NON AFRICAN AMERICAN): 56 ML/MIN/1.73 M^2
GLUCOSE SERPL-MCNC: 148 MG/DL (ref 70–110)
GLUCOSE UR QL STRIP: NEGATIVE
HCT VFR BLD AUTO: 37.3 % (ref 40–54)
HGB BLD-MCNC: 12 G/DL (ref 14–18)
HGB UR QL STRIP: NEGATIVE
IMM GRANULOCYTES # BLD AUTO: 0.01 K/UL (ref 0–0.04)
IMM GRANULOCYTES NFR BLD AUTO: 0.2 % (ref 0–0.5)
KETONES UR QL STRIP: NEGATIVE
LEUKOCYTE ESTERASE UR QL STRIP: NEGATIVE
LYMPHOCYTES # BLD AUTO: 1.2 K/UL (ref 1–4.8)
LYMPHOCYTES NFR BLD: 20.5 % (ref 18–48)
MCH RBC QN AUTO: 27.6 PG (ref 27–31)
MCHC RBC AUTO-ENTMCNC: 32.2 G/DL (ref 32–36)
MCV RBC AUTO: 86 FL (ref 82–98)
MICROSCOPIC COMMENT: ABNORMAL
MONOCYTES # BLD AUTO: 0.4 K/UL (ref 0.3–1)
MONOCYTES NFR BLD: 7.4 % (ref 4–15)
NEUTROPHILS # BLD AUTO: 4.3 K/UL (ref 1.8–7.7)
NEUTROPHILS NFR BLD: 71.7 % (ref 38–73)
NITRITE UR QL STRIP: NEGATIVE
NON-SQ EPI CELLS #/AREA URNS HPF: 1 /HPF
NRBC BLD-RTO: 0 /100 WBC
PH UR STRIP: 6 [PH] (ref 5–8)
PLATELET # BLD AUTO: 170 K/UL (ref 150–350)
PMV BLD AUTO: 11.3 FL (ref 9.2–12.9)
POTASSIUM SERPL-SCNC: 4.1 MMOL/L (ref 3.5–5.1)
PROT SERPL-MCNC: 7.8 G/DL (ref 6–8.4)
PROT UR QL STRIP: NEGATIVE
RBC # BLD AUTO: 4.34 M/UL (ref 4.6–6.2)
RBC #/AREA URNS HPF: 1 /HPF (ref 0–4)
SARS-COV-2 RDRP RESP QL NAA+PROBE: NEGATIVE
SODIUM SERPL-SCNC: 141 MMOL/L (ref 136–145)
SP GR UR STRIP: 1.01 (ref 1–1.03)
SQUAMOUS #/AREA URNS HPF: 1 /HPF
TROPONIN I SERPL DL<=0.01 NG/ML-MCNC: 0.03 NG/ML (ref 0–0.03)
URN SPEC COLLECT METH UR: NORMAL
UROBILINOGEN UR STRIP-ACNC: NEGATIVE EU/DL
WBC # BLD AUTO: 5.94 K/UL (ref 3.9–12.7)

## 2020-12-25 PROCEDURE — 96374 THER/PROPH/DIAG INJ IV PUSH: CPT

## 2020-12-25 PROCEDURE — 93005 ELECTROCARDIOGRAM TRACING: CPT

## 2020-12-25 PROCEDURE — 99285 EMERGENCY DEPT VISIT HI MDM: CPT | Mod: 25

## 2020-12-25 PROCEDURE — 85025 COMPLETE CBC W/AUTO DIFF WBC: CPT

## 2020-12-25 PROCEDURE — 84484 ASSAY OF TROPONIN QUANT: CPT

## 2020-12-25 PROCEDURE — 80053 COMPREHEN METABOLIC PANEL: CPT

## 2020-12-25 PROCEDURE — 83880 ASSAY OF NATRIURETIC PEPTIDE: CPT

## 2020-12-25 PROCEDURE — 93010 ELECTROCARDIOGRAM REPORT: CPT | Mod: ,,, | Performed by: INTERNAL MEDICINE

## 2020-12-25 PROCEDURE — 81000 URINALYSIS NONAUTO W/SCOPE: CPT

## 2020-12-25 PROCEDURE — U0002 COVID-19 LAB TEST NON-CDC: HCPCS | Performed by: PHYSICIAN ASSISTANT

## 2020-12-25 PROCEDURE — 93010 EKG 12-LEAD: ICD-10-PCS | Mod: ,,, | Performed by: INTERNAL MEDICINE

## 2020-12-25 PROCEDURE — 94761 N-INVAS EAR/PLS OXIMETRY MLT: CPT

## 2020-12-25 PROCEDURE — 25000242 PHARM REV CODE 250 ALT 637 W/ HCPCS: Performed by: PHYSICIAN ASSISTANT

## 2020-12-25 PROCEDURE — 94640 AIRWAY INHALATION TREATMENT: CPT

## 2020-12-25 PROCEDURE — 63600175 PHARM REV CODE 636 W HCPCS: Performed by: PHYSICIAN ASSISTANT

## 2020-12-25 RX ORDER — METHYLPREDNISOLONE SOD SUCC 125 MG
125 VIAL (EA) INJECTION
Status: COMPLETED | OUTPATIENT
Start: 2020-12-25 | End: 2020-12-25

## 2020-12-25 RX ORDER — IPRATROPIUM BROMIDE AND ALBUTEROL SULFATE 2.5; .5 MG/3ML; MG/3ML
3 SOLUTION RESPIRATORY (INHALATION)
Status: COMPLETED | OUTPATIENT
Start: 2020-12-25 | End: 2020-12-25

## 2020-12-25 RX ORDER — PREDNISONE 20 MG/1
40 TABLET ORAL DAILY
Qty: 6 TABLET | Refills: 0 | Status: SHIPPED | OUTPATIENT
Start: 2020-12-25 | End: 2020-12-28

## 2020-12-25 RX ADMIN — METHYLPREDNISOLONE SODIUM SUCCINATE 125 MG: 125 INJECTION, POWDER, FOR SOLUTION INTRAMUSCULAR; INTRAVENOUS at 06:12

## 2020-12-25 RX ADMIN — IPRATROPIUM BROMIDE AND ALBUTEROL SULFATE 3 ML: .5; 3 SOLUTION RESPIRATORY (INHALATION) at 06:12

## 2020-12-25 RX ADMIN — IPRATROPIUM BROMIDE AND ALBUTEROL SULFATE 3 ML: .5; 3 SOLUTION RESPIRATORY (INHALATION) at 07:12

## 2020-12-25 NOTE — ED PROVIDER NOTES
Encounter Date: 2020    SCRIBE #1 NOTE: I, Griselda العراقي, am scribing for, and in the presence of,  Giuseppe Joy PA-C. I have scribed the following portions of the note - Other sections scribed: HPI, ROS, PE.       History     Chief Complaint   Patient presents with    Shortness of Breath     Patient c/o sob x 3 days with hx of COPD.  Reports completing 2 neb treatments pta.  Denies cough, congestion, fever, n/v, chest pain.     Micah Laurent is a 80 y.o. male, with a PMHx of COPD, Asthma, Diabetes Mellitus, HTN, Atrial Fibrillation, Pleural Effusion, CKD3, who presents to the ED with shortness of breath and palpitations that started 3 days ago. Patient has been compliant with his Nebulizer and inhaler treatments, but saw no alleviation with symptoms. He denies chest pain, fevers, chills, myalgias, congestion, sore throat, rhinorrhea, nausea, vomiting,diarrhea, leg swelling, or other associated symptoms. His O2 Saturation is currently at 96%. Patient has a drug allergy to Lisinopril.       The history is provided by the patient.     Review of patient's allergies indicates:   Allergen Reactions    Lisinopril      Swells lower legs.     Past Medical History:   Diagnosis Date    Asthma     Atrial fibrillation     COPD (chronic obstructive pulmonary disease)     Diabetes mellitus, new onset     [place] (hard of hearing)     Hypertension      Past Surgical History:   Procedure Laterality Date    NO PAST SURGERIES  2020     History reviewed. No pertinent family history.  Social History     Tobacco Use    Smoking status: Former Smoker     Packs/day: 1.50     Years: 20.00     Pack years: 30.00     Types: Cigarettes     Quit date:      Years since quittin.0    Smokeless tobacco: Former User    Tobacco comment: quit in    Substance Use Topics    Alcohol use: No    Drug use: No     Review of Systems   Constitutional: Negative for chills and fever.   HENT: Negative for congestion, rhinorrhea  and sore throat.    Eyes: Negative for visual disturbance.   Respiratory: Positive for shortness of breath. Negative for cough.    Cardiovascular: Positive for palpitations. Negative for chest pain and leg swelling.   Gastrointestinal: Negative for abdominal pain, diarrhea, nausea and vomiting.   Genitourinary: Negative for dysuria, frequency and hematuria.   Musculoskeletal: Negative for back pain.   Skin: Negative for rash.   Neurological: Negative for dizziness, weakness and headaches.   Psychiatric/Behavioral: Negative for confusion.       Physical Exam     Initial Vitals [12/25/20 0608]   BP Pulse Resp Temp SpO2   (!) 184/84 91 (!) 22 98.4 °F (36.9 °C) (!) 94 %      MAP       --         Physical Exam    Nursing note and vitals reviewed.  Constitutional: He appears well-developed and well-nourished. He is not diaphoretic. No distress.   HENT:   Head: Normocephalic and atraumatic.   Eyes: EOM are normal. Pupils are equal, round, and reactive to light.   Neck: Normal range of motion. Neck supple. No thyromegaly present. No JVD present.   Cardiovascular: Normal rate and regular rhythm. Exam reveals no gallop and no friction rub.    No murmur heard.  No lower extremity edema present.    Pulmonary/Chest: Breath sounds normal. No respiratory distress.   There are expiratory wheezes.    Abdominal: Soft. Bowel sounds are normal. There is no abdominal tenderness.   Musculoskeletal: Normal range of motion. No tenderness or edema.   Neurological: He is alert and oriented to person, place, and time. He has normal strength. GCS score is 15. GCS eye subscore is 4. GCS verbal subscore is 5. GCS motor subscore is 6.   Skin: Skin is warm. Capillary refill takes less than 2 seconds.   Psychiatric: He has a normal mood and affect. His behavior is normal. Thought content normal.         ED Course   Procedures  Labs Reviewed   CBC W/ AUTO DIFFERENTIAL - Abnormal; Notable for the following components:       Result Value    RBC 4.34  "(*)     Hemoglobin 12.0 (*)     Hematocrit 37.3 (*)     RDW 14.6 (*)     All other components within normal limits   COMPREHENSIVE METABOLIC PANEL - Abnormal; Notable for the following components:    Glucose 148 (*)     eGFR if non  56 (*)     All other components within normal limits   B-TYPE NATRIURETIC PEPTIDE - Abnormal; Notable for the following components:     (*)     All other components within normal limits   URINALYSIS MICROSCOPIC - Abnormal; Notable for the following components:    Non-Squam Epith 1 (*)     All other components within normal limits    Narrative:     Specimen Source->Urine   TROPONIN I   URINALYSIS, REFLEX TO URINE CULTURE    Narrative:     Specimen Source->Urine   SARS-COV-2 RDRP GENE     EKG Readings: (Independently Interpreted)   This patient is in a sinus rhythm with a heart rate of 76.  There are frequent PVCs.  Axis is normal.  This patient may have left ventricular hypertrophy.  There are nonspecific ST segment changes.  There are no significant T-wave changes.  There is no evidence of acute myocardial infarction or malignant arrhythmia.       Imaging Results          X-Ray Chest AP Portable (Final result)  Result time 12/25/20 07:04:10    Final result by Saturnino Martínez MD (12/25/20 07:04:10)                 Impression:      No acute finding or detrimental change when compared with 12/18/2020.      Electronically signed by: Saturnino Martínez MD  Date:    12/25/2020  Time:    07:04             Narrative:    EXAMINATION:  XR CHEST AP PORTABLE    CLINICAL HISTORY:  Provided history is "sob;  ".    TECHNIQUE:  One view of the chest.    COMPARISON:  12/18/2020.    FINDINGS:  Cardiac wires overlie the chest.  Cardiomediastinal silhouette is not enlarged.  No focal consolidation.  No sizable pleural effusion.  No pneumothorax.  No detrimental change in lung aeration.                                 Medical Decision Making:   History:   Old Medical Records: I decided " to obtain old medical records.  Clinical Tests:   Lab Tests: Ordered and Reviewed  Radiological Study: Ordered and Reviewed            Scribe Attestation:   Scribe #1: I performed the above scribed service and the documentation accurately describes the services I performed. I attest to the accuracy of the note.                      Clinical Impression:       ICD-10-CM ICD-9-CM   1. COPD exacerbation  J44.1 491.21   2. Shortness of breath  R06.02 786.05   3. Cough  R05 786.2                          ED Disposition Condition    Discharge Stable        ED Prescriptions     Medication Sig Dispense Start Date End Date Auth. Provider    predniSONE (DELTASONE) 20 MG tablet Take 2 tablets (40 mg total) by mouth once daily. for 3 days 6 tablet 12/25/2020 12/28/2020 Giuseppe Joy PA-C        Follow-up Information     Follow up With Specialties Details Why Contact Info    Chelsy Torrez MD Endocrinology Call on 12/28/2020  [address] [facility] [facility] [address]  [facility] [address]  [phone]      [facility] Emergency Medicine Go to  If symptoms worsen [address] [facility] [address] [phone]                          I personally performed the services described in this documentation.  All medical record  entries made by the scribe are at my direction and in my presence.  Signed, Dr. Lashawn Hardin MD  12/25/20 1633       Wili Hardin MD  12/25/20 3193

## 2020-12-25 NOTE — DISCHARGE INSTRUCTIONS
Please take new medication as directed and follow discharge instructions provided.  Please make sure to follow-up with your PCP on Monday to discuss today's emergency department visit and for further evaluation and management.  Please return emergency department immediately if her symptoms worsen or you develop any additional concerning symptoms.

## 2021-01-30 ENCOUNTER — HOSPITAL ENCOUNTER (EMERGENCY)
Facility: HOSPITAL | Age: 81
Discharge: HOME | End: 2021-01-30
Attending: EMERGENCY MEDICINE
Payer: MEDICARE

## 2021-01-30 VITALS
WEIGHT: 155 LBS | TEMPERATURE: 98 F | BODY MASS INDEX: 25.83 KG/M2 | DIASTOLIC BLOOD PRESSURE: 84 MMHG | SYSTOLIC BLOOD PRESSURE: 144 MMHG | OXYGEN SATURATION: 95 % | HEART RATE: 68 BPM | HEIGHT: 65 IN | RESPIRATION RATE: 17 BRPM

## 2021-01-30 DIAGNOSIS — J44.1 COPD EXACERBATION: Primary | ICD-10-CM

## 2021-01-30 DIAGNOSIS — R07.9 CHEST PAIN: ICD-10-CM

## 2021-01-30 LAB
ALBUMIN SERPL BCP-MCNC: 3.7 G/DL (ref 3.5–5.2)
ALP SERPL-CCNC: 72 U/L (ref 55–135)
ALT SERPL W/O P-5'-P-CCNC: 36 U/L (ref 10–44)
ANION GAP SERPL CALC-SCNC: 12 MMOL/L (ref 8–16)
AST SERPL-CCNC: 32 U/L (ref 10–40)
BASOPHILS # BLD AUTO: 0.01 K/UL (ref 0–0.2)
BASOPHILS NFR BLD: 0.1 % (ref 0–1.9)
BILIRUB SERPL-MCNC: 0.5 MG/DL (ref 0.1–1)
BNP SERPL-MCNC: 215 PG/ML (ref 0–99)
BUN SERPL-MCNC: 21 MG/DL (ref 8–23)
CALCIUM SERPL-MCNC: 8.9 MG/DL (ref 8.7–10.5)
CHLORIDE SERPL-SCNC: 101 MMOL/L (ref 95–110)
CO2 SERPL-SCNC: 29 MMOL/L (ref 23–29)
CREAT SERPL-MCNC: 1.2 MG/DL (ref 0.5–1.4)
CTP QC/QA: YES
DIFFERENTIAL METHOD: ABNORMAL
EOSINOPHIL # BLD AUTO: 0 K/UL (ref 0–0.5)
EOSINOPHIL NFR BLD: 0 % (ref 0–8)
ERYTHROCYTE [DISTWIDTH] IN BLOOD BY AUTOMATED COUNT: 15.1 % (ref 11.5–14.5)
EST. GFR  (AFRICAN AMERICAN): >60 ML/MIN/1.73 M^2
EST. GFR  (NON AFRICAN AMERICAN): 56 ML/MIN/1.73 M^2
GLUCOSE SERPL-MCNC: 106 MG/DL (ref 70–110)
HCT VFR BLD AUTO: 40.4 % (ref 40–54)
HGB BLD-MCNC: 12.5 G/DL (ref 14–18)
IMM GRANULOCYTES # BLD AUTO: 0.05 K/UL (ref 0–0.04)
IMM GRANULOCYTES NFR BLD AUTO: 0.4 % (ref 0–0.5)
LYMPHOCYTES # BLD AUTO: 1.4 K/UL (ref 1–4.8)
LYMPHOCYTES NFR BLD: 11.3 % (ref 18–48)
MCH RBC QN AUTO: 26.9 PG (ref 27–31)
MCHC RBC AUTO-ENTMCNC: 30.9 G/DL (ref 32–36)
MCV RBC AUTO: 87 FL (ref 82–98)
MONOCYTES # BLD AUTO: 0.9 K/UL (ref 0.3–1)
MONOCYTES NFR BLD: 6.9 % (ref 4–15)
NEUTROPHILS # BLD AUTO: 10.2 K/UL (ref 1.8–7.7)
NEUTROPHILS NFR BLD: 81.3 % (ref 38–73)
NRBC BLD-RTO: 0 /100 WBC
PLATELET # BLD AUTO: 172 K/UL (ref 150–350)
PMV BLD AUTO: 11.2 FL (ref 9.2–12.9)
POTASSIUM SERPL-SCNC: 4.2 MMOL/L (ref 3.5–5.1)
PROT SERPL-MCNC: 8.4 G/DL (ref 6–8.4)
RBC # BLD AUTO: 4.65 M/UL (ref 4.6–6.2)
SARS-COV-2 RDRP RESP QL NAA+PROBE: NEGATIVE
SODIUM SERPL-SCNC: 142 MMOL/L (ref 136–145)
TROPONIN I SERPL DL<=0.01 NG/ML-MCNC: 0.01 NG/ML (ref 0–0.03)
TROPONIN I SERPL DL<=0.01 NG/ML-MCNC: 0.02 NG/ML (ref 0–0.03)
WBC # BLD AUTO: 12.49 K/UL (ref 3.9–12.7)

## 2021-01-30 PROCEDURE — 96374 THER/PROPH/DIAG INJ IV PUSH: CPT

## 2021-01-30 PROCEDURE — 93005 ELECTROCARDIOGRAM TRACING: CPT

## 2021-01-30 PROCEDURE — 83880 ASSAY OF NATRIURETIC PEPTIDE: CPT

## 2021-01-30 PROCEDURE — 63600175 PHARM REV CODE 636 W HCPCS: Performed by: EMERGENCY MEDICINE

## 2021-01-30 PROCEDURE — 85025 COMPLETE CBC W/AUTO DIFF WBC: CPT

## 2021-01-30 PROCEDURE — 94640 AIRWAY INHALATION TREATMENT: CPT

## 2021-01-30 PROCEDURE — 84484 ASSAY OF TROPONIN QUANT: CPT

## 2021-01-30 PROCEDURE — 80053 COMPREHEN METABOLIC PANEL: CPT

## 2021-01-30 PROCEDURE — 93010 ELECTROCARDIOGRAM REPORT: CPT | Mod: ,,, | Performed by: INTERNAL MEDICINE

## 2021-01-30 PROCEDURE — 93010 EKG 12-LEAD: ICD-10-PCS | Mod: ,,, | Performed by: INTERNAL MEDICINE

## 2021-01-30 PROCEDURE — U0002 COVID-19 LAB TEST NON-CDC: HCPCS | Performed by: EMERGENCY MEDICINE

## 2021-01-30 PROCEDURE — 25000242 PHARM REV CODE 250 ALT 637 W/ HCPCS: Performed by: EMERGENCY MEDICINE

## 2021-01-30 PROCEDURE — 99285 EMERGENCY DEPT VISIT HI MDM: CPT | Mod: 25

## 2021-01-30 RX ORDER — METHYLPREDNISOLONE SOD SUCC 125 MG
125 VIAL (EA) INJECTION
Status: COMPLETED | OUTPATIENT
Start: 2021-01-30 | End: 2021-01-30

## 2021-01-30 RX ORDER — PREDNISONE 50 MG/1
50 TABLET ORAL DAILY
Qty: 5 TABLET | Refills: 0 | Status: SHIPPED | OUTPATIENT
Start: 2021-01-31 | End: 2021-02-05

## 2021-01-30 RX ORDER — IPRATROPIUM BROMIDE AND ALBUTEROL SULFATE 2.5; .5 MG/3ML; MG/3ML
3 SOLUTION RESPIRATORY (INHALATION)
Status: COMPLETED | OUTPATIENT
Start: 2021-01-30 | End: 2021-01-30

## 2021-01-30 RX ORDER — ASPIRIN 325 MG
325 TABLET ORAL
Status: DISCONTINUED | OUTPATIENT
Start: 2021-01-30 | End: 2021-01-30 | Stop reason: HOSPADM

## 2021-01-30 RX ADMIN — IPRATROPIUM BROMIDE AND ALBUTEROL SULFATE 3 ML: .5; 3 SOLUTION RESPIRATORY (INHALATION) at 03:01

## 2021-01-30 RX ADMIN — METHYLPREDNISOLONE SODIUM SUCCINATE 125 MG: 125 INJECTION, POWDER, FOR SOLUTION INTRAMUSCULAR; INTRAVENOUS at 03:01

## 2021-04-03 ENCOUNTER — HOSPITAL ENCOUNTER (EMERGENCY)
Facility: HOSPITAL | Age: 81
Discharge: HOME | End: 2021-04-04
Attending: EMERGENCY MEDICINE
Payer: MEDICARE

## 2021-04-03 DIAGNOSIS — R05.9 COUGH: ICD-10-CM

## 2021-04-03 DIAGNOSIS — R06.02 SHORTNESS OF BREATH: ICD-10-CM

## 2021-04-03 DIAGNOSIS — R07.89 CHEST WALL PAIN: ICD-10-CM

## 2021-04-03 DIAGNOSIS — J44.1 COPD WITH ACUTE EXACERBATION: Primary | ICD-10-CM

## 2021-04-03 LAB
ALBUMIN SERPL BCP-MCNC: 3.6 G/DL (ref 3.5–5.2)
ALP SERPL-CCNC: 63 U/L (ref 55–135)
ALT SERPL W/O P-5'-P-CCNC: 36 U/L (ref 10–44)
ANION GAP SERPL CALC-SCNC: 10 MMOL/L (ref 8–16)
AST SERPL-CCNC: 38 U/L (ref 10–40)
BASOPHILS # BLD AUTO: 0 K/UL (ref 0–0.2)
BASOPHILS NFR BLD: 0 % (ref 0–1.9)
BILIRUB SERPL-MCNC: 0.5 MG/DL (ref 0.1–1)
BUN SERPL-MCNC: 24 MG/DL (ref 8–23)
CALCIUM SERPL-MCNC: 9 MG/DL (ref 8.7–10.5)
CHLORIDE SERPL-SCNC: 103 MMOL/L (ref 95–110)
CO2 SERPL-SCNC: 29 MMOL/L (ref 23–29)
CREAT SERPL-MCNC: 1.3 MG/DL (ref 0.5–1.4)
CTP QC/QA: YES
DIFFERENTIAL METHOD: ABNORMAL
EOSINOPHIL # BLD AUTO: 0 K/UL (ref 0–0.5)
EOSINOPHIL NFR BLD: 0 % (ref 0–8)
ERYTHROCYTE [DISTWIDTH] IN BLOOD BY AUTOMATED COUNT: 15.3 % (ref 11.5–14.5)
EST. GFR  (AFRICAN AMERICAN): 59 ML/MIN/1.73 M^2
EST. GFR  (NON AFRICAN AMERICAN): 51 ML/MIN/1.73 M^2
GLUCOSE SERPL-MCNC: 74 MG/DL (ref 70–110)
HCT VFR BLD AUTO: 36.1 % (ref 40–54)
HGB BLD-MCNC: 11.5 G/DL (ref 14–18)
IMM GRANULOCYTES # BLD AUTO: 0.03 K/UL (ref 0–0.04)
IMM GRANULOCYTES NFR BLD AUTO: 0.5 % (ref 0–0.5)
LYMPHOCYTES # BLD AUTO: 1.5 K/UL (ref 1–4.8)
LYMPHOCYTES NFR BLD: 23.8 % (ref 18–48)
MCH RBC QN AUTO: 27.2 PG (ref 27–31)
MCHC RBC AUTO-ENTMCNC: 31.9 G/DL (ref 32–36)
MCV RBC AUTO: 85 FL (ref 82–98)
MONOCYTES # BLD AUTO: 0.6 K/UL (ref 0.3–1)
MONOCYTES NFR BLD: 9 % (ref 4–15)
NEUTROPHILS # BLD AUTO: 4.2 K/UL (ref 1.8–7.7)
NEUTROPHILS NFR BLD: 66.7 % (ref 38–73)
NRBC BLD-RTO: 0 /100 WBC
PLATELET # BLD AUTO: 151 K/UL (ref 150–450)
PMV BLD AUTO: ABNORMAL FL (ref 9.2–12.9)
POCT GLUCOSE: 83 MG/DL (ref 70–110)
POTASSIUM SERPL-SCNC: 4.1 MMOL/L (ref 3.5–5.1)
PROT SERPL-MCNC: 8.5 G/DL (ref 6–8.4)
RBC # BLD AUTO: 4.23 M/UL (ref 4.6–6.2)
SARS-COV-2 RDRP RESP QL NAA+PROBE: NEGATIVE
SODIUM SERPL-SCNC: 142 MMOL/L (ref 136–145)
TROPONIN I SERPL DL<=0.01 NG/ML-MCNC: 0.01 NG/ML (ref 0–0.03)
WBC # BLD AUTO: 6.3 K/UL (ref 3.9–12.7)

## 2021-04-03 PROCEDURE — 96374 THER/PROPH/DIAG INJ IV PUSH: CPT

## 2021-04-03 PROCEDURE — 99285 EMERGENCY DEPT VISIT HI MDM: CPT | Mod: 25

## 2021-04-03 PROCEDURE — 63600175 PHARM REV CODE 636 W HCPCS: Performed by: EMERGENCY MEDICINE

## 2021-04-03 PROCEDURE — U0002 COVID-19 LAB TEST NON-CDC: HCPCS | Performed by: EMERGENCY MEDICINE

## 2021-04-03 PROCEDURE — 94640 AIRWAY INHALATION TREATMENT: CPT

## 2021-04-03 PROCEDURE — 25000242 PHARM REV CODE 250 ALT 637 W/ HCPCS: Performed by: EMERGENCY MEDICINE

## 2021-04-03 PROCEDURE — 80053 COMPREHEN METABOLIC PANEL: CPT | Performed by: EMERGENCY MEDICINE

## 2021-04-03 PROCEDURE — 83880 ASSAY OF NATRIURETIC PEPTIDE: CPT | Performed by: EMERGENCY MEDICINE

## 2021-04-03 PROCEDURE — 93005 ELECTROCARDIOGRAM TRACING: CPT

## 2021-04-03 PROCEDURE — 85025 COMPLETE CBC W/AUTO DIFF WBC: CPT | Performed by: EMERGENCY MEDICINE

## 2021-04-03 PROCEDURE — 82962 GLUCOSE BLOOD TEST: CPT

## 2021-04-03 PROCEDURE — 93010 ELECTROCARDIOGRAM REPORT: CPT | Mod: ,,, | Performed by: INTERNAL MEDICINE

## 2021-04-03 PROCEDURE — 84484 ASSAY OF TROPONIN QUANT: CPT | Performed by: EMERGENCY MEDICINE

## 2021-04-03 PROCEDURE — 93010 EKG 12-LEAD: ICD-10-PCS | Mod: ,,, | Performed by: INTERNAL MEDICINE

## 2021-04-03 PROCEDURE — 94644 CONT INHLJ TX 1ST HOUR: CPT

## 2021-04-03 RX ORDER — METOPROLOL SUCCINATE 50 MG/1
50 TABLET, EXTENDED RELEASE ORAL DAILY
COMMUNITY

## 2021-04-03 RX ORDER — IPRATROPIUM BROMIDE 0.5 MG/2.5ML
1.5 SOLUTION RESPIRATORY (INHALATION)
Status: COMPLETED | OUTPATIENT
Start: 2021-04-03 | End: 2021-04-03

## 2021-04-03 RX ORDER — ALBUTEROL SULFATE 2.5 MG/.5ML
10 SOLUTION RESPIRATORY (INHALATION)
Status: COMPLETED | OUTPATIENT
Start: 2021-04-03 | End: 2021-04-03

## 2021-04-03 RX ORDER — METHYLPREDNISOLONE SOD SUCC 125 MG
125 VIAL (EA) INJECTION
Status: COMPLETED | OUTPATIENT
Start: 2021-04-03 | End: 2021-04-03

## 2021-04-03 RX ADMIN — METHYLPREDNISOLONE SODIUM SUCCINATE 125 MG: 125 INJECTION, POWDER, FOR SOLUTION INTRAMUSCULAR; INTRAVENOUS at 11:04

## 2021-04-03 RX ADMIN — ALBUTEROL SULFATE 10 MG: 2.5 SOLUTION RESPIRATORY (INHALATION) at 11:04

## 2021-04-03 RX ADMIN — IPRATROPIUM BROMIDE 1.5 MG: 0.5 SOLUTION RESPIRATORY (INHALATION) at 11:04

## 2021-04-04 VITALS
OXYGEN SATURATION: 100 % | DIASTOLIC BLOOD PRESSURE: 94 MMHG | TEMPERATURE: 99 F | WEIGHT: 155 LBS | HEART RATE: 75 BPM | SYSTOLIC BLOOD PRESSURE: 174 MMHG | RESPIRATION RATE: 18 BRPM | BODY MASS INDEX: 25.83 KG/M2 | HEIGHT: 65 IN

## 2021-04-04 LAB — BNP SERPL-MCNC: 166 PG/ML (ref 0–99)

## 2021-04-04 PROCEDURE — 94640 AIRWAY INHALATION TREATMENT: CPT

## 2021-04-04 PROCEDURE — 25000242 PHARM REV CODE 250 ALT 637 W/ HCPCS: Performed by: EMERGENCY MEDICINE

## 2021-04-04 PROCEDURE — 63600175 PHARM REV CODE 636 W HCPCS: Performed by: EMERGENCY MEDICINE

## 2021-04-04 PROCEDURE — 63700000 PHARM REV CODE 250 ALT 637 W/O HCPCS: Performed by: EMERGENCY MEDICINE

## 2021-04-04 RX ORDER — ALBUTEROL SULFATE 90 UG/1
2 AEROSOL, METERED RESPIRATORY (INHALATION) EVERY 4 HOURS PRN
Qty: 18 G | Refills: 2 | Status: ON HOLD | OUTPATIENT
Start: 2021-04-04 | End: 2021-04-11 | Stop reason: HOSPADM

## 2021-04-04 RX ORDER — IPRATROPIUM BROMIDE AND ALBUTEROL SULFATE 2.5; .5 MG/3ML; MG/3ML
3 SOLUTION RESPIRATORY (INHALATION) EVERY 4 HOURS PRN
Qty: 1 BOX | Refills: 2 | Status: SHIPPED | OUTPATIENT
Start: 2021-04-04 | End: 2021-05-10 | Stop reason: SDUPTHER

## 2021-04-04 RX ORDER — PREDNISONE 20 MG/1
40 TABLET ORAL
Status: COMPLETED | OUTPATIENT
Start: 2021-04-04 | End: 2021-04-04

## 2021-04-04 RX ORDER — FLUTICASONE PROPIONATE AND SALMETEROL 250; 50 UG/1; UG/1
1 POWDER RESPIRATORY (INHALATION) 2 TIMES DAILY
Qty: 60 EACH | Refills: 2 | Status: SHIPPED | OUTPATIENT
Start: 2021-04-04 | End: 2021-05-25 | Stop reason: SDUPTHER

## 2021-04-04 RX ORDER — AZITHROMYCIN 250 MG/1
TABLET, FILM COATED ORAL
Qty: 6 TABLET | Refills: 0 | Status: ON HOLD | OUTPATIENT
Start: 2021-04-04 | End: 2021-04-11 | Stop reason: HOSPADM

## 2021-04-04 RX ORDER — AZITHROMYCIN 250 MG/1
500 TABLET, FILM COATED ORAL
Status: COMPLETED | OUTPATIENT
Start: 2021-04-04 | End: 2021-04-04

## 2021-04-04 RX ORDER — PREDNISONE 20 MG/1
40 TABLET ORAL DAILY
Qty: 10 TABLET | Refills: 0 | Status: ON HOLD | OUTPATIENT
Start: 2021-04-04 | End: 2021-04-11

## 2021-04-04 RX ORDER — IPRATROPIUM BROMIDE AND ALBUTEROL SULFATE 2.5; .5 MG/3ML; MG/3ML
3 SOLUTION RESPIRATORY (INHALATION)
Status: COMPLETED | OUTPATIENT
Start: 2021-04-04 | End: 2021-04-04

## 2021-04-04 RX ADMIN — AZITHROMYCIN 500 MG: 250 TABLET, FILM COATED ORAL at 01:04

## 2021-04-04 RX ADMIN — PREDNISONE 40 MG: 20 TABLET ORAL at 01:04

## 2021-04-04 RX ADMIN — IPRATROPIUM BROMIDE AND ALBUTEROL SULFATE 3 ML: .5; 3 SOLUTION RESPIRATORY (INHALATION) at 01:04

## 2021-04-10 ENCOUNTER — HOSPITAL ENCOUNTER (OUTPATIENT)
Facility: HOSPITAL | Age: 81
Discharge: HOME | End: 2021-04-11
Attending: EMERGENCY MEDICINE | Admitting: EMERGENCY MEDICINE
Payer: MEDICARE

## 2021-04-10 DIAGNOSIS — R09.02 HYPOXEMIA: ICD-10-CM

## 2021-04-10 DIAGNOSIS — J44.1 COPD WITH ACUTE EXACERBATION: ICD-10-CM

## 2021-04-10 DIAGNOSIS — J44.1 COPD EXACERBATION: ICD-10-CM

## 2021-04-10 DIAGNOSIS — R07.89 CHEST PAIN, NON-CARDIAC: ICD-10-CM

## 2021-04-10 DIAGNOSIS — R11.10 VOMITING AND DIARRHEA: Primary | ICD-10-CM

## 2021-04-10 DIAGNOSIS — R19.7 VOMITING AND DIARRHEA: Primary | ICD-10-CM

## 2021-04-10 DIAGNOSIS — R06.02 SHORTNESS OF BREATH: ICD-10-CM

## 2021-04-10 PROBLEM — R11.2 NAUSEA & VOMITING: Status: ACTIVE | Noted: 2021-04-10

## 2021-04-10 PROBLEM — R11.0 NAUSEA: Status: ACTIVE | Noted: 2021-04-10

## 2021-04-10 PROBLEM — E87.3 METABOLIC ALKALOSIS: Status: ACTIVE | Noted: 2021-04-10

## 2021-04-10 PROBLEM — R11.0 NAUSEA: Status: RESOLVED | Noted: 2021-04-10 | Resolved: 2021-04-10

## 2021-04-10 LAB
ALBUMIN SERPL BCP-MCNC: 3.5 G/DL (ref 3.5–5.2)
ALLENS TEST: ABNORMAL
ALP SERPL-CCNC: 68 U/L (ref 55–135)
ALT SERPL W/O P-5'-P-CCNC: 41 U/L (ref 10–44)
ANION GAP SERPL CALC-SCNC: 9 MMOL/L (ref 8–16)
AST SERPL-CCNC: 41 U/L (ref 10–40)
BACTERIA #/AREA URNS HPF: ABNORMAL /HPF
BASOPHILS # BLD AUTO: 0.01 K/UL (ref 0–0.2)
BASOPHILS NFR BLD: 0.1 % (ref 0–1.9)
BILIRUB SERPL-MCNC: 0.6 MG/DL (ref 0.1–1)
BILIRUB UR QL STRIP: NEGATIVE
BNP SERPL-MCNC: 136 PG/ML (ref 0–99)
BUN SERPL-MCNC: 22 MG/DL (ref 8–23)
CALCIUM SERPL-MCNC: 8.6 MG/DL (ref 8.7–10.5)
CHLORIDE SERPL-SCNC: 102 MMOL/L (ref 95–110)
CLARITY UR: CLEAR
CO2 SERPL-SCNC: 29 MMOL/L (ref 23–29)
COLOR UR: YELLOW
CREAT SERPL-MCNC: 1.2 MG/DL (ref 0.5–1.4)
CTP QC/QA: YES
DELSYS: ABNORMAL
DIFFERENTIAL METHOD: ABNORMAL
EOSINOPHIL # BLD AUTO: 0 K/UL (ref 0–0.5)
EOSINOPHIL NFR BLD: 0 % (ref 0–8)
ERYTHROCYTE [DISTWIDTH] IN BLOOD BY AUTOMATED COUNT: 15.9 % (ref 11.5–14.5)
ERYTHROCYTE [SEDIMENTATION RATE] IN BLOOD BY WESTERGREN METHOD: 22 MM/H
EST. GFR  (AFRICAN AMERICAN): >60 ML/MIN/1.73 M^2
EST. GFR  (NON AFRICAN AMERICAN): 56 ML/MIN/1.73 M^2
FIO2: 28
FLOW: 2
GLUCOSE SERPL-MCNC: 126 MG/DL (ref 70–110)
GLUCOSE UR QL STRIP: NEGATIVE
HCO3 UR-SCNC: 35.2 MMOL/L (ref 24–28)
HCT VFR BLD AUTO: 35 % (ref 40–54)
HGB BLD-MCNC: 11.6 G/DL (ref 14–18)
HGB UR QL STRIP: ABNORMAL
HYALINE CASTS #/AREA URNS LPF: 0 /LPF
IMM GRANULOCYTES # BLD AUTO: 0.11 K/UL (ref 0–0.04)
IMM GRANULOCYTES NFR BLD AUTO: 1.2 % (ref 0–0.5)
KETONES UR QL STRIP: NEGATIVE
LEUKOCYTE ESTERASE UR QL STRIP: NEGATIVE
LYMPHOCYTES # BLD AUTO: 0.6 K/UL (ref 1–4.8)
LYMPHOCYTES NFR BLD: 6.7 % (ref 18–48)
MCH RBC QN AUTO: 27.3 PG (ref 27–31)
MCHC RBC AUTO-ENTMCNC: 33.1 G/DL (ref 32–36)
MCV RBC AUTO: 82 FL (ref 82–98)
MICROSCOPIC COMMENT: ABNORMAL
MODE: ABNORMAL
MONOCYTES # BLD AUTO: 0.4 K/UL (ref 0.3–1)
MONOCYTES NFR BLD: 4.3 % (ref 4–15)
NEUTROPHILS # BLD AUTO: 8.3 K/UL (ref 1.8–7.7)
NEUTROPHILS NFR BLD: 87.7 % (ref 38–73)
NITRITE UR QL STRIP: NEGATIVE
NRBC BLD-RTO: 1 /100 WBC
PCO2 BLDA: 49.9 MMHG (ref 35–45)
PH SMN: 7.46 [PH] (ref 7.35–7.45)
PH UR STRIP: 7 [PH] (ref 5–8)
PLATELET # BLD AUTO: 194 K/UL (ref 150–450)
PMV BLD AUTO: ABNORMAL FL (ref 9.2–12.9)
PO2 BLDA: 182 MMHG (ref 80–100)
POC BE: 10 MMOL/L
POC SATURATED O2: 100 % (ref 95–100)
POC TCO2: 37 MMOL/L (ref 23–27)
POTASSIUM SERPL-SCNC: 5.1 MMOL/L (ref 3.5–5.1)
PROT SERPL-MCNC: 8.6 G/DL (ref 6–8.4)
PROT UR QL STRIP: ABNORMAL
RBC # BLD AUTO: 4.25 M/UL (ref 4.6–6.2)
RBC #/AREA URNS HPF: 17 /HPF (ref 0–4)
SAMPLE: ABNORMAL
SARS-COV-2 RDRP RESP QL NAA+PROBE: NEGATIVE
SITE: ABNORMAL
SODIUM SERPL-SCNC: 140 MMOL/L (ref 136–145)
SP GR UR STRIP: 1.02 (ref 1–1.03)
SP02: 99
TROPONIN I SERPL DL<=0.01 NG/ML-MCNC: 0.01 NG/ML (ref 0–0.03)
URN SPEC COLLECT METH UR: ABNORMAL
UROBILINOGEN UR STRIP-ACNC: NEGATIVE EU/DL
WBC # BLD AUTO: 9.43 K/UL (ref 3.9–12.7)
WBC #/AREA URNS HPF: 9 /HPF (ref 0–5)

## 2021-04-10 PROCEDURE — 82803 BLOOD GASES ANY COMBINATION: CPT

## 2021-04-10 PROCEDURE — 99900035 HC TECH TIME PER 15 MIN (STAT)

## 2021-04-10 PROCEDURE — 25000242 PHARM REV CODE 250 ALT 637 W/ HCPCS: Performed by: EMERGENCY MEDICINE

## 2021-04-10 PROCEDURE — 94640 AIRWAY INHALATION TREATMENT: CPT

## 2021-04-10 PROCEDURE — 63600175 PHARM REV CODE 636 W HCPCS: Performed by: INTERNAL MEDICINE

## 2021-04-10 PROCEDURE — 93005 ELECTROCARDIOGRAM TRACING: CPT

## 2021-04-10 PROCEDURE — 36600 WITHDRAWAL OF ARTERIAL BLOOD: CPT

## 2021-04-10 PROCEDURE — 81000 URINALYSIS NONAUTO W/SCOPE: CPT | Performed by: EMERGENCY MEDICINE

## 2021-04-10 PROCEDURE — 63600175 PHARM REV CODE 636 W HCPCS: Performed by: EMERGENCY MEDICINE

## 2021-04-10 PROCEDURE — 25000003 PHARM REV CODE 250: Performed by: INTERNAL MEDICINE

## 2021-04-10 PROCEDURE — 99291 CRITICAL CARE FIRST HOUR: CPT | Mod: 25

## 2021-04-10 PROCEDURE — 94761 N-INVAS EAR/PLS OXIMETRY MLT: CPT

## 2021-04-10 PROCEDURE — 80053 COMPREHEN METABOLIC PANEL: CPT | Performed by: PHYSICIAN ASSISTANT

## 2021-04-10 PROCEDURE — 96374 THER/PROPH/DIAG INJ IV PUSH: CPT

## 2021-04-10 PROCEDURE — 25000003 PHARM REV CODE 250: Performed by: EMERGENCY MEDICINE

## 2021-04-10 PROCEDURE — G0378 HOSPITAL OBSERVATION PER HR: HCPCS

## 2021-04-10 PROCEDURE — 85025 COMPLETE CBC W/AUTO DIFF WBC: CPT | Performed by: PHYSICIAN ASSISTANT

## 2021-04-10 PROCEDURE — 11000001 HC ACUTE MED/SURG PRIVATE ROOM

## 2021-04-10 PROCEDURE — 83880 ASSAY OF NATRIURETIC PEPTIDE: CPT | Performed by: EMERGENCY MEDICINE

## 2021-04-10 PROCEDURE — 25000003 PHARM REV CODE 250: Performed by: PHYSICIAN ASSISTANT

## 2021-04-10 PROCEDURE — U0002 COVID-19 LAB TEST NON-CDC: HCPCS | Performed by: PHYSICIAN ASSISTANT

## 2021-04-10 PROCEDURE — 84484 ASSAY OF TROPONIN QUANT: CPT | Performed by: PHYSICIAN ASSISTANT

## 2021-04-10 PROCEDURE — 93010 EKG 12-LEAD: ICD-10-PCS | Mod: ,,, | Performed by: INTERNAL MEDICINE

## 2021-04-10 PROCEDURE — 93010 ELECTROCARDIOGRAM REPORT: CPT | Mod: ,,, | Performed by: INTERNAL MEDICINE

## 2021-04-10 RX ORDER — ALBUTEROL SULFATE 90 UG/1
2 AEROSOL, METERED RESPIRATORY (INHALATION) EVERY 4 HOURS PRN
Status: DISCONTINUED | OUTPATIENT
Start: 2021-04-10 | End: 2021-04-11 | Stop reason: HOSPADM

## 2021-04-10 RX ORDER — METHYLPREDNISOLONE SOD SUCC 125 MG
125 VIAL (EA) INJECTION
Status: COMPLETED | OUTPATIENT
Start: 2021-04-10 | End: 2021-04-10

## 2021-04-10 RX ORDER — ENOXAPARIN SODIUM 100 MG/ML
30 INJECTION SUBCUTANEOUS EVERY 24 HOURS
Status: DISCONTINUED | OUTPATIENT
Start: 2021-04-10 | End: 2021-04-11 | Stop reason: HOSPADM

## 2021-04-10 RX ORDER — IPRATROPIUM BROMIDE AND ALBUTEROL SULFATE 2.5; .5 MG/3ML; MG/3ML
3 SOLUTION RESPIRATORY (INHALATION)
Status: COMPLETED | OUTPATIENT
Start: 2021-04-10 | End: 2021-04-10

## 2021-04-10 RX ORDER — ASPIRIN 81 MG/1
81 TABLET ORAL DAILY
Status: DISCONTINUED | OUTPATIENT
Start: 2021-04-11 | End: 2021-04-11 | Stop reason: HOSPADM

## 2021-04-10 RX ORDER — METOPROLOL SUCCINATE 50 MG/1
50 TABLET, EXTENDED RELEASE ORAL DAILY
Status: DISCONTINUED | OUTPATIENT
Start: 2021-04-11 | End: 2021-04-11 | Stop reason: HOSPADM

## 2021-04-10 RX ORDER — HYDRALAZINE HYDROCHLORIDE 25 MG/1
50 TABLET, FILM COATED ORAL EVERY 8 HOURS
Status: DISCONTINUED | OUTPATIENT
Start: 2021-04-10 | End: 2021-04-11 | Stop reason: HOSPADM

## 2021-04-10 RX ORDER — ASPIRIN 325 MG
325 TABLET ORAL
Status: COMPLETED | OUTPATIENT
Start: 2021-04-10 | End: 2021-04-10

## 2021-04-10 RX ORDER — ALBUTEROL SULFATE 2.5 MG/.5ML
2.5 SOLUTION RESPIRATORY (INHALATION)
Status: COMPLETED | OUTPATIENT
Start: 2021-04-10 | End: 2021-04-10

## 2021-04-10 RX ORDER — ONDANSETRON 4 MG/1
4 TABLET, ORALLY DISINTEGRATING ORAL
Status: COMPLETED | OUTPATIENT
Start: 2021-04-10 | End: 2021-04-10

## 2021-04-10 RX ORDER — FLUTICASONE FUROATE AND VILANTEROL 100; 25 UG/1; UG/1
1 POWDER RESPIRATORY (INHALATION) DAILY
Status: DISCONTINUED | OUTPATIENT
Start: 2021-04-11 | End: 2021-04-11 | Stop reason: HOSPADM

## 2021-04-10 RX ORDER — IPRATROPIUM BROMIDE AND ALBUTEROL SULFATE 2.5; .5 MG/3ML; MG/3ML
3 SOLUTION RESPIRATORY (INHALATION) EVERY 4 HOURS
Status: DISCONTINUED | OUTPATIENT
Start: 2021-04-11 | End: 2021-04-11 | Stop reason: HOSPADM

## 2021-04-10 RX ORDER — SODIUM CHLORIDE 0.9 % (FLUSH) 0.9 %
10 SYRINGE (ML) INJECTION
Status: DISCONTINUED | OUTPATIENT
Start: 2021-04-10 | End: 2021-04-11 | Stop reason: HOSPADM

## 2021-04-10 RX ORDER — PREDNISONE 20 MG/1
40 TABLET ORAL DAILY
Status: DISCONTINUED | OUTPATIENT
Start: 2021-04-11 | End: 2021-04-11 | Stop reason: HOSPADM

## 2021-04-10 RX ADMIN — HYDRALAZINE HYDROCHLORIDE 50 MG: 25 TABLET, FILM COATED ORAL at 10:04

## 2021-04-10 RX ADMIN — ALBUTEROL SULFATE 2.5 MG: 2.5 SOLUTION RESPIRATORY (INHALATION) at 06:04

## 2021-04-10 RX ADMIN — IPRATROPIUM BROMIDE AND ALBUTEROL SULFATE 3 ML: .5; 3 SOLUTION RESPIRATORY (INHALATION) at 11:04

## 2021-04-10 RX ADMIN — ONDANSETRON 4 MG: 4 TABLET, ORALLY DISINTEGRATING ORAL at 05:04

## 2021-04-10 RX ADMIN — ENOXAPARIN SODIUM 30 MG: 30 INJECTION SUBCUTANEOUS at 10:04

## 2021-04-10 RX ADMIN — ASPIRIN 325 MG ORAL TABLET 325 MG: 325 PILL ORAL at 05:04

## 2021-04-10 RX ADMIN — METHYLPREDNISOLONE SODIUM SUCCINATE 125 MG: 125 INJECTION, POWDER, FOR SOLUTION INTRAMUSCULAR; INTRAVENOUS at 06:04

## 2021-04-10 RX ADMIN — IPRATROPIUM BROMIDE AND ALBUTEROL SULFATE 3 ML: .5; 3 SOLUTION RESPIRATORY (INHALATION) at 05:04

## 2021-04-11 VITALS
BODY MASS INDEX: 24.91 KG/M2 | SYSTOLIC BLOOD PRESSURE: 140 MMHG | TEMPERATURE: 99 F | HEIGHT: 66 IN | HEART RATE: 86 BPM | DIASTOLIC BLOOD PRESSURE: 74 MMHG | OXYGEN SATURATION: 95 % | RESPIRATION RATE: 18 BRPM | WEIGHT: 155 LBS

## 2021-04-11 PROBLEM — R11.10 VOMITING AND DIARRHEA: Status: ACTIVE | Noted: 2021-04-11

## 2021-04-11 PROBLEM — R19.7 VOMITING AND DIARRHEA: Status: ACTIVE | Noted: 2021-04-11

## 2021-04-11 LAB
POCT GLUCOSE: 170 MG/DL (ref 70–110)
POCT GLUCOSE: 182 MG/DL (ref 70–110)

## 2021-04-11 PROCEDURE — 94640 AIRWAY INHALATION TREATMENT: CPT

## 2021-04-11 PROCEDURE — 25000242 PHARM REV CODE 250 ALT 637 W/ HCPCS: Performed by: INTERNAL MEDICINE

## 2021-04-11 PROCEDURE — 27000221 HC OXYGEN, UP TO 24 HOURS

## 2021-04-11 PROCEDURE — 63600175 PHARM REV CODE 636 W HCPCS: Performed by: INTERNAL MEDICINE

## 2021-04-11 PROCEDURE — G0378 HOSPITAL OBSERVATION PER HR: HCPCS

## 2021-04-11 PROCEDURE — 25000003 PHARM REV CODE 250: Performed by: INTERNAL MEDICINE

## 2021-04-11 PROCEDURE — 94760 N-INVAS EAR/PLS OXIMETRY 1: CPT

## 2021-04-11 RX ORDER — IPRATROPIUM BROMIDE 0.5 MG/2.5ML
500 SOLUTION RESPIRATORY (INHALATION)
Qty: 1 BOX | Refills: 0 | Status: ON HOLD | OUTPATIENT
Start: 2021-04-11 | End: 2022-01-01 | Stop reason: HOSPADM

## 2021-04-11 RX ORDER — PREDNISONE 20 MG/1
40 TABLET ORAL DAILY
Qty: 10 TABLET | Refills: 0 | Status: SHIPPED | OUTPATIENT
Start: 2021-04-11 | End: 2021-04-16

## 2021-04-11 RX ADMIN — IPRATROPIUM BROMIDE AND ALBUTEROL SULFATE 3 ML: .5; 3 SOLUTION RESPIRATORY (INHALATION) at 09:04

## 2021-04-11 RX ADMIN — SITAGLIPTIN 50 MG: 25 TABLET, FILM COATED ORAL at 09:04

## 2021-04-11 RX ADMIN — FLUTICASONE FUROATE AND VILANTEROL TRIFENATATE 1 PUFF: 100; 25 POWDER RESPIRATORY (INHALATION) at 10:04

## 2021-04-11 RX ADMIN — METOPROLOL SUCCINATE 50 MG: 50 TABLET, EXTENDED RELEASE ORAL at 09:04

## 2021-04-11 RX ADMIN — PREDNISONE 40 MG: 20 TABLET ORAL at 08:04

## 2021-04-11 RX ADMIN — IPRATROPIUM BROMIDE AND ALBUTEROL SULFATE 3 ML: .5; 3 SOLUTION RESPIRATORY (INHALATION) at 04:04

## 2021-04-11 RX ADMIN — ASPIRIN 81 MG: 81 TABLET, COATED ORAL at 08:04

## 2021-04-11 RX ADMIN — HYDRALAZINE HYDROCHLORIDE 50 MG: 25 TABLET, FILM COATED ORAL at 06:04

## 2021-05-09 ENCOUNTER — HOSPITAL ENCOUNTER (EMERGENCY)
Facility: HOSPITAL | Age: 81
Discharge: HOME | End: 2021-05-10
Attending: EMERGENCY MEDICINE
Payer: MEDICARE

## 2021-05-09 DIAGNOSIS — J44.1 COPD EXACERBATION: Primary | ICD-10-CM

## 2021-05-09 DIAGNOSIS — R06.02 SHORTNESS OF BREATH: ICD-10-CM

## 2021-05-09 LAB
BASOPHILS # BLD AUTO: 0.02 K/UL (ref 0–0.2)
BASOPHILS NFR BLD: 0.3 % (ref 0–1.9)
DIFFERENTIAL METHOD: ABNORMAL
EOSINOPHIL # BLD AUTO: 0 K/UL (ref 0–0.5)
EOSINOPHIL NFR BLD: 0 % (ref 0–8)
ERYTHROCYTE [DISTWIDTH] IN BLOOD BY AUTOMATED COUNT: 16.5 % (ref 11.5–14.5)
HCT VFR BLD AUTO: 32.8 % (ref 40–54)
HGB BLD-MCNC: 10.6 G/DL (ref 14–18)
IMM GRANULOCYTES # BLD AUTO: 0.28 K/UL (ref 0–0.04)
IMM GRANULOCYTES NFR BLD AUTO: 3.8 % (ref 0–0.5)
LYMPHOCYTES # BLD AUTO: 1.1 K/UL (ref 1–4.8)
LYMPHOCYTES NFR BLD: 14.9 % (ref 18–48)
MCH RBC QN AUTO: 28.1 PG (ref 27–31)
MCHC RBC AUTO-ENTMCNC: 32.3 G/DL (ref 32–36)
MCV RBC AUTO: 87 FL (ref 82–98)
MONOCYTES # BLD AUTO: 0.4 K/UL (ref 0.3–1)
MONOCYTES NFR BLD: 5.7 % (ref 4–15)
NEUTROPHILS # BLD AUTO: 5.6 K/UL (ref 1.8–7.7)
NEUTROPHILS NFR BLD: 75.3 % (ref 38–73)
NRBC BLD-RTO: 1 /100 WBC
PLATELET # BLD AUTO: 334 K/UL (ref 150–450)
PMV BLD AUTO: 10 FL (ref 9.2–12.9)
RBC # BLD AUTO: 3.77 M/UL (ref 4.6–6.2)
WBC # BLD AUTO: 7.43 K/UL (ref 3.9–12.7)

## 2021-05-09 PROCEDURE — 83880 ASSAY OF NATRIURETIC PEPTIDE: CPT | Performed by: EMERGENCY MEDICINE

## 2021-05-09 PROCEDURE — 93005 ELECTROCARDIOGRAM TRACING: CPT

## 2021-05-09 PROCEDURE — 85025 COMPLETE CBC W/AUTO DIFF WBC: CPT | Performed by: EMERGENCY MEDICINE

## 2021-05-09 PROCEDURE — 94640 AIRWAY INHALATION TREATMENT: CPT

## 2021-05-09 PROCEDURE — 99285 EMERGENCY DEPT VISIT HI MDM: CPT | Mod: 25

## 2021-05-09 PROCEDURE — 84484 ASSAY OF TROPONIN QUANT: CPT | Performed by: EMERGENCY MEDICINE

## 2021-05-09 PROCEDURE — 80053 COMPREHEN METABOLIC PANEL: CPT | Performed by: EMERGENCY MEDICINE

## 2021-05-09 PROCEDURE — 63600175 PHARM REV CODE 636 W HCPCS: Performed by: EMERGENCY MEDICINE

## 2021-05-09 PROCEDURE — 93010 ELECTROCARDIOGRAM REPORT: CPT | Mod: ,,, | Performed by: INTERNAL MEDICINE

## 2021-05-09 PROCEDURE — 96374 THER/PROPH/DIAG INJ IV PUSH: CPT

## 2021-05-09 PROCEDURE — 93010 EKG 12-LEAD: ICD-10-PCS | Mod: ,,, | Performed by: INTERNAL MEDICINE

## 2021-05-09 PROCEDURE — 25000242 PHARM REV CODE 250 ALT 637 W/ HCPCS: Performed by: EMERGENCY MEDICINE

## 2021-05-09 PROCEDURE — 25000003 PHARM REV CODE 250: Performed by: EMERGENCY MEDICINE

## 2021-05-09 RX ORDER — ASPIRIN 325 MG
325 TABLET ORAL
Status: COMPLETED | OUTPATIENT
Start: 2021-05-09 | End: 2021-05-09

## 2021-05-09 RX ORDER — METHYLPREDNISOLONE SOD SUCC 125 MG
125 VIAL (EA) INJECTION
Status: COMPLETED | OUTPATIENT
Start: 2021-05-09 | End: 2021-05-09

## 2021-05-09 RX ORDER — IPRATROPIUM BROMIDE AND ALBUTEROL SULFATE 2.5; .5 MG/3ML; MG/3ML
3 SOLUTION RESPIRATORY (INHALATION)
Status: COMPLETED | OUTPATIENT
Start: 2021-05-09 | End: 2021-05-09

## 2021-05-09 RX ADMIN — IPRATROPIUM BROMIDE AND ALBUTEROL SULFATE 3 ML: .5; 3 SOLUTION RESPIRATORY (INHALATION) at 11:05

## 2021-05-09 RX ADMIN — METHYLPREDNISOLONE SODIUM SUCCINATE 125 MG: 125 INJECTION, POWDER, FOR SOLUTION INTRAMUSCULAR; INTRAVENOUS at 11:05

## 2021-05-09 RX ADMIN — ASPIRIN 325 MG ORAL TABLET 325 MG: 325 PILL ORAL at 11:05

## 2021-05-10 VITALS
RESPIRATION RATE: 20 BRPM | SYSTOLIC BLOOD PRESSURE: 206 MMHG | OXYGEN SATURATION: 99 % | BODY MASS INDEX: 24.27 KG/M2 | WEIGHT: 151 LBS | HEART RATE: 72 BPM | DIASTOLIC BLOOD PRESSURE: 87 MMHG | TEMPERATURE: 98 F | HEIGHT: 66 IN

## 2021-05-10 LAB
ALBUMIN SERPL BCP-MCNC: 2.9 G/DL (ref 3.5–5.2)
ALP SERPL-CCNC: 73 U/L (ref 55–135)
ALT SERPL W/O P-5'-P-CCNC: 29 U/L (ref 10–44)
ANION GAP SERPL CALC-SCNC: 8 MMOL/L (ref 8–16)
AST SERPL-CCNC: 33 U/L (ref 10–40)
BILIRUB SERPL-MCNC: 0.3 MG/DL (ref 0.1–1)
BNP SERPL-MCNC: 228 PG/ML (ref 0–99)
BUN SERPL-MCNC: 22 MG/DL (ref 8–23)
CALCIUM SERPL-MCNC: 8.6 MG/DL (ref 8.7–10.5)
CHLORIDE SERPL-SCNC: 102 MMOL/L (ref 95–110)
CO2 SERPL-SCNC: 29 MMOL/L (ref 23–29)
CREAT SERPL-MCNC: 1.4 MG/DL (ref 0.5–1.4)
EST. GFR  (AFRICAN AMERICAN): 54 ML/MIN/1.73 M^2
EST. GFR  (NON AFRICAN AMERICAN): 47 ML/MIN/1.73 M^2
GLUCOSE SERPL-MCNC: 113 MG/DL (ref 70–110)
POCT GLUCOSE: 96 MG/DL (ref 70–110)
POTASSIUM SERPL-SCNC: 4.5 MMOL/L (ref 3.5–5.1)
PROT SERPL-MCNC: 8.2 G/DL (ref 6–8.4)
SODIUM SERPL-SCNC: 139 MMOL/L (ref 136–145)
TROPONIN I SERPL DL<=0.01 NG/ML-MCNC: 0.01 NG/ML (ref 0–0.03)

## 2021-05-10 PROCEDURE — 25000003 PHARM REV CODE 250: Performed by: EMERGENCY MEDICINE

## 2021-05-10 RX ORDER — HYDRALAZINE HYDROCHLORIDE 25 MG/1
100 TABLET, FILM COATED ORAL
Status: COMPLETED | OUTPATIENT
Start: 2021-05-10 | End: 2021-05-10

## 2021-05-10 RX ORDER — PREDNISONE 50 MG/1
50 TABLET ORAL DAILY
Qty: 5 TABLET | Refills: 0 | Status: SHIPPED | OUTPATIENT
Start: 2021-05-10 | End: 2021-05-15

## 2021-05-10 RX ORDER — IPRATROPIUM BROMIDE AND ALBUTEROL SULFATE 2.5; .5 MG/3ML; MG/3ML
3 SOLUTION RESPIRATORY (INHALATION) EVERY 4 HOURS PRN
Qty: 1 BOX | Refills: 11 | Status: SHIPPED | OUTPATIENT
Start: 2021-05-10 | End: 2021-05-25 | Stop reason: SDUPTHER

## 2021-05-10 RX ADMIN — HYDRALAZINE HYDROCHLORIDE 100 MG: 25 TABLET, FILM COATED ORAL at 12:05

## 2021-05-25 ENCOUNTER — HOSPITAL ENCOUNTER (EMERGENCY)
Facility: HOSPITAL | Age: 81
Discharge: HOME | End: 2021-05-25
Attending: EMERGENCY MEDICINE
Payer: MEDICARE

## 2021-05-25 VITALS
TEMPERATURE: 99 F | BODY MASS INDEX: 27.14 KG/M2 | SYSTOLIC BLOOD PRESSURE: 153 MMHG | OXYGEN SATURATION: 94 % | RESPIRATION RATE: 20 BRPM | WEIGHT: 159 LBS | DIASTOLIC BLOOD PRESSURE: 89 MMHG | HEIGHT: 64 IN | HEART RATE: 76 BPM

## 2021-05-25 DIAGNOSIS — R06.02 SOB (SHORTNESS OF BREATH): ICD-10-CM

## 2021-05-25 DIAGNOSIS — J44.1 COPD WITH ACUTE EXACERBATION: Primary | ICD-10-CM

## 2021-05-25 LAB
CTP QC/QA: YES
SARS-COV-2 RDRP RESP QL NAA+PROBE: NEGATIVE

## 2021-05-25 PROCEDURE — 25000242 PHARM REV CODE 250 ALT 637 W/ HCPCS: Performed by: EMERGENCY MEDICINE

## 2021-05-25 PROCEDURE — 93005 ELECTROCARDIOGRAM TRACING: CPT

## 2021-05-25 PROCEDURE — 93010 ELECTROCARDIOGRAM REPORT: CPT | Mod: ,,, | Performed by: INTERNAL MEDICINE

## 2021-05-25 PROCEDURE — 99285 EMERGENCY DEPT VISIT HI MDM: CPT | Mod: 25

## 2021-05-25 PROCEDURE — U0002 COVID-19 LAB TEST NON-CDC: HCPCS | Performed by: EMERGENCY MEDICINE

## 2021-05-25 PROCEDURE — 94640 AIRWAY INHALATION TREATMENT: CPT

## 2021-05-25 PROCEDURE — 93010 EKG 12-LEAD: ICD-10-PCS | Mod: ,,, | Performed by: INTERNAL MEDICINE

## 2021-05-25 RX ORDER — ALBUTEROL SULFATE 2.5 MG/.5ML
5 SOLUTION RESPIRATORY (INHALATION)
Status: COMPLETED | OUTPATIENT
Start: 2021-05-25 | End: 2021-05-25

## 2021-05-25 RX ORDER — ALBUTEROL SULFATE 2.5 MG/.5ML
2.5 SOLUTION RESPIRATORY (INHALATION)
Status: COMPLETED | OUTPATIENT
Start: 2021-05-25 | End: 2021-05-25

## 2021-05-25 RX ORDER — FLUTICASONE PROPIONATE AND SALMETEROL 250; 50 UG/1; UG/1
1 POWDER RESPIRATORY (INHALATION) 2 TIMES DAILY
Qty: 60 EACH | Refills: 2 | Status: SHIPPED | OUTPATIENT
Start: 2021-05-25 | End: 2022-01-01

## 2021-05-25 RX ORDER — IPRATROPIUM BROMIDE AND ALBUTEROL SULFATE 2.5; .5 MG/3ML; MG/3ML
3 SOLUTION RESPIRATORY (INHALATION) EVERY 4 HOURS PRN
Qty: 1 BOX | Refills: 3 | Status: SHIPPED | OUTPATIENT
Start: 2021-05-25 | End: 2021-07-01 | Stop reason: SDUPTHER

## 2021-05-25 RX ORDER — IPRATROPIUM BROMIDE AND ALBUTEROL SULFATE 2.5; .5 MG/3ML; MG/3ML
3 SOLUTION RESPIRATORY (INHALATION)
Status: COMPLETED | OUTPATIENT
Start: 2021-05-25 | End: 2021-05-25

## 2021-05-25 RX ADMIN — ALBUTEROL SULFATE 2.5 MG: 2.5 SOLUTION RESPIRATORY (INHALATION) at 11:05

## 2021-05-25 RX ADMIN — ALBUTEROL SULFATE 5 MG: 2.5 SOLUTION RESPIRATORY (INHALATION) at 09:05

## 2021-05-25 RX ADMIN — IPRATROPIUM BROMIDE AND ALBUTEROL SULFATE 3 ML: .5; 3 SOLUTION RESPIRATORY (INHALATION) at 09:05

## 2021-06-02 ENCOUNTER — HOSPITAL ENCOUNTER (EMERGENCY)
Facility: HOSPITAL | Age: 81
Discharge: HOME | End: 2021-06-02
Attending: EMERGENCY MEDICINE
Payer: MEDICARE

## 2021-06-02 VITALS
HEIGHT: 67 IN | BODY MASS INDEX: 23.88 KG/M2 | HEART RATE: 99 BPM | OXYGEN SATURATION: 91 % | WEIGHT: 152.13 LBS | TEMPERATURE: 99 F | SYSTOLIC BLOOD PRESSURE: 143 MMHG | RESPIRATION RATE: 20 BRPM | DIASTOLIC BLOOD PRESSURE: 82 MMHG

## 2021-06-02 DIAGNOSIS — J44.9 CHRONIC OBSTRUCTIVE PULMONARY DISEASE, UNSPECIFIED COPD TYPE: Primary | ICD-10-CM

## 2021-06-02 DIAGNOSIS — R06.02 SHORTNESS OF BREATH: ICD-10-CM

## 2021-06-02 DIAGNOSIS — J18.9 PNEUMONIA DUE TO INFECTIOUS ORGANISM, UNSPECIFIED LATERALITY, UNSPECIFIED PART OF LUNG: ICD-10-CM

## 2021-06-02 DIAGNOSIS — R06.02 SOB (SHORTNESS OF BREATH): ICD-10-CM

## 2021-06-02 LAB
ALBUMIN SERPL BCP-MCNC: 3 G/DL (ref 3.5–5.2)
ALP SERPL-CCNC: 69 U/L (ref 55–135)
ALT SERPL W/O P-5'-P-CCNC: 18 U/L (ref 10–44)
ANION GAP SERPL CALC-SCNC: 10 MMOL/L (ref 8–16)
AST SERPL-CCNC: 21 U/L (ref 10–40)
BASOPHILS # BLD AUTO: 0.01 K/UL (ref 0–0.2)
BASOPHILS NFR BLD: 0.1 % (ref 0–1.9)
BILIRUB SERPL-MCNC: 0.4 MG/DL (ref 0.1–1)
BNP SERPL-MCNC: 141 PG/ML (ref 0–99)
BUN SERPL-MCNC: 21 MG/DL (ref 8–23)
CALCIUM SERPL-MCNC: 8.8 MG/DL (ref 8.7–10.5)
CHLORIDE SERPL-SCNC: 104 MMOL/L (ref 95–110)
CO2 SERPL-SCNC: 23 MMOL/L (ref 23–29)
CREAT SERPL-MCNC: 1.6 MG/DL (ref 0.5–1.4)
CTP QC/QA: YES
DIFFERENTIAL METHOD: ABNORMAL
EOSINOPHIL # BLD AUTO: 0 K/UL (ref 0–0.5)
EOSINOPHIL NFR BLD: 0 % (ref 0–8)
ERYTHROCYTE [DISTWIDTH] IN BLOOD BY AUTOMATED COUNT: 16.3 % (ref 11.5–14.5)
EST. GFR  (AFRICAN AMERICAN): 46 ML/MIN/1.73 M^2
EST. GFR  (NON AFRICAN AMERICAN): 40 ML/MIN/1.73 M^2
GLUCOSE SERPL-MCNC: 165 MG/DL (ref 70–110)
HCT VFR BLD AUTO: 32.8 % (ref 40–54)
HGB BLD-MCNC: 10.6 G/DL (ref 14–18)
IMM GRANULOCYTES # BLD AUTO: 0.03 K/UL (ref 0–0.04)
IMM GRANULOCYTES NFR BLD AUTO: 0.3 % (ref 0–0.5)
LACTATE SERPL-SCNC: 1.4 MMOL/L (ref 0.5–2.2)
LYMPHOCYTES # BLD AUTO: 1.1 K/UL (ref 1–4.8)
LYMPHOCYTES NFR BLD: 10.7 % (ref 18–48)
MCH RBC QN AUTO: 28.3 PG (ref 27–31)
MCHC RBC AUTO-ENTMCNC: 32.3 G/DL (ref 32–36)
MCV RBC AUTO: 88 FL (ref 82–98)
MONOCYTES # BLD AUTO: 0.5 K/UL (ref 0.3–1)
MONOCYTES NFR BLD: 4.6 % (ref 4–15)
NEUTROPHILS # BLD AUTO: 8.6 K/UL (ref 1.8–7.7)
NEUTROPHILS NFR BLD: 84.3 % (ref 38–73)
NRBC BLD-RTO: 0 /100 WBC
PLATELET # BLD AUTO: 227 K/UL (ref 150–450)
PMV BLD AUTO: 11.2 FL (ref 9.2–12.9)
POTASSIUM SERPL-SCNC: 4.5 MMOL/L (ref 3.5–5.1)
PROT SERPL-MCNC: 8 G/DL (ref 6–8.4)
RBC # BLD AUTO: 3.74 M/UL (ref 4.6–6.2)
SARS-COV-2 RDRP RESP QL NAA+PROBE: NEGATIVE
SODIUM SERPL-SCNC: 137 MMOL/L (ref 136–145)
TROPONIN I SERPL DL<=0.01 NG/ML-MCNC: 0.01 NG/ML (ref 0–0.03)
WBC # BLD AUTO: 10.18 K/UL (ref 3.9–12.7)

## 2021-06-02 PROCEDURE — 25000242 PHARM REV CODE 250 ALT 637 W/ HCPCS: Performed by: EMERGENCY MEDICINE

## 2021-06-02 PROCEDURE — 85025 COMPLETE CBC W/AUTO DIFF WBC: CPT | Performed by: EMERGENCY MEDICINE

## 2021-06-02 PROCEDURE — 83880 ASSAY OF NATRIURETIC PEPTIDE: CPT | Performed by: EMERGENCY MEDICINE

## 2021-06-02 PROCEDURE — 83605 ASSAY OF LACTIC ACID: CPT | Performed by: EMERGENCY MEDICINE

## 2021-06-02 PROCEDURE — 93010 EKG 12-LEAD: ICD-10-PCS | Mod: ,,, | Performed by: INTERNAL MEDICINE

## 2021-06-02 PROCEDURE — 63600175 PHARM REV CODE 636 W HCPCS: Performed by: EMERGENCY MEDICINE

## 2021-06-02 PROCEDURE — 93005 ELECTROCARDIOGRAM TRACING: CPT

## 2021-06-02 PROCEDURE — 80053 COMPREHEN METABOLIC PANEL: CPT | Performed by: EMERGENCY MEDICINE

## 2021-06-02 PROCEDURE — U0002 COVID-19 LAB TEST NON-CDC: HCPCS | Performed by: EMERGENCY MEDICINE

## 2021-06-02 PROCEDURE — 84484 ASSAY OF TROPONIN QUANT: CPT | Performed by: EMERGENCY MEDICINE

## 2021-06-02 PROCEDURE — 25000003 PHARM REV CODE 250: Performed by: EMERGENCY MEDICINE

## 2021-06-02 PROCEDURE — 94640 AIRWAY INHALATION TREATMENT: CPT

## 2021-06-02 PROCEDURE — 99285 EMERGENCY DEPT VISIT HI MDM: CPT | Mod: 25

## 2021-06-02 PROCEDURE — 96368 THER/DIAG CONCURRENT INF: CPT

## 2021-06-02 PROCEDURE — 96375 TX/PRO/DX INJ NEW DRUG ADDON: CPT

## 2021-06-02 PROCEDURE — 87040 BLOOD CULTURE FOR BACTERIA: CPT | Mod: 59 | Performed by: EMERGENCY MEDICINE

## 2021-06-02 PROCEDURE — 96365 THER/PROPH/DIAG IV INF INIT: CPT

## 2021-06-02 PROCEDURE — 93010 ELECTROCARDIOGRAM REPORT: CPT | Mod: ,,, | Performed by: INTERNAL MEDICINE

## 2021-06-02 RX ORDER — AZITHROMYCIN 250 MG/1
250 TABLET, FILM COATED ORAL DAILY
Qty: 6 TABLET | Refills: 0 | OUTPATIENT
Start: 2021-06-02 | End: 2021-07-01

## 2021-06-02 RX ORDER — ALBUTEROL SULFATE 2.5 MG/.5ML
10 SOLUTION RESPIRATORY (INHALATION)
Status: COMPLETED | OUTPATIENT
Start: 2021-06-02 | End: 2021-06-02

## 2021-06-02 RX ORDER — IPRATROPIUM BROMIDE 0.5 MG/2.5ML
1 SOLUTION RESPIRATORY (INHALATION)
Status: COMPLETED | OUTPATIENT
Start: 2021-06-02 | End: 2021-06-02

## 2021-06-02 RX ORDER — METHYLPREDNISOLONE SOD SUCC 125 MG
125 VIAL (EA) INJECTION
Status: COMPLETED | OUTPATIENT
Start: 2021-06-02 | End: 2021-06-02

## 2021-06-02 RX ORDER — PREDNISONE 20 MG/1
40 TABLET ORAL DAILY
Qty: 10 TABLET | Refills: 0 | Status: SHIPPED | OUTPATIENT
Start: 2021-06-02 | End: 2021-06-07

## 2021-06-02 RX ADMIN — ALBUTEROL SULFATE 10 MG: 2.5 SOLUTION RESPIRATORY (INHALATION) at 02:06

## 2021-06-02 RX ADMIN — IPRATROPIUM BROMIDE 1 MG: 0.5 SOLUTION RESPIRATORY (INHALATION) at 01:06

## 2021-06-02 RX ADMIN — METHYLPREDNISOLONE SODIUM SUCCINATE 125 MG: 125 INJECTION, POWDER, FOR SOLUTION INTRAMUSCULAR; INTRAVENOUS at 01:06

## 2021-06-02 RX ADMIN — IPRATROPIUM BROMIDE 1 MG: 0.5 SOLUTION RESPIRATORY (INHALATION) at 02:06

## 2021-06-02 RX ADMIN — ALBUTEROL SULFATE 10 MG: 2.5 SOLUTION RESPIRATORY (INHALATION) at 01:06

## 2021-06-02 RX ADMIN — AZITHROMYCIN MONOHYDRATE 500 MG: 500 INJECTION, POWDER, LYOPHILIZED, FOR SOLUTION INTRAVENOUS at 03:06

## 2021-06-02 RX ADMIN — CEFTRIAXONE 2 G: 2 INJECTION, SOLUTION INTRAVENOUS at 03:06

## 2021-06-07 LAB
BACTERIA BLD CULT: NORMAL
BACTERIA BLD CULT: NORMAL

## 2021-06-30 ENCOUNTER — HOSPITAL ENCOUNTER (EMERGENCY)
Facility: HOSPITAL | Age: 81
Discharge: HOME | End: 2021-07-01
Attending: EMERGENCY MEDICINE
Payer: MEDICARE

## 2021-06-30 DIAGNOSIS — I10 HYPERTENSION, UNSPECIFIED TYPE: ICD-10-CM

## 2021-06-30 DIAGNOSIS — R06.02 SOB (SHORTNESS OF BREATH): ICD-10-CM

## 2021-06-30 DIAGNOSIS — J44.1 COPD EXACERBATION: Primary | ICD-10-CM

## 2021-06-30 LAB
ALBUMIN SERPL BCP-MCNC: 3.4 G/DL (ref 3.5–5.2)
ALP SERPL-CCNC: 73 U/L (ref 55–135)
ALT SERPL W/O P-5'-P-CCNC: 16 U/L (ref 10–44)
ANION GAP SERPL CALC-SCNC: 8 MMOL/L (ref 8–16)
AST SERPL-CCNC: 18 U/L (ref 10–40)
BASOPHILS # BLD AUTO: 0.01 K/UL (ref 0–0.2)
BASOPHILS NFR BLD: 0.1 % (ref 0–1.9)
BILIRUB SERPL-MCNC: 0.5 MG/DL (ref 0.1–1)
BNP SERPL-MCNC: 184 PG/ML (ref 0–99)
BUN SERPL-MCNC: 26 MG/DL (ref 8–23)
CALCIUM SERPL-MCNC: 8.7 MG/DL (ref 8.7–10.5)
CHLORIDE SERPL-SCNC: 104 MMOL/L (ref 95–110)
CO2 SERPL-SCNC: 28 MMOL/L (ref 23–29)
CREAT SERPL-MCNC: 1.5 MG/DL (ref 0.5–1.4)
CTP QC/QA: YES
DIFFERENTIAL METHOD: ABNORMAL
EOSINOPHIL # BLD AUTO: 0 K/UL (ref 0–0.5)
EOSINOPHIL NFR BLD: 0 % (ref 0–8)
ERYTHROCYTE [DISTWIDTH] IN BLOOD BY AUTOMATED COUNT: 15.4 % (ref 11.5–14.5)
EST. GFR  (AFRICAN AMERICAN): 50 ML/MIN/1.73 M^2
EST. GFR  (NON AFRICAN AMERICAN): 43 ML/MIN/1.73 M^2
GLUCOSE SERPL-MCNC: 155 MG/DL (ref 70–110)
HCT VFR BLD AUTO: 32.6 % (ref 40–54)
HGB BLD-MCNC: 10.4 G/DL (ref 14–18)
IMM GRANULOCYTES # BLD AUTO: 0.04 K/UL (ref 0–0.04)
IMM GRANULOCYTES NFR BLD AUTO: 0.5 % (ref 0–0.5)
LYMPHOCYTES # BLD AUTO: 1 K/UL (ref 1–4.8)
LYMPHOCYTES NFR BLD: 11.6 % (ref 18–48)
MCH RBC QN AUTO: 28.1 PG (ref 27–31)
MCHC RBC AUTO-ENTMCNC: 31.9 G/DL (ref 32–36)
MCV RBC AUTO: 88 FL (ref 82–98)
MONOCYTES # BLD AUTO: 0.6 K/UL (ref 0.3–1)
MONOCYTES NFR BLD: 6.6 % (ref 4–15)
NEUTROPHILS # BLD AUTO: 7.1 K/UL (ref 1.8–7.7)
NEUTROPHILS NFR BLD: 81.2 % (ref 38–73)
NRBC BLD-RTO: 0 /100 WBC
PLATELET # BLD AUTO: 211 K/UL (ref 150–450)
PMV BLD AUTO: 10.3 FL (ref 9.2–12.9)
POTASSIUM SERPL-SCNC: 4.3 MMOL/L (ref 3.5–5.1)
PROT SERPL-MCNC: 8 G/DL (ref 6–8.4)
RBC # BLD AUTO: 3.7 M/UL (ref 4.6–6.2)
SARS-COV-2 RDRP RESP QL NAA+PROBE: NEGATIVE
SODIUM SERPL-SCNC: 140 MMOL/L (ref 136–145)
TROPONIN I SERPL DL<=0.01 NG/ML-MCNC: 0.01 NG/ML (ref 0–0.03)
WBC # BLD AUTO: 8.73 K/UL (ref 3.9–12.7)

## 2021-06-30 PROCEDURE — 93010 ELECTROCARDIOGRAM REPORT: CPT | Mod: ,,, | Performed by: INTERNAL MEDICINE

## 2021-06-30 PROCEDURE — 93010 EKG 12-LEAD: ICD-10-PCS | Mod: ,,, | Performed by: INTERNAL MEDICINE

## 2021-06-30 PROCEDURE — 83880 ASSAY OF NATRIURETIC PEPTIDE: CPT | Performed by: EMERGENCY MEDICINE

## 2021-06-30 PROCEDURE — 25000242 PHARM REV CODE 250 ALT 637 W/ HCPCS: Performed by: EMERGENCY MEDICINE

## 2021-06-30 PROCEDURE — 80053 COMPREHEN METABOLIC PANEL: CPT | Performed by: EMERGENCY MEDICINE

## 2021-06-30 PROCEDURE — U0002 COVID-19 LAB TEST NON-CDC: HCPCS | Performed by: EMERGENCY MEDICINE

## 2021-06-30 PROCEDURE — 93005 ELECTROCARDIOGRAM TRACING: CPT

## 2021-06-30 PROCEDURE — 99285 EMERGENCY DEPT VISIT HI MDM: CPT | Mod: 25

## 2021-06-30 PROCEDURE — 94640 AIRWAY INHALATION TREATMENT: CPT

## 2021-06-30 PROCEDURE — 96374 THER/PROPH/DIAG INJ IV PUSH: CPT

## 2021-06-30 PROCEDURE — 85025 COMPLETE CBC W/AUTO DIFF WBC: CPT | Performed by: EMERGENCY MEDICINE

## 2021-06-30 PROCEDURE — 84484 ASSAY OF TROPONIN QUANT: CPT | Performed by: EMERGENCY MEDICINE

## 2021-06-30 RX ORDER — IPRATROPIUM BROMIDE AND ALBUTEROL SULFATE 2.5; .5 MG/3ML; MG/3ML
3 SOLUTION RESPIRATORY (INHALATION)
Status: COMPLETED | OUTPATIENT
Start: 2021-06-30 | End: 2021-06-30

## 2021-06-30 RX ADMIN — IPRATROPIUM BROMIDE AND ALBUTEROL SULFATE 3 ML: .5; 3 SOLUTION RESPIRATORY (INHALATION) at 10:06

## 2021-07-01 VITALS
SYSTOLIC BLOOD PRESSURE: 178 MMHG | OXYGEN SATURATION: 98 % | HEIGHT: 67 IN | RESPIRATION RATE: 20 BRPM | HEART RATE: 75 BPM | DIASTOLIC BLOOD PRESSURE: 84 MMHG | WEIGHT: 159 LBS | TEMPERATURE: 98 F | BODY MASS INDEX: 24.96 KG/M2

## 2021-07-01 LAB — TROPONIN I SERPL DL<=0.01 NG/ML-MCNC: <0.006 NG/ML (ref 0–0.03)

## 2021-07-01 PROCEDURE — 84484 ASSAY OF TROPONIN QUANT: CPT | Performed by: EMERGENCY MEDICINE

## 2021-07-01 PROCEDURE — 63600175 PHARM REV CODE 636 W HCPCS: Performed by: EMERGENCY MEDICINE

## 2021-07-01 PROCEDURE — 25000242 PHARM REV CODE 250 ALT 637 W/ HCPCS: Performed by: EMERGENCY MEDICINE

## 2021-07-01 PROCEDURE — 94640 AIRWAY INHALATION TREATMENT: CPT

## 2021-07-01 RX ORDER — PREDNISONE 20 MG/1
40 TABLET ORAL DAILY
Qty: 10 TABLET | Refills: 0 | Status: SHIPPED | OUTPATIENT
Start: 2021-07-01 | End: 2021-07-06

## 2021-07-01 RX ORDER — IPRATROPIUM BROMIDE AND ALBUTEROL SULFATE 2.5; .5 MG/3ML; MG/3ML
3 SOLUTION RESPIRATORY (INHALATION) EVERY 4 HOURS PRN
Qty: 1 BOX | Refills: 3 | Status: SHIPPED | OUTPATIENT
Start: 2021-07-01 | End: 2022-01-01

## 2021-07-01 RX ORDER — METHYLPREDNISOLONE SOD SUCC 125 MG
125 VIAL (EA) INJECTION
Status: COMPLETED | OUTPATIENT
Start: 2021-07-01 | End: 2021-07-01

## 2021-07-01 RX ORDER — AZITHROMYCIN 250 MG/1
250 TABLET, FILM COATED ORAL DAILY
Qty: 6 TABLET | Refills: 0 | OUTPATIENT
Start: 2021-07-01 | End: 2021-10-24

## 2021-07-01 RX ORDER — IPRATROPIUM BROMIDE AND ALBUTEROL SULFATE 2.5; .5 MG/3ML; MG/3ML
3 SOLUTION RESPIRATORY (INHALATION)
Status: COMPLETED | OUTPATIENT
Start: 2021-07-01 | End: 2021-07-01

## 2021-07-01 RX ADMIN — IPRATROPIUM BROMIDE AND ALBUTEROL SULFATE 3 ML: .5; 3 SOLUTION RESPIRATORY (INHALATION) at 01:07

## 2021-07-01 RX ADMIN — METHYLPREDNISOLONE SODIUM SUCCINATE 125 MG: 125 INJECTION, POWDER, FOR SOLUTION INTRAMUSCULAR; INTRAVENOUS at 01:07

## 2021-10-24 ENCOUNTER — HOSPITAL ENCOUNTER (EMERGENCY)
Facility: HOSPITAL | Age: 81
Discharge: HOME | End: 2021-10-24
Attending: EMERGENCY MEDICINE
Payer: MEDICARE

## 2021-10-24 VITALS
WEIGHT: 159 LBS | HEIGHT: 63 IN | OXYGEN SATURATION: 99 % | HEART RATE: 81 BPM | RESPIRATION RATE: 18 BRPM | BODY MASS INDEX: 28.17 KG/M2 | DIASTOLIC BLOOD PRESSURE: 71 MMHG | TEMPERATURE: 99 F | SYSTOLIC BLOOD PRESSURE: 147 MMHG

## 2021-10-24 DIAGNOSIS — R06.02 SHORTNESS OF BREATH: ICD-10-CM

## 2021-10-24 DIAGNOSIS — R91.8 PULMONARY INFILTRATE: Primary | ICD-10-CM

## 2021-10-24 DIAGNOSIS — J44.1 COPD WITH ACUTE EXACERBATION: ICD-10-CM

## 2021-10-24 LAB
ALBUMIN SERPL BCP-MCNC: 3.8 G/DL (ref 3.5–5.2)
ALP SERPL-CCNC: 72 U/L (ref 55–135)
ALT SERPL W/O P-5'-P-CCNC: 36 U/L (ref 10–44)
ANION GAP SERPL CALC-SCNC: 10 MMOL/L (ref 8–16)
AST SERPL-CCNC: 35 U/L (ref 10–40)
BASOPHILS # BLD AUTO: 0.01 K/UL (ref 0–0.2)
BASOPHILS NFR BLD: 0.1 % (ref 0–1.9)
BILIRUB SERPL-MCNC: 1 MG/DL (ref 0.1–1)
BILIRUB UR QL STRIP: NEGATIVE
BUN SERPL-MCNC: 27 MG/DL (ref 8–23)
CALCIUM SERPL-MCNC: 9.8 MG/DL (ref 8.7–10.5)
CHLORIDE SERPL-SCNC: 104 MMOL/L (ref 95–110)
CLARITY UR: CLEAR
CO2 SERPL-SCNC: 25 MMOL/L (ref 23–29)
COLOR UR: YELLOW
CREAT SERPL-MCNC: 1.7 MG/DL (ref 0.5–1.4)
CTP QC/QA: YES
CTP QC/QA: YES
DIFFERENTIAL METHOD: ABNORMAL
EOSINOPHIL # BLD AUTO: 0 K/UL (ref 0–0.5)
EOSINOPHIL NFR BLD: 0 % (ref 0–8)
ERYTHROCYTE [DISTWIDTH] IN BLOOD BY AUTOMATED COUNT: 17 % (ref 11.5–14.5)
EST. GFR  (AFRICAN AMERICAN): 42 ML/MIN/1.73 M^2
EST. GFR  (NON AFRICAN AMERICAN): 37 ML/MIN/1.73 M^2
GLUCOSE SERPL-MCNC: 126 MG/DL (ref 70–110)
GLUCOSE SERPL-MCNC: 128 MG/DL (ref 70–110)
GLUCOSE UR QL STRIP: NEGATIVE
HCT VFR BLD AUTO: 29.8 % (ref 40–54)
HGB BLD-MCNC: 9.8 G/DL (ref 14–18)
HGB UR QL STRIP: NEGATIVE
IMM GRANULOCYTES # BLD AUTO: 0.06 K/UL (ref 0–0.04)
IMM GRANULOCYTES NFR BLD AUTO: 0.5 % (ref 0–0.5)
KETONES UR QL STRIP: NEGATIVE
LEUKOCYTE ESTERASE UR QL STRIP: NEGATIVE
LYMPHOCYTES # BLD AUTO: 0.8 K/UL (ref 1–4.8)
LYMPHOCYTES NFR BLD: 6.4 % (ref 18–48)
MCH RBC QN AUTO: 30.2 PG (ref 27–31)
MCHC RBC AUTO-ENTMCNC: 32.9 G/DL (ref 32–36)
MCV RBC AUTO: 92 FL (ref 82–98)
MONOCYTES # BLD AUTO: 0.4 K/UL (ref 0.3–1)
MONOCYTES NFR BLD: 3.3 % (ref 4–15)
NEUTROPHILS # BLD AUTO: 10.7 K/UL (ref 1.8–7.7)
NEUTROPHILS NFR BLD: 89.7 % (ref 38–73)
NITRITE UR QL STRIP: NEGATIVE
NRBC BLD-RTO: 0 /100 WBC
PH UR STRIP: 6 [PH] (ref 5–8)
PLATELET # BLD AUTO: 188 K/UL (ref 150–450)
PMV BLD AUTO: 11.1 FL (ref 9.2–12.9)
POC MOLECULAR INFLUENZA A AGN: NEGATIVE
POC MOLECULAR INFLUENZA B AGN: NEGATIVE
POTASSIUM SERPL-SCNC: 4.8 MMOL/L (ref 3.5–5.1)
PROT SERPL-MCNC: 8.7 G/DL (ref 6–8.4)
PROT UR QL STRIP: NEGATIVE
RBC # BLD AUTO: 3.24 M/UL (ref 4.6–6.2)
SARS-COV-2 RDRP RESP QL NAA+PROBE: NEGATIVE
SODIUM SERPL-SCNC: 139 MMOL/L (ref 136–145)
SP GR UR STRIP: 1.01 (ref 1–1.03)
URN SPEC COLLECT METH UR: NORMAL
UROBILINOGEN UR STRIP-ACNC: NEGATIVE EU/DL
WBC # BLD AUTO: 11.96 K/UL (ref 3.9–12.7)

## 2021-10-24 PROCEDURE — 96360 HYDRATION IV INFUSION INIT: CPT

## 2021-10-24 PROCEDURE — 93010 EKG 12-LEAD: ICD-10-PCS | Mod: ,,, | Performed by: INTERNAL MEDICINE

## 2021-10-24 PROCEDURE — 93005 ELECTROCARDIOGRAM TRACING: CPT

## 2021-10-24 PROCEDURE — 87502 INFLUENZA DNA AMP PROBE: CPT

## 2021-10-24 PROCEDURE — 27000221 HC OXYGEN, UP TO 24 HOURS

## 2021-10-24 PROCEDURE — 25000242 PHARM REV CODE 250 ALT 637 W/ HCPCS: Performed by: EMERGENCY MEDICINE

## 2021-10-24 PROCEDURE — 82962 GLUCOSE BLOOD TEST: CPT | Mod: 91

## 2021-10-24 PROCEDURE — 93010 ELECTROCARDIOGRAM REPORT: CPT | Mod: ,,, | Performed by: INTERNAL MEDICINE

## 2021-10-24 PROCEDURE — 81003 URINALYSIS AUTO W/O SCOPE: CPT | Performed by: EMERGENCY MEDICINE

## 2021-10-24 PROCEDURE — 25000003 PHARM REV CODE 250: Performed by: EMERGENCY MEDICINE

## 2021-10-24 PROCEDURE — 85025 COMPLETE CBC W/AUTO DIFF WBC: CPT | Performed by: EMERGENCY MEDICINE

## 2021-10-24 PROCEDURE — 94761 N-INVAS EAR/PLS OXIMETRY MLT: CPT

## 2021-10-24 PROCEDURE — 94640 AIRWAY INHALATION TREATMENT: CPT

## 2021-10-24 PROCEDURE — U0002 COVID-19 LAB TEST NON-CDC: HCPCS | Performed by: EMERGENCY MEDICINE

## 2021-10-24 PROCEDURE — 80053 COMPREHEN METABOLIC PANEL: CPT | Performed by: EMERGENCY MEDICINE

## 2021-10-24 PROCEDURE — 99285 EMERGENCY DEPT VISIT HI MDM: CPT | Mod: 25

## 2021-10-24 RX ORDER — AZITHROMYCIN 250 MG/1
500 TABLET, FILM COATED ORAL DAILY
Qty: 14 TABLET | Refills: 0 | Status: SHIPPED | OUTPATIENT
Start: 2021-10-24 | End: 2021-10-31

## 2021-10-24 RX ORDER — ONDANSETRON 4 MG/1
4 TABLET, ORALLY DISINTEGRATING ORAL
Status: COMPLETED | OUTPATIENT
Start: 2021-10-24 | End: 2021-10-24

## 2021-10-24 RX ORDER — IPRATROPIUM BROMIDE AND ALBUTEROL SULFATE 2.5; .5 MG/3ML; MG/3ML
3 SOLUTION RESPIRATORY (INHALATION)
Status: COMPLETED | OUTPATIENT
Start: 2021-10-24 | End: 2021-10-24

## 2021-10-24 RX ORDER — ALBUTEROL SULFATE 2.5 MG/.5ML
5 SOLUTION RESPIRATORY (INHALATION)
Status: COMPLETED | OUTPATIENT
Start: 2021-10-24 | End: 2021-10-24

## 2021-10-24 RX ORDER — PREDNISONE 20 MG/1
40 TABLET ORAL DAILY
Qty: 6 TABLET | Refills: 0 | Status: SHIPPED | OUTPATIENT
Start: 2021-10-24 | End: 2021-10-27

## 2021-10-24 RX ORDER — ACETAMINOPHEN 500 MG
1000 TABLET ORAL
Status: COMPLETED | OUTPATIENT
Start: 2021-10-24 | End: 2021-10-24

## 2021-10-24 RX ADMIN — SODIUM CHLORIDE 1000 ML: 0.9 INJECTION, SOLUTION INTRAVENOUS at 01:10

## 2021-10-24 RX ADMIN — ALBUTEROL SULFATE 5 MG: 2.5 SOLUTION RESPIRATORY (INHALATION) at 02:10

## 2021-10-24 RX ADMIN — IPRATROPIUM BROMIDE AND ALBUTEROL SULFATE 3 ML: 2.5; .5 SOLUTION RESPIRATORY (INHALATION) at 02:10

## 2021-10-24 RX ADMIN — ONDANSETRON 4 MG: 4 TABLET, ORALLY DISINTEGRATING ORAL at 01:10

## 2021-10-24 RX ADMIN — ACETAMINOPHEN 1000 MG: 500 TABLET ORAL at 01:10

## 2021-10-25 LAB — POCT GLUCOSE: 135 MG/DL (ref 70–110)

## 2022-01-01 ENCOUNTER — HOSPITAL ENCOUNTER (EMERGENCY)
Facility: HOSPITAL | Age: 82
Discharge: HOME | End: 2022-07-26
Attending: EMERGENCY MEDICINE
Payer: MEDICARE

## 2022-01-01 ENCOUNTER — HOSPITAL ENCOUNTER (EMERGENCY)
Facility: HOSPITAL | Age: 82
Discharge: HOME | End: 2022-07-12
Attending: STUDENT IN AN ORGANIZED HEALTH CARE EDUCATION/TRAINING PROGRAM
Payer: MEDICARE

## 2022-01-01 ENCOUNTER — PATIENT OUTREACH (OUTPATIENT)
Dept: ADMINISTRATIVE | Facility: CLINIC | Age: 82
End: 2022-01-01
Payer: MEDICARE

## 2022-01-01 ENCOUNTER — HOSPITAL ENCOUNTER (EMERGENCY)
Facility: HOSPITAL | Age: 82
Discharge: HOME | End: 2022-05-21
Attending: EMERGENCY MEDICINE
Payer: MEDICARE

## 2022-01-01 ENCOUNTER — PES CALL (OUTPATIENT)
Dept: ADMINISTRATIVE | Facility: CLINIC | Age: 82
End: 2022-01-01
Payer: MEDICARE

## 2022-01-01 ENCOUNTER — HOSPITAL ENCOUNTER (INPATIENT)
Facility: HOSPITAL | Age: 82
LOS: 2 days | Discharge: HOME | DRG: 178 | End: 2022-09-04
Attending: EMERGENCY MEDICINE | Admitting: HOSPITALIST
Payer: MEDICARE

## 2022-01-01 ENCOUNTER — HOSPITAL ENCOUNTER (EMERGENCY)
Facility: HOSPITAL | Age: 82
Discharge: HOME | End: 2022-05-16
Attending: EMERGENCY MEDICINE
Payer: MEDICARE

## 2022-01-01 ENCOUNTER — HOSPITAL ENCOUNTER (EMERGENCY)
Facility: HOSPITAL | Age: 82
Discharge: HOME | End: 2022-08-05
Attending: EMERGENCY MEDICINE
Payer: MEDICARE

## 2022-01-01 ENCOUNTER — HOSPITAL ENCOUNTER (EMERGENCY)
Facility: HOSPITAL | Age: 82
End: 2022-12-13
Attending: EMERGENCY MEDICINE
Payer: MEDICARE

## 2022-01-01 VITALS
RESPIRATION RATE: 18 BRPM | HEART RATE: 79 BPM | BODY MASS INDEX: 23.7 KG/M2 | SYSTOLIC BLOOD PRESSURE: 126 MMHG | DIASTOLIC BLOOD PRESSURE: 76 MMHG | HEIGHT: 67 IN | OXYGEN SATURATION: 92 % | WEIGHT: 151 LBS | TEMPERATURE: 98 F

## 2022-01-01 VITALS
DIASTOLIC BLOOD PRESSURE: 80 MMHG | BODY MASS INDEX: 26.49 KG/M2 | WEIGHT: 159 LBS | OXYGEN SATURATION: 96 % | HEART RATE: 76 BPM | RESPIRATION RATE: 17 BRPM | SYSTOLIC BLOOD PRESSURE: 156 MMHG | HEIGHT: 65 IN | TEMPERATURE: 98 F

## 2022-01-01 VITALS
BODY MASS INDEX: 26.66 KG/M2 | WEIGHT: 160 LBS | DIASTOLIC BLOOD PRESSURE: 78 MMHG | HEIGHT: 65 IN | TEMPERATURE: 99 F | HEART RATE: 80 BPM | SYSTOLIC BLOOD PRESSURE: 147 MMHG | RESPIRATION RATE: 18 BRPM | OXYGEN SATURATION: 98 %

## 2022-01-01 VITALS
OXYGEN SATURATION: 100 % | HEART RATE: 69 BPM | WEIGHT: 158.75 LBS | SYSTOLIC BLOOD PRESSURE: 131 MMHG | BODY MASS INDEX: 25.62 KG/M2 | DIASTOLIC BLOOD PRESSURE: 71 MMHG | RESPIRATION RATE: 20 BRPM

## 2022-01-01 VITALS
HEART RATE: 79 BPM | OXYGEN SATURATION: 100 % | SYSTOLIC BLOOD PRESSURE: 135 MMHG | DIASTOLIC BLOOD PRESSURE: 75 MMHG | WEIGHT: 160 LBS | HEIGHT: 65 IN | TEMPERATURE: 99 F | RESPIRATION RATE: 16 BRPM | BODY MASS INDEX: 26.66 KG/M2

## 2022-01-01 VITALS
TEMPERATURE: 98 F | WEIGHT: 160 LBS | HEART RATE: 88 BPM | RESPIRATION RATE: 23 BRPM | OXYGEN SATURATION: 95 % | SYSTOLIC BLOOD PRESSURE: 180 MMHG | DIASTOLIC BLOOD PRESSURE: 97 MMHG | HEIGHT: 65 IN | BODY MASS INDEX: 26.66 KG/M2

## 2022-01-01 VITALS
HEART RATE: 68 BPM | WEIGHT: 155 LBS | BODY MASS INDEX: 24.91 KG/M2 | DIASTOLIC BLOOD PRESSURE: 65 MMHG | HEIGHT: 66 IN | SYSTOLIC BLOOD PRESSURE: 123 MMHG | OXYGEN SATURATION: 97 % | TEMPERATURE: 98 F | RESPIRATION RATE: 20 BRPM

## 2022-01-01 DIAGNOSIS — J44.9 CHRONIC OBSTRUCTIVE PULMONARY DISEASE, UNSPECIFIED COPD TYPE: ICD-10-CM

## 2022-01-01 DIAGNOSIS — J18.9 PNEUMONIA DUE TO INFECTIOUS ORGANISM, UNSPECIFIED LATERALITY, UNSPECIFIED PART OF LUNG: ICD-10-CM

## 2022-01-01 DIAGNOSIS — D64.9 ANEMIA, UNSPECIFIED TYPE: ICD-10-CM
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
## 2022-01-01 DIAGNOSIS — I46.9 CARDIAC ARREST: ICD-10-CM

## 2022-01-01 DIAGNOSIS — U07.1 COVID-19: Primary | ICD-10-CM

## 2022-01-01 DIAGNOSIS — J96.20 ACUTE ON CHRONIC RESPIRATORY FAILURE, UNSPECIFIED WHETHER WITH HYPOXIA OR HYPERCAPNIA: ICD-10-CM

## 2022-01-01 DIAGNOSIS — J44.9 CHRONIC OBSTRUCTIVE PULMONARY DISEASE, UNSPECIFIED COPD TYPE: Primary | ICD-10-CM

## 2022-01-01 DIAGNOSIS — R52 PAIN: ICD-10-CM

## 2022-01-01 DIAGNOSIS — J96.91 RESPIRATORY FAILURE WITH HYPOXIA: ICD-10-CM

## 2022-01-01 DIAGNOSIS — R07.89 CHEST WALL PAIN: ICD-10-CM

## 2022-01-01 DIAGNOSIS — U07.1 COVID-19 VIRUS DETECTED: ICD-10-CM

## 2022-01-01 DIAGNOSIS — S52.501A CLOSED FRACTURE OF DISTAL END OF RIGHT RADIUS, UNSPECIFIED FRACTURE MORPHOLOGY, INITIAL ENCOUNTER: Primary | ICD-10-CM

## 2022-01-01 DIAGNOSIS — S01.81XA FACIAL LACERATION, INITIAL ENCOUNTER: Primary | ICD-10-CM

## 2022-01-01 DIAGNOSIS — R09.2 RESPIRATORY ARREST: Primary | ICD-10-CM

## 2022-01-01 DIAGNOSIS — R07.9 CHEST PAIN: ICD-10-CM

## 2022-01-01 DIAGNOSIS — S52.614A CLOSED NONDISPLACED FRACTURE OF STYLOID PROCESS OF RIGHT ULNA, INITIAL ENCOUNTER: ICD-10-CM

## 2022-01-01 DIAGNOSIS — J44.1 COPD EXACERBATION: Primary | ICD-10-CM

## 2022-01-01 DIAGNOSIS — M79.18 MUSCULOSKELETAL PAIN: Primary | ICD-10-CM

## 2022-01-01 DIAGNOSIS — R06.02 SOB (SHORTNESS OF BREATH): ICD-10-CM

## 2022-01-01 DIAGNOSIS — J90 SMALL PLEURAL EFFUSION: ICD-10-CM

## 2022-01-01 DIAGNOSIS — U07.1 COVID-19: ICD-10-CM

## 2022-01-01 DIAGNOSIS — R06.02 SHORTNESS OF BREATH: ICD-10-CM

## 2022-01-01 LAB
ALBUMIN SERPL BCP-MCNC: 2.9 G/DL (ref 3.5–5.2)
ALBUMIN SERPL BCP-MCNC: 3 G/DL (ref 3.5–5.2)
ALBUMIN SERPL BCP-MCNC: 3.3 G/DL (ref 3.5–5.2)
ALLENS TEST: ABNORMAL
ALLENS TEST: ABNORMAL
ALP SERPL-CCNC: 100 U/L (ref 55–135)
ALP SERPL-CCNC: 107 U/L (ref 55–135)
ALP SERPL-CCNC: 65 U/L (ref 55–135)
ALP SERPL-CCNC: 73 U/L (ref 55–135)
ALP SERPL-CCNC: 92 U/L (ref 55–135)
ALT SERPL W/O P-5'-P-CCNC: 25 U/L (ref 10–44)
ALT SERPL W/O P-5'-P-CCNC: 32 U/L (ref 10–44)
ALT SERPL W/O P-5'-P-CCNC: 39 U/L (ref 10–44)
ALT SERPL W/O P-5'-P-CCNC: 44 U/L (ref 10–44)
ALT SERPL W/O P-5'-P-CCNC: 53 U/L (ref 10–44)
ANION GAP SERPL CALC-SCNC: 10 MMOL/L (ref 8–16)
ANION GAP SERPL CALC-SCNC: 11 MMOL/L (ref 8–16)
ANION GAP SERPL CALC-SCNC: 13 MMOL/L (ref 8–16)
ANION GAP SERPL CALC-SCNC: 14 MMOL/L (ref 8–16)
ANION GAP SERPL CALC-SCNC: 7 MMOL/L (ref 8–16)
ANION GAP SERPL CALC-SCNC: 7 MMOL/L (ref 8–16)
ANISOCYTOSIS BLD QL SMEAR: SLIGHT
APTT BLDCRRT: 31.2 SEC (ref 21–32)
AST SERPL-CCNC: 34 U/L (ref 10–40)
AST SERPL-CCNC: 38 U/L (ref 10–40)
AST SERPL-CCNC: 49 U/L (ref 10–40)
AST SERPL-CCNC: 50 U/L (ref 10–40)
AST SERPL-CCNC: 87 U/L (ref 10–40)
BACTERIA BLD CULT: NORMAL
BASOPHILS # BLD AUTO: 0 K/UL (ref 0–0.2)
BASOPHILS # BLD AUTO: 0.01 K/UL (ref 0–0.2)
BASOPHILS NFR BLD: 0 % (ref 0–1.9)
BASOPHILS NFR BLD: 0.1 % (ref 0–1.9)
BASOPHILS NFR BLD: 0.2 % (ref 0–1.9)
BASOPHILS NFR BLD: 0.2 % (ref 0–1.9)
BILIRUB SERPL-MCNC: 0.4 MG/DL (ref 0.1–1)
BILIRUB SERPL-MCNC: 0.5 MG/DL (ref 0.1–1)
BILIRUB SERPL-MCNC: 0.5 MG/DL (ref 0.1–1)
BILIRUB SERPL-MCNC: 0.6 MG/DL (ref 0.1–1)
BILIRUB SERPL-MCNC: 0.7 MG/DL (ref 0.1–1)
BILIRUB UR QL STRIP: NEGATIVE
BLD PROD TYP BPU: NORMAL
BLD PROD TYP BPU: NORMAL
BLOOD UNIT EXPIRATION DATE: NORMAL
BLOOD UNIT EXPIRATION DATE: NORMAL
BLOOD UNIT TYPE CODE: 9500
BLOOD UNIT TYPE CODE: 9500
BLOOD UNIT TYPE: NORMAL
BLOOD UNIT TYPE: NORMAL
BNP SERPL-MCNC: 40 PG/ML (ref 0–99)
BNP SERPL-MCNC: 46 PG/ML (ref 0–99)
BNP SERPL-MCNC: 53 PG/ML (ref 0–99)
BUN SERPL-MCNC: 26 MG/DL (ref 8–23)
BUN SERPL-MCNC: 27 MG/DL (ref 8–23)
BUN SERPL-MCNC: 27 MG/DL (ref 8–23)
BUN SERPL-MCNC: 33 MG/DL (ref 6–30)
BUN SERPL-MCNC: 34 MG/DL (ref 8–23)
BUN SERPL-MCNC: 37 MG/DL (ref 8–23)
CALCIUM SERPL-MCNC: 7.7 MG/DL (ref 8.7–10.5)
CALCIUM SERPL-MCNC: 8.6 MG/DL (ref 8.7–10.5)
CALCIUM SERPL-MCNC: 8.8 MG/DL (ref 8.7–10.5)
CALCIUM SERPL-MCNC: 8.9 MG/DL (ref 8.7–10.5)
CALCIUM SERPL-MCNC: 9.5 MG/DL (ref 8.7–10.5)
CHLORIDE SERPL-SCNC: 102 MMOL/L (ref 95–110)
CHLORIDE SERPL-SCNC: 104 MMOL/L (ref 95–110)
CHLORIDE SERPL-SCNC: 105 MMOL/L (ref 95–110)
CHLORIDE SERPL-SCNC: 105 MMOL/L (ref 95–110)
CHLORIDE SERPL-SCNC: 107 MMOL/L (ref 95–110)
CHLORIDE SERPL-SCNC: 109 MMOL/L (ref 95–110)
CK SERPL-CCNC: 43 U/L (ref 20–200)
CLARITY UR: CLEAR
CO2 SERPL-SCNC: 19 MMOL/L (ref 23–29)
CO2 SERPL-SCNC: 23 MMOL/L (ref 23–29)
CO2 SERPL-SCNC: 26 MMOL/L (ref 23–29)
CO2 SERPL-SCNC: 26 MMOL/L (ref 23–29)
CO2 SERPL-SCNC: 30 MMOL/L (ref 23–29)
CODING SYSTEM: NORMAL
CODING SYSTEM: NORMAL
COLOR UR: YELLOW
CREAT SERPL-MCNC: 1.5 MG/DL (ref 0.5–1.4)
CREAT SERPL-MCNC: 1.7 MG/DL (ref 0.5–1.4)
CREAT SERPL-MCNC: 1.8 MG/DL (ref 0.5–1.4)
CREAT SERPL-MCNC: 1.9 MG/DL (ref 0.5–1.4)
CREAT SERPL-MCNC: 2 MG/DL (ref 0.5–1.4)
CREAT SERPL-MCNC: 2.4 MG/DL (ref 0.5–1.4)
CTP QC/QA: YES
D DIMER PPP IA.FEU-MCNC: 1.17 MG/L FEU
D DIMER PPP IA.FEU-MCNC: 3.73 MG/L FEU
DACRYOCYTES BLD QL SMEAR: ABNORMAL
DELSYS: ABNORMAL
DIFFERENTIAL METHOD: ABNORMAL
DISPENSE STATUS: NORMAL
DISPENSE STATUS: NORMAL
EOSINOPHIL # BLD AUTO: 0 K/UL (ref 0–0.5)
EOSINOPHIL NFR BLD: 0 % (ref 0–8)
ERYTHROCYTE [DISTWIDTH] IN BLOOD BY AUTOMATED COUNT: 14.8 % (ref 11.5–14.5)
ERYTHROCYTE [DISTWIDTH] IN BLOOD BY AUTOMATED COUNT: 14.8 % (ref 11.5–14.5)
ERYTHROCYTE [DISTWIDTH] IN BLOOD BY AUTOMATED COUNT: 16.1 % (ref 11.5–14.5)
ERYTHROCYTE [DISTWIDTH] IN BLOOD BY AUTOMATED COUNT: 16.2 % (ref 11.5–14.5)
ERYTHROCYTE [DISTWIDTH] IN BLOOD BY AUTOMATED COUNT: 16.3 % (ref 11.5–14.5)
ERYTHROCYTE [DISTWIDTH] IN BLOOD BY AUTOMATED COUNT: 16.4 % (ref 11.5–14.5)
ERYTHROCYTE [DISTWIDTH] IN BLOOD BY AUTOMATED COUNT: 17.2 % (ref 11.5–14.5)
ERYTHROCYTE [DISTWIDTH] IN BLOOD BY AUTOMATED COUNT: 23.6 % (ref 11.5–14.5)
ERYTHROCYTE [SEDIMENTATION RATE] IN BLOOD BY WESTERGREN METHOD: 23 MM/HR (ref 0–10)
EST. GFR  (AFRICAN AMERICAN): 37 ML/MIN/1.73 M^2
EST. GFR  (AFRICAN AMERICAN): 40 ML/MIN/1.73 M^2
EST. GFR  (NO RACE VARIABLE): 26 ML/MIN/1.73 M^2
EST. GFR  (NO RACE VARIABLE): 33 ML/MIN/1.73 M^2
EST. GFR  (NO RACE VARIABLE): 46 ML/MIN/1.73 M^2
EST. GFR  (NON AFRICAN AMERICAN): 32 ML/MIN/1.73 M^2
EST. GFR  (NON AFRICAN AMERICAN): 34 ML/MIN/1.73 M^2
FERRITIN SERPL-MCNC: 1489 NG/ML (ref 20–300)
FERRITIN SERPL-MCNC: 906 NG/ML (ref 20–300)
FIO2: 21
GLUCOSE SERPL-MCNC: 101 MG/DL (ref 70–110)
GLUCOSE SERPL-MCNC: 102 MG/DL (ref 70–110)
GLUCOSE SERPL-MCNC: 102 MG/DL (ref 70–110)
GLUCOSE SERPL-MCNC: 115 MG/DL (ref 70–110)
GLUCOSE SERPL-MCNC: 159 MG/DL (ref 70–110)
GLUCOSE SERPL-MCNC: 78 MG/DL (ref 70–110)
GLUCOSE UR QL STRIP: NEGATIVE
HCO3 UR-SCNC: 32.6 MMOL/L (ref 24–28)
HCO3 UR-SCNC: 34.4 MMOL/L (ref 24–28)
HCT VFR BLD AUTO: 16.1 % (ref 40–54)
HCT VFR BLD AUTO: 26.4 % (ref 40–54)
HCT VFR BLD AUTO: 27.1 % (ref 40–54)
HCT VFR BLD AUTO: 27.3 % (ref 40–54)
HCT VFR BLD AUTO: 28.2 % (ref 40–54)
HCT VFR BLD AUTO: 28.3 % (ref 40–54)
HCT VFR BLD AUTO: 29.7 % (ref 40–54)
HCT VFR BLD AUTO: 32.5 % (ref 40–54)
HCT VFR BLD CALC: 40 %PCV (ref 36–54)
HGB BLD-MCNC: 10.7 G/DL (ref 14–18)
HGB BLD-MCNC: 5.2 G/DL (ref 14–18)
HGB BLD-MCNC: 9 G/DL (ref 14–18)
HGB BLD-MCNC: 9.6 G/DL (ref 14–18)
HGB BLD-MCNC: 9.6 G/DL (ref 14–18)
HGB BLD-MCNC: 9.7 G/DL (ref 14–18)
HGB UR QL STRIP: NEGATIVE
HYALINE CASTS #/AREA URNS LPF: 1 /LPF
HYPOCHROMIA BLD QL SMEAR: ABNORMAL
IMM GRANULOCYTES # BLD AUTO: 0.01 K/UL (ref 0–0.04)
IMM GRANULOCYTES # BLD AUTO: 0.02 K/UL (ref 0–0.04)
IMM GRANULOCYTES # BLD AUTO: 0.02 K/UL (ref 0–0.04)
IMM GRANULOCYTES # BLD AUTO: 0.04 K/UL (ref 0–0.04)
IMM GRANULOCYTES # BLD AUTO: 0.04 K/UL (ref 0–0.04)
IMM GRANULOCYTES # BLD AUTO: 0.26 K/UL (ref 0–0.04)
IMM GRANULOCYTES NFR BLD AUTO: 0.2 % (ref 0–0.5)
IMM GRANULOCYTES NFR BLD AUTO: 0.3 % (ref 0–0.5)
IMM GRANULOCYTES NFR BLD AUTO: 0.4 % (ref 0–0.5)
IMM GRANULOCYTES NFR BLD AUTO: 0.5 % (ref 0–0.5)
IMM GRANULOCYTES NFR BLD AUTO: 0.9 % (ref 0–0.5)
IMM GRANULOCYTES NFR BLD AUTO: 3.1 % (ref 0–0.5)
INR PPP: 1 (ref 0.8–1.2)
INR PPP: 1.2 (ref 0.8–1.2)
KETONES UR QL STRIP: NEGATIVE
LACTATE SERPL-SCNC: 1 MMOL/L (ref 0.5–2.2)
LACTATE SERPL-SCNC: 1.6 MMOL/L (ref 0.5–2.2)
LACTATE SERPL-SCNC: 3.2 MMOL/L (ref 0.5–2.2)
LACTATE SERPL-SCNC: 7.3 MMOL/L (ref 0.5–2.2)
LDH SERPL L TO P-CCNC: 149 U/L (ref 110–260)
LDH SERPL L TO P-CCNC: 165 U/L (ref 110–260)
LEUKOCYTE ESTERASE UR QL STRIP: ABNORMAL
LIPASE SERPL-CCNC: 79 U/L (ref 4–60)
LYMPHOCYTES # BLD AUTO: 0.4 K/UL (ref 1–4.8)
LYMPHOCYTES # BLD AUTO: 0.5 K/UL (ref 1–4.8)
LYMPHOCYTES # BLD AUTO: 0.8 K/UL (ref 1–4.8)
LYMPHOCYTES # BLD AUTO: 0.9 K/UL (ref 1–4.8)
LYMPHOCYTES # BLD AUTO: 1.2 K/UL (ref 1–4.8)
LYMPHOCYTES # BLD AUTO: 1.2 K/UL (ref 1–4.8)
LYMPHOCYTES # BLD AUTO: 1.5 K/UL (ref 1–4.8)
LYMPHOCYTES # BLD AUTO: 4 K/UL (ref 1–4.8)
LYMPHOCYTES NFR BLD: 10.2 % (ref 18–48)
LYMPHOCYTES NFR BLD: 11.1 % (ref 18–48)
LYMPHOCYTES NFR BLD: 13.1 % (ref 18–48)
LYMPHOCYTES NFR BLD: 20.1 % (ref 18–48)
LYMPHOCYTES NFR BLD: 20.3 % (ref 18–48)
LYMPHOCYTES NFR BLD: 24.5 % (ref 18–48)
LYMPHOCYTES NFR BLD: 27.3 % (ref 18–48)
LYMPHOCYTES NFR BLD: 47.8 % (ref 18–48)
MAGNESIUM SERPL-MCNC: 1.9 MG/DL (ref 1.6–2.6)
MCH RBC QN AUTO: 28.8 PG (ref 27–31)
MCH RBC QN AUTO: 28.9 PG (ref 27–31)
MCH RBC QN AUTO: 29.4 PG (ref 27–31)
MCH RBC QN AUTO: 29.5 PG (ref 27–31)
MCH RBC QN AUTO: 29.6 PG (ref 27–31)
MCH RBC QN AUTO: 29.9 PG (ref 27–31)
MCH RBC QN AUTO: 29.9 PG (ref 27–31)
MCH RBC QN AUTO: 30.6 PG (ref 27–31)
MCHC RBC AUTO-ENTMCNC: 32.3 G/DL (ref 32–36)
MCHC RBC AUTO-ENTMCNC: 32.3 G/DL (ref 32–36)
MCHC RBC AUTO-ENTMCNC: 32.9 G/DL (ref 32–36)
MCHC RBC AUTO-ENTMCNC: 33 G/DL (ref 32–36)
MCHC RBC AUTO-ENTMCNC: 33.2 G/DL (ref 32–36)
MCHC RBC AUTO-ENTMCNC: 33.9 G/DL (ref 32–36)
MCHC RBC AUTO-ENTMCNC: 34.1 G/DL (ref 32–36)
MCHC RBC AUTO-ENTMCNC: 34.4 G/DL (ref 32–36)
MCV RBC AUTO: 86 FL (ref 82–98)
MCV RBC AUTO: 87 FL (ref 82–98)
MCV RBC AUTO: 90 FL (ref 82–98)
MCV RBC AUTO: 90 FL (ref 82–98)
MCV RBC AUTO: 93 FL (ref 82–98)
MCV RBC AUTO: 93 FL (ref 82–98)
MICROSCOPIC COMMENT: NORMAL
MODE: ABNORMAL
MONOCYTES # BLD AUTO: 0 K/UL (ref 0.3–1)
MONOCYTES # BLD AUTO: 0.2 K/UL (ref 0.3–1)
MONOCYTES # BLD AUTO: 0.3 K/UL (ref 0.3–1)
MONOCYTES # BLD AUTO: 0.5 K/UL (ref 0.3–1)
MONOCYTES # BLD AUTO: 0.6 K/UL (ref 0.3–1)
MONOCYTES NFR BLD: 0.8 % (ref 4–15)
MONOCYTES NFR BLD: 10.4 % (ref 4–15)
MONOCYTES NFR BLD: 2.6 % (ref 4–15)
MONOCYTES NFR BLD: 3.3 % (ref 4–15)
MONOCYTES NFR BLD: 3.6 % (ref 4–15)
MONOCYTES NFR BLD: 6 % (ref 4–15)
MONOCYTES NFR BLD: 6.1 % (ref 4–15)
MONOCYTES NFR BLD: 9.7 % (ref 4–15)
NEUTROPHILS # BLD AUTO: 2.7 K/UL (ref 1.8–7.7)
NEUTROPHILS # BLD AUTO: 2.8 K/UL (ref 1.8–7.7)
NEUTROPHILS # BLD AUTO: 3.3 K/UL (ref 1.8–7.7)
NEUTROPHILS # BLD AUTO: 3.4 K/UL (ref 1.8–7.7)
NEUTROPHILS # BLD AUTO: 3.8 K/UL (ref 1.8–7.7)
NEUTROPHILS # BLD AUTO: 3.9 K/UL (ref 1.8–7.7)
NEUTROPHILS # BLD AUTO: 4.1 K/UL (ref 1.8–7.7)
NEUTROPHILS # BLD AUTO: 7.7 K/UL (ref 1.8–7.7)
NEUTROPHILS NFR BLD: 46.5 % (ref 38–73)
NEUTROPHILS NFR BLD: 62.6 % (ref 38–73)
NEUTROPHILS NFR BLD: 68.8 % (ref 38–73)
NEUTROPHILS NFR BLD: 69.2 % (ref 38–73)
NEUTROPHILS NFR BLD: 73.5 % (ref 38–73)
NEUTROPHILS NFR BLD: 82.8 % (ref 38–73)
NEUTROPHILS NFR BLD: 84.7 % (ref 38–73)
NEUTROPHILS NFR BLD: 88.5 % (ref 38–73)
NITRITE UR QL STRIP: NEGATIVE
NRBC BLD-RTO: 0 /100 WBC
NRBC BLD-RTO: 1 /100 WBC
NRBC BLD-RTO: 2 /100 WBC
NRBC BLD-RTO: 3 /100 WBC
NUM UNITS TRANS PACKED RBC: NORMAL
OVALOCYTES BLD QL SMEAR: ABNORMAL
PCO2 BLDA: 59.5 MMHG (ref 35–45)
PCO2 BLDA: 64.2 MMHG (ref 35–45)
PH SMN: 7.34 [PH] (ref 7.35–7.45)
PH SMN: 7.35 [PH] (ref 7.35–7.45)
PH UR STRIP: 5 [PH] (ref 5–8)
PHOSPHATE SERPL-MCNC: 6.6 MG/DL (ref 2.7–4.5)
PLATELET # BLD AUTO: 103 K/UL (ref 150–450)
PLATELET # BLD AUTO: 217 K/UL (ref 150–450)
PLATELET # BLD AUTO: 222 K/UL (ref 150–450)
PLATELET # BLD AUTO: 285 K/UL (ref 150–450)
PLATELET # BLD AUTO: 83 K/UL (ref 150–450)
PLATELET # BLD AUTO: 85 K/UL (ref 150–450)
PLATELET # BLD AUTO: 90 K/UL (ref 150–450)
PLATELET # BLD AUTO: 93 K/UL (ref 150–450)
PMV BLD AUTO: 10.9 FL (ref 9.2–12.9)
PMV BLD AUTO: 11.2 FL (ref 9.2–12.9)
PMV BLD AUTO: 11.3 FL (ref 9.2–12.9)
PMV BLD AUTO: ABNORMAL FL (ref 9.2–12.9)
PO2 BLDA: 16 MMHG (ref 40–60)
PO2 BLDA: 26 MMHG (ref 40–60)
POC BE: 5 MMOL/L
POC BE: 7 MMOL/L
POC IONIZED CALCIUM: 1.24 MMOL/L (ref 1.06–1.42)
POC MOLECULAR INFLUENZA A AGN: NEGATIVE
POC MOLECULAR INFLUENZA A AGN: NEGATIVE
POC MOLECULAR INFLUENZA B AGN: NEGATIVE
POC MOLECULAR INFLUENZA B AGN: NEGATIVE
POC SATURATED O2: 17 % (ref 95–100)
POC SATURATED O2: 42 % (ref 95–100)
POC TCO2 (MEASURED): 34 MMOL/L (ref 23–29)
POC TCO2: 34 MMOL/L (ref 24–29)
POC TCO2: 36 MMOL/L (ref 24–29)
POCT GLUCOSE: 104 MG/DL (ref 70–110)
POCT GLUCOSE: 113 MG/DL (ref 70–110)
POCT GLUCOSE: 116 MG/DL (ref 70–110)
POCT GLUCOSE: 118 MG/DL (ref 70–110)
POCT GLUCOSE: 141 MG/DL (ref 70–110)
POCT GLUCOSE: 150 MG/DL (ref 70–110)
POCT GLUCOSE: 155 MG/DL (ref 70–110)
POCT GLUCOSE: 157 MG/DL (ref 70–110)
POCT GLUCOSE: 158 MG/DL (ref 70–110)
POCT GLUCOSE: 159 MG/DL (ref 70–110)
POCT GLUCOSE: 173 MG/DL (ref 70–110)
POCT GLUCOSE: 183 MG/DL (ref 70–110)
POIKILOCYTOSIS BLD QL SMEAR: ABNORMAL
POLYCHROMASIA BLD QL SMEAR: ABNORMAL
POTASSIUM BLD-SCNC: 5 MMOL/L (ref 3.5–5.1)
POTASSIUM SERPL-SCNC: 4.7 MMOL/L (ref 3.5–5.1)
POTASSIUM SERPL-SCNC: 4.8 MMOL/L (ref 3.5–5.1)
POTASSIUM SERPL-SCNC: 5 MMOL/L (ref 3.5–5.1)
POTASSIUM SERPL-SCNC: 5 MMOL/L (ref 3.5–5.1)
POTASSIUM SERPL-SCNC: 6.3 MMOL/L (ref 3.5–5.1)
PROCALCITONIN SERPL IA-MCNC: 0.1 NG/ML
PROCALCITONIN SERPL IA-MCNC: 0.23 NG/ML
PROT SERPL-MCNC: 6.7 G/DL (ref 6–8.4)
PROT SERPL-MCNC: 7.7 G/DL (ref 6–8.4)
PROT SERPL-MCNC: 8.6 G/DL (ref 6–8.4)
PROT SERPL-MCNC: 9.7 G/DL (ref 6–8.4)
PROT SERPL-MCNC: 9.7 G/DL (ref 6–8.4)
PROT UR QL STRIP: NEGATIVE
PROTHROMBIN TIME: 10.8 SEC (ref 9–12.5)
PROTHROMBIN TIME: 12.5 SEC (ref 9–12.5)
RBC # BLD AUTO: 1.74 M/UL (ref 4.6–6.2)
RBC # BLD AUTO: 2.94 M/UL (ref 4.6–6.2)
RBC # BLD AUTO: 3.06 M/UL (ref 4.6–6.2)
RBC # BLD AUTO: 3.13 M/UL (ref 4.6–6.2)
RBC # BLD AUTO: 3.24 M/UL (ref 4.6–6.2)
RBC # BLD AUTO: 3.25 M/UL (ref 4.6–6.2)
RBC # BLD AUTO: 3.32 M/UL (ref 4.6–6.2)
RBC # BLD AUTO: 3.61 M/UL (ref 4.6–6.2)
SAMPLE: ABNORMAL
SARS-COV-2 RDRP RESP QL NAA+PROBE: NEGATIVE
SARS-COV-2 RDRP RESP QL NAA+PROBE: NEGATIVE
SARS-COV-2 RDRP RESP QL NAA+PROBE: POSITIVE
SCHISTOCYTES BLD QL SMEAR: ABNORMAL
SITE: ABNORMAL
SITE: ABNORMAL
SODIUM BLD-SCNC: 144 MMOL/L (ref 136–145)
SODIUM SERPL-SCNC: 136 MMOL/L (ref 136–145)
SODIUM SERPL-SCNC: 138 MMOL/L (ref 136–145)
SODIUM SERPL-SCNC: 142 MMOL/L (ref 136–145)
SODIUM SERPL-SCNC: 142 MMOL/L (ref 136–145)
SODIUM SERPL-SCNC: 143 MMOL/L (ref 136–145)
SP GR UR STRIP: 1.01 (ref 1–1.03)
TARGETS BLD QL SMEAR: ABNORMAL
TRANS ERYTHROCYTES VOL PATIENT: NORMAL ML
TROPONIN I SERPL DL<=0.01 NG/ML-MCNC: 0.01 NG/ML (ref 0–0.03)
TROPONIN I SERPL DL<=0.01 NG/ML-MCNC: 0.04 NG/ML (ref 0–0.03)
TROPONIN I SERPL DL<=0.01 NG/ML-MCNC: <0.006 NG/ML (ref 0–0.03)
URN SPEC COLLECT METH UR: ABNORMAL
UROBILINOGEN UR STRIP-ACNC: NEGATIVE EU/DL
WBC # BLD AUTO: 3.73 K/UL (ref 3.9–12.7)
WBC # BLD AUTO: 3.79 K/UL (ref 3.9–12.7)
WBC # BLD AUTO: 3.84 K/UL (ref 3.9–12.7)
WBC # BLD AUTO: 4.5 K/UL (ref 3.9–12.7)
WBC # BLD AUTO: 5.49 K/UL (ref 3.9–12.7)
WBC # BLD AUTO: 5.96 K/UL (ref 3.9–12.7)
WBC # BLD AUTO: 8.33 K/UL (ref 3.9–12.7)
WBC # BLD AUTO: 9.26 K/UL (ref 3.9–12.7)
WBC #/AREA URNS HPF: 1 /HPF (ref 0–5)

## 2022-01-01 PROCEDURE — 80047 BASIC METABLC PNL IONIZED CA: CPT

## 2022-01-01 PROCEDURE — 83605 ASSAY OF LACTIC ACID: CPT | Performed by: EMERGENCY MEDICINE

## 2022-01-01 PROCEDURE — 83880 ASSAY OF NATRIURETIC PEPTIDE: CPT | Performed by: EMERGENCY MEDICINE

## 2022-01-01 PROCEDURE — 93010 EKG 12-LEAD: ICD-10-PCS | Mod: ,,, | Performed by: INTERNAL MEDICINE

## 2022-01-01 PROCEDURE — 94640 AIRWAY INHALATION TREATMENT: CPT

## 2022-01-01 PROCEDURE — 93010 ELECTROCARDIOGRAM REPORT: CPT | Mod: ,,, | Performed by: INTERNAL MEDICINE

## 2022-01-01 PROCEDURE — 25000003 PHARM REV CODE 250: Performed by: PHYSICIAN ASSISTANT

## 2022-01-01 PROCEDURE — 63600175 PHARM REV CODE 636 W HCPCS: Performed by: EMERGENCY MEDICINE

## 2022-01-01 PROCEDURE — 84145 PROCALCITONIN (PCT): CPT | Performed by: EMERGENCY MEDICINE

## 2022-01-01 PROCEDURE — U0002 COVID-19 LAB TEST NON-CDC: HCPCS | Performed by: NURSE PRACTITIONER

## 2022-01-01 PROCEDURE — 99900035 HC TECH TIME PER 15 MIN (STAT)

## 2022-01-01 PROCEDURE — 85014 HEMATOCRIT: CPT

## 2022-01-01 PROCEDURE — 96374 THER/PROPH/DIAG INJ IV PUSH: CPT

## 2022-01-01 PROCEDURE — 25000242 PHARM REV CODE 250 ALT 637 W/ HCPCS: Performed by: EMERGENCY MEDICINE

## 2022-01-01 PROCEDURE — 99285 EMERGENCY DEPT VISIT HI MDM: CPT | Mod: 25

## 2022-01-01 PROCEDURE — 25000242 PHARM REV CODE 250 ALT 637 W/ HCPCS: Performed by: HOSPITALIST

## 2022-01-01 PROCEDURE — 87502 INFLUENZA DNA AMP PROBE: CPT

## 2022-01-01 PROCEDURE — 25000003 PHARM REV CODE 250: Performed by: EMERGENCY MEDICINE

## 2022-01-01 PROCEDURE — 99284 EMERGENCY DEPT VISIT MOD MDM: CPT | Mod: 25

## 2022-01-01 PROCEDURE — 25000003 PHARM REV CODE 250: Performed by: HOSPITALIST

## 2022-01-01 PROCEDURE — 94640 AIRWAY INHALATION TREATMENT: CPT | Mod: XB

## 2022-01-01 PROCEDURE — 94002 VENT MGMT INPAT INIT DAY: CPT

## 2022-01-01 PROCEDURE — 94761 N-INVAS EAR/PLS OXIMETRY MLT: CPT

## 2022-01-01 PROCEDURE — 83735 ASSAY OF MAGNESIUM: CPT | Performed by: EMERGENCY MEDICINE

## 2022-01-01 PROCEDURE — 83605 ASSAY OF LACTIC ACID: CPT | Performed by: NURSE PRACTITIONER

## 2022-01-01 PROCEDURE — 27000207 HC ISOLATION

## 2022-01-01 PROCEDURE — U0002 COVID-19 LAB TEST NON-CDC: HCPCS | Performed by: PHYSICIAN ASSISTANT

## 2022-01-01 PROCEDURE — 81000 URINALYSIS NONAUTO W/SCOPE: CPT | Performed by: NURSE PRACTITIONER

## 2022-01-01 PROCEDURE — 83690 ASSAY OF LIPASE: CPT | Performed by: NURSE PRACTITIONER

## 2022-01-01 PROCEDURE — 21400001 HC TELEMETRY ROOM

## 2022-01-01 PROCEDURE — 63600175 PHARM REV CODE 636 W HCPCS

## 2022-01-01 PROCEDURE — 85610 PROTHROMBIN TIME: CPT | Performed by: HOSPITALIST

## 2022-01-01 PROCEDURE — 85379 FIBRIN DEGRADATION QUANT: CPT | Performed by: HOSPITALIST

## 2022-01-01 PROCEDURE — 85025 COMPLETE CBC W/AUTO DIFF WBC: CPT | Performed by: NURSE PRACTITIONER

## 2022-01-01 PROCEDURE — 82550 ASSAY OF CK (CPK): CPT | Performed by: HOSPITALIST

## 2022-01-01 PROCEDURE — 99291 CRITICAL CARE FIRST HOUR: CPT | Mod: 25

## 2022-01-01 PROCEDURE — 36415 COLL VENOUS BLD VENIPUNCTURE: CPT | Performed by: HOSPITALIST

## 2022-01-01 PROCEDURE — 84484 ASSAY OF TROPONIN QUANT: CPT | Mod: 91 | Performed by: NURSE PRACTITIONER

## 2022-01-01 PROCEDURE — 93005 ELECTROCARDIOGRAM TRACING: CPT

## 2022-01-01 PROCEDURE — 94760 N-INVAS EAR/PLS OXIMETRY 1: CPT

## 2022-01-01 PROCEDURE — 82728 ASSAY OF FERRITIN: CPT | Performed by: HOSPITALIST

## 2022-01-01 PROCEDURE — 96375 TX/PRO/DX INJ NEW DRUG ADDON: CPT

## 2022-01-01 PROCEDURE — U0002 COVID-19 LAB TEST NON-CDC: HCPCS | Performed by: EMERGENCY MEDICINE

## 2022-01-01 PROCEDURE — 87040 BLOOD CULTURE FOR BACTERIA: CPT | Performed by: EMERGENCY MEDICINE

## 2022-01-01 PROCEDURE — 83880 ASSAY OF NATRIURETIC PEPTIDE: CPT | Performed by: NURSE PRACTITIONER

## 2022-01-01 PROCEDURE — 85025 COMPLETE CBC W/AUTO DIFF WBC: CPT | Performed by: HOSPITALIST

## 2022-01-01 PROCEDURE — 29105 APPLICATION LONG ARM SPLINT: CPT | Mod: RT

## 2022-01-01 PROCEDURE — 82330 ASSAY OF CALCIUM: CPT | Mod: 59

## 2022-01-01 PROCEDURE — 82803 BLOOD GASES ANY COMBINATION: CPT

## 2022-01-01 PROCEDURE — 85025 COMPLETE CBC W/AUTO DIFF WBC: CPT | Performed by: EMERGENCY MEDICINE

## 2022-01-01 PROCEDURE — 80053 COMPREHEN METABOLIC PANEL: CPT | Performed by: NURSE PRACTITIONER

## 2022-01-01 PROCEDURE — 84484 ASSAY OF TROPONIN QUANT: CPT | Performed by: EMERGENCY MEDICINE

## 2022-01-01 PROCEDURE — 25000003 PHARM REV CODE 250

## 2022-01-01 PROCEDURE — 25000242 PHARM REV CODE 250 ALT 637 W/ HCPCS: Performed by: STUDENT IN AN ORGANIZED HEALTH CARE EDUCATION/TRAINING PROGRAM

## 2022-01-01 PROCEDURE — 82800 BLOOD PH: CPT

## 2022-01-01 PROCEDURE — 25000242 PHARM REV CODE 250 ALT 637 W/ HCPCS

## 2022-01-01 PROCEDURE — 80053 COMPREHEN METABOLIC PANEL: CPT | Performed by: EMERGENCY MEDICINE

## 2022-01-01 PROCEDURE — 63600175 PHARM REV CODE 636 W HCPCS: Performed by: PHYSICIAN ASSISTANT

## 2022-01-01 PROCEDURE — 84100 ASSAY OF PHOSPHORUS: CPT | Performed by: EMERGENCY MEDICINE

## 2022-01-01 PROCEDURE — 63600175 PHARM REV CODE 636 W HCPCS: Mod: TB | Performed by: HOSPITALIST

## 2022-01-01 PROCEDURE — 85610 PROTHROMBIN TIME: CPT | Performed by: EMERGENCY MEDICINE

## 2022-01-01 PROCEDURE — 96361 HYDRATE IV INFUSION ADD-ON: CPT

## 2022-01-01 PROCEDURE — 63600175 PHARM REV CODE 636 W HCPCS: Performed by: HOSPITALIST

## 2022-01-01 PROCEDURE — 12013 RPR F/E/E/N/L/M 2.6-5.0 CM: CPT

## 2022-01-01 PROCEDURE — 96365 THER/PROPH/DIAG IV INF INIT: CPT

## 2022-01-01 PROCEDURE — 82565 ASSAY OF CREATININE: CPT | Mod: 59

## 2022-01-01 PROCEDURE — 27000221 HC OXYGEN, UP TO 24 HOURS

## 2022-01-01 PROCEDURE — 31500 INSERT EMERGENCY AIRWAY: CPT | Mod: 59

## 2022-01-01 PROCEDURE — 83605 ASSAY OF LACTIC ACID: CPT | Performed by: HOSPITALIST

## 2022-01-01 PROCEDURE — 83615 LACTATE (LD) (LDH) ENZYME: CPT | Performed by: HOSPITALIST

## 2022-01-01 PROCEDURE — 85730 THROMBOPLASTIN TIME PARTIAL: CPT | Performed by: HOSPITALIST

## 2022-01-01 PROCEDURE — 84132 ASSAY OF SERUM POTASSIUM: CPT | Mod: 59

## 2022-01-01 PROCEDURE — 99283 EMERGENCY DEPT VISIT LOW MDM: CPT | Mod: 25

## 2022-01-01 PROCEDURE — 85652 RBC SED RATE AUTOMATED: CPT | Performed by: HOSPITALIST

## 2022-01-01 PROCEDURE — 84295 ASSAY OF SERUM SODIUM: CPT

## 2022-01-01 RX ORDER — ACETAMINOPHEN 325 MG/1
650 TABLET ORAL EVERY 8 HOURS PRN
Status: DISCONTINUED | OUTPATIENT
Start: 2022-01-01 | End: 2022-01-01 | Stop reason: HOSPADM

## 2022-01-01 RX ORDER — IPRATROPIUM BROMIDE AND ALBUTEROL SULFATE 2.5; .5 MG/3ML; MG/3ML
3 SOLUTION RESPIRATORY (INHALATION)
Status: COMPLETED | OUTPATIENT
Start: 2022-01-01 | End: 2022-01-01

## 2022-01-01 RX ORDER — IBUPROFEN 200 MG
16 TABLET ORAL
Status: DISCONTINUED | OUTPATIENT
Start: 2022-01-01 | End: 2022-01-01 | Stop reason: HOSPADM

## 2022-01-01 RX ORDER — HYDROCODONE BITARTRATE AND ACETAMINOPHEN 500; 5 MG/1; MG/1
TABLET ORAL
Status: DISCONTINUED | OUTPATIENT
Start: 2022-01-01 | End: 2022-01-01 | Stop reason: HOSPADM

## 2022-01-01 RX ORDER — IPRATROPIUM BROMIDE 0.5 MG/2.5ML
SOLUTION RESPIRATORY (INHALATION)
Status: COMPLETED
Start: 2022-01-01 | End: 2022-01-01

## 2022-01-01 RX ORDER — ACETAMINOPHEN 500 MG
1000 TABLET ORAL
Status: COMPLETED | OUTPATIENT
Start: 2022-01-01 | End: 2022-01-01

## 2022-01-01 RX ORDER — DOXYCYCLINE HYCLATE 100 MG
100 TABLET ORAL
Status: COMPLETED | OUTPATIENT
Start: 2022-01-01 | End: 2022-01-01

## 2022-01-01 RX ORDER — ALBUTEROL SULFATE 90 UG/1
1-2 AEROSOL, METERED RESPIRATORY (INHALATION) EVERY 6 HOURS PRN
Qty: 18 G | Refills: 0 | Status: SHIPPED | OUTPATIENT
Start: 2022-01-01 | End: 2023-08-05

## 2022-01-01 RX ORDER — MORPHINE SULFATE 4 MG/ML
2 INJECTION, SOLUTION INTRAMUSCULAR; INTRAVENOUS
Status: DISCONTINUED | OUTPATIENT
Start: 2022-01-01 | End: 2022-01-01 | Stop reason: HOSPADM

## 2022-01-01 RX ORDER — CALCIUM CHLORIDE INJECTION 100 MG/ML
INJECTION, SOLUTION INTRAVENOUS CODE/TRAUMA/SEDATION MEDICATION
Status: COMPLETED | OUTPATIENT
Start: 2022-01-01 | End: 2022-01-01

## 2022-01-01 RX ORDER — IBUPROFEN 200 MG
24 TABLET ORAL
Status: DISCONTINUED | OUTPATIENT
Start: 2022-01-01 | End: 2022-01-01 | Stop reason: HOSPADM

## 2022-01-01 RX ORDER — GLUCAGON 1 MG
1 KIT INJECTION
Status: DISCONTINUED | OUTPATIENT
Start: 2022-01-01 | End: 2022-01-01 | Stop reason: HOSPADM

## 2022-01-01 RX ORDER — ALBUTEROL SULFATE 2.5 MG/.5ML
5 SOLUTION RESPIRATORY (INHALATION)
Status: COMPLETED | OUTPATIENT
Start: 2022-01-01 | End: 2022-01-01

## 2022-01-01 RX ORDER — SODIUM CHLORIDE 0.9 % (FLUSH) 0.9 %
10 SYRINGE (ML) INJECTION
Status: DISCONTINUED | OUTPATIENT
Start: 2022-01-01 | End: 2022-01-01 | Stop reason: HOSPADM

## 2022-01-01 RX ORDER — OXYCODONE AND ACETAMINOPHEN 5; 325 MG/1; MG/1
1 TABLET ORAL
Status: COMPLETED | OUTPATIENT
Start: 2022-01-01 | End: 2022-01-01

## 2022-01-01 RX ORDER — ATROPINE SULFATE 0.1 MG/ML
INJECTION INTRAVENOUS CODE/TRAUMA/SEDATION MEDICATION
Status: COMPLETED | OUTPATIENT
Start: 2022-01-01 | End: 2022-01-01

## 2022-01-01 RX ORDER — ENOXAPARIN SODIUM 100 MG/ML
1 INJECTION SUBCUTANEOUS 2 TIMES DAILY
Status: DISCONTINUED | OUTPATIENT
Start: 2022-01-01 | End: 2022-01-01 | Stop reason: HOSPADM

## 2022-01-01 RX ORDER — ACETAMINOPHEN 500 MG
500 TABLET ORAL EVERY 4 HOURS PRN
Qty: 20 TABLET | Refills: 0 | Status: SHIPPED | OUTPATIENT
Start: 2022-01-01 | End: 2022-01-01

## 2022-01-01 RX ORDER — METHYLPREDNISOLONE SOD SUCC 125 MG
125 VIAL (EA) INJECTION
Status: COMPLETED | OUTPATIENT
Start: 2022-01-01 | End: 2022-01-01

## 2022-01-01 RX ORDER — SODIUM BICARBONATE 1 MEQ/ML
SYRINGE (ML) INTRAVENOUS CODE/TRAUMA/SEDATION MEDICATION
Status: COMPLETED | OUTPATIENT
Start: 2022-01-01 | End: 2022-01-01

## 2022-01-01 RX ORDER — LIDOCAINE HYDROCHLORIDE 10 MG/ML
10 INJECTION INFILTRATION; PERINEURAL
Status: COMPLETED | OUTPATIENT
Start: 2022-01-01 | End: 2022-01-01

## 2022-01-01 RX ORDER — IPRATROPIUM BROMIDE 0.5 MG/2.5ML
1 SOLUTION RESPIRATORY (INHALATION) ONCE
Status: COMPLETED | OUTPATIENT
Start: 2022-01-01 | End: 2022-01-01

## 2022-01-01 RX ORDER — ONDANSETRON 4 MG/1
4 TABLET, ORALLY DISINTEGRATING ORAL
Status: COMPLETED | OUTPATIENT
Start: 2022-01-01 | End: 2022-01-01

## 2022-01-01 RX ORDER — MAGNESIUM SULFATE 1 G/100ML
1 INJECTION INTRAVENOUS
Status: COMPLETED | OUTPATIENT
Start: 2022-01-01 | End: 2022-01-01

## 2022-01-01 RX ORDER — IPRATROPIUM BROMIDE AND ALBUTEROL SULFATE 2.5; .5 MG/3ML; MG/3ML
9 SOLUTION RESPIRATORY (INHALATION)
Status: COMPLETED | OUTPATIENT
Start: 2022-01-01 | End: 2022-01-01

## 2022-01-01 RX ORDER — PROPOFOL 10 MG/ML
INJECTION, EMULSION INTRAVENOUS
Status: COMPLETED
Start: 2022-01-01 | End: 2022-01-01

## 2022-01-01 RX ORDER — OXYCODONE HYDROCHLORIDE 5 MG/1
5 TABLET ORAL EVERY 4 HOURS PRN
Qty: 15 TABLET | Refills: 0 | Status: SHIPPED | OUTPATIENT
Start: 2022-01-01 | End: 2022-01-01

## 2022-01-01 RX ORDER — PROPOFOL 10 MG/ML
15 INJECTION, EMULSION INTRAVENOUS
Status: DISCONTINUED | OUTPATIENT
Start: 2022-01-01 | End: 2022-01-01

## 2022-01-01 RX ORDER — AZITHROMYCIN 250 MG/1
250 TABLET, FILM COATED ORAL DAILY
Qty: 6 TABLET | Refills: 0 | Status: ON HOLD | OUTPATIENT
Start: 2022-01-01 | End: 2022-01-01 | Stop reason: HOSPADM

## 2022-01-01 RX ORDER — IPRATROPIUM BROMIDE 0.5 MG/2.5ML
SOLUTION RESPIRATORY (INHALATION)
Status: DISCONTINUED
Start: 2022-01-01 | End: 2022-01-01 | Stop reason: HOSPADM

## 2022-01-01 RX ORDER — HYDROCODONE BITARTRATE AND ACETAMINOPHEN 5; 325 MG/1; MG/1
1 TABLET ORAL EVERY 4 HOURS PRN
Qty: 12 TABLET | Refills: 0 | Status: SHIPPED | OUTPATIENT
Start: 2022-01-01

## 2022-01-01 RX ORDER — METOPROLOL SUCCINATE 50 MG/1
50 TABLET, EXTENDED RELEASE ORAL DAILY
Status: DISCONTINUED | OUTPATIENT
Start: 2022-01-01 | End: 2022-01-01 | Stop reason: HOSPADM

## 2022-01-01 RX ORDER — LORAZEPAM 2 MG/ML
1 INJECTION INTRAMUSCULAR
Status: DISCONTINUED | OUTPATIENT
Start: 2022-01-01 | End: 2022-01-01 | Stop reason: HOSPADM

## 2022-01-01 RX ORDER — ACETAMINOPHEN 500 MG
500 TABLET ORAL
Status: COMPLETED | OUTPATIENT
Start: 2022-01-01 | End: 2022-01-01

## 2022-01-01 RX ORDER — ALBUTEROL SULFATE 2.5 MG/.5ML
2.5 SOLUTION RESPIRATORY (INHALATION)
Status: COMPLETED | OUTPATIENT
Start: 2022-01-01 | End: 2022-01-01

## 2022-01-01 RX ORDER — DOXYCYCLINE 100 MG/1
100 CAPSULE ORAL 2 TIMES DAILY
Qty: 20 CAPSULE | Refills: 0 | Status: SHIPPED | OUTPATIENT
Start: 2022-01-01 | End: 2022-01-01

## 2022-01-01 RX ORDER — NOREPINEPHRINE BITARTRATE/D5W 4MG/250ML
PLASTIC BAG, INJECTION (ML) INTRAVENOUS
Status: COMPLETED
Start: 2022-01-01 | End: 2022-01-01

## 2022-01-01 RX ORDER — PREDNISONE 50 MG/1
50 TABLET ORAL DAILY
Qty: 4 TABLET | Refills: 0 | Status: SHIPPED | OUTPATIENT
Start: 2022-01-01 | End: 2022-01-01

## 2022-01-01 RX ORDER — METHYLPREDNISOLONE SOD SUCC 125 MG
125 VIAL (EA) INJECTION
Status: DISCONTINUED | OUTPATIENT
Start: 2022-01-01 | End: 2022-01-01

## 2022-01-01 RX ORDER — ASCORBIC ACID 500 MG
500 TABLET ORAL 2 TIMES DAILY
Status: DISCONTINUED | OUTPATIENT
Start: 2022-01-01 | End: 2022-01-01 | Stop reason: HOSPADM

## 2022-01-01 RX ORDER — METHOCARBAMOL 500 MG/1
500 TABLET, FILM COATED ORAL 3 TIMES DAILY
Qty: 15 TABLET | Refills: 0 | Status: SHIPPED | OUTPATIENT
Start: 2022-01-01 | End: 2022-01-01

## 2022-01-01 RX ORDER — METHOCARBAMOL 500 MG/1
500 TABLET, FILM COATED ORAL
Status: COMPLETED | OUTPATIENT
Start: 2022-01-01 | End: 2022-01-01

## 2022-01-01 RX ORDER — AMIODARONE HYDROCHLORIDE 150 MG/3ML
INJECTION, SOLUTION INTRAVENOUS CODE/TRAUMA/SEDATION MEDICATION
Status: COMPLETED | OUTPATIENT
Start: 2022-01-01 | End: 2022-01-01

## 2022-01-01 RX ORDER — HYDRALAZINE HYDROCHLORIDE 25 MG/1
50 TABLET, FILM COATED ORAL EVERY 8 HOURS
Status: DISCONTINUED | OUTPATIENT
Start: 2022-01-01 | End: 2022-01-01 | Stop reason: HOSPADM

## 2022-01-01 RX ORDER — GUAIFENESIN/DEXTROMETHORPHAN 100-10MG/5
10 SYRUP ORAL EVERY 4 HOURS PRN
Status: DISCONTINUED | OUTPATIENT
Start: 2022-01-01 | End: 2022-01-01 | Stop reason: HOSPADM

## 2022-01-01 RX ORDER — ALBUTEROL SULFATE 90 UG/1
2 AEROSOL, METERED RESPIRATORY (INHALATION) EVERY 6 HOURS
Status: DISCONTINUED | OUTPATIENT
Start: 2022-01-01 | End: 2022-01-01

## 2022-01-01 RX ORDER — EPINEPHRINE 0.1 MG/ML
INJECTION INTRAVENOUS CODE/TRAUMA/SEDATION MEDICATION
Status: COMPLETED | OUTPATIENT
Start: 2022-01-01 | End: 2022-01-01

## 2022-01-01 RX ORDER — NALOXONE HCL 0.4 MG/ML
VIAL (ML) INJECTION CODE/TRAUMA/SEDATION MEDICATION
Status: COMPLETED | OUTPATIENT
Start: 2022-01-01 | End: 2022-01-01

## 2022-01-01 RX ORDER — PREDNISONE 20 MG/1
40 TABLET ORAL DAILY
Qty: 10 TABLET | Refills: 0 | Status: SHIPPED | OUTPATIENT
Start: 2022-01-01 | End: 2022-01-01

## 2022-01-01 RX ORDER — ALBUTEROL SULFATE 90 UG/1
2 AEROSOL, METERED RESPIRATORY (INHALATION) 2 TIMES DAILY
Status: DISCONTINUED | OUTPATIENT
Start: 2022-01-01 | End: 2022-01-01 | Stop reason: HOSPADM

## 2022-01-01 RX ORDER — HYDROMORPHONE HYDROCHLORIDE 2 MG/ML
1 INJECTION, SOLUTION INTRAMUSCULAR; INTRAVENOUS; SUBCUTANEOUS
Status: COMPLETED | OUTPATIENT
Start: 2022-01-01 | End: 2022-01-01

## 2022-01-01 RX ORDER — METHYLPREDNISOLONE SOD SUCC 125 MG
80 VIAL (EA) INJECTION
Status: COMPLETED | OUTPATIENT
Start: 2022-01-01 | End: 2022-01-01

## 2022-01-01 RX ADMIN — DEXAMETHASONE 6 MG: 2 TABLET ORAL at 08:09

## 2022-01-01 RX ADMIN — HYDRALAZINE HYDROCHLORIDE 50 MG: 25 TABLET, FILM COATED ORAL at 05:09

## 2022-01-01 RX ADMIN — OXYCODONE HYDROCHLORIDE AND ACETAMINOPHEN 500 MG: 500 TABLET ORAL at 08:09

## 2022-01-01 RX ADMIN — METHYLPREDNISOLONE SODIUM SUCCINATE 80 MG: 125 INJECTION, POWDER, FOR SOLUTION INTRAMUSCULAR; INTRAVENOUS at 09:08

## 2022-01-01 RX ADMIN — IPRATROPIUM BROMIDE AND ALBUTEROL SULFATE 9 ML: 2.5; .5 SOLUTION RESPIRATORY (INHALATION) at 04:12

## 2022-01-01 RX ADMIN — ALBUTEROL SULFATE 2.5 MG: 2.5 SOLUTION RESPIRATORY (INHALATION) at 08:05

## 2022-01-01 RX ADMIN — METHOCARBAMOL 500 MG: 500 TABLET ORAL at 04:07

## 2022-01-01 RX ADMIN — OXYCODONE AND ACETAMINOPHEN 1 TABLET: 5; 325 TABLET ORAL at 10:05

## 2022-01-01 RX ADMIN — METHYLPREDNISOLONE SODIUM SUCCINATE 125 MG: 125 INJECTION, POWDER, FOR SOLUTION INTRAMUSCULAR; INTRAVENOUS at 04:12

## 2022-01-01 RX ADMIN — HYDRALAZINE HYDROCHLORIDE 50 MG: 25 TABLET, FILM COATED ORAL at 02:09

## 2022-01-01 RX ADMIN — MAGNESIUM SULFATE 1 G: 1 INJECTION INTRAVENOUS at 04:12

## 2022-01-01 RX ADMIN — LIDOCAINE HYDROCHLORIDE 10 ML: 10 INJECTION, SOLUTION INFILTRATION; PERINEURAL at 02:07

## 2022-01-01 RX ADMIN — SODIUM BICARBONATE 50 MEQ: 84 INJECTION, SOLUTION INTRAVENOUS at 05:12

## 2022-01-01 RX ADMIN — IPRATROPIUM BROMIDE 1 MG: 0.5 SOLUTION RESPIRATORY (INHALATION) at 09:08

## 2022-01-01 RX ADMIN — METOPROLOL SUCCINATE 50 MG: 50 TABLET, EXTENDED RELEASE ORAL at 08:09

## 2022-01-01 RX ADMIN — ALBUTEROL SULFATE 2 PUFF: 90 AEROSOL, METERED RESPIRATORY (INHALATION) at 08:09

## 2022-01-01 RX ADMIN — OXYCODONE HYDROCHLORIDE AND ACETAMINOPHEN 500 MG: 500 TABLET ORAL at 09:09

## 2022-01-01 RX ADMIN — HYDRALAZINE HYDROCHLORIDE 50 MG: 25 TABLET, FILM COATED ORAL at 01:09

## 2022-01-01 RX ADMIN — CALCIUM CHLORIDE 1 G: 100 INJECTION, SOLUTION INTRAVENOUS; INTRAVENTRICULAR at 05:12

## 2022-01-01 RX ADMIN — ATROPINE SULFATE 0.1 MG: 0.1 INJECTION, SOLUTION INTRAVENOUS at 03:12

## 2022-01-01 RX ADMIN — ATROPINE SULFATE 0.1 MG: 0.1 INJECTION, SOLUTION INTRAVENOUS at 05:12

## 2022-01-01 RX ADMIN — ACETAMINOPHEN 500 MG: 500 TABLET ORAL at 04:07

## 2022-01-01 RX ADMIN — NALOXONE HYDROCHLORIDE 2 MG: 0.4 INJECTION, SOLUTION INTRAMUSCULAR; INTRAVENOUS; SUBCUTANEOUS at 03:12

## 2022-01-01 RX ADMIN — DOXYCYCLINE HYCLATE 100 MG: 100 TABLET, COATED ORAL at 10:08

## 2022-01-01 RX ADMIN — HYDROMORPHONE HYDROCHLORIDE 0.5 MG: 2 INJECTION, SOLUTION INTRAMUSCULAR; INTRAVENOUS; SUBCUTANEOUS at 12:07

## 2022-01-01 RX ADMIN — ALBUTEROL SULFATE 2 PUFF: 90 AEROSOL, METERED RESPIRATORY (INHALATION) at 07:09

## 2022-01-01 RX ADMIN — EPINEPHRINE 1 MG: 0.1 INJECTION, SOLUTION ENDOTRACHEAL; INTRACARDIAC; INTRAVENOUS at 03:12

## 2022-01-01 RX ADMIN — HYDRALAZINE HYDROCHLORIDE 50 MG: 25 TABLET, FILM COATED ORAL at 09:09

## 2022-01-01 RX ADMIN — SODIUM BICARBONATE 50 MEQ: 84 INJECTION, SOLUTION INTRAVENOUS at 03:12

## 2022-01-01 RX ADMIN — ALBUTEROL SULFATE 2.5 MG: 2.5 SOLUTION RESPIRATORY (INHALATION) at 07:05

## 2022-01-01 RX ADMIN — ACETAMINOPHEN 650 MG: 325 TABLET ORAL at 11:09

## 2022-01-01 RX ADMIN — ENOXAPARIN SODIUM 70 MG: 80 INJECTION SUBCUTANEOUS at 01:09

## 2022-01-01 RX ADMIN — ACETAMINOPHEN 1000 MG: 500 TABLET ORAL at 10:09

## 2022-01-01 RX ADMIN — ENOXAPARIN SODIUM 70 MG: 80 INJECTION SUBCUTANEOUS at 09:09

## 2022-01-01 RX ADMIN — THERA TABS 1 TABLET: TAB at 08:09

## 2022-01-01 RX ADMIN — PROPOFOL 2 MCG/KG/MIN: 10 INJECTION, EMULSION INTRAVENOUS at 04:12

## 2022-01-01 RX ADMIN — ALBUTEROL SULFATE 2 PUFF: 90 AEROSOL, METERED RESPIRATORY (INHALATION) at 09:09

## 2022-01-01 RX ADMIN — IPRATROPIUM BROMIDE AND ALBUTEROL SULFATE 3 ML: 2.5; .5 SOLUTION RESPIRATORY (INHALATION) at 11:09

## 2022-01-01 RX ADMIN — METHYLPREDNISOLONE SODIUM SUCCINATE 125 MG: 125 INJECTION, POWDER, FOR SOLUTION INTRAMUSCULAR; INTRAVENOUS at 10:09

## 2022-01-01 RX ADMIN — EPINEPHRINE 0.1 MG: 0.1 INJECTION, SOLUTION ENDOTRACHEAL; INTRACARDIAC; INTRAVENOUS at 05:12

## 2022-01-01 RX ADMIN — ONDANSETRON 4 MG: 4 TABLET, ORALLY DISINTEGRATING ORAL at 10:09

## 2022-01-01 RX ADMIN — REMDESIVIR 200 MG: 100 INJECTION, POWDER, LYOPHILIZED, FOR SOLUTION INTRAVENOUS at 01:09

## 2022-01-01 RX ADMIN — Medication 4 MG: at 04:12

## 2022-01-01 RX ADMIN — REMDESIVIR 100 MG: 100 INJECTION, POWDER, LYOPHILIZED, FOR SOLUTION INTRAVENOUS at 02:09

## 2022-01-01 RX ADMIN — EPINEPHRINE 0.1 MG: 0.1 INJECTION, SOLUTION ENDOTRACHEAL; INTRACARDIAC; INTRAVENOUS at 03:12

## 2022-01-01 RX ADMIN — AMIODARONE HYDROCHLORIDE 300 MG: 50 INJECTION, SOLUTION INTRAVENOUS at 05:12

## 2022-01-01 RX ADMIN — NOREPINEPHRINE BITARTRATE 0.02 MCG/KG/MIN: 1 INJECTION, SOLUTION, CONCENTRATE INTRAVENOUS at 03:12

## 2022-01-01 RX ADMIN — GUAIFENESIN AND DEXTROMETHORPHAN 10 ML: 100; 10 SYRUP ORAL at 09:09

## 2022-01-01 RX ADMIN — SODIUM CHLORIDE 500 ML: 9 INJECTION, SOLUTION INTRAVENOUS at 09:08

## 2022-01-01 RX ADMIN — REMDESIVIR 100 MG: 100 INJECTION, POWDER, LYOPHILIZED, FOR SOLUTION INTRAVENOUS at 08:09

## 2022-01-01 RX ADMIN — METHYLPREDNISOLONE SODIUM SUCCINATE 125 MG: 125 INJECTION, POWDER, FOR SOLUTION INTRAMUSCULAR; INTRAVENOUS at 07:05

## 2022-01-01 RX ADMIN — SODIUM CHLORIDE 1000 ML: 9 INJECTION, SOLUTION INTRAVENOUS at 03:12

## 2022-01-01 RX ADMIN — ALBUTEROL SULFATE 5 MG: 2.5 SOLUTION RESPIRATORY (INHALATION) at 09:08

## 2022-01-01 RX ADMIN — ENOXAPARIN SODIUM 70 MG: 80 INJECTION SUBCUTANEOUS at 08:09

## 2022-05-16 NOTE — ED TRIAGE NOTES
"Pt reports COPD "flare up" starting yesterday without relief after two nebulizer treatments at home. Pt reports cough, white and brown mucus, and related pain in left side. Also reports chills today, getting "cold cold cold" at home. Room air sats 90-92%, 2LNC placed for comfort. Denies home O2 at baseline. Denies CP, n/d/v. Alert. Able to respond appropriately. Hard of hearing.   "

## 2022-05-17 NOTE — ED NOTES
Pt reports coughing up brown/ white sputum infrequently. Pt also reports shortness of breath at rest and on exertion.

## 2022-05-17 NOTE — ED PROVIDER NOTES
Encounter Date: 2022       History     Chief Complaint   Patient presents with    Shortness of Breath     Pt c/c SOB starting yesterday getting worse today. Pt has Hx of COPD and says it feels like its hard to take a deep breath     Mr. Laurent is an 81-year-old who presents by personal transportation.  He complains of cough and shortness of breath that began last night.  The cough is productive of yellow sputum.  He has shortness of breath at rest that is exacerbated by walking.  He has mild left lower lateral chest wall soreness that he attributes to coughing.  He denies sick contacts.  He denies fever, chills, nausea, and vomiting.  He has a history of COPD.  He is not on supplemental oxygen.  He attempted treatment with both albuterol MDI and nebulized albuterol but reports no improvement in symptoms with the therapies.    He has a past medical history of Asthma, Atrial fibrillation, CHF (congestive heart failure), COPD (chronic obstructive pulmonary disease), Diabetes mellitus, new onset, [place] (hard of hearing), and Hypertension.    The history is provided by the patient. No  was used.     Review of patient's allergies indicates:   Allergen Reactions    Lisinopril      Swells lower legs.     Past Medical History:   Diagnosis Date    Asthma     Atrial fibrillation     CHF (congestive heart failure)     COPD (chronic obstructive pulmonary disease)     Diabetes mellitus, new onset     [place] (hard of hearing)     Hypertension      Past Surgical History:   Procedure Laterality Date    NO PAST SURGERIES  2020     History reviewed. No pertinent family history.  Social History     Tobacco Use    Smoking status: Former Smoker     Packs/day: 1.50     Years: 20.00     Pack years: 30.00     Types: Cigarettes     Quit date:      Years since quittin.4    Smokeless tobacco: Former User    Tobacco comment: quit in    Substance Use Topics    Alcohol use: No    Drug use: No      Review of Systems   Constitutional: Negative for chills and fever.   HENT: Negative for congestion and rhinorrhea.    Eyes: Negative for visual disturbance.   Respiratory: Positive for cough and shortness of breath.    Cardiovascular: Positive for chest pain.   Gastrointestinal: Negative for abdominal pain, diarrhea, nausea and vomiting.   Endocrine: Negative for polydipsia and polyuria.   Genitourinary: Negative for difficulty urinating and dysuria.   Musculoskeletal: Negative for arthralgias and myalgias.   Skin: Negative for rash.   Allergic/Immunologic: Negative for immunocompromised state.   Neurological: Negative for dizziness and headaches.       Physical Exam     Initial Vitals [05/16/22 1752]   BP Pulse Resp Temp SpO2   (!) 142/73 100 (!) 24 100.2 °F (37.9 °C) (!) 91 %      MAP       --         Physical Exam    Nursing note and vitals reviewed.  Constitutional: He is not diaphoretic. No distress.   HENT:   Head: Normocephalic and atraumatic.   Eyes: Conjunctivae are normal.   Neck: No JVD present.   Cardiovascular: Normal rate, regular rhythm and normal heart sounds. Exam reveals no gallop and no friction rub.    No murmur heard.  Pulmonary/Chest: No stridor. Tachypnea noted. He has decreased breath sounds. He has wheezes. He has no rhonchi. He has no rales.   Diminished breath sounds throughout all lung fields.  Faint scattered wheezes bilaterally.   Abdominal: Abdomen is soft. There is no abdominal tenderness.   Musculoskeletal:      Comments: No swelling or tenderness to the extremities.     Neurological: He is alert and oriented to person, place, and time. GCS eye subscore is 4. GCS verbal subscore is 5. GCS motor subscore is 6.   Skin: Skin is warm and dry. No pallor.   Psychiatric: He is not actively hallucinating. He is attentive.         ED Course   Procedures  Labs Reviewed   CBC W/ AUTO DIFFERENTIAL - Abnormal; Notable for the following components:       Result Value    RBC 2.94 (*)      Hemoglobin 9.0 (*)     Hematocrit 27.3 (*)     RDW 17.2 (*)     Gran % 82.8 (*)     Lymph % 13.1 (*)     Mono % 3.6 (*)     All other components within normal limits   COMPREHENSIVE METABOLIC PANEL - Abnormal; Notable for the following components:    Glucose 115 (*)     BUN 27 (*)     Creatinine 1.9 (*)     Total Protein 8.6 (*)     Albumin 3.3 (*)     Anion Gap 7 (*)     eGFR if  37 (*)     eGFR if non  32 (*)     All other components within normal limits   CULTURE, BLOOD   TROPONIN I   TROPONIN I   B-TYPE NATRIURETIC PEPTIDE   LACTIC ACID, PLASMA   SARS-COV-2 RDRP GENE   POCT INFLUENZA A/B MOLECULAR     EKG Readings: (Independently Interpreted)   05/16/2022 18:27  Normal sinus rhythm.  Ventricular rate 93 beats per minute.  Normal axis normal QRS and QT intervals.  No ST segment elevation or depression.  Normal T-wave morphology.  Voltage criteria for LVH present.       Imaging Results          X-Ray Chest AP Portable (Final result)  Result time 05/16/22 19:20:26    Final result by Barbra Hutchinson MD (05/16/22 19:20:26)                 Impression:      Small density in the left retrocardiac region.  Findings may represent pneumonic infiltrate or atelectasis.  This possibility of small left pleural fluid cannot be entirely excluded.      Electronically signed by: Barbra Hutchinson  Date:    05/16/2022  Time:    19:20             Narrative:    EXAMINATION:  AP PORTABLE CHEST    CLINICAL HISTORY:  Shortness of breath    TECHNIQUE:  AP portable chest radiograph was submitted.    COMPARISON:  02/16/2022    FINDINGS:  AP portable chest radiograph demonstrates a cardiac silhouette within normal limits.  There is small density seen in the left retrocardiac region with question of blunting of the costophrenic angle.  There is stable mild right basilar linear atelectasis or scarring.  There is no pneumothorax.  The underlying lung fields appear emphysematous.                                  Medications   albuterol sulfate nebulizer solution 2.5 mg (2.5 mg Nebulization Given 22)   methylPREDNISolone sodium succinate injection 125 mg (125 mg Intravenous Given 22)     Medical Decision Making:   History:   Old Medical Records: I decided to obtain old medical records.  Old Records Summarized: other records.       <> Summary of Records: PMH reviewed as above.  Differential Diagnosis:   COPD exacerbation, pneumonia, pleural effusion, cardiac arrhythmia, acute coronary syndromes, decompensated heart failure  Independently Interpreted Test(s):   I have ordered and independently interpreted EKG Reading(s) - see prior notes  Clinical Tests:   Lab Tests: Ordered and Reviewed  Radiological Study: Ordered and Reviewed  Medical Tests: Ordered and Reviewed    MDM:  The patient underwent urgent evaluation for the aforementioned.  Differential included the above as well as other conditions.  Workup had included cardiopulmonary monitoring, serial exams, EKG, chest x-ray, labs.  Presentation and workup most consistent with COPD exacerbation.  He was treated with nebulized breathing treatments and IV Solu-Medrol.  He was discharged in improved condition.  He was prescribed a course of prednisone and was instructed to initiate scheduled nebulizer treatments with albuterol MDI p.r.n..  He was instructed to arrange close follow-up with his PCP.  Strict ED return precautions were given.             ED Course as of 22 1835   Mon May 16, 2022   2246 He is feeling much better. [LP]      ED Course User Index  [LP] Mic Horn III, MD             Clinical Impression:   Final diagnoses:  [J44.1] COPD exacerbation (Primary)          ED Disposition Condition    Discharge Stable        ED Prescriptions     Medication Sig Dispense Start Date End Date Auth. Provider    predniSONE (DELTASONE) 20 MG tablet () Take 2 tablets (40 mg total) by mouth once daily. for 5 days 10 tablet 2022 Mic  MOE Horn III, MD        Follow-up Information     Follow up With Specialties Details Why Contact Info    Chelsy Torrez MD Endocrinology  Call to schedule close follow-up with your primary provider for a recheck. [address] [address]  [phone]      ER   Return to this ER or visit any other ER should you have any concerns that you feel need immediate attention.            Mic Horn III, MD  05/24/22 1695

## 2022-05-21 NOTE — ED PROVIDER NOTES
Encounter Date: 2022    SCRIBE #1 NOTE: I, Giuseppe Blevins, am scribing for, and in the presence of,  Bela Steele PA-C. I have scribed the following portions of the note - Other sections scribed: HPI, ROS, PE.       History     Chief Complaint   Patient presents with    Hand Injury     Complaining of right hand pain and swelling after reaching out to block fall today     81 y.o. male, with a pertinent past medical history of asthma, atrial fibrillation, CHF, COPD, diabetes mellitus, hard of hearing, and hypertension presents to the ED with a right hand injury after a fall that occurred 90 minutes prior to arrival. Pt states that he lost his balance and fell backward and attempted to catch himself with his right hand. Pt denies a head injury or loss of consciousness. Pt states that he has associated right hand pain/swelling, right wrist pain/swelling, and mild lower back pain. Pt rates current pain as a 9/10. Pt is right handed. Pt states that he takes aspirin. Pt denies taking any pain medication prior to arrival. No other exacerbating or alleviating factors. Patient denies blurry vision, or other associated symptoms.       The history is provided by the patient. No  was used.     Review of patient's allergies indicates:   Allergen Reactions    Lisinopril      Swells lower legs.     Past Medical History:   Diagnosis Date    Asthma     Atrial fibrillation     CHF (congestive heart failure)     COPD (chronic obstructive pulmonary disease)     Diabetes mellitus, new onset     [place] (hard of hearing)     Hypertension      Past Surgical History:   Procedure Laterality Date    NO PAST SURGERIES  2020     History reviewed. No pertinent family history.  Social History     Tobacco Use    Smoking status: Former Smoker     Packs/day: 1.50     Years: 20.00     Pack years: 30.00     Types: Cigarettes     Quit date:      Years since quittin.4    Smokeless tobacco:  Former User    Tobacco comment: quit in 1990   Substance Use Topics    Alcohol use: No    Drug use: No     Review of Systems   Constitutional: Negative for chills and fever.   HENT: Negative for congestion, rhinorrhea and sore throat.    Eyes: Negative for visual disturbance.   Respiratory: Negative for cough and shortness of breath.    Cardiovascular: Negative for chest pain.   Gastrointestinal: Negative for abdominal pain, diarrhea, nausea and vomiting.   Genitourinary: Negative for dysuria, frequency and hematuria.   Musculoskeletal: Positive for back pain.        (+) hand pain/swelling  (+) wrist pain/swelling  (-) head injury   Skin: Negative for rash.   Neurological: Negative for dizziness, syncope, weakness and headaches.       Physical Exam     Initial Vitals [05/20/22 2018]   BP Pulse Resp Temp SpO2   (!) 140/82 87 20 98.9 °F (37.2 °C) (!) 94 %      MAP       --         Physical Exam    Nursing note and vitals reviewed.  Constitutional: He appears well-developed and well-nourished. He is not diaphoretic. No distress.   HENT:   Head: Normocephalic and atraumatic.   Right Ear: External ear normal.   Left Ear: External ear normal.   Mouth/Throat: No oropharyngeal exudate.   Eyes: EOM are normal. Pupils are equal, round, and reactive to light. Right eye exhibits no discharge. Left eye exhibits no discharge.   Neck: No JVD present.   Cardiovascular: Normal rate and intact distal pulses.   Pulmonary/Chest: No respiratory distress.   Abdominal: He exhibits no distension. There is no guarding.   Musculoskeletal:      Comments: Swelling and tenderness over the right distal radius and ulna. Swelling over the dorsum right hand. Tenderness over the 2nd, 3rd, and 4th metacarpals. Pt is unable to supinate his right hand. No tenderness to the rest of his right upper extremity. Tenderness to L spine. 2+ radial pulses.  No sensory deficits.     TTP over the lumbar paraspinal musculature. No midline ttp. Normal strength        Neurological: He is alert and oriented to person, place, and time. GCS score is 15. GCS eye subscore is 4. GCS verbal subscore is 5. GCS motor subscore is 6.   Skin: Skin is warm and dry.   Psychiatric: He has a normal mood and affect. His behavior is normal.         ED Course   Splint Application    Date/Time: 5/20/2022 11:51 PM  Performed by: Bela Steele PA-C  Authorized by: Rogerio Marquez MD   Location details: right wrist  Splint type: sugar tong  Supplies used: Ortho-Glass  Post-procedure: The splinted body part was neurovascularly unchanged following the procedure.  Patient tolerance: Patient tolerated the procedure well with no immediate complications        Labs Reviewed - No data to display       Imaging Results          X-Ray Lumbar Spine Ap And Lateral (Final result)  Result time 05/20/22 23:35:51    Final result by Tessa Colbert MD (05/20/22 23:35:51)                 Impression:      No acute lumbar spine abnormalities identified.      Electronically signed by: Tessa Colbert MD  Date:    05/20/2022  Time:    23:35             Narrative:    EXAMINATION:  XR LUMBAR SPINE AP AND LATERAL    CLINICAL HISTORY:  back pain;    TECHNIQUE:  AP, lateral and spot images were performed of the lumbar spine.    COMPARISON:  None    FINDINGS:  Lumbar spine alignment is within normal limits.  No evidence of acute lumbar spine fracture or subluxation.  Intervertebral disc spaces appear fairly well maintained.  Visualized sacrum is unremarkable.                               X-Ray Hand 3 view Right (Final result)  Result time 05/20/22 20:52:55    Final result by Tessa Colbert MD (05/20/22 20:52:55)                 Impression:      1. Acute mild impacted and comminuted distal radius fracture.  2. Subtle nondisplaced fracture at the base of the ulnar styloid process.      Electronically signed by: Tessa Colbert MD  Date:    05/20/2022  Time:    20:52             Narrative:    EXAMINATION:  XR  WRIST COMPLETE 3 VIEWS RIGHT; XR HAND COMPLETE 3 VIEW RIGHT    CLINICAL HISTORY:  fall; Unspecified fall, initial encounter    TECHNIQUE:  PA, lateral, and oblique views of the right wrist were performed.  Right hand three views.    COMPARISON:  None    FINDINGS:  Acute displaced mild impacted and comminuted fracture is seen of the distal radius.  Cleavage fracture plane extends to involve the radiocarpal joint.  Suspected subtle nondisplaced fracture is seen at the base of the ulnar styloid process.  No additional acute displaced fractures or dislocation seen.                               X-Ray Wrist Complete Right (Final result)  Result time 05/20/22 20:52:55    Final result by Tessa Colbert MD (05/20/22 20:52:55)                 Impression:      1. Acute mild impacted and comminuted distal radius fracture.  2. Subtle nondisplaced fracture at the base of the ulnar styloid process.      Electronically signed by: Tessa Colbert MD  Date:    05/20/2022  Time:    20:52             Narrative:    EXAMINATION:  XR WRIST COMPLETE 3 VIEWS RIGHT; XR HAND COMPLETE 3 VIEW RIGHT    CLINICAL HISTORY:  fall; Unspecified fall, initial encounter    TECHNIQUE:  PA, lateral, and oblique views of the right wrist were performed.  Right hand three views.    COMPARISON:  None    FINDINGS:  Acute displaced mild impacted and comminuted fracture is seen of the distal radius.  Cleavage fracture plane extends to involve the radiocarpal joint.  Suspected subtle nondisplaced fracture is seen at the base of the ulnar styloid process.  No additional acute displaced fractures or dislocation seen.                                 Medications   oxyCODONE-acetaminophen 5-325 mg per tablet 1 tablet (1 tablet Oral Given 5/20/22 2225)     Medical Decision Making:   History:   Old Medical Records: I decided to obtain old medical records.  ED Management:  81-year-old male presenting status post mechanical fall on to outstretched right hand.  Denies  head injury, LOC, neck pain.  Reports he is having some lumbar back pain. No HA, visual disturbance, weakness, paresthesias. Exam above.  X-ray L-spine is negative for fracture dislocation.  X-ray of the wrist and hand remarkable for distal radius and ulnar styloid fracture.  No neurovascular deficits.  Sugar-tong splint applied per procedure note.  Referral to Ortho placed.  Will have patient return to the emergency department worsening symptoms or as needed.          Scribe Attestation:   Scribe #1: I performed the above scribed service and the documentation accurately describes the services I performed. I attest to the accuracy of the note.                 Clinical Impression:   Final diagnoses:  [W19.XXXA] Fall  [S52.501A] Closed fracture of distal end of right radius, unspecified fracture morphology, initial encounter (Primary)  [S52.491E] Closed nondisplaced fracture of styloid process of right ulna, initial encounter          ED Disposition Condition    Discharge Stable        ED Prescriptions     Medication Sig Dispense Start Date End Date Auth. Provider    acetaminophen (TYLENOL) 500 MG tablet Take 1 tablet (500 mg total) by mouth every 4 (four) hours as needed. 20 tablet 5/20/2022 5/25/2022 Bela Steele PA-C    oxyCODONE (ROXICODONE) 5 MG immediate release tablet Take 1 tablet (5 mg total) by mouth every 4 (four) hours as needed for Pain. 15 tablet 5/20/2022 5/23/2022 Bela Steele PA-C        Follow-up Information     Follow up With Specialties Details Why Contact Info    Chelsy Torrez MD Endocrinology Schedule an appointment as soon as possible for a visit in 2 days for follow up [address] [facility] [address]  [facility] [address]  [phone]      [facility] Emergency Medicine Go to  As needed, If symptoms worsen [address] [facility] [address] [phone]    Maxime Dutta MD Orthopedic Surgery Schedule an appointment as soon as possible for a visit   for follow up with orthopedics [address]    [facility] [address]  [phone]      Gunnar Mcleod MD Orthopedic Surgery, Hand Surgery Schedule an appointment as soon as possible for a visit  for orthopedics follow up [address]  [place] [id number] [phone]         I, Bela Steele PA-C , personally performed the services described in this documentation. All medical record entries made by the scribe were at my direction and in my presence. I have reviewed the chart and agree that the record reflects my personal performance and is accurate and complete.       Bela Steele PA-C  05/20/22 [id number]

## 2022-05-21 NOTE — ED NOTES
Report from ashutosh, assuming care at this time. Pt awaiting XR of spine. Denies needs at this time

## 2022-05-21 NOTE — DISCHARGE INSTRUCTIONS

## 2022-07-12 NOTE — ED PROVIDER NOTES
Encounter Date: 2022       History     Chief Complaint   Patient presents with    Laceration     Presents with complaint of left eyebrow laceration and left rib pain injury sustained when fell out of bed tonight, reports daily ASA use     81-year-old male to the ER for evaluation after rolling out of his bed and striking his head.  The patient states that he was getting out of bed to use the restroom.  He struck his head on the end table.  This resulted in a smell laceration above the left eyebrow.  The patient reports taking aspirin daily.  He is also complaining of pain to the left side of the chest wall.  He states that he also hit his chest.  Denies any chest pain at this time.  Denies any shortness of breath.        Review of patient's allergies indicates:   Allergen Reactions    Lisinopril      Swells lower legs.     Past Medical History:   Diagnosis Date    Asthma     Atrial fibrillation     CHF (congestive heart failure)     COPD (chronic obstructive pulmonary disease)     Diabetes mellitus, new onset     [place] (hard of hearing)     Hypertension      Past Surgical History:   Procedure Laterality Date    NO PAST SURGERIES  2020     History reviewed. No pertinent family history.  Social History     Tobacco Use    Smoking status: Former Smoker     Packs/day: 1.50     Years: 20.00     Pack years: 30.00     Types: Cigarettes     Quit date:      Years since quittin.5    Smokeless tobacco: Former User    Tobacco comment: quit in    Substance Use Topics    Alcohol use: No    Drug use: No     Review of Systems   Constitutional: Negative for fever.   HENT: Negative for sore throat.    Respiratory: Negative for shortness of breath.    Cardiovascular: Negative for chest pain.   Gastrointestinal: Negative for nausea.   Genitourinary: Negative for dysuria.   Musculoskeletal: Negative for back pain.        Chest wall pain   Skin: Negative for rash.        Eyebrow laceration    Neurological: Negative for weakness.   Hematological: Does not bruise/bleed easily.       Physical Exam     Initial Vitals [07/12/22 0129]   BP Pulse Resp Temp SpO2   139/81 88 18 98.1 °F (36.7 °C) (!) 94 %      MAP       --         Physical Exam    Constitutional: Vital signs are normal. He appears well-developed and well-nourished.   HENT:   Head: Normocephalic and atraumatic.   Right Ear: Hearing normal.   Left Ear: Hearing normal.   Facial bones are not tender   Eyes: Conjunctivae are normal.   Pupils equally round and reactive to light  Extraocular muscles intact  Facial bones nontender   Cardiovascular: Normal rate and regular rhythm.   Pulmonary/Chest:   Clear on exam.  Chest wall nontender, no subcutaneous emphysema   Abdominal: Abdomen is soft. Bowel sounds are normal.   Musculoskeletal:         General: Normal range of motion.      Comments: The cervical spine is not tender  No pain with axial loading  No lumbar pain     Neurological: He is alert and oriented to person, place, and time.   Skin: Skin is warm and intact.   Small 2 cm laceration to the lateral aspect of the left eyebrow bleeding controlled   Psychiatric: He has a normal mood and affect. His speech is normal and behavior is normal. Cognition and memory are normal.         ED Course   Lac Repair    Date/Time: 7/12/2022 3:05 AM  Performed by: Olu Harris PA-C  Authorized by: Marcio Reddy MD     Consent:     Consent obtained:  Verbal    Consent given by:  Patient  Anesthesia:     Anesthesia method:  Local infiltration    Local anesthetic:  Lidocaine 1% w/o epi  Laceration details:     Location:  Face    Face location:  L eyebrow    Length (cm):  3  Pre-procedure details:     Preparation:  Patient was prepped and draped in usual sterile fashion  Exploration:     Wound exploration: wound explored through full range of motion      Contaminated: no    Treatment:     Area cleansed with:  Chlorhexidine and saline    Amount of cleaning:   Standard    Irrigation solution:  Sterile saline    Irrigation volume:  500    Irrigation method:  Syringe    Visualized foreign bodies/material removed: no      Debridement:  None    Undermining:  None    Scar revision: no    Skin repair:     Repair method:  Sutures    Suture size:  6-0    Suture material:  Prolene    Suture technique:  Simple interrupted    Number of sutures:  3  Approximation:     Approximation:  Close  Repair type:     Repair type:  Simple  Post-procedure details:     Dressing:  Open (no dressing)    Procedure completion:  Tolerated well, no immediate complications      Labs Reviewed - No data to display       Imaging Results          CT Head Without Contrast (Final result)  Result time 07/12/22 03:43:20    Final result by Astrid Mantilla MD (07/12/22 03:43:20)                 Impression:      1. Left periorbital soft tissue swelling/hematoma.  No CT evidence of acute intracranial abnormality. Clinical correlation and further evaluation as warranted.  2. Generalized cerebral volume loss and findings suggestive of chronic microvascular ischemic change.  3. No CT evidence of acute cervical spine fracture or traumatic subluxation noting degenerative change of the cervical spine.  Clinical correlation and further evaluation as warranted.  4. Heterogeneous material within the external auditory canals bilaterally (left greater than right) likely cerumen although correlation with physical exam advised.  5. Asymmetric enlargement of the visualized left thyroid lobe compared to the right.  Further evaluation with nonemergent ultrasound as warranted clinically.      Electronically signed by: Astrid Mantilal MD  Date:    07/12/2022  Time:    03:43             Narrative:    EXAMINATION:  CT HEAD WITHOUT CONTRAST; CT CERVICAL SPINE WITHOUT CONTRAST    CLINICAL HISTORY:  Head trauma, minor (Age >= 65y);; Neck trauma (Age >= 65y);    TECHNIQUE:  Low dose axial images were obtained through the head and cervical  spine.  Coronal and sagittal reformations were also performed. Contrast was not administered.    COMPARISON:  Head CT 12/31/2010; soft tissue neck radiograph 05/18/2018    FINDINGS:  Head CT:    There is left periorbital soft tissue swelling/hematoma.  There is no acute intracranial hemorrhage, hydrocephalus, midline shift or mass effect. There is generalized cerebral volume loss compensatory prominence of the cerebral sulci and ventricular system.  There is hypoattenuation within the supratentorial white matter which is nonspecific but likely reflect sequela of chronic microvascular ischemic change.  Punctate hyperdensities in the basal ganglia bilaterally presumably reflect physiologic calcifications.  Gray-white matter differentiation is otherwise maintained.  The basal cisterns are patent. The mastoid air cells and visualized paranasal sinuses are clear of acute process.  Heterogeneous material within the external auditory canals bilaterally (left greater than right) presumably cerumen although correlation with physical exam advised.  The visualized bones of the calvarium demonstrate no acute osseous abnormality.    Cervical Spine CT:    Cervical vertebral body alignment is within normal limits.  The facet joints articulate appropriately.  No significant prevertebral soft tissue swelling.  Vertebral body heights appear maintained/similar to prior exam of 2018 for differences in modality.  There are anterior osteophytes at the C5-C6 level.  There is mild multilevel intervertebral disc height loss.  There is degenerative change of the cervical spine primarily consisting of posterior disc osteophyte complex, uncovertebral joint DJD and facet arthropathy very degrees of neural foraminal narrowing.  The visualized skull base appears intact.  There is asymmetric enlargement of the visualized left thyroid lobe compared to the right.  Further evaluation with ultrasound as clinically warranted.  The visualized lung  apices are free of pleural fluid or focal consolidation.                               CT Cervical Spine Without Contrast (Final result)  Result time 07/12/22 03:43:20    Final result by Astrid Mantilla MD (07/12/22 03:43:20)                 Impression:      1. Left periorbital soft tissue swelling/hematoma.  No CT evidence of acute intracranial abnormality. Clinical correlation and further evaluation as warranted.  2. Generalized cerebral volume loss and findings suggestive of chronic microvascular ischemic change.  3. No CT evidence of acute cervical spine fracture or traumatic subluxation noting degenerative change of the cervical spine.  Clinical correlation and further evaluation as warranted.  4. Heterogeneous material within the external auditory canals bilaterally (left greater than right) likely cerumen although correlation with physical exam advised.  5. Asymmetric enlargement of the visualized left thyroid lobe compared to the right.  Further evaluation with nonemergent ultrasound as warranted clinically.      Electronically signed by: Astrid Mantilla MD  Date:    07/12/2022  Time:    03:43             Narrative:    EXAMINATION:  CT HEAD WITHOUT CONTRAST; CT CERVICAL SPINE WITHOUT CONTRAST    CLINICAL HISTORY:  Head trauma, minor (Age >= 65y);; Neck trauma (Age >= 65y);    TECHNIQUE:  Low dose axial images were obtained through the head and cervical spine.  Coronal and sagittal reformations were also performed. Contrast was not administered.    COMPARISON:  Head CT 12/31/2010; soft tissue neck radiograph 05/18/2018    FINDINGS:  Head CT:    There is left periorbital soft tissue swelling/hematoma.  There is no acute intracranial hemorrhage, hydrocephalus, midline shift or mass effect. There is generalized cerebral volume loss compensatory prominence of the cerebral sulci and ventricular system.  There is hypoattenuation within the supratentorial white matter which is nonspecific but likely reflect sequela of  chronic microvascular ischemic change.  Punctate hyperdensities in the basal ganglia bilaterally presumably reflect physiologic calcifications.  Gray-white matter differentiation is otherwise maintained.  The basal cisterns are patent. The mastoid air cells and visualized paranasal sinuses are clear of acute process.  Heterogeneous material within the external auditory canals bilaterally (left greater than right) presumably cerumen although correlation with physical exam advised.  The visualized bones of the calvarium demonstrate no acute osseous abnormality.    Cervical Spine CT:    Cervical vertebral body alignment is within normal limits.  The facet joints articulate appropriately.  No significant prevertebral soft tissue swelling.  Vertebral body heights appear maintained/similar to prior exam of 2018 for differences in modality.  There are anterior osteophytes at the C5-C6 level.  There is mild multilevel intervertebral disc height loss.  There is degenerative change of the cervical spine primarily consisting of posterior disc osteophyte complex, uncovertebral joint DJD and facet arthropathy very degrees of neural foraminal narrowing.  The visualized skull base appears intact.  There is asymmetric enlargement of the visualized left thyroid lobe compared to the right.  Further evaluation with ultrasound as clinically warranted.  The visualized lung apices are free of pleural fluid or focal consolidation.                               X-Ray Chest AP Portable (Final result)  Result time 07/12/22 03:51:01    Final result by Aston Mantilla MD (07/12/22 03:51:01)                 Impression:      Left basilar infiltrate, as above.      Electronically signed by: Aston Mantilla  Date:    07/12/2022  Time:    03:51             Narrative:    EXAMINATION:  XR CHEST AP PORTABLE    CLINICAL HISTORY:  Shortness of breath    TECHNIQUE:  Single frontal view of the chest was performed.    COMPARISON:  Chest radiograph May  16, 2022    FINDINGS:  Single portable chest view is submitted.  The cardiomediastinal silhouette appears stable.  Chronic appearing lung changes are noted, there is appearance of superimposed mild parenchymal infiltrate at the left lung base without dense consolidation.  There is no evidence for significant pleural effusion or pneumothorax.  The visualized osseous structures appear intact.                               X-Ray Ribs 2 View Left (Final result)  Result time 07/12/22 03:53:52    Final result by Aston Mantilla MD (07/12/22 03:53:52)                 Impression:      Radiographic findings as above.      Electronically signed by: Aston Mantilla  Date:    07/12/2022  Time:    03:53             Narrative:    EXAMINATION:  XR RIBS 2 VIEW LEFT    CLINICAL HISTORY:  Unspecified fall, initial encounter    TECHNIQUE:  Two views of the left ribs were performed.    COMPARISON:  Chest radiograph July 12, 2022    FINDINGS:  Left rib series is submitted, there are 3 radiographs submitted.  The distal left ribs are not well evaluated.  Otherwise the osseous structures appear intact without evidence for acute fracture deformity.  Intrathoracic findings are dictated separately on the chest radiographic examination of the same date.                                 Medications   LIDOcaine HCL 10 mg/ml (1%) injection 10 mL (10 mLs Infiltration Given by Provider 7/12/22 0236)     Medical Decision Making:   Initial Assessment:   81-year-old male presenting after head injury  Differential Diagnosis:   Fracture, contusion, laceration, ICH  Independently Interpreted Test(s):   I have ordered and independently interpreted X-rays - see summary below.  Clinical Tests:   Lab Tests: Ordered and Reviewed  Radiological Study: Ordered and Reviewed  ED Management:  Laceration repaired without difficulty in the ED.  It was cleaned and irrigated appropriately.  CT scan of the head and neck and a chest x-ray and rib film x-ray.Pt has  Chest wall pain on exam after striking his chest when he rolled out of bed. No concern for ACS    No acute findings on CT imaging.    Chest x-ray did not reveal acute fracture.  Possible pulmonary infiltrate.  Patient without a cough or congestion.  No symptoms of pneumonia.  No infectious etiology.  Advised follow-up with PCP.  The patient will be discharged home follow up for suture removal in 5 days                      Clinical Impression:   Final diagnoses:  [R06.02] SOB (shortness of breath)  [W19.XXXA] Fall  [R07.89] Chest wall pain  [S01.81XA] Facial laceration, initial encounter (Primary)          ED Disposition Condition    Discharge Stable        ED Prescriptions     None        Follow-up Information     Follow up With Specialties Details Why Contact Info    Chelsy Torrez MD Endocrinology   [address]  [facility] [address]  [phone]             Olu Harris PA-C  07/12/22 2813

## 2022-07-12 NOTE — ED TRIAGE NOTES
Pt reports falling out of bed last night and striking the night stand. Pt has small head laceration to the L eyebrow. Pt reports chest pain and pain on exertion from fall.

## 2022-07-12 NOTE — ED NOTES
Patient asymptomatic of BP elevation. Due for BP meds within the next few hours. PA notified and educated patient to take medication like described. Monitor BP at home and return if still abnormal

## 2022-07-12 NOTE — DISCHARGE INSTRUCTIONS
Keep the wound clean daily with soap and water  Stitches need to come out in 5 days  Return to the ED as needed    Thank you for coming to our Emergency Department today. It is important to remember that some problems are difficult to diagnose and may not be found during your Emergency Department visit. Be sure to follow up with your primary care doctor and review all labs/imaging/tests that were performed during this visit with them. Some labs/tests may be outside of the normal range and require non-emergent follow-up and further investigation to help diagnose/exclude/prevent complications or other medical conditions.    If you do not have a primary care doctor, you may contact the one listed on your discharge paperwork or you may also call the [facility] Appointment Desk at [phone] to schedule an appointment and establish care with one. It is important to your health that you have a primary care doctor.    Please take all medications as directed. All medications may potentially have side-effects and it is impossible to predict which medications may give you side-effects or what side-effects (if any) they will give you.. If you feel that you are having a negative effect or side-effect of any medication you should immediately stop taking them and seek medical attention. If you feel that you are having a life-threatening reaction call 911.    Return to the ER with any questions/concerns, new/concerning symptoms, worsening or failure to improve.     Do not drive, swim, climb to height, take a bath or make any important decisions for 24 hours if you have received any pain medications, sedatives or mood altering drugs during your ER visit.

## 2022-07-26 NOTE — FIRST PROVIDER EVALUATION
Emergency Department TeleTriage Encounter Note      CHIEF COMPLAINT    Chief Complaint   Patient presents with    Abdominal Pain     Patient reports  a lower abdominal pain that started yesterday. Denies nausea, vomiting, diarrhea, constipation, fever, chills dysuria, or hematuria. He states that his last BM was last night. Denies GI hx.        VITAL SIGNS   Initial Vitals [07/26/22 1054]   BP Pulse Resp Temp SpO2   122/75 81 16 98.3 °F (36.8 °C) (!) 93 %      MAP       --            ALLERGIES    Review of patient's allergies indicates:   Allergen Reactions    Lisinopril      Swells lower legs.       PROVIDER TRIAGE NOTE  This is a teletriage evaluation of a 81 y.o. male presenting to the ED complaining of lower abd pain starting yesterday. Denies other symptoms. States pain is worse with coughing.     Well-appearing, no distress.     Initial orders will be placed and care will be transferred to an alternate provider when patient is roomed for a full evaluation. Any additional orders and the final disposition will be determined by that provider.           ORDERS  Labs Reviewed   CBC W/ AUTO DIFFERENTIAL   COMPREHENSIVE METABOLIC PANEL   LIPASE   URINALYSIS, REFLEX TO URINE CULTURE   LACTIC ACID, PLASMA   ISTAT CHEM8       ED Orders (720h ago, onward)    Start Ordered     Status Ordering Provider    07/26/22 1125 07/26/22 1124  Vital signs  Every 2 hours         Ordered JENNIFER PARKER N.    07/26/22 1125 07/26/22 1124  Diet NPO  Diet effective now         Ordered JENNIFER PARKER N.    07/26/22 1125 07/26/22 1124  Insert peripheral IV  Once         Ordered JENNIFER PARKER N.    07/26/22 1125 07/26/22 1124  CBC W/ AUTO DIFFERENTIAL  STAT         Ordered JENNIFER PARKER N.    07/26/22 1125 07/26/22 1124  Comp. Metabolic Panel  STAT         Ordered JENNIFER PARKER N.    07/26/22 1125 07/26/22 1124  ISTAT CHEM8  Once         Ordered JENNIFER PARKER N.    07/26/22 1125 07/26/22  1124  Lipase  STAT         Ordered ELENA JENNIFER N.    07/26/22 1125 07/26/22 1124  Urinalysis, Reflex to Urine Culture Urine, Clean Catch  STAT         Ordered JENNIFER PARKER N.    07/26/22 1125 07/26/22 1124  Lactic acid, plasma  STAT         Ordered JENNIFER PARKER N.            Virtual Visit Note: The provider triage portion of this emergency department evaluation and documentation was performed via [employer], a HIPAA-compliant telemedicine application, in concert with a tele-presenter in the room. A face to face patient evaluation with one of my colleagues will occur once the patient is placed in an emergency department room.      DISCLAIMER: This note was prepared with [employer] voice recognition transcription software. Garbled syntax, mangled pronouns, and other bizarre constructions may be attributed to that software system.

## 2022-07-26 NOTE — ED PROVIDER NOTES
Encounter Date: 2022       History     Chief Complaint   Patient presents with    Abdominal Pain     Patient reports  a lower abdominal pain that started yesterday. Denies nausea, vomiting, diarrhea, constipation, fever, chills dysuria, or hematuria. He states that his last BM was last night. Denies GI hx.      This is a 81 year old male with PMH of Asthma, A Fib, CHF, COPD, Diabetes and HTN who presents to the ED complaining of left sided lower abdominal pain since yesterday.  The pain is a constant, sharp pain that is worse with movement and coughing (10/10).  He took Tylenol without any relief.  No prior episodes noted by the patient.  He denies fever, new cough, vomiting, diarrhea.  The pain does improve when the patient is extremely still and does not move.  There are no other alleviating or exacerbating factors.  No other associated symptoms.        Review of patient's allergies indicates:   Allergen Reactions    Lisinopril      Swells lower legs.     Past Medical History:   Diagnosis Date    Asthma     Atrial fibrillation     CHF (congestive heart failure)     COPD (chronic obstructive pulmonary disease)     Diabetes mellitus, new onset     [place] (hard of hearing)     Hypertension      Past Surgical History:   Procedure Laterality Date    NO PAST SURGERIES  2020     History reviewed. No pertinent family history.  Social History     Tobacco Use    Smoking status: Former Smoker     Packs/day: 1.50     Years: 20.00     Pack years: 30.00     Types: Cigarettes     Quit date:      Years since quittin.5    Smokeless tobacco: Former User    Tobacco comment: quit in    Substance Use Topics    Alcohol use: No    Drug use: No     Review of Systems   Constitutional: Negative for chills, fatigue and fever.   Eyes: Negative for pain.   Respiratory: Negative for cough and shortness of breath.    Cardiovascular: Negative for chest pain.   Gastrointestinal: Positive for abdominal pain.  Negative for diarrhea, nausea and vomiting.   Genitourinary: Negative for flank pain.   Musculoskeletal: Negative for neck pain.   Skin: Negative for rash.   Neurological: Negative for headaches.   All other systems reviewed and are negative.      Physical Exam     Initial Vitals [07/26/22 1054]   BP Pulse Resp Temp SpO2   122/75 81 16 98.3 °F (36.8 °C) (!) 93 %      MAP       --         Physical Exam    Nursing note and vitals reviewed.  Constitutional: He appears well-developed and well-nourished. He is not diaphoretic.   HENT:   Head: Normocephalic and atraumatic.   Eyes: EOM are normal. Pupils are equal, round, and reactive to light.   Neck: Neck supple.   Cardiovascular: Normal rate.   Pulmonary/Chest: No respiratory distress. He has wheezes (There are course breath sounds noted bilaterally.). He exhibits tenderness.   Abdominal: Abdomen is soft. Bowel sounds are normal.   Musculoskeletal:         General: No edema. Normal range of motion.        Arms:       Cervical back: Neck supple.     Neurological: He is alert and oriented to person, place, and time.   Skin: Skin is warm. Capillary refill takes less than 2 seconds. No rash noted. No erythema.         ED Course   Procedures  Labs Reviewed   CBC W/ AUTO DIFFERENTIAL - Abnormal; Notable for the following components:       Result Value    RBC 3.61 (*)     Hemoglobin 10.7 (*)     Hematocrit 32.5 (*)     RDW 14.8 (*)     All other components within normal limits   COMPREHENSIVE METABOLIC PANEL - Abnormal; Notable for the following components:    CO2 30 (*)     BUN 27 (*)     Creatinine 1.8 (*)     Total Protein 9.7 (*)     Albumin 3.3 (*)     AST 49 (*)     Anion Gap 7 (*)     eGFR if  40 (*)     eGFR if non  34 (*)     All other components within normal limits   LIPASE - Abnormal; Notable for the following components:    Lipase 79 (*)     All other components within normal limits   URINALYSIS, REFLEX TO URINE CULTURE - Abnormal;  Notable for the following components:    Leukocytes, UA Trace (*)     All other components within normal limits    Narrative:     Specimen Source->Urine   ISTAT PROCEDURE - Abnormal; Notable for the following components:    POC PH 7.337 (*)     POC PCO2 64.2 (*)     POC PO2 16 (*)     POC HCO3 34.4 (*)     POC SATURATED O2 17 (*)     POC TCO2 36 (*)     All other components within normal limits   ISTAT PROCEDURE - Abnormal; Notable for the following components:    POC BUN 33 (*)     POC Creatinine 1.7 (*)     POC TCO2 (MEASURED) 34 (*)     All other components within normal limits   LACTIC ACID, PLASMA   URINALYSIS MICROSCOPIC    Narrative:     Specimen Source->Urine   SARS-COV-2 RDRP GENE    Narrative:     This test utilizes isothermal nucleic acid amplification   technology to detect the SARS-CoV-2 RdRp nucleic acid segment.   The analytical sensitivity (limit of detection) is 125 genome   equivalents/mL.   A POSITIVE result implies infection with the SARS-CoV-2 virus;   the patient is presumed to be contagious.     A NEGATIVE result means that SARS-CoV-2 nucleic acids are not   present above the limit of detection. A NEGATIVE result should be   treated as presumptive. It does not rule out the possibility of   COVID-19 and should not be the sole basis for treatment decisions.   If COVID-19 is strongly suspected based on clinical and exposure   history, re-testing using an alternate molecular assay should be   considered.   This test is only for use under the Food and Drug   Administration s Emergency Use Authorization (EUA).   Commercial kits are provided by [employer].   Performance characteristics of the EUA have been independently   verified by [facility] Department of   Pathology and Laboratory Medicine.   _________________________________________________________________   The authorized Fact Sheet for Healthcare Providers and the authorized Fact   Sheet for Patients of the ID NOW COVID-19  are available on the FDA   website:     https://www.fda.gov/media/704416/download  https://www.fda.gov/media/990126/download           ISTAT CHEM8          Imaging Results          X-Ray Chest PA And Lateral (Final result)  Result time 07/26/22 13:07:54    Final result by Marcio Hdez MD (07/26/22 13:07:54)                 Impression:      As above.      Electronically signed by: Marcio Hdez  Date:    07/26/2022  Time:    13:07             Narrative:    EXAMINATION:  XR CHEST PA AND LATERAL    CLINICAL HISTORY:  Pain, unspecified    TECHNIQUE:  PA and lateral views of the chest were performed.    COMPARISON:  Chest radiograph 07/12/2022    FINDINGS:  Lines and tubes: None.    Heart and mediastinum: Unchanged.    Pleura: Small left pleural effusion, new from prior.  No pneumothorax.    Lungs: Lungs are well inflated. Decreased patchy opacities in the left lower lobe, which could represent atelectasis, aspiration, or pneumonia.  No new focal consolidation.    Soft tissue/bone: Osteopenia and degenerative changes.                                 Medications   HYDROmorphone (PF) injection 1 mg (0.5 mg Intravenous Given 7/26/22 1236)           APC / Resident Notes:   This is an urgent evaluation of an 81-year-old male who presents to the emergency department complaining of left-sided abdominal pain.    The patient is currently afebrile and nontoxic in appearance.  His blood pressure is 156/80.  His pulse ox was 96%.  On re-evaluation of previous medical records his pulse ox typically lives between 91 and 94%.  On physical exam, there are coarse breath sounds noted to the bilateral lower lungs.  There is point tenderness and reproducible pain to palpation of the muscles of the of the lower left costal area.  He states that the pain is worse with movement and coughing.  I carefully considered but doubt coronary, cardiac origin of this pain.  While in the emergency department blood was drawn.  Urine was also collected.   Chest x-ray revealed a small pleural effusion and potential opacities in the left lower lung that could represent pneumonia.  CBC is without leukocytosis.  There is an anemia with an H&H of 10 and 32.5.  This is actually improved from blood work done 2 months ago.  CMP reveals a BUN and creatinine of 271.8.  This is also consistent with his typical blood work.  AST is 49. Lipase is 79. Urinalysis with 1+ leukocytes.  Microscopic UA without any significant evidence of urinary tract infection.    Rapid COVID test was negative.  At this time, the patient is stable.  I will treat him with azithromycin for potential pneumonia.  I also will give him pain medication for his muscle pain.  It is very important that the patient follows up with his primary care doctor this week for re-evaluation.  This is of the patient's pleural effusion is monitored and does not worsen.  Also signs and symptoms of worsening were thoroughly reviewed with him and he verbalized understanding and agreement.  The patient's wife also verbalized understanding.  At this time the patient is stable for discharge.                 Clinical Impression:   Final diagnoses:  [R52] Pain  [M79.18] Musculoskeletal pain (Primary)  [J18.9] Pneumonia due to infectious organism, unspecified laterality, unspecified part of lung  [J90] Small pleural effusion  [D64.9] Anemia, unspecified type          ED Disposition Condition    Discharge Stable        ED Prescriptions     Medication Sig Dispense Start Date End Date Auth. Provider    azithromycin (Z-TERRENCE) 250 MG tablet Take 1 tablet (250 mg total) by mouth once daily. Take first 2 tablets together, then 1 every day until finished. 6 tablet 7/26/2022  Lupe Bravo PA-C    HYDROcodone-acetaminophen (NORCO) 5-325 mg per tablet Take 1 tablet by mouth every 4 (four) hours as needed for Pain. 12 tablet 7/26/2022  Lupe Bravo PA-C        Follow-up Information     Follow up With Specialties Details Why Contact  Info    Escipamy Torrez MD Endocrinology   [address]  [facility] [address]  [phone]             Lupe Bravo PA-C  07/26/22 1458       Lupe Bravo PA-C  07/26/22 1456

## 2022-07-26 NOTE — DISCHARGE INSTRUCTIONS
You must follow up with your doctor this week.  Rest.  Take medication as prescribed and until completed.  Return to the ED if you have ANY worsening pain, shortness of breath, fatigue.

## 2022-08-06 NOTE — ED TRIAGE NOTES
"80 yo male presents to ED, escorted by his wife, with c/o SOB and mid sternal chest tightness. Pt reports that symptoms started yesterday. Pt states that " if eels like somebody is squeezing my chest". Pt states that there is no pain. Pt reports that he has PMH of HTN and COPD.  "

## 2022-08-06 NOTE — ED PROVIDER NOTES
"Encounter Date: 2022    SCRIBE #1 NOTE: I, Corina Yarbrough, am scribing for, and in the presence of,  Chao Gibbs MD. I have scribed the following portions of the note - Other sections scribed: HPI, ROS.       History     Chief Complaint   Patient presents with    Chest Pain     Presents with complaint of chest pain accompanied by shortness of breath since last night described as "feels like someone is choking me", Hx COPD     81 year old male, with pertinent medical history of COPD, asthma, Afib, CHF, DM, and HTN, presents to the ED to evaluate his chest pain and shortness of breath that began last night. Patient states the chest pain feels like tightness, "stopping air from getting to me." He reports productive cough with cloudy white sputum. Patient has inhalers that provided no relief. He was diagnosed with pneumonia weeks ago but ran out of his prescribed antibiotics. He has not taken any medications for treatment of his symptoms besides his normal prescribed medications. Patient denies tobacco use. He denies fever, chills, abdominal pain, nausea, vomiting, diarrhea, or other associated symptoms.     The history is provided by the patient. No  was used.     Review of patient's allergies indicates:   Allergen Reactions    Lisinopril      Swells lower legs.     Past Medical History:   Diagnosis Date    Asthma     Atrial fibrillation     CHF (congestive heart failure)     COPD (chronic obstructive pulmonary disease)     Diabetes mellitus, new onset     [place] (hard of hearing)     Hypertension      Past Surgical History:   Procedure Laterality Date    NO PAST SURGERIES  2020     No family history on file.  Social History     Tobacco Use    Smoking status: Former Smoker     Packs/day: 1.50     Years: 20.00     Pack years: 30.00     Types: Cigarettes     Quit date:      Years since quittin.6    Smokeless tobacco: Former User    Tobacco comment: quit in  "   Substance Use Topics    Alcohol use: No    Drug use: No     Review of Systems   Constitutional: Negative.    HENT: Negative.    Eyes: Negative.    Respiratory: Positive for cough and shortness of breath.    Cardiovascular: Positive for chest pain.   Gastrointestinal: Negative.    Genitourinary: Negative.    Musculoskeletal: Negative.    Skin: Negative.    Neurological: Negative.        Physical Exam     Initial Vitals [08/05/22 1914]   BP Pulse Resp Temp SpO2   (!) 144/61 84 (!) 22 98.2 °F (36.8 °C) (!) 93 %      MAP       --         Physical Exam    Nursing note and vitals reviewed.  Constitutional: He appears well-developed and well-nourished. He is not diaphoretic. No distress.   HENT:   Head: Normocephalic and atraumatic.   Nose: Nose normal.   Mouth/Throat: No oropharyngeal exudate.   Eyes: EOM are normal. Pupils are equal, round, and reactive to light.   Neck: Neck supple. No tracheal deviation present. No JVD present.   Normal range of motion.  Cardiovascular: Normal rate, regular rhythm, normal heart sounds and intact distal pulses.   Pulmonary/Chest: He is in respiratory distress (mildly tachypneic). He has wheezes (diffuse). He has no rhonchi. He has no rales.   Abdominal: Abdomen is soft. Bowel sounds are normal. He exhibits no distension. There is no abdominal tenderness. There is no rebound and no guarding.   Musculoskeletal:         General: No tenderness or edema. Normal range of motion.      Cervical back: Normal range of motion and neck supple.     Neurological: He is alert and oriented to person, place, and time. He has normal strength.   Skin: Skin is warm and dry. Capillary refill takes less than 2 seconds. No rash noted. No erythema.         ED Course   Procedures  Labs Reviewed   CBC W/ AUTO DIFFERENTIAL - Abnormal; Notable for the following components:       Result Value    RBC 3.32 (*)     Hemoglobin 9.6 (*)     Hematocrit 29.7 (*)     RDW 14.8 (*)     All other components within normal  limits   COMPREHENSIVE METABOLIC PANEL - Abnormal; Notable for the following components:    BUN 37 (*)     Creatinine 2.4 (*)     Total Protein 9.7 (*)     Albumin 3.3 (*)     AST 50 (*)     eGFR 26 (*)     All other components within normal limits   ISTAT PROCEDURE - Abnormal; Notable for the following components:    POC PH 7.347 (*)     POC PCO2 59.5 (*)     POC PO2 26 (*)     POC HCO3 32.6 (*)     POC SATURATED O2 42 (*)     POC TCO2 34 (*)     All other components within normal limits   TROPONIN I   B-TYPE NATRIURETIC PEPTIDE   PROCALCITONIN          Imaging Results          X-Ray Chest AP Portable (Final result)  Result time 08/05/22 21:29:03    Final result by Barbra Hutchinson MD (08/05/22 21:29:03)                 Impression:      Blunting of the costophrenic angles, which may suggest mild pleural thickening or pleural fluid.      Electronically signed by: Barbra Hutchinson  Date:    08/05/2022  Time:    21:29             Narrative:    EXAMINATION:  AP PORTABLE CHEST    CLINICAL HISTORY:  Chest Pain;    TECHNIQUE:  AP portable chest radiograph was submitted.    COMPARISON:  07/26/2022    FINDINGS:  AP portable chest radiograph demonstrates a cardiac silhouette within normal limits.  The left costophrenic angle is partially obscured and was blunted on the previous exam.  There is blunting of right costophrenic angle.  No pneumothorax or focal consolidation is detected.  Bones are osteopenic.                                 Medications   methylPREDNISolone sodium succinate injection 80 mg (80 mg Intravenous Given 8/5/22 2112)   albuterol sulfate nebulizer solution 5 mg (5 mg Nebulization Given 8/5/22 2115)   ipratropium 0.02 % nebulizer solution 1 mg ( Nebulization Not Given 8/5/22 2130)   sodium chloride 0.9% bolus 500 mL (0 mLs Intravenous Stopped 8/5/22 2240)   doxycycline tablet 100 mg (100 mg Oral Given 8/5/22 2257)     Medical Decision Making:   Independently Interpreted Test(s):   I have ordered and  independently interpreted EKG Reading(s) - see summary below  Clinical Tests:   Lab Tests: Ordered and Reviewed  Radiological Study: Ordered and Reviewed  Medical Tests: Ordered and Reviewed    MDM:    81 y.o.male with PMHx as noted above, presents with SOB. Physical exam as noted above.  ED workup remarkable for EKG - nsr, rate 81 bpm, no ischemic pattern noted, no STEMI, trop - neg, BNP - 53, pH/pCO2 - 7.34/59.5, CBC/CMP - Hgb 9.6, BUN/Cr - 37/2.4, CXR - unremarkable.  Pt presentation consistent with COPD exacerbation, given solumedrol, duo-nebs with improvement in symptoms.  Pt continued to be observed with stable vitals, and no oxygen requirement.  At this time given patient's history, physical exam, and ED workup do not suspect arrhythmia, CHF exacerbation, cardiac tamponade, MI/ACS, PE, PTX, aortic dissection, pericarditis, PNA, or any further malignant cause.  Discussed diagnosis and further treatment with patient including f/u, return precautions given and all questions answered.  Patient in understanding of plan.  Pt discharged to home improved and stable.            Scribe Attestation:   Scribe #1: I performed the above scribed service and the documentation accurately describes the services I performed. I attest to the accuracy of the note.                 Clinical Impression:   Final diagnoses:  [R07.9] Chest pain  [J44.1] COPD exacerbation (Primary)  [J44.9] Chronic obstructive pulmonary disease, unspecified COPD type          ED Disposition Condition    Discharge Stable        ED Prescriptions     Medication Sig Dispense Start Date End Date Auth. Provider    albuterol (PROVENTIL/VENTOLIN HFA) 90 mcg/actuation inhaler Inhale 1-2 puffs into the lungs every 6 (six) hours as needed. Rescue 18 g 8/5/2022 8/5/2023 Chao Gibbs MD    predniSONE (DELTASONE) 50 MG Tab Take 1 tablet (50 mg total) by mouth once daily. for 4 days 4 tablet 8/5/2022 8/9/2022 Chao Gibbs MD    doxycycline (VIBRAMYCIN)  100 MG Cap Take 1 capsule (100 mg total) by mouth 2 (two) times daily. for 10 days 20 capsule 8/5/2022 8/15/2022 Chao Gibbs MD    ipratropium-albuteroL (COMBIVENT)  mcg/actuation inhaler Inhale 1 puff into the lungs every 6 (six) hours as needed for Wheezing or Shortness of Breath. Rescue 4 g 8/6/2022  Chao Gibbs MD        Follow-up Information     Follow up With Specialties Details Why Contact Info    [facility] Emergency Dept Emergency Medicine Go to  If symptoms worsen [address] [facility] [address] [phone]    Chelsy Torrez MD Endocrinology Go in 1 week As needed [address] [facility] [address]  [facility] [id number] [phone]      Chandler Franco MD Pulmonary Disease, Sleep Medicine Schedule an appointment as soon as possible for a visit   [address] [facility] [address]  [address]  [place] [id number] [phone]         I, Chao Gibbs M.D., personally performed the services described in this documentation. All medical record entries made by the scribe were at my direction and in my presence. I have reviewed the chart and agree that the record reflects my personal performance and is accurate and complete.     Chao Gibbs MD  08/06/22 7393

## 2022-09-02 PROBLEM — U07.1 COVID-19: Status: ACTIVE | Noted: 2022-01-01

## 2022-09-02 NOTE — PLAN OF CARE
09/02/22 1302   Medicare Message   Important Message from Medicare regarding Discharge Appeal Rights Explained to patient/caregiver  (SW explained IMM. Pt expressed verbally understanding. BORIS mailed a copy to address on file.)   Date IMM was signed 09/02/22   Time IMM was signed 1309   [id number] [phone]

## 2022-09-02 NOTE — HPI
Micah Laurent Jr. is a 81 y.o. male with a PMH  has a past medical history of Asthma, Atrial fibrillation, CHF (congestive heart failure), COPD (chronic obstructive pulmonary disease), Diabetes mellitus, new onset, [place] (hard of hearing), and Hypertension. who presented to the ED complaining of worsening shortness and chills x1 day duration.  Patient reports baseline chronic shortness of breath in setting of underlying COPD however reported worsening shortness of breath from baseline unrelieved with home medications.  Aggravating factors included exertion with no known alleviating factors.  Associated symptoms included chills, nausea, and chronic productive cough in setting of COPD reported all other review of systems negative except those noted above.  Patient reported his wife was recently diagnosed with COVID and feels he may have contracted it from her.  He reported he was not vaccinated against COVID and was in his usual state health prior to onset of symptoms.  Patient was noted to have increased oxygen demand in the ED prompting admission for symptomatic COVID him to initiate remdesivir treatment.      PCP: Chelsy Torrez

## 2022-09-02 NOTE — ED NOTES
Ambulatory O2 sat:88% on RA, after Duoneb then sat dropped to 86% after wards; placed on 2L per n/c at rest and pt O2 sat increased to 94%.

## 2022-09-02 NOTE — PLAN OF CARE
[facility] - Telemetry  Initial Discharge Assessment       Primary Care Provider: Chelsy Torrez MD    Admission Diagnosis: Shortness of breath [R06.02]  SOB (shortness of breath) [R06.02]  Respiratory failure with hypoxia [J96.91]  Acute on chronic respiratory failure, unspecified whether with hypoxia or hypercapnia [J96.20]  Chronic obstructive pulmonary disease, unspecified COPD type [J44.9]  COVID-19 [U07.1]    Admission Date: 9/1/2022  Expected Discharge Date:  9/7/2022    CM discussed discharge planning with pt. No DME at home. Assessment completed and role of CM discussed. Plan A ( home with family) and Plan B (other- TBD).     Discharge Barriers Identified: (P) None    Payor: [employer] / Plan: [employer] / Product Type: Medicare Advantage /     Extended Emergency Contact Information  Primary Emergency Contact: Susanne Laurent  Address: [address] [place] [address]  Home Phone: [phone]  Relation: Spouse    Discharge Plan A: (P) Home with family  Discharge Plan B: (P) Other (tbd)      [id number] - [place], [place] - [address]  [address] [address]  Phone: [phone] Fax: [phone]      Initial Assessment (most recent)       Adult Discharge Assessment - 09/02/22 1251          Discharge Assessment    Assessment Type Discharge Planning Assessment     Confirmed/corrected address, phone number and insurance Yes     Confirmed Demographics Correct on Facesheet     Source of Information patient     When was your last doctors appointment? -- (P)    Pt stated he seen PCP 2 months ago.    Reason For Admission COVID (P)      Lives With spouse (P)      Do you expect to return to your current living situation? Yes (P)      Do you have help at home or someone to help you manage your care at home? Yes (P)      Who are your caregiver(s) and their phone number(s)? Susanne Laurent (Spouse)    [phone] (P)      Prior to hospitilization cognitive status: Alert/Oriented (P)      Current cognitive status: Alert/Oriented (P)      Walking or Climbing Stairs Difficulty ambulation difficulty, assistance 1 person;stair climbing difficulty, assistance 1 person (P)      Dressing/Bathing Difficulty none (P)      Home Layout Able to live on 1st floor (P)      Equipment Currently Used at Home none (P)      Readmission within 30 days? No (P)      Patient currently being followed by outpatient case management? No (P)      Do you currently have service(s) that help you manage your care at home? No (P)      Do you take prescription medications? Yes (P)      Do you have prescription coverage? Yes (P)      Coverage PHN (P)      Do you have any problems affording any of your prescribed medications? No (P)      Is the patient taking medications as prescribed? yes (P)      Who is going to help you get home at discharge? MEÑO- WIFE (P)      How do you get to doctors appointments? family or friend will provide (P)      Are you on dialysis? No (P)      Do you take coumadin? No (P)      Discharge Plan A Home with family (P)      Discharge Plan B Other (P)    tbd    DME Needed Upon Discharge  none (P)      Discharge Plan discussed with: Patient (P)      Discharge Barriers Identified None (P)         Physical Activity    On average, how many days per week do you engage in moderate to strenuous exercise (like a brisk walk)? 0 days (P)      On average, how many minutes do you engage in exercise at this level? 0 min (P)         Financial Resource Strain    How hard is it for you to pay for the very basics like food, housing, medical care, and heating? Not hard at all (P)         Housing Stability    In the last 12 months, was there a time when you were not able to pay the mortgage or rent on time? No (P)      In the last 12 months, how many places have you lived? 1 (P)      In the last 12 months, was there a time when you did  not have a steady place to sleep or slept in a shelter (including now)? No (P)         Transportation Needs    In the past 12 months, has lack of transportation kept you from medical appointments or from getting medications? No (P)      In the past 12 months, has lack of transportation kept you from meetings, work, or from getting things needed for daily living? No (P)         Food Insecurity    Within the past 12 months, you worried that your food would run out before you got the money to buy more. Never true (P)      Within the past 12 months, the food you bought just didn't last and you didn't have money to get more. Never true (P)         Stress    Do you feel stress - tense, restless, nervous, or anxious, or unable to sleep at night because your mind is troubled all the time - these days? Not at all (P)         Social Connections    In a typical week, how many times do you talk on the phone with family, friends, or neighbors? Three times a week (P)      How often do you get together with friends or relatives? Three times a week (P)      How often do you attend Scientology or Hinduism services? Never (P)      Do you belong to any clubs or organizations such as Scientology groups, unions, fraternal or athletic groups, or school groups? No (P)      How often do you attend meetings of the clubs or organizations you belong to? Never (P)      Are you , , , , never , or living with a partner?  (P)         Alcohol Use    Q1: How often do you have a drink containing alcohol? Never (P)      Q2: How many drinks containing alcohol do you have on a typical day when you are drinking? Patient does not drink (P)      Q3: How often do you have six or more drinks on one occasion? Never (P)         Relationship/Environment    Name(s) of Who Lives With Patient Susanne- wife (P)

## 2022-09-02 NOTE — SUBJECTIVE & OBJECTIVE
Past Medical History:   Diagnosis Date    Asthma     Atrial fibrillation     CHF (congestive heart failure)     COPD (chronic obstructive pulmonary disease)     Diabetes mellitus, new onset     [place] (hard of hearing)     Hypertension        Past Surgical History:   Procedure Laterality Date    NO PAST SURGERIES  2020       Review of patient's allergies indicates:   Allergen Reactions    Lisinopril      Swells lower legs.       No current facility-administered medications on file prior to encounter.     Current Outpatient Medications on File Prior to Encounter   Medication Sig    albuterol (PROVENTIL/VENTOLIN HFA) 90 mcg/actuation inhaler Inhale 1-2 puffs into the lungs every 6 (six) hours as needed. Rescue    aspirin (ECOTRIN) 81 MG EC tablet Take 81 mg by mouth once daily.    azithromycin (Z-TERRENCE) 250 MG tablet Take 1 tablet (250 mg total) by mouth once daily. Take first 2 tablets together, then 1 every day until finished.    fluticasone-salmeterol diskus inhaler 250-50 mcg Inhale 1 puff into the lungs 2 (two) times daily. Controller    hydrALAZINE (APRESOLINE) 50 MG tablet Take 1 tablet (50 mg total) by mouth every 8 (eight) hours.    HYDROcodone-acetaminophen (NORCO) 5-325 mg per tablet Take 1 tablet by mouth every 4 (four) hours as needed for Pain.    ipratropium (ATROVENT) 0.02 % nebulizer solution Take 2.5 mLs (500 mcg total) by nebulization every 4 (four) hours while awake. Rescue    ipratropium-albuteroL (COMBIVENT)  mcg/actuation inhaler Inhale 1 puff into the lungs every 6 (six) hours as needed for Wheezing or Shortness of Breath. Rescue    metoprolol succinate (TOPROL-XL) 50 MG 24 hr tablet Take 50 mg by mouth once daily.    SITagliptin (JANUVIA) 50 MG Tab      Family History    None       Tobacco Use    Smoking status: Former     Packs/day: 1.50     Years: 20.00     Pack years: 30.00     Types: Cigarettes     Quit date:      Years since quittin.6    Smokeless tobacco: Former    Tobacco  comments:     quit in 1990   Substance and Sexual Activity    Alcohol use: No    Drug use: No    Sexual activity: Not Currently     Partners: Female     Review of Systems   All other systems reviewed and are negative.  Objective:     Vital Signs (Most Recent):  Temp: 98.9 °F (37.2 °C) (09/01/22 2132)  Pulse: 84 (09/01/22 2307)  Resp: (!) 23 (09/01/22 2307)  BP: (!) 144/70 (09/01/22 2132)  SpO2: (!) 93 % (09/01/22 2307)   Vital Signs (24h Range):  Temp:  [98.9 °F (37.2 °C)] 98.9 °F (37.2 °C)  Pulse:  [] 84  Resp:  [23-24] 23  SpO2:  [89 %-93 %] 93 %  BP: (144)/(70) 144/70     Weight: 70.3 kg (155 lb)  Body mass index is 25.02 kg/m².    Physical Exam  Constitutional:       General: He is in acute distress.      Appearance: Normal appearance. He is obese. He is ill-appearing. He is not toxic-appearing or diaphoretic.   HENT:      Head: Normocephalic and atraumatic.      Right Ear: External ear normal.      Left Ear: External ear normal.      Nose: Nose normal. No congestion or rhinorrhea.      Mouth/Throat:      Mouth: Mucous membranes are moist.      Pharynx: Oropharynx is clear. No oropharyngeal exudate or posterior oropharyngeal erythema.   Eyes:      General: No scleral icterus.     Extraocular Movements: Extraocular movements intact.      Conjunctiva/sclera: Conjunctivae normal.      Pupils: Pupils are equal, round, and reactive to light.   Neck:      Vascular: No carotid bruit.   Cardiovascular:      Rate and Rhythm: Normal rate and regular rhythm.      Pulses: Normal pulses.      Heart sounds: Normal heart sounds. No murmur heard.    No friction rub. No gallop.   Pulmonary:      Effort: No respiratory distress.      Breath sounds: No stridor. Wheezing present. No rhonchi or rales.      Comments: Patient tachypneic with diffuse bilateral wheezes noted.  Chest:      Chest wall: No tenderness.   Abdominal:      General: Abdomen is flat. Bowel sounds are normal. There is no distension.      Palpations: Abdomen  is soft. There is no mass.      Tenderness: There is no abdominal tenderness. There is no guarding or rebound.      Hernia: No hernia is present.   Musculoskeletal:         General: No swelling, tenderness, deformity or signs of injury. Normal range of motion.      Cervical back: Normal range of motion and neck supple. No rigidity or tenderness.   Lymphadenopathy:      Cervical: No cervical adenopathy.   Skin:     General: Skin is warm and dry.      Capillary Refill: Capillary refill takes less than 2 seconds.      Coloration: Skin is not jaundiced or pale.      Findings: No bruising, erythema, lesion or rash.   Neurological:      General: No focal deficit present.      Mental Status: He is alert and oriented to person, place, and time. Mental status is at baseline.      Cranial Nerves: No cranial nerve deficit.      Sensory: No sensory deficit.      Motor: No weakness.      Coordination: Coordination normal.   Psychiatric:         Mood and Affect: Mood normal.         Behavior: Behavior normal.         Thought Content: Thought content normal.         Judgment: Judgment normal.         CRANIAL NERVES     CN III, IV, VI   Pupils are equal, round, and reactive to light.     Significant Labs: All pertinent labs within the past 24 hours have been reviewed.    Significant Imaging: I have reviewed all pertinent imaging results/findings within the past 24 hours.

## 2022-09-02 NOTE — ASSESSMENT & PLAN NOTE
Patient with history of atrial fibrillation currently on metoprolol and not on anticoagulation outpatient. Patient initiated on lovenox in setting of covid.  Plan:  -continue home medication  -telemetry  -continue anticoagulation as noted above

## 2022-09-02 NOTE — ASSESSMENT & PLAN NOTE
Patient with history of COPD currently admitted for acute exacerbation secondary to COVID infection.  Patient initiated on COVID treatment as noted above.  Chest x-ray without signs of infectious processes noted.  Plan:  -continue treatment of COVID  -titrate oxygen therapy as needed  -incentive spirometry

## 2022-09-02 NOTE — PLAN OF CARE
Pt is AAOx4. 2L NC. Tele maintained.   No falls or injuries reported during shift, safety precautions maintained.     Problem: Adult Inpatient Plan of Care  Goal: Plan of Care Review  Outcome: Ongoing, Progressing     Problem: Adult Inpatient Plan of Care  Goal: Optimal Comfort and Wellbeing  Outcome: Ongoing, Progressing

## 2022-09-02 NOTE — ASSESSMENT & PLAN NOTE
BP currently ranging from 120s-140s systolic  Plan:  -continue home medications  -treat covid as noted above  -monitor BP  -titrate home medications as needed   -low salt diet

## 2022-09-02 NOTE — ASSESSMENT & PLAN NOTE
Patient's FSGs are controlled on current medication regimen.  Last A1c reviewed-   Lab Results   Component Value Date    HGBA1C 5.4 11/11/2020     Most recent fingerstick glucose reviewed-   Recent Labs   Lab 09/02/22  0123   POCTGLUCOSE 150*     Current correctional scale  none  Maintain anti-hyperglycemic dose as follows-   Antihyperglycemics (From admission, onward)    None      Plan:  -SSI  -Accu-checks   -Hypoglycemic protocol   -Hold oral antihyperglycemics while inpatient

## 2022-09-02 NOTE — ED PROVIDER NOTES
"Encounter Date: 9/1/2022    SCRIBE #1 NOTE: I, Maria Elena Mic, am scribing for, and in the presence of,  Ashley King MD. I have scribed the following portions of the note - Other sections scribed: HPI/ROS/PE.   SCRIBE #2 NOTE: I, Zuleima Deshawn, am scribing for, and in the presence of,  Ashley King MD. I have scribed the following portions of the note - Other sections scribed: HPI, ROS, PE.   History     Chief Complaint   Patient presents with    Headache    Weakness    Nausea     Per pt he has a headache and feels nauseous with new onset SOB since 3-4pm. Denies fever reports chills      Micah Laurent Jr. is a 81 y.o. male, with a PMHx of Asthma, COPD, HTN, and DM, who presents to the ED with chills onset 7 hours ago. Patient notes associated symptoms of chronic shortness of breath increased from baseline, chills, nausea, and chronic productive cough unchanged from baseline. Patient attempted treatment with his nebulizer and inhaler, but denies any alleviation of symptoms. No other exacerbating or alleviating factors. Patient's wife reports that she was recently diagnosed with COVID-19. She states that her  "took care of her" during this and suspects it may have been transmitted to the patient. He denies receiving any vaccinations against COVID-19. Patient denies home O2 use. He endorses allergies to Lisinopril. Patient denies chest pain, vomiting, abdominal pain or other associated symptoms.    The history is provided by the patient and the spouse.   Review of patient's allergies indicates:   Allergen Reactions    Lisinopril      Swells lower legs.     Past Medical History:   Diagnosis Date    Asthma     Atrial fibrillation     CHF (congestive heart failure)     COPD (chronic obstructive pulmonary disease)     Diabetes mellitus, new onset     [place] (hard of hearing)     Hypertension      Past Surgical History:   Procedure Laterality Date    NO PAST SURGERIES  11/11/2020     History reviewed. " No pertinent family history.  Social History     Tobacco Use    Smoking status: Former     Packs/day: 1.50     Years: 20.00     Pack years: 30.00     Types: Cigarettes     Quit date:      Years since quittin.6    Smokeless tobacco: Former    Tobacco comments:     quit in    Substance Use Topics    Alcohol use: No    Drug use: No     Review of Systems   Constitutional:  Positive for chills. Negative for fever.   HENT:  Negative for congestion and sore throat.    Eyes:  Negative for visual disturbance.   Respiratory:  Positive for cough (chronic, unchanged from baseline) and shortness of breath (chronic, increased from baseline).    Cardiovascular:  Negative for chest pain.   Gastrointestinal:  Positive for nausea. Negative for abdominal pain, diarrhea and vomiting.   Genitourinary:  Negative for dysuria.   Skin:  Negative for rash.   Neurological:  Negative for headaches.   Psychiatric/Behavioral:  Negative for confusion.      Physical Exam     Initial Vitals [22 2132]   BP Pulse Resp Temp SpO2   (!) 144/70 101 (!) 24 98.9 °F (37.2 °C) (!) 89 %      MAP       --         Physical Exam    Nursing note and vitals reviewed.  Constitutional: He appears well-developed and well-nourished. He is not diaphoretic. No distress.   Well appearing.   HENT:   Head: Normocephalic and atraumatic.   Eyes: Pupils are equal, round, and reactive to light.   Neck: Neck supple.   Cardiovascular:  Normal rate and regular rhythm.           Pulmonary/Chest: No respiratory distress. He has wheezes (expiratory).   Abdominal: Abdomen is soft. Bowel sounds are normal.   Musculoskeletal:         General: No edema.      Cervical back: Neck supple.      Comments: No LE edema.     Neurological: He is alert and oriented to person, place, and time. GCS score is 15. GCS eye subscore is 4. GCS verbal subscore is 5. GCS motor subscore is 6.   Skin: Skin is warm and dry.   Psychiatric: He has a normal mood and affect.       ED Course    Critical Care    Date/Time: 9/2/2022 2:32 AM  Performed by: Ashley King MD  Authorized by: Dylan Cason MD   Total critical care time (exclusive of procedural time) : 0 minutes  Comments: Please put in 35 minutes of critical care due to patient having a high risk of respiratory failure.   Separate from teaching and exclusive of procedure and ekg time  Includes:  Time at bedside  Time reviewing test results  Time discussing case with staff  Time documenting the medical record  Time spent with family members  Management          Labs Reviewed   CBC W/ AUTO DIFFERENTIAL - Abnormal; Notable for the following components:       Result Value    RBC 3.06 (*)     Hemoglobin 9.0 (*)     Hematocrit 26.4 (*)     RDW 16.3 (*)     Platelets 93 (*)     Immature Granulocytes 0.9 (*)     Lymph # 0.5 (*)     Mono # 0.2 (*)     nRBC 2 (*)     Gran % 84.7 (*)     Lymph % 11.1 (*)     Mono % 3.3 (*)     All other components within normal limits   COMPREHENSIVE METABOLIC PANEL - Abnormal; Notable for the following components:    BUN 26 (*)     Creatinine 1.5 (*)     Calcium 8.6 (*)     Albumin 3.0 (*)     eGFR 46 (*)     All other components within normal limits   SARS-COV-2 RDRP GENE - Abnormal; Notable for the following components:    POC Rapid COVID Positive (*)     All other components within normal limits   B-TYPE NATRIURETIC PEPTIDE   TROPONIN I   CBC W/ AUTO DIFFERENTIAL   POCT INFLUENZA A/B MOLECULAR     EKG Readings: (Independently Interpreted)   Normal sinus rhythm, rate 89 beats per minute, normal RI interval,  milliseconds, no STEMI.     Imaging Results              X-Ray Chest AP Portable (Final result)  Result time 09/01/22 22:53:27      Final result by Ruperto Banuelos MD (09/01/22 22:53:27)                   Impression:      No acute process.      Electronically signed by: Ruperto Banuelos MD  Date:    09/01/2022  Time:    22:53               Narrative:    EXAMINATION:  XR CHEST AP PORTABLE    CLINICAL  HISTORY:  shortness of breath;    TECHNIQUE:  Single frontal view of the chest was performed.    COMPARISON:  08/05/2022.    FINDINGS:  Monitoring EKG leads are present.  The trachea is unremarkable.  The cardiomediastinal silhouette is within normal limits.  There is stable prominence of the right hilum.  There are no pleural effusions.  There is no evidence of a pneumothorax.  There is no evidence of pneumomediastinum.  No airspace opacity is present.  There are degenerative changes in the osseous structures.                                       Medications   sodium chloride 0.9% flush 10 mL (has no administration in time range)   remdesivir 100 mg in sodium chloride 0.9% 250 mL infusion (has no administration in time range)   glucose chewable tablet 16 g (has no administration in time range)   glucose chewable tablet 24 g (has no administration in time range)   glucagon (human recombinant) injection 1 mg (has no administration in time range)   ascorbic acid (vitamin C) tablet 500 mg (has no administration in time range)   multivitamin tablet (has no administration in time range)   dexAMETHasone tablet 6 mg (has no administration in time range)   dextrose 10% bolus 125 mL (has no administration in time range)   dextrose 10% bolus 250 mL (has no administration in time range)   enoxaparin injection 70 mg (70 mg Subcutaneous Given 9/2/22 0149)   dextromethorphan-guaiFENesin  mg/5 ml liquid 10 mL (has no administration in time range)   acetaminophen tablet 650 mg (has no administration in time range)   albuterol inhaler 2 puff (has no administration in time range)   acetaminophen tablet 1,000 mg (1,000 mg Oral Given 9/1/22 2224)   ondansetron disintegrating tablet 4 mg (4 mg Oral Given 9/1/22 2215)   methylPREDNISolone sodium succinate injection 125 mg (125 mg Intravenous Given 9/1/22 2221)   albuterol-ipratropium 2.5 mg-0.5 mg/3 mL nebulizer solution 3 mL (3 mLs Nebulization Given 9/1/22 2307)   remdesivir 200  mg in sodium chloride 0.9% 250 mL infusion (200 mg Intravenous New Bag 9/2/22 0155)     Medical Decision Making:   Initial Assessment:    81-year-old male with history of asthma, COPD, CHF, diabetes, hard of hearing, hypertension presents to the emergency department with chills, nausea.  The patient has COPD,  reports shortness of breath today with cough productive of a laila sputum.  He has been taking care of his wife who was recently diagnosed with COVID.  He has not been vaccinated for COVID.  On chart review, the patient was seen in the ED on August 5th for COPD exacerbation, at that time, he was discharged with prednisone and doxycycline in addition to inhalers.  He has been using home inhaler and nebulized treatments without improvement.  On exam, the patient was initially satting 89% on room air, on my assessment, his sats are 92% on room air.  He is expiratory wheezing and rhonchorous breath sounds.  There is no respiratory distress.  Differential includes but not limited to COPD exacerbation, viral illness such as flu or COVID, pneumonia.  Lower suspicion for  CHF exacerbation or PE.  Will check basic labs, chest x-ray, treat with DuoNebs.      Independently Interpreted Test(s):   I have ordered and independently interpreted EKG Reading(s) - see prior notes  Clinical Tests:   Lab Tests: Ordered and Reviewed  Radiological Study: Ordered and Reviewed  Medical Tests: Ordered and Reviewed        Scribe Attestation:   Scribe #1: I performed the above scribed service and the documentation accurately describes the services I performed. I attest to the accuracy of the note.  Scribe #2: I performed the above scribed service and the documentation accurately describes the services I performed. I attest to the accuracy of the note.      ED Course as of 09/02/22 0232   Fri Sep 02, 2022   0020 Patient reassessed, he reports feeling improved.  Cardiac enzymes are negative.  He is COVID positive today.  The patient was  treated with a DuoNeb, after this treatment, we checked an ambulatory pulse ox which went down to 86%.  The patient is not on supplemental oxygen at home.  Anemia appears to be at baseline, does not meet transfusion criteria.  Creatinine actually improved from baseline.  I discussed the case with Dr. Pérez for hospital admission. [LH]      ED Course User Index  [LH] Ashley King MD             Clinical Impression:   Final diagnoses:  [R06.02] SOB (shortness of breath)  [R06.02] Shortness of breath  [J44.9] Chronic obstructive pulmonary disease, unspecified COPD type (Primary)  [J96.20] Acute on chronic respiratory failure, unspecified whether with hypoxia or hypercapnia  [U07.1] COVID-19  [J96.91] Respiratory failure with hypoxia     I, Ashley King MD, personally performed the services described in this documentation. All medical record entries made by the scribe were at my direction and in my presence. I have reviewed the chart and agree that the record reflects my personal performance and is accurate and complete.     This dictation has been generated using M-Modal Fluency Direct dictation; some phonetic errors may occur.      ED Disposition Condition    Admit                 Ashley King MD  09/02/22 0232       Ashley King MD  09/02/22 0233     147.9

## 2022-09-02 NOTE — H&P
[facility]  History & Physical    Patient Name: Micah Laurent Jr.  MRN: [id number]  Patient Class: IP- Inpatient  Admission Date: 9/1/2022  Attending Physician: Dylan Cason MD   Primary Care Provider: Chelsy Torrez MD       Patient information was obtained from patient, past medical records and ER records.     Subjective:     Principal Problem:<principal problem not specified>    Chief Complaint:   Chief Complaint   Patient presents with    Headache    Weakness    Nausea     Per pt he has a headache and feels nauseous with new onset SOB since 3-4pm. Denies fever reports chills         HPI: Micah Laurent Jr. is a 81 y.o. male with a PMH  has a past medical history of Asthma, Atrial fibrillation, CHF (congestive heart failure), COPD (chronic obstructive pulmonary disease), Diabetes mellitus, new onset, [place] (hard of hearing), and Hypertension. who presented to the ED complaining of worsening shortness and chills x1 day duration.  Patient reports baseline chronic shortness of breath in setting of underlying COPD however reported worsening shortness of breath from baseline unrelieved with home medications.  Aggravating factors included exertion with no known alleviating factors.  Associated symptoms included chills, nausea, and chronic productive cough in setting of COPD reported all other review of systems negative except those noted above.  Patient reported his wife was recently diagnosed with COVID and feels he may have contracted it from her.  He reported he was not vaccinated against COVID and was in his usual state health prior to onset of symptoms.  Patient was noted to have increased oxygen demand in the ED prompting admission for symptomatic COVID him to initiate remdesivir treatment.      PCP: Chelsy Torrez      Past Medical History:   Diagnosis Date    Asthma     Atrial fibrillation     CHF (congestive heart failure)     COPD (chronic obstructive pulmonary  disease)     Diabetes mellitus, new onset     [place] (hard of hearing)     Hypertension        Past Surgical History:   Procedure Laterality Date    NO PAST SURGERIES  2020       Review of patient's allergies indicates:   Allergen Reactions    Lisinopril      Swells lower legs.       No current facility-administered medications on file prior to encounter.     Current Outpatient Medications on File Prior to Encounter   Medication Sig    albuterol (PROVENTIL/VENTOLIN HFA) 90 mcg/actuation inhaler Inhale 1-2 puffs into the lungs every 6 (six) hours as needed. Rescue    aspirin (ECOTRIN) 81 MG EC tablet Take 81 mg by mouth once daily.    azithromycin (Z-TERRENCE) 250 MG tablet Take 1 tablet (250 mg total) by mouth once daily. Take first 2 tablets together, then 1 every day until finished.    fluticasone-salmeterol diskus inhaler 250-50 mcg Inhale 1 puff into the lungs 2 (two) times daily. Controller    hydrALAZINE (APRESOLINE) 50 MG tablet Take 1 tablet (50 mg total) by mouth every 8 (eight) hours.    HYDROcodone-acetaminophen (NORCO) 5-325 mg per tablet Take 1 tablet by mouth every 4 (four) hours as needed for Pain.    ipratropium (ATROVENT) 0.02 % nebulizer solution Take 2.5 mLs (500 mcg total) by nebulization every 4 (four) hours while awake. Rescue    ipratropium-albuteroL (COMBIVENT)  mcg/actuation inhaler Inhale 1 puff into the lungs every 6 (six) hours as needed for Wheezing or Shortness of Breath. Rescue    metoprolol succinate (TOPROL-XL) 50 MG 24 hr tablet Take 50 mg by mouth once daily.    SITagliptin (JANUVIA) 50 MG Tab      Family History    None       Tobacco Use    Smoking status: Former     Packs/day: 1.50     Years: 20.00     Pack years: 30.00     Types: Cigarettes     Quit date:      Years since quittin.6    Smokeless tobacco: Former    Tobacco comments:     quit in    Substance and Sexual Activity    Alcohol use: No    Drug use: No    Sexual activity: Not  Currently     Partners: Female     Review of Systems   All other systems reviewed and are negative.  Objective:     Vital Signs (Most Recent):  Temp: 98.9 °F (37.2 °C) (09/01/22 2132)  Pulse: 84 (09/01/22 2307)  Resp: (!) 23 (09/01/22 2307)  BP: (!) 144/70 (09/01/22 2132)  SpO2: (!) 93 % (09/01/22 2307)   Vital Signs (24h Range):  Temp:  [98.9 °F (37.2 °C)] 98.9 °F (37.2 °C)  Pulse:  [] 84  Resp:  [23-24] 23  SpO2:  [89 %-93 %] 93 %  BP: (144)/(70) 144/70     Weight: 70.3 kg (155 lb)  Body mass index is 25.02 kg/m².    Physical Exam  Constitutional:       General: He is in acute distress.      Appearance: Normal appearance. He is obese. He is ill-appearing. He is not toxic-appearing or diaphoretic.   HENT:      Head: Normocephalic and atraumatic.      Right Ear: External ear normal.      Left Ear: External ear normal.      Nose: Nose normal. No congestion or rhinorrhea.      Mouth/Throat:      Mouth: Mucous membranes are moist.      Pharynx: Oropharynx is clear. No oropharyngeal exudate or posterior oropharyngeal erythema.   Eyes:      General: No scleral icterus.     Extraocular Movements: Extraocular movements intact.      Conjunctiva/sclera: Conjunctivae normal.      Pupils: Pupils are equal, round, and reactive to light.   Neck:      Vascular: No carotid bruit.   Cardiovascular:      Rate and Rhythm: Normal rate and regular rhythm.      Pulses: Normal pulses.      Heart sounds: Normal heart sounds. No murmur heard.    No friction rub. No gallop.   Pulmonary:      Effort: No respiratory distress.      Breath sounds: No stridor. Wheezing present. No rhonchi or rales.      Comments: Patient tachypneic with diffuse bilateral wheezes noted.  Chest:      Chest wall: No tenderness.   Abdominal:      General: Abdomen is flat. Bowel sounds are normal. There is no distension.      Palpations: Abdomen is soft. There is no mass.      Tenderness: There is no abdominal tenderness. There is no guarding or rebound.       Hernia: No hernia is present.   Musculoskeletal:         General: No swelling, tenderness, deformity or signs of injury. Normal range of motion.      Cervical back: Normal range of motion and neck supple. No rigidity or tenderness.   Lymphadenopathy:      Cervical: No cervical adenopathy.   Skin:     General: Skin is warm and dry.      Capillary Refill: Capillary refill takes less than 2 seconds.      Coloration: Skin is not jaundiced or pale.      Findings: No bruising, erythema, lesion or rash.   Neurological:      General: No focal deficit present.      Mental Status: He is alert and oriented to person, place, and time. Mental status is at baseline.      Cranial Nerves: No cranial nerve deficit.      Sensory: No sensory deficit.      Motor: No weakness.      Coordination: Coordination normal.   Psychiatric:         Mood and Affect: Mood normal.         Behavior: Behavior normal.         Thought Content: Thought content normal.         Judgment: Judgment normal.         CRANIAL NERVES     CN III, IV, VI   Pupils are equal, round, and reactive to light.     Significant Labs: All pertinent labs within the past 24 hours have been reviewed.    Significant Imaging: I have reviewed all pertinent imaging results/findings within the past 24 hours.    Assessment/Plan:     COVID-19  Patient is identified as Severe COVID-19 based on hypoxemia with O2 saturations <94% on room air or on ambulation   Initiate standard COVID protocols; COVID-19 testing ,Infection Control notification  and isolation- respiratory, contact and droplet per protocol    Diagnostics: (leukopenia, hyponatremia, hyperferritinemia, elevated troponin, elevated d-dimer, age, and comorbidities are significant predictors of poor clinical outcome)  CBC, CMP, Ferritin, CRP and Portable CXR    Management: Initiate targeted therapy with Remdesivir, 200mg IV x1, followed by 100mg IV daily x5 days total, Dexamethasone PO/IV 6mg daily x10 days and Anticoagulation,  Patient admitted to non-critical care unit- Will initiate full dose anticoagulation with Weight based lovenox 1mg/kg IV q12, Maintain oxygen saturations 92-96% via Nasal Cannula  LPM and monitor with continuous/intermittent pulse oximetry.  and Inhaled bronchodilators as needed for shortness of breath.    COPD (chronic obstructive pulmonary disease)  Patient with history of COPD currently admitted for acute exacerbation secondary to COVID infection.  Patient initiated on COVID treatment as noted above.  Chest x-ray without signs of infectious processes noted.  Plan:  -continue treatment of COVID  -titrate oxygen therapy as needed  -incentive spirometry      Type 2 diabetes mellitus, without long-term current use of insulin  Patient's FSGs are controlled on current medication regimen.  Last A1c reviewed-   Lab Results   Component Value Date    HGBA1C 5.4 11/11/2020     Most recent fingerstick glucose reviewed-   Recent Labs   Lab 09/02/22  0123   POCTGLUCOSE 150*     Current correctional scale  none  Maintain anti-hyperglycemic dose as follows-   Antihyperglycemics (From admission, onward)    None      Plan:  -SSI  -Accu-checks   -Hypoglycemic protocol   -Hold oral antihyperglycemics while inpatient       Anemia of chronic disease  Appears near baseline.  Plan:  -monitor H/H  -type and screen, transfuse as needed       Primary hypertension  BP currently ranging from 120s-140s systolic  Plan:  -continue home medications  -treat covid as noted above  -monitor BP  -titrate home medications as needed   -low salt diet       Atrial fibrillation  Patient with history of atrial fibrillation currently on metoprolol and not on anticoagulation outpatient. Patient initiated on lovenox in setting of covid.  Plan:  -continue home medication  -telemetry  -continue anticoagulation as noted above        VTE Risk Mitigation (From admission, onward)         Ordered     enoxaparin injection 70 mg  2 times daily         09/02/22 0048                    Scott Pérez MD  Department of Hospital Medicine   [facility] [place] - Emergency Dept

## 2022-09-02 NOTE — NURSING
Handoff report received from Devorah DENNISON.  Patient resting in bed, rise and fall noted to chest.  No signs of distress noted.  Call light within reach.  Will continue to monitor.

## 2022-09-03 NOTE — SUBJECTIVE & OBJECTIVE
NAEON. Denies SOB or CP.   Eating okay. UOP wnl. BM wnl.     Review of Systems   Constitutional:  Positive for activity change and fatigue. Negative for fever and unexpected weight change.   HENT:  Positive for congestion and rhinorrhea. Negative for trouble swallowing and voice change.    Eyes:  Negative for photophobia and visual disturbance.   Respiratory:  Positive for cough and shortness of breath.    Cardiovascular:  Negative for chest pain and leg swelling.   Gastrointestinal:  Negative for blood in stool and nausea.   Endocrine: Negative for polydipsia and polyuria.   Genitourinary:  Negative for difficulty urinating and hematuria.   Musculoskeletal:  Positive for myalgias. Negative for neck pain and neck stiffness.   Skin:  Negative for pallor and rash.   Allergic/Immunologic: Negative for food allergies and immunocompromised state.   Neurological:  Negative for seizures and syncope.   Psychiatric/Behavioral:  Negative for agitation, behavioral problems and confusion.    All other systems reviewed and are negative.    Objective:     Vital Signs (Most Recent):  Temp: 98.6 °F (37 °C) (09/03/22 0841)  Pulse: 68 (09/03/22 0841)  Resp: 16 (09/03/22 0841)  BP: 106/64 (09/03/22 0841)  SpO2: (!) 94 % (09/03/22 0841)   Vital Signs (24h Range):  Temp:  [97.9 °F (36.6 °C)-98.9 °F (37.2 °C)] 98.6 °F (37 °C)  Pulse:  [65-73] 68  Resp:  [16-18] 16  SpO2:  [93 %-99 %] 94 %  BP: (106-161)/(64-81) 106/64     Weight: 70.3 kg (155 lb)  Body mass index is 25.02 kg/m².    Physical Exam  Vitals and nursing note reviewed.   Constitutional:       General: He is in acute distress.      Appearance: Normal appearance. He is normal weight. He is ill-appearing. He is not toxic-appearing or diaphoretic.   HENT:      Head: Normocephalic and atraumatic.      Right Ear: External ear normal.      Left Ear: External ear normal.      Nose: Nose normal. No congestion or rhinorrhea.      Mouth/Throat:      Mouth: Mucous membranes are moist.       Pharynx: Oropharynx is clear. No oropharyngeal exudate or posterior oropharyngeal erythema.   Eyes:      General: No scleral icterus.     Extraocular Movements: Extraocular movements intact.      Conjunctiva/sclera: Conjunctivae normal.      Pupils: Pupils are equal, round, and reactive to light.   Neck:      Vascular: No carotid bruit.   Cardiovascular:      Rate and Rhythm: Normal rate and regular rhythm.      Pulses: Normal pulses.      Heart sounds: Normal heart sounds. No murmur heard.    No friction rub. No gallop.   Pulmonary:      Effort: No respiratory distress.      Breath sounds: No stridor. Wheezing and rales present. No rhonchi.      Comments: Patient tachypneic with diffuse bilateral wheezes noted.  Chest:      Chest wall: No tenderness.   Abdominal:      General: Abdomen is flat. Bowel sounds are normal. There is no distension.      Palpations: Abdomen is soft. There is no mass.      Tenderness: There is no abdominal tenderness. There is no guarding or rebound.      Hernia: No hernia is present.   Musculoskeletal:         General: No swelling, tenderness, deformity or signs of injury. Normal range of motion.      Cervical back: Normal range of motion and neck supple. No rigidity or tenderness.   Lymphadenopathy:      Cervical: No cervical adenopathy.   Skin:     General: Skin is warm and dry.      Capillary Refill: Capillary refill takes less than 2 seconds.      Coloration: Skin is not jaundiced or pale.      Findings: No bruising, erythema, lesion or rash.   Neurological:      General: No focal deficit present.      Mental Status: He is alert and oriented to person, place, and time. Mental status is at baseline.      Cranial Nerves: No cranial nerve deficit.      Sensory: No sensory deficit.      Motor: No weakness.      Coordination: Coordination normal.   Psychiatric:         Mood and Affect: Mood normal.         Behavior: Behavior normal.         Thought Content: Thought content normal.          Judgment: Judgment normal.         CRANIAL NERVES     CN III, IV, VI   Pupils are equal, round, and reactive to light.         Recent Results (from the past 24 hour(s))   POCT glucose    Collection Time: 09/02/22 11:32 AM   Result Value Ref Range    POCT Glucose 157 (H) 70 - 110 mg/dL   POCT glucose    Collection Time: 09/02/22  4:53 PM   Result Value Ref Range    POCT Glucose 158 (H) 70 - 110 mg/dL   POCT glucose    Collection Time: 09/02/22  7:36 PM   Result Value Ref Range    POCT Glucose 141 (H) 70 - 110 mg/dL   CBC with Automated Differential    Collection Time: 09/03/22  5:02 AM   Result Value Ref Range    WBC 3.79 (L) 3.90 - 12.70 K/uL    RBC 3.13 (L) 4.60 - 6.20 M/uL    Hemoglobin 9.0 (L) 14.0 - 18.0 g/dL    Hematocrit 27.1 (L) 40.0 - 54.0 %    MCV 87 82 - 98 fL    MCH 28.8 27.0 - 31.0 pg    MCHC 33.2 32.0 - 36.0 g/dL    RDW 16.1 (H) 11.5 - 14.5 %    Platelets 83 (L) 150 - 450 K/uL    MPV SEE COMMENT 9.2 - 12.9 fL    Immature Granulocytes 0.3 0.0 - 0.5 %    Gran # (ANC) 2.8 1.8 - 7.7 K/uL    Immature Grans (Abs) 0.01 0.00 - 0.04 K/uL    Lymph # 0.8 (L) 1.0 - 4.8 K/uL    Mono # 0.2 (L) 0.3 - 1.0 K/uL    Eos # 0.0 0.0 - 0.5 K/uL    Baso # 0.00 0.00 - 0.20 K/uL    nRBC 3 (A) 0 /100 WBC    Gran % 73.5 (H) 38.0 - 73.0 %    Lymph % 20.1 18.0 - 48.0 %    Mono % 6.1 4.0 - 15.0 %    Eosinophil % 0.0 0.0 - 8.0 %    Basophil % 0.0 0.0 - 1.9 %    Differential Method Automated    POCT glucose    Collection Time: 09/03/22  7:19 AM   Result Value Ref Range    POCT Glucose 116 (H) 70 - 110 mg/dL       Microbiology Results (last 7 days)       ** No results found for the last 168 hours. **             Imaging Results              X-Ray Chest AP Portable (Final result)  Result time 09/01/22 22:53:27      Final result by Ruperto Banuelos MD (09/01/22 22:53:27)                   Impression:      No acute process.      Electronically signed by: Ruperto Banuelos MD  Date:    09/01/2022  Time:    22:53               Narrative:     EXAMINATION:  XR CHEST AP PORTABLE    CLINICAL HISTORY:  shortness of breath;    TECHNIQUE:  Single frontal view of the chest was performed.    COMPARISON:  08/05/2022.    FINDINGS:  Monitoring EKG leads are present.  The trachea is unremarkable.  The cardiomediastinal silhouette is within normal limits.  There is stable prominence of the right hilum.  There are no pleural effusions.  There is no evidence of a pneumothorax.  There is no evidence of pneumomediastinum.  No airspace opacity is present.  There are degenerative changes in the osseous structures.

## 2022-09-03 NOTE — HOSPITAL COURSE
Patient admitted with COVID, requiring 1-2 L initially of oxygen with dropped to 89% in ED. patient has responded well to remdesivir and steroids.  On room air today.  Would recommend he gets at least 3 doses of remdesivir, if passes 6 minute walk test on Sunday can discharge then, if not, would complete 5 days of remdesivir.

## 2022-09-03 NOTE — PROGRESS NOTES
[facility] Medicine  Progress Note    Patient Name: Micah Laurent Jr.  MRN: [id number]  Patient Class: IP- Inpatient   Admission Date: 9/1/2022  Length of Stay: 1 days  Attending Physician: Dylan Cason MD  Primary Care Provider: Chelsy Torrez MD        Subjective:     Principal Problem:<principal problem not specified>        HPI:  Micah Laurent Jr. is a 81 y.o. male with a PMH  has a past medical history of Asthma, Atrial fibrillation, CHF (congestive heart failure), COPD (chronic obstructive pulmonary disease), Diabetes mellitus, new onset, [place] (hard of hearing), and Hypertension. who presented to the ED complaining of worsening shortness and chills x1 day duration.  Patient reports baseline chronic shortness of breath in setting of underlying COPD however reported worsening shortness of breath from baseline unrelieved with home medications.  Aggravating factors included exertion with no known alleviating factors.  Associated symptoms included chills, nausea, and chronic productive cough in setting of COPD reported all other review of systems negative except those noted above.  Patient reported his wife was recently diagnosed with COVID and feels he may have contracted it from her.  He reported he was not vaccinated against COVID and was in his usual state health prior to onset of symptoms.  Patient was noted to have increased oxygen demand in the ED prompting admission for symptomatic COVID him to initiate remdesivir treatment.      PCP: Chelsy Torrez        Overview/Hospital Course:  Patient admitted with COVID, requiring 1-2 L initially of oxygen with dropped to 89% in ED. patient has responded well to remdesivir and steroids.  On room air today.  Would recommend he gets at least 3 doses of remdesivir, if passes 6 minute walk test on Sunday can discharge then, if not, would complete 5 days of remdesivir.      NAEON. Denies SOB or CP.   Eating okay. UOP wnl. BM wnl.     Review of  Systems   Constitutional:  Positive for activity change and fatigue. Negative for fever and unexpected weight change.   HENT:  Positive for congestion and rhinorrhea. Negative for trouble swallowing and voice change.    Eyes:  Negative for photophobia and visual disturbance.   Respiratory:  Positive for cough and shortness of breath.    Cardiovascular:  Negative for chest pain and leg swelling.   Gastrointestinal:  Negative for blood in stool and nausea.   Endocrine: Negative for polydipsia and polyuria.   Genitourinary:  Negative for difficulty urinating and hematuria.   Musculoskeletal:  Positive for myalgias. Negative for neck pain and neck stiffness.   Skin:  Negative for pallor and rash.   Allergic/Immunologic: Negative for food allergies and immunocompromised state.   Neurological:  Negative for seizures and syncope.   Psychiatric/Behavioral:  Negative for agitation, behavioral problems and confusion.    All other systems reviewed and are negative.    Objective:     Vital Signs (Most Recent):  Temp: 98.6 °F (37 °C) (09/03/22 0841)  Pulse: 68 (09/03/22 0841)  Resp: 16 (09/03/22 0841)  BP: 106/64 (09/03/22 0841)  SpO2: (!) 94 % (09/03/22 0841)   Vital Signs (24h Range):  Temp:  [97.9 °F (36.6 °C)-98.9 °F (37.2 °C)] 98.6 °F (37 °C)  Pulse:  [65-73] 68  Resp:  [16-18] 16  SpO2:  [93 %-99 %] 94 %  BP: (106-161)/(64-81) 106/64     Weight: 70.3 kg (155 lb)  Body mass index is 25.02 kg/m².    Physical Exam  Vitals and nursing note reviewed.   Constitutional:       General: He is in acute distress.      Appearance: Normal appearance. He is normal weight. He is ill-appearing. He is not toxic-appearing or diaphoretic.   HENT:      Head: Normocephalic and atraumatic.      Right Ear: External ear normal.      Left Ear: External ear normal.      Nose: Nose normal. No congestion or rhinorrhea.      Mouth/Throat:      Mouth: Mucous membranes are moist.      Pharynx: Oropharynx is clear. No oropharyngeal exudate or posterior  oropharyngeal erythema.   Eyes:      General: No scleral icterus.     Extraocular Movements: Extraocular movements intact.      Conjunctiva/sclera: Conjunctivae normal.      Pupils: Pupils are equal, round, and reactive to light.   Neck:      Vascular: No carotid bruit.   Cardiovascular:      Rate and Rhythm: Normal rate and regular rhythm.      Pulses: Normal pulses.      Heart sounds: Normal heart sounds. No murmur heard.    No friction rub. No gallop.   Pulmonary:      Effort: No respiratory distress.      Breath sounds: No stridor. Wheezing and rales present. No rhonchi.      Comments: Patient tachypneic with diffuse bilateral wheezes noted.  Chest:      Chest wall: No tenderness.   Abdominal:      General: Abdomen is flat. Bowel sounds are normal. There is no distension.      Palpations: Abdomen is soft. There is no mass.      Tenderness: There is no abdominal tenderness. There is no guarding or rebound.      Hernia: No hernia is present.   Musculoskeletal:         General: No swelling, tenderness, deformity or signs of injury. Normal range of motion.      Cervical back: Normal range of motion and neck supple. No rigidity or tenderness.   Lymphadenopathy:      Cervical: No cervical adenopathy.   Skin:     General: Skin is warm and dry.      Capillary Refill: Capillary refill takes less than 2 seconds.      Coloration: Skin is not jaundiced or pale.      Findings: No bruising, erythema, lesion or rash.   Neurological:      General: No focal deficit present.      Mental Status: He is alert and oriented to person, place, and time. Mental status is at baseline.      Cranial Nerves: No cranial nerve deficit.      Sensory: No sensory deficit.      Motor: No weakness.      Coordination: Coordination normal.   Psychiatric:         Mood and Affect: Mood normal.         Behavior: Behavior normal.         Thought Content: Thought content normal.         Judgment: Judgment normal.         CRANIAL NERVES     CN III, IV, VI    Pupils are equal, round, and reactive to light.         Recent Results (from the past 24 hour(s))   POCT glucose    Collection Time: 09/02/22 11:32 AM   Result Value Ref Range    POCT Glucose 157 (H) 70 - 110 mg/dL   POCT glucose    Collection Time: 09/02/22  4:53 PM   Result Value Ref Range    POCT Glucose 158 (H) 70 - 110 mg/dL   POCT glucose    Collection Time: 09/02/22  7:36 PM   Result Value Ref Range    POCT Glucose 141 (H) 70 - 110 mg/dL   CBC with Automated Differential    Collection Time: 09/03/22  5:02 AM   Result Value Ref Range    WBC 3.79 (L) 3.90 - 12.70 K/uL    RBC 3.13 (L) 4.60 - 6.20 M/uL    Hemoglobin 9.0 (L) 14.0 - 18.0 g/dL    Hematocrit 27.1 (L) 40.0 - 54.0 %    MCV 87 82 - 98 fL    MCH 28.8 27.0 - 31.0 pg    MCHC 33.2 32.0 - 36.0 g/dL    RDW 16.1 (H) 11.5 - 14.5 %    Platelets 83 (L) 150 - 450 K/uL    MPV SEE COMMENT 9.2 - 12.9 fL    Immature Granulocytes 0.3 0.0 - 0.5 %    Gran # (ANC) 2.8 1.8 - 7.7 K/uL    Immature Grans (Abs) 0.01 0.00 - 0.04 K/uL    Lymph # 0.8 (L) 1.0 - 4.8 K/uL    Mono # 0.2 (L) 0.3 - 1.0 K/uL    Eos # 0.0 0.0 - 0.5 K/uL    Baso # 0.00 0.00 - 0.20 K/uL    nRBC 3 (A) 0 /100 WBC    Gran % 73.5 (H) 38.0 - 73.0 %    Lymph % 20.1 18.0 - 48.0 %    Mono % 6.1 4.0 - 15.0 %    Eosinophil % 0.0 0.0 - 8.0 %    Basophil % 0.0 0.0 - 1.9 %    Differential Method Automated    POCT glucose    Collection Time: 09/03/22  7:19 AM   Result Value Ref Range    POCT Glucose 116 (H) 70 - 110 mg/dL       Microbiology Results (last 7 days)       ** No results found for the last 168 hours. **             Imaging Results              X-Ray Chest AP Portable (Final result)  Result time 09/01/22 22:53:27      Final result by Ruperto Banuelos MD (09/01/22 22:53:27)                   Impression:      No acute process.      Electronically signed by: Ruperto Banuelos MD  Date:    09/01/2022  Time:    22:53               Narrative:    EXAMINATION:  XR CHEST AP PORTABLE    CLINICAL HISTORY:  shortness of  breath;    TECHNIQUE:  Single frontal view of the chest was performed.    COMPARISON:  08/05/2022.    FINDINGS:  Monitoring EKG leads are present.  The trachea is unremarkable.  The cardiomediastinal silhouette is within normal limits.  There is stable prominence of the right hilum.  There are no pleural effusions.  There is no evidence of a pneumothorax.  There is no evidence of pneumomediastinum.  No airspace opacity is present.  There are degenerative changes in the osseous structures.                                            Assessment/Plan:      COVID-19  Patient is identified as Severe COVID-19 based on hypoxemia with O2 saturations <94% on room air or on ambulation   Initiate standard COVID protocols; COVID-19 testing ,Infection Control notification  and isolation- respiratory, contact and droplet per protocol    Diagnostics: (leukopenia, hyponatremia, hyperferritinemia, elevated troponin, elevated d-dimer, age, and comorbidities are significant predictors of poor clinical outcome)  CBC, CMP, Ferritin, CRP and Portable CXR    Management: Initiate targeted therapy with Remdesivir, 200mg IV x1, followed by 100mg IV daily x5 days total, Dexamethasone PO/IV 6mg daily x10 days and Anticoagulation, Patient admitted to non-critical care unit- Will initiate full dose anticoagulation with Weight based lovenox 1mg/kg IV q12, Maintain oxygen saturations 92-96% via Nasal Cannula  LPM and monitor with continuous/intermittent pulse oximetry.  and Inhaled bronchodilators as needed for shortness of breath.    Type 2 diabetes mellitus, without long-term current use of insulin  Patient's FSGs are controlled on current medication regimen.  Last A1c reviewed-   Lab Results   Component Value Date    HGBA1C 5.4 11/11/2020     Most recent fingerstick glucose reviewed-   Recent Labs   Lab 09/02/22  0123   POCTGLUCOSE 150*     Current correctional scale  none  Maintain anti-hyperglycemic dose as follows-   Antihyperglycemics (From  admission, onward)    None      Plan:  -SSI  -Accu-checks   -Hypoglycemic protocol   -Hold oral antihyperglycemics while inpatient       Stage 3a chronic kidney disease    · Monitor Ins and Outs and daily weights.   · Avoid nephrotoxic agents such as NSAIDS, Gadolinium and IV Radiocontrast  · Renal Dose meds to current GFR/Creat Clereance        COPD (chronic obstructive pulmonary disease)  Patient with history of COPD currently admitted for acute exacerbation secondary to COVID infection.  Patient initiated on COVID treatment as noted above.  Chest x-ray without signs of infectious processes noted.  Plan:  -continue treatment of COVID  -titrate oxygen therapy as needed  -incentive spirometry      Anemia of chronic disease  Appears near baseline.  Plan:  -monitor H/H  -type and screen, transfuse as needed       Primary hypertension  BP currently ranging from 120s-140s systolic  Plan:  -continue home medications  -treat covid as noted above  -monitor BP  -titrate home medications as needed   -low salt diet       Atrial fibrillation  Patient with history of atrial fibrillation currently on metoprolol and not on anticoagulation outpatient. Patient initiated on lovenox in setting of covid.  Plan:  -continue home medication  -telemetry  -continue anticoagulation as noted above          VTE Risk Mitigation (From admission, onward)         Ordered     enoxaparin injection 70 mg  2 times daily         09/02/22 0048                Discharge Planning   NAHID:      Code Status: Full Code   Is the patient medically ready for discharge?:     Reason for patient still in hospital (select all that apply): Patient trending condition and Treatment  Discharge Plan A: Home with family                  Dylan Cason MD  Department of Hospital Medicine   [facility] [place] [facility] Tele[facility]

## 2022-09-03 NOTE — PLAN OF CARE
Pt is AAOx4. Pt weaned down to room air, spo2 98%. Tele maintained. BG WNL.  No falls or injuries reported during shift, safety precautions maintained.     Problem: Adult Inpatient Plan of Care  Goal: Plan of Care Review  Outcome: Ongoing, Progressing     Problem: Adult Inpatient Plan of Care  Goal: Optimal Comfort and Wellbeing  Outcome: Ongoing, Progressing

## 2022-09-03 NOTE — ASSESSMENT & PLAN NOTE
· Monitor Ins and Outs and daily weights.   · Avoid nephrotoxic agents such as NSAIDS, Gadolinium and IV Radiocontrast  · Renal Dose meds to current GFR/Creat Clereance

## 2022-09-04 NOTE — NURSING
Pt c/o tenderness to LLQ, large hematoma present, hard to touch. Pt denies bloody stool or urine. VSS.  MD made aware.  Site outlined w marker. Pt understands to call nurse if pain worsens. Call light within reach.

## 2022-09-04 NOTE — PLAN OF CARE
[facility] [place] - Telemetry  Discharge Final Note    Primary Care Provider: Chelsy Torrez MD    Expected Discharge Date: 9/4/2022    All CM needs met. CM notified nurse, Ellen that pt is ready for discharge from CM standpoint.    Final Discharge Note (most recent)       Final Note - 09/04/22 1317          Final Note    Assessment Type Final Discharge Note (P)      Anticipated Discharge Disposition Home or Self Care (P)      What phone number can be called within the next 1-3 days to see how you are doing after discharge? [phone] (P)      Hospital Resources/Appts/Education Provided Appointments scheduled and added to AVS;Appointments scheduled by Navigator/Coordinator (P)         Post-Acute Status    Discharge Delays None known at this time (P)                      Important Message from Medicare  Important Message from Medicare regarding Discharge Appeal Rights: Explained to patient/caregiver (SW explained IMM. Pt expressed verbally understanding. SW mailed a copy to address on file.)     Date IMM was signed: 09/02/22  Time IMM was signed: 1302    Contact Info       Chelsy Torrez MD   Specialty: Endocrinology   Relationship: PCP - General    [address] [place], [place] [id number]   Phone: [phone]       Next Steps: Call    Instructions: To schedule hospital follow up appointment

## 2022-09-04 NOTE — NURSING
Pt given discharge instructions, verbalized understanding. IV taken out and in tact. Tele removed and returned. Awaiting family transport for discharge.

## 2022-09-04 NOTE — DISCHARGE SUMMARY
[facility] Medicine  Discharge Summary      Patient Name: Micah Laurent Jr.  MRN: [id number]  Patient Class: IP- Inpatient  Admission Date: 9/1/2022  Hospital Length of Stay: 2 days  Discharge Date and Time:  09/04/2022 11:52 AM  Attending Physician: Dylan Cason MD   Discharging Provider: Dylan Cason MD  Primary Care Provider: Chelsy Torrez MD      HPI:   Micah Laurent Jr. is a 81 y.o. male with a PMH  has a past medical history of Asthma, Atrial fibrillation, CHF (congestive heart failure), COPD (chronic obstructive pulmonary disease), Diabetes mellitus, new onset, [place] (hard of hearing), and Hypertension. who presented to the ED complaining of worsening shortness and chills x1 day duration.  Patient reports baseline chronic shortness of breath in setting of underlying COPD however reported worsening shortness of breath from baseline unrelieved with home medications.  Aggravating factors included exertion with no known alleviating factors.  Associated symptoms included chills, nausea, and chronic productive cough in setting of COPD reported all other review of systems negative except those noted above.  Patient reported his wife was recently diagnosed with COVID and feels he may have contracted it from her.  He reported he was not vaccinated against COVID and was in his usual state health prior to onset of symptoms.  Patient was noted to have increased oxygen demand in the ED prompting admission for symptomatic COVID him to initiate remdesivir treatment.      PCP: Chelsy Torrez        * No surgery found *      Hospital Course:   Patient admitted with COVID, requiring 1-2 L initially of oxygen with dropped to 89% in ED. patient has responded well to remdesivir and steroids.  On room air today.  Would recommend he gets at least 3 doses of remdesivir, if passes 6 minute walk test on Sunday can discharge then, if not, would complete 5 days of remdesivir.       ROS:    Cardiac: Negative for  chest pain, palpitations, orthopnea, or PND.  Pulmonary: Negative for dyspnea, cough, hemoptysis, or wheezing.          Vitals:    09/03/22 2325 09/04/22 0521 09/04/22 0808 09/04/22 0813   BP: 118/62 130/78 116/72    BP Location: Left arm Left arm Left arm    Patient Position: Lying Lying Lying    Pulse: 64 63 67 71   Resp: 17 17 20 18   Temp: 98.1 °F (36.7 °C) 98.5 °F (36.9 °C) 98.2 °F (36.8 °C)    TempSrc: Oral Oral Oral    SpO2: 96% 96% 97% 97%   Weight:       Height:            Body mass index is 25.02 kg/m².        PHYSICAL EXAM:  GENERAL APPEARANCE: Well developed, well nourished, alert and cooperative, and appears to be in no acute distress.  HEENT:     HEAD: NC/AT     EYES: PERRL, EOMI.  Vision is grossly intact.  NECK: Neck supple, non-tender without LAD, masses or thyromegaly.  CARDIAC: Normal S1 and S2. No S3, S4 or murmurs. Rhythm is regular. There is no peripheral edema, cyanosis or pallor.   LUNGS: Clear to auscultation and percussion without rales, rhonchi, wheezing or diminished breath sounds.  ABDOMEN: Positive bowel sounds. Soft, nondistended, nontender. No guarding or rebound. No masses.  MSK: No joint erythema or tenderness. Normal muscular development. Normal gait.  BACK: Examination of the spine reveals normal gait and posture, no spinal deformity  EXTREMITIES: No significant deformity or joint abnormality. No edema. Peripheral pulses intact. No varicosities.  LOWER EXTREMITY: Examination of both feet reveals all toes to be normal in size and symmetry, normal range of motion,   NEUROLOGICAL: CN II-XII intact. Strength and sensation symmetric and intact throughout.   SKIN: Skin normal color, texture and turgor with no lesions or eruptions.  PSYCHIATRIC: The mental examination revealed the patient was oriented to person, place, and time.       Goals of Care Treatment Preferences:  Code Status: Full Code      Consults:     No new Assessment & Plan notes have been filed under this hospital service  since the last note was generated.  Service: Hospital Medicine    Final Active Diagnoses:    Diagnosis Date Noted POA    PRINCIPAL PROBLEM:  COVID-19 [U07.1] 09/02/2022 Yes    Type 2 diabetes mellitus, without long-term current use of insulin [E11.9] 11/11/2020 Yes    Stage 3a chronic kidney disease [N18.31] 11/11/2020 Yes    COPD (chronic obstructive pulmonary disease) [J44.9] 06/19/2020 Yes    Anemia of chronic disease [D63.8] 09/18/2019 Yes    Primary hypertension [I10]  Yes    Atrial fibrillation [I48.91]  Yes      Problems Resolved During this Admission:    Diagnosis Date Noted Date Resolved POA    Microcytic anemia [D50.9] 07/24/2013 09/02/2022 Yes       Discharged Condition: good    Disposition:     Follow Up:    Patient Instructions:      Ambulatory referral/consult to Internal Medicine   Standing Status: Future   Referral Priority: Routine Referral Type: Consultation   Referral Reason: Specialty Services Required   Requested Specialty: Internal Medicine   Number of Visits Requested: 1       Significant Diagnostic Studies:       Recent Results (from the past 24 hour(s))   POCT glucose    Collection Time: 09/03/22  4:11 PM   Result Value Ref Range    POCT Glucose 173 (H) 70 - 110 mg/dL   POCT glucose    Collection Time: 09/03/22  7:43 PM   Result Value Ref Range    POCT Glucose 159 (H) 70 - 110 mg/dL   Lactate Dehydrogenase    Collection Time: 09/04/22  4:36 AM   Result Value Ref Range     110 - 260 U/L   Ferritin    Collection Time: 09/04/22  4:36 AM   Result Value Ref Range    Ferritin 1,489 (H) 20.0 - 300.0 ng/mL   D-Dimer, Quantitative    Collection Time: 09/04/22  4:36 AM   Result Value Ref Range    D-Dimer 1.17 (H) <0.50 mg/L FEU   CBC with Automated Differential    Collection Time: 09/04/22  4:36 AM   Result Value Ref Range    WBC 3.84 (L) 3.90 - 12.70 K/uL    RBC 3.24 (L) 4.60 - 6.20 M/uL    Hemoglobin 9.7 (L) 14.0 - 18.0 g/dL    Hematocrit 28.2 (L) 40.0 - 54.0 %    MCV 87 82 - 98 fL     MCH 29.9 27.0 - 31.0 pg    MCHC 34.4 32.0 - 36.0 g/dL    RDW 16.4 (H) 11.5 - 14.5 %    Platelets 85 (L) 150 - 450 K/uL    MPV SEE COMMENT 9.2 - 12.9 fL    Immature Granulocytes 0.3 0.0 - 0.5 %    Gran # (ANC) 2.7 1.8 - 7.7 K/uL    Immature Grans (Abs) 0.01 0.00 - 0.04 K/uL    Lymph # 0.9 (L) 1.0 - 4.8 K/uL    Mono # 0.2 (L) 0.3 - 1.0 K/uL    Eos # 0.0 0.0 - 0.5 K/uL    Baso # 0.00 0.00 - 0.20 K/uL    nRBC 3 (A) 0 /100 WBC    Gran % 69.2 38.0 - 73.0 %    Lymph % 24.5 18.0 - 48.0 %    Mono % 6.0 4.0 - 15.0 %    Eosinophil % 0.0 0.0 - 8.0 %    Basophil % 0.0 0.0 - 1.9 %    Differential Method Automated    POCT glucose    Collection Time: 09/04/22  8:09 AM   Result Value Ref Range    POCT Glucose 104 70 - 110 mg/dL       Microbiology Results (last 7 days)     ** No results found for the last 168 hours. **           Imaging Results          X-Ray Chest AP Portable (Final result)  Result time 09/01/22 22:53:27    Final result by Ruperto Banuelos MD (09/01/22 22:53:27)                 Impression:      No acute process.      Electronically signed by: Ruperto Banuelos MD  Date:    09/01/2022  Time:    22:53             Narrative:    EXAMINATION:  XR CHEST AP PORTABLE    CLINICAL HISTORY:  shortness of breath;    TECHNIQUE:  Single frontal view of the chest was performed.    COMPARISON:  08/05/2022.    FINDINGS:  Monitoring EKG leads are present.  The trachea is unremarkable.  The cardiomediastinal silhouette is within normal limits.  There is stable prominence of the right hilum.  There are no pleural effusions.  There is no evidence of a pneumothorax.  There is no evidence of pneumomediastinum.  No airspace opacity is present.  There are degenerative changes in the osseous structures.                                    Pending Diagnostic Studies:     None         Medications:  Reconciled Home Medications:      Medication List      CONTINUE taking these medications    albuterol 90 mcg/actuation inhaler  Commonly known as:  PROVENTIL/VENTOLIN HFA  Inhale 1-2 puffs into the lungs every 6 (six) hours as needed. Rescue     aspirin 81 MG EC tablet  Commonly known as: ECOTRIN  Take 81 mg by mouth once daily.     fluticasone-salmeterol 250-50 mcg/dose 250-50 mcg/dose diskus inhaler  Commonly known as: ADVAIR  Inhale 1 puff into the lungs 2 (two) times daily. Controller     hydrALAZINE 50 MG tablet  Commonly known as: APRESOLINE  Take 1 tablet (50 mg total) by mouth every 8 (eight) hours.     HYDROcodone-acetaminophen 5-325 mg per tablet  Commonly known as: NORCO  Take 1 tablet by mouth every 4 (four) hours as needed for Pain.     ipratropium-albuteroL  mcg/actuation inhaler  Commonly known as: CombiVENT  Inhale 1 puff into the lungs every 6 (six) hours as needed for Wheezing or Shortness of Breath. Rescue     metoprolol succinate 50 MG 24 hr tablet  Commonly known as: TOPROL-XL  Take 50 mg by mouth once daily.     SITagliptin 50 MG Tab  Commonly known as: JANUVIA        STOP taking these medications    azithromycin 250 MG tablet  Commonly known as: Z-TERRENCE     ipratropium 0.02 % nebulizer solution  Commonly known as: ATROVENT            Indwelling Lines/Drains at time of discharge:   Lines/Drains/Airways     None                 Time spent on the discharge of patient: 35 minutes         Dylan Cason MD  Department of Hospital Medicine  [facility]

## 2022-09-04 NOTE — PLAN OF CARE
Pt is AAOx4. Room air. Tele maintained.   No falls reported during shift, safety precautions maintained.     Problem: Adult Inpatient Plan of Care  Goal: Plan of Care Review  Outcome: Ongoing, Progressing     Problem: Adult Inpatient Plan of Care  Goal: Optimal Comfort and Wellbeing  Outcome: Ongoing, Progressing

## 2022-09-06 NOTE — PROGRESS NOTES
C3 nurse attempted to contact Micah Laurent Jr. for a TCC post hospital discharge follow up call. No answer. The patient does not have a scheduled HOSFU appointment, and the pt does not have an [facility]CP

## 2022-09-07 NOTE — PROGRESS NOTES
C3 nurse spoke with Stony Pointdoris Laurent  ( Wife) for a TCC post hospital discharge follow up call. The patient does not have a scheduled HOSFU appointment with Chelsy Torrez MD   within 5-7 days post hospital discharge date 09/04/2022. C3 nurse was unable to schedule HOSFU appointment in [facility].    Patient wife instructed to please contact pcp and schedule follow up appointment using HOSFU visit type on or before 09/21/2022  NP referral sent.

## 2022-12-13 NOTE — ED PROVIDER NOTES
"Encounter Date: 2022       History     Chief Complaint   Patient presents with    Respiratory Arrest     Wife was on the way to [facility] ED when pt became apneic and unresponsive in car. Pt was pulseless upon ED arrival and CPR initiated. Pt's initial complaint  per wife was "feeling bad".      83 yo male with history of Atrial fibrillation, CHF, COPD, DM, HTN presents with cardiopulmonary arrest. Pt was having COPD-like symptoms at home. Family reports multiple prior visits for similar COPD exacerbations. Pt walked to the car but in the car had worsening SOB and became unresponsive and slumped over. Drive to the hospital was 10 minutes. Triage found patient in the car pulseless and apneic. PT pulled out onto stretcher and CPR started and brought emergently to a room.  Further history limited by acuity of condition and pt nonverbal.       Review of patient's allergies indicates:   Allergen Reactions    Lisinopril      Swells lower legs.     Past Medical History:   Diagnosis Date    Asthma     Atrial fibrillation     CHF (congestive heart failure)     COPD (chronic obstructive pulmonary disease)     Diabetes mellitus, new onset     [place] (hard of hearing)     Hypertension      Past Surgical History:   Procedure Laterality Date    NO PAST SURGERIES  2020     History reviewed. No pertinent family history.  Social History     Tobacco Use    Smoking status: Former     Packs/day: 1.50     Years: 20.00     Pack years: 30.00     Types: Cigarettes     Quit date:      Years since quittin.9    Smokeless tobacco: Former    Tobacco comments:     quit in    Substance Use Topics    Alcohol use: No    Drug use: No     Review of Systems   Unable to perform ROS: Patient nonverbal     Physical Exam     Initial Vitals   BP Pulse Resp Temp SpO2   22 1553 22 1547 22 1547 -- 22 1601   (!) 139/108 104 (!) 100  100 %      MAP       --                Physical Exam    Nursing note and " vitals reviewed.  Constitutional: He appears well-developed. He appears distressed.   HENT:   Head: Atraumatic.   Eyes:   Pupils dilated.    Neck: JVD present.   Cardiovascular:            Asystole - no pulse.    Pulmonary/Chest: He is in respiratory distress.   Distant Breath sound bilaterally.    Abdominal: He exhibits no distension.   Musculoskeletal:         General: No edema.     Neurological:   GCS 3       ED Course   Critical Care    Date/Time: 12/13/2022 4:40 PM  Performed by: Tejinder Scott MD  Authorized by: Tejinder Scott MD   Direct patient critical care time: 20 minutes  Additional history critical care time: 10 minutes  Ordering / reviewing critical care time: 10 minutes  Documentation critical care time: 5 minutes  Consulting other physicians critical care time: 5 minutes  Consult with family critical care time: 5 minutes  Total critical care time (exclusive of procedural time) : 55 minutes  Critical care time was exclusive of separately billable procedures and treating other patients and teaching time.  Critical care was necessary to treat or prevent imminent or life-threatening deterioration of the following conditions: cardiac failure, circulatory failure, respiratory failure, CNS failure or compromise, shock and sepsis.  Critical care was time spent personally by me on the following activities: blood draw for specimens, development of treatment plan with patient or surrogate, discussions with consultants, discussions with primary provider, gastric intubation, interpretation of cardiac output measurements, evaluation of patient's response to treatment, examination of patient, ordering and performing treatments and interventions, obtaining history from patient or surrogate, ordering and review of radiographic studies, ordering and review of laboratory studies, pulse oximetry, re-evaluation of patient's condition, review of old charts, ventilator management and vascular access  procedures.      Intubation    Date/Time: 12/13/2022 4:00 PM  Location procedure was performed: [facility] ED PHYSICIAN / ED SCRIBE  Performed by: Tejinder Scott MD  Authorized by: Tejinder Scott MD   Consent Done: Emergent Situation  Indications: respiratory failure  Intubation method: video-assisted  Patient status: unconscious  Preoxygenation: BVM  Laryngoscope size: Glide 2  Tube size: 7.5 mm  Tube type: cuffed  Number of attempts: 1  Cricoid pressure: no  Cords visualized: yes  Post-procedure assessment: CO2 detector  Breath sounds: diminished  Cuff inflated: yes  ETT to lip: 24 cm  Tube secured with: ETT dodson  Chest x-ray interpreted by me.  Chest x-ray findings: endotracheal tube in appropriate position  Patient tolerance: Patient tolerated the procedure well with no immediate complications  DO NOT USE COMPLICATIONS: small blood to mid upper lip.      Labs Reviewed   CBC W/ AUTO DIFFERENTIAL - Abnormal; Notable for the following components:       Result Value    RBC 1.74 (*)     Hemoglobin 5.2 (*)     Hematocrit 16.1 (*)     RDW 23.6 (*)     Platelets 103 (*)     Immature Granulocytes 3.1 (*)     Immature Grans (Abs) 0.26 (*)     Mono # 0.2 (*)     nRBC 3 (*)     Mono % 2.6 (*)     All other components within normal limits    Narrative:        Hemoglobin/hematocrit critical result(s) called and verbal   readback obtained from Patti by [facility] 12/13/2022 16:52   COMPREHENSIVE METABOLIC PANEL - Abnormal; Notable for the following components:    Potassium 6.3 (*)     CO2 19 (*)     Glucose 159 (*)     BUN 34 (*)     Creatinine 2.0 (*)     Calcium 7.7 (*)     Albumin 2.9 (*)     AST 87 (*)     ALT 53 (*)     eGFR 33 (*)     All other components within normal limits    Narrative:        POTASSIUM critical result(s) called and verbal readback obtained   from DAWN CHOUDHARY by [facility] 12/13/2022 17:32   TROPONIN I - Abnormal; Notable for the following components:    Troponin I 0.036 (*)     All other components  within normal limits   PHOSPHORUS - Abnormal; Notable for the following components:    Phosphorus 6.6 (*)     All other components within normal limits   LACTIC ACID, PLASMA - Abnormal; Notable for the following components:    Lactate (Lactic Acid) 7.3 (*)     All other components within normal limits   POCT GLUCOSE - Abnormal; Notable for the following components:    POCT Glucose 155 (*)     All other components within normal limits   CULTURE, BLOOD   PROTIME-INR   PROCALCITONIN   MAGNESIUM   URINALYSIS, REFLEX TO URINE CULTURE   LACTIC ACID, PLASMA   PREPARE RBC SOFT          Imaging Results               X-ray Abdomen for NG Tube Placement (Nursing should notify Radiology after placement) (Final result)  Result time 12/13/22 16:44:20      Final result by Jacob Jara MD (12/13/22 16:44:20)                   Impression:      Malpositioned enteric tube with the tip coiled upon itself within the distal thoracic esophagus.  Repositioning is recommended.    This report was flagged in Epic as abnormal.      Electronically signed by: Jacob Jara MD  Date:    12/13/2022  Time:    16:44               Narrative:    EXAMINATION:  XR NON-RADIOLOGIST PERFORMED NG/GASTRIC TUBE CHECK    CLINICAL HISTORY:  OG Tube;    TECHNIQUE:  A single AP radiograph was obtained at the level of the diaphragm to evaluate positioning of the enteric tube.    COMPARISON:  None    FINDINGS:  There is malpositioning of the enteric tube.  The tube is folded upon itself within the distal thoracic esophagus with the tip directed cephalad.  Repositioning is recommended.  Once again, a focal opacity is identified within the right lung base, likely right basilar pneumonia, however continued follow-up is recommended to ensure clearance.                                        X-Ray Chest AP Portable (Final result)  Result time 12/13/22 16:28:36      Final result by Medardo Zarate MD (12/13/22 16:28:36)                   Impression:      1. 6 cm  ovoid right basilar airspace disease could represent focal consolidation and pneumonia.  Underlying pulmonary nodule is not excluded and follow-up to resolution is recommended.  Clinical correlation recommended.  2. Mild patchy infiltrate/pneumonitis in the right upper lobe also.  3. Small pleural effusion on the right.  4. NG tube present with tip at the level of the distal esophagus.  Recommend advancing the NG tube.  5.  This report was flagged in Epic as abnormal.      Electronically signed by: Medardo Villegas  Date:    12/13/2022  Time:    16:28               Narrative:    EXAMINATION:  XR CHEST AP PORTABLE    CLINICAL HISTORY:  Sepsis;    TECHNIQUE:  Single frontal view of the chest was performed.    COMPARISON:  09/01/2022    FINDINGS:  Cardiac pads and leads overlie the field of view obscuring visualization.    Endotracheal tube is present with tip above the denny.  NG tube is present with tip at the level the distal esophagus.  Recommend advancing the NG tube.    6 cm ovoid airspace disease the right lung base could represent focal consolidation and pneumonia.  Underlying pulmonary nodule is not completely excluded and follow-up to resolution is recommended.  Mild patchy infiltrate in the right upper lobe also.    Small pleural effusion on the right.    Cardiac silhouette is normal in size.  No acute osseous abnormality.                                       Medications   NORepinephrine bitartrate-D5W 4 mg/250 mL (16 mcg/mL) infusion Soln (0 mcg/kg/min  Stopped 12/13/22 1700)   albuterol-ipratropium 2.5 mg-0.5 mg/3 mL nebulizer solution 9 mL (9 mLs Nebulization Given 12/13/22 1617)   methylPREDNISolone sodium succinate injection 125 mg (125 mg Intravenous Given 12/13/22 1626)   magnesium sulfate in dextrose IVPB (premix) 1 g (0 g Intravenous Stopped 12/13/22 1727)   EPINEPHrine 0.1 mg/mL injection (0.1 mg Intravenous Given 12/13/22 1717)   atropine injection (0.1 mg Intravenous Given 12/13/22 1717)   sodium  bicarbonate 8.4 % (1 mEq/mL) injection (50 mEq Intravenous Given 12/13/22 1722)   amiodarone injection (300 mg Intravenous Given 12/13/22 1721)   calcium chloride 100 mg/mL (10 %) injection (1 g Intravenous Given 12/13/22 1722)   sodium chloride 0.9% bolus (0 mLs Intravenous Stopped 12/13/22 1925)   NORepinephrine 4 mg in dextrose 5 % 250 mL infusion (0 mcg/kg/min × 72 kg Intravenous Stopped 12/13/22 1753)   naloxone 0.4 mg/mL injection (2 mg Intravenous Given 12/13/22 1544)     Pt had what sounds like respiratory arrest. Time from slumping event in the car until pt pulled out of the car is estimated to be 10 minutes per the spouse. ROSC within 15 min in the ED after intubation and 3rd round of Epinephrine. Atropine once and Bicarb once also given. Pt turned over to Dr. Bah to follow and admit patient to the ICU.              ED Course as of 12/14/22 0009   Tue Dec 13, 2022   1801 Assumed care of patient at 17:00 upon assessment of patient he became hypotensive bradycardic and lost pulses.  CPR, after 2 rounds of CPR and epi ROSC was achieved the patient shortly lost pulses.  Per review of lab work obtained prior to my arrival patient had a potassium of 6.3 and hemoglobin of 5.2.  Order for on crossed blood was placed.  CPR was again initiated and patient was given epinephrine sodium bicarb and calcium chloride bolus was given.  Patient achieved ROSC and noted to be in a wide complex tachycardia so amiodarone bolus and drip was initiated.  After discussion with family of his current status they elected the patient to be DNR.  His wife reports that he would have not wanted to be on a ventilator nor wanted chest compressions.  She states wanting him to be extubated.  Time of death called at 5:54 PM.   [AS]      ED Course User Index  [AS] Adan Bah MD            This dictation has been generated using M-Modal Fluency Direct dictation; some phonetic errors may occur.          Clinical Impression:   Final  diagnoses:  [I46.9] Cardiac arrest  [R09.2] Respiratory arrest (Primary)  [J44.9] Chronic obstructive pulmonary disease, unspecified COPD type        ED Disposition Condition                     Adan Bah MD  22 0010

## 2022-12-13 NOTE — CLINICAL REVIEW
IP Sepsis Screen (most recent)       Sepsis Screen (IP) - 12/13/22 5900       Is the patient's history or complaint suggestive of a possible infection? Yes  -    Are there at least two of the following signs and symptoms present? Yes  -    Sepsis signs/symptoms - Hyper or Hypothermia Hyperthermia >100.4 or Hypothermia < 96.8  -    Sepsis signs/symptoms - Tachypnea Tachypnea     >20  -    Sepsis signs/symptoms - Tachypnea Tachypnea     >24  -    Are any of the following organ dysfunction criteria present and not considered to be due to a chronic condition? Yes  -    Organ Dysfunction Criteria SBP < 90 or MAP < 65  -    Organ Dysfunction Criteria - SBP/MAP SBP < 85 or MAP < 65  -    Organ Dysfunction Criteria Lactate > 2.0  -    Organ Dysfunction Criteria - Resp Comp Respiratory Compromise: Requiring > 5L NC  -JM    Initiate Sepsis Protocol No  -JM    Reason sepsis not considered Pt. receiving appropriate management  -              User Key  (r) = Recorded By, (t) = Taken By, (c) = Cosigned By      Initials Name     Marija Cyr RN

## 2022-12-14 NOTE — ED NOTES
[facility] coroners office called back - spoke with investigator Kevin - patient is released and will not be autopsied

## 2022-12-14 NOTE — ED TRIAGE NOTES
Pt arrived to ER by family member - unresponsive.- Pt began not feeling well at home and wife started rushing him to the hospital. While in the car he began gasping for air and became unresponsive. Pt placed in room and code blue called immediately.

## 2022-12-14 NOTE — ED NOTES
[facility] called back; spoke with Jodee Ambrocio; pt will not be a candidate for any donation. Referral number : [id number]

## 2022-12-17 LAB — BACTERIA BLD CULT: NORMAL

## 2024-10-11 NOTE — PLAN OF CARE
11/12/20 0933   Discharge Assessment   Assessment Type Discharge Planning Assessment   Assessment information obtained from? Medical Record   Communicated expected length of stay with patient/caregiver yes   Prior to hospitilization cognitive status: Alert/Oriented   Prior to hospitalization functional status: Independent   Current cognitive status: Alert/Oriented   Current Functional Status: Independent   Facility Arrived From: home   Lives With spouse   Able to Return to Prior Arrangements yes   Is patient able to care for self after discharge? Yes   Who are your caregiver(s) and their phone number(s)? Susanne [phone]   Patient's perception of discharge disposition home or selfcare   Readmission Within the Last 30 Days no previous admission in last 30 days   Patient currently being followed by outpatient case management? No   Patient currently receives any other outside agency services? No   Equipment Currently Used at Home none   Do you have any problems affording any of your prescribed medications? No   Is the patient taking medications as prescribed? yes   Does the patient have transportation home? Yes   Transportation Anticipated family or friend will provide;car, drives self   Does the patient receive services at the [facility]? No   Discharge Plan A Home with family  (with follow up)   DME Needed Upon Discharge  none   Patient/Family in Agreement with Plan yes     [id number] - [place], [place] - [address]  [address] [place] [id number]  Phone: [phone] Fax: [phone]     Right  hip/decreased ROM/decreased ROM due to pain details…